# Patient Record
Sex: FEMALE | Race: WHITE | NOT HISPANIC OR LATINO | Employment: UNEMPLOYED | URBAN - NONMETROPOLITAN AREA
[De-identification: names, ages, dates, MRNs, and addresses within clinical notes are randomized per-mention and may not be internally consistent; named-entity substitution may affect disease eponyms.]

---

## 2017-03-30 ENCOUNTER — TRANSFERRED RECORDS (OUTPATIENT)
Dept: HEALTH INFORMATION MANAGEMENT | Facility: HOSPITAL | Age: 22
End: 2017-03-30

## 2017-03-30 ENCOUNTER — AMBULATORY - HEALTHEAST (OUTPATIENT)
Dept: MULTI SPECIALTY CLINIC | Facility: CLINIC | Age: 22
End: 2017-03-30

## 2017-03-30 LAB
C TRACH DNA SPEC QL PROBE+SIG AMP: NORMAL
N GONORRHOEA DNA SPEC QL PROBE+SIG AMP: NORMAL
PAP SMEAR - HIM PATIENT REPORTED: NORMAL
PAP: NORMAL

## 2017-03-31 LAB
C TRACH DNA SPEC QL PROBE+SIG AMP: NEGATIVE
N GONORRHOEA DNA SPEC QL PROBE+SIG AMP: NEGATIVE
SPECIMEN DESCRIP: NORMAL
SPECIMEN DESCRIPTION: NORMAL

## 2017-05-23 LAB
ABO + RH BLD: NORMAL
ABO + RH BLD: NORMAL
BLD GP AB SCN SERPL QL: NORMAL
HBV SURFACE AG SERPL QL IA: NORMAL
HCT VFR BLD AUTO: 40.5 %
HEMOGLOBIN: 13.5 G/DL (ref 11.7–15.7)
HIV 1+2 AB+HIV1 P24 AG SERPL QL IA: NORMAL
RUBELLA ABY IGG: NORMAL
T PALLIDUM IGG SER QL: NON REACTIVE

## 2017-07-21 ENCOUNTER — TRANSFERRED RECORDS (OUTPATIENT)
Dept: HEALTH INFORMATION MANAGEMENT | Facility: HOSPITAL | Age: 22
End: 2017-07-21

## 2017-07-21 LAB
GLU GEST SCREEN 1HR 50G: 190
GLU GEST SCREEN 1HR 50G: 190
HCT VFR BLD AUTO: 38.3 %
HEMOGLOBIN: 13.2 G/DL (ref 11.7–15.7)

## 2017-09-01 ENCOUNTER — TRANSFERRED RECORDS (OUTPATIENT)
Dept: HEALTH INFORMATION MANAGEMENT | Facility: HOSPITAL | Age: 22
End: 2017-09-01

## 2017-09-11 ENCOUNTER — TELEPHONE (OUTPATIENT)
Dept: OBGYN | Facility: OTHER | Age: 22
End: 2017-09-11

## 2017-09-11 NOTE — TELEPHONE ENCOUNTER
Pt states she has moved into town & would like to schedule with Dr Severino/Mony Fernández    She states she is 35 wks pregnant    Please call   [phone]

## 2017-09-15 ENCOUNTER — HOSPITAL ENCOUNTER (OUTPATIENT)
Dept: EDUCATION SERVICES | Facility: HOSPITAL | Age: 22
Discharge: HOME | End: 2017-09-15
Attending: NURSE PRACTITIONER | Admitting: NURSE PRACTITIONER
Payer: MEDICAID

## 2017-09-15 ENCOUNTER — TELEPHONE (OUTPATIENT)
Dept: EDUCATION SERVICES | Facility: HOSPITAL | Age: 22
End: 2017-09-15

## 2017-09-15 ENCOUNTER — PRENATAL OFFICE VISIT (OUTPATIENT)
Dept: OBGYN | Facility: OTHER | Age: 22
End: 2017-09-15
Attending: NURSE PRACTITIONER
Payer: MEDICAID

## 2017-09-15 VITALS
SYSTOLIC BLOOD PRESSURE: 120 MMHG | DIASTOLIC BLOOD PRESSURE: 72 MMHG | BODY MASS INDEX: 39.7 KG/M2 | HEIGHT: 66 IN | WEIGHT: 247 LBS

## 2017-09-15 VITALS
WEIGHT: 251 LBS | DIASTOLIC BLOOD PRESSURE: 78 MMHG | BODY MASS INDEX: 40.34 KG/M2 | OXYGEN SATURATION: 98 % | HEART RATE: 106 BPM | SYSTOLIC BLOOD PRESSURE: 123 MMHG | HEIGHT: 66 IN

## 2017-09-15 DIAGNOSIS — O24.415 GESTATIONAL DIABETES MELLITUS (GDM) IN THIRD TRIMESTER CONTROLLED ON ORAL HYPOGLYCEMIC DRUG: ICD-10-CM

## 2017-09-15 DIAGNOSIS — A60.04 HERPES SIMPLEX VIRUS (HSV) INFECTION OF VAGINA: Primary | ICD-10-CM

## 2017-09-15 DIAGNOSIS — O24.419 GESTATIONAL DIABETES MELLITUS (GDM) AFFECTING PREGNANCY: ICD-10-CM

## 2017-09-15 DIAGNOSIS — B00.9 RECURRENT HSV (HERPES SIMPLEX VIRUS): ICD-10-CM

## 2017-09-15 DIAGNOSIS — Z23 NEED FOR PROPHYLACTIC VACCINATION AND INOCULATION AGAINST INFLUENZA: ICD-10-CM

## 2017-09-15 DIAGNOSIS — O09.93 SUPERVISION OF HIGH RISK PREGNANCY IN THIRD TRIMESTER: ICD-10-CM

## 2017-09-15 DIAGNOSIS — O24.414 INSULIN CONTROLLED GESTATIONAL DIABETES MELLITUS (GDM) IN THIRD TRIMESTER: Primary | ICD-10-CM

## 2017-09-15 DIAGNOSIS — O09.613 HIGH-RISK FIRST PREGNANCY OF YOUNG WOMAN, THIRD TRIMESTER: ICD-10-CM

## 2017-09-15 PROCEDURE — 90686 IIV4 VACC NO PRSV 0.5 ML IM: CPT | Performed by: NURSE PRACTITIONER

## 2017-09-15 PROCEDURE — G0108 DIAB MANAGE TRN  PER INDIV: HCPCS

## 2017-09-15 PROCEDURE — 90715 TDAP VACCINE 7 YRS/> IM: CPT | Performed by: NURSE PRACTITIONER

## 2017-09-15 PROCEDURE — 87653 STREP B DNA AMP PROBE: CPT | Mod: ZL | Performed by: NURSE PRACTITIONER

## 2017-09-15 PROCEDURE — 90471 IMMUNIZATION ADMIN: CPT | Performed by: NURSE PRACTITIONER

## 2017-09-15 PROCEDURE — 90472 IMMUNIZATION ADMIN EACH ADD: CPT | Performed by: NURSE PRACTITIONER

## 2017-09-15 PROCEDURE — 99212 OFFICE O/P EST SF 10 MIN: CPT | Mod: 25 | Performed by: NURSE PRACTITIONER

## 2017-09-15 PROCEDURE — 99207 ZZC PRENATAL VISIT: CPT | Performed by: NURSE PRACTITIONER

## 2017-09-15 RX ORDER — VALACYCLOVIR HYDROCHLORIDE 500 MG/1
500 TABLET, FILM COATED ORAL 2 TIMES DAILY
Qty: 20 TABLET | Refills: 0 | Status: SHIPPED | OUTPATIENT
Start: 2017-09-15 | End: 2017-09-22

## 2017-09-15 ASSESSMENT — PAIN SCALES - GENERAL: PAINLEVEL: NO PAIN (0)

## 2017-09-15 NOTE — PROGRESS NOTES
Injectable Influenza Immunization Documentation    1.  Is the person to be vaccinated sick today? NO    2. Does the person to be vaccinated have an allergy to a component   of the vaccine? NO    3. Has the person to be vaccinated ever had a serious reaction   to influenza vaccine in the past? NO    4. Has the person to be vaccinated ever had Guillain-Barré syndrome? NO    Form completed by Annamarie Aguayo

## 2017-09-15 NOTE — TELEPHONE ENCOUNTER
Started Megyn on HumuLIN N U-100 - 10 units daily at bedtime. Do you want her to stop Glyguride? Thanks!

## 2017-09-15 NOTE — NURSING NOTE
"Chief Complaint   Patient presents with     Diabetes     consult       Initial /78 (BP Location: Left arm, Patient Position: Chair, Cuff Size: Adult Large)  Pulse 106  Ht 1.676 m (5' 6\")  Wt 113.9 kg (251 lb)  SpO2 98%  BMI 40.51 kg/m2 Estimated body mass index is 40.51 kg/(m^2) as calculated from the following:    Height as of this encounter: 1.676 m (5' 6\").    Weight as of this encounter: 113.9 kg (251 lb).  Medication Reconciliation: complete   Yue Blake      "

## 2017-09-15 NOTE — MR AVS SNAPSHOT
After Visit Summary   9/15/2017    Lorena Echevarria    MRN: [id number]           Patient Information     Date Of Birth          1995        Visit Information        Provider Department      9/15/2017 3:30 PM Mony Fernández NP [facility]        Today's Diagnoses     Herpes simplex virus (HSV) infection of vagina    -  1    Gestational diabetes mellitus (GDM) affecting pregnancy        Supervision of high risk pregnancy in third trimester        Need for prophylactic vaccination and inoculation against influenza        High-risk first pregnancy of young woman, third trimester        Recurrent HSV (herpes simplex virus)        Gestational diabetes mellitus (GDM) in third trimester controlled on oral hypoglycemic drug          Care Instructions    RTC 1 week          Follow-ups after your visit        Additional Services     DIABETES EDUCATION REFERRAL ([place])       DIABETES SELF-MANAGEMENT TRAINING (DSMT)  Type of training and number of hours requested:  Initial Group DMST; 10    (Medicare will cover:10 hours initial DSMT in 12-month period, plus 2 hours follow-up DSMT annually)    Please add if the patient has special educational need: Additional Insulin Training  (Patients with special needs requiring individual DSMT)    Please include the following DMST content: All ten content areas, as appropriate and Insulin start/management    Patient has the following:Pregnancy    Please begin Medical Nutritional Therapy.  Initial Medical Nutritional Therapy (MNT)  ([place] allows 3 hours initial MNT in the first calendar year, plus two hours follow up MNT annually.  Additional MNT hours are available for change in medical condition treatment and/or diagnosis)    Additional Services Provided:  >>A1c will be completed upon referral and completion of program unless completed in clinic.  >>Influenza vaccination assessment (form #N228) as applicable.  >>Order for diabetes supplies will be faxed to  "patient's pharmacy.  >>If on insulin: Insulin dose adjustment per staged Diabetes Mgmt. Protocols    DIABETES RESOURCE CENTER  [facility]  Telephone:  [phone]   Fax:  [phone]            DIABETES EDUCATOR REFERRAL       Pt is currently pregnant. Failed 3  hr GTT.                  Your next 10 appointments already scheduled     Sep 22, 2017 11:30 AM CDT   (Arrive by 11:15 AM)   ESTABLISHED PRENATAL with Fidel Severino MD   [facility] ([facility] )    [address]  [facility] [address]   [phone]              Future tests that were ordered for you today     Open Future Orders        Priority Expected Expires Ordered    DIABETES EDUCATOR REFERRAL Routine 9/15/2017 11/15/2017 9/15/2017            Who to contact     If you have questions or need follow up information about today's clinic visit or your schedule please contact [facility] directly at [phone].  Normal or non-critical lab and imaging results will be communicated to you by MyChart, letter or phone within 4 business days after the clinic has received the results. If you do not hear from us within 7 days, please contact the clinic through PluggedInhart or phone. If you have a critical or abnormal lab result, we will notify you by phone as soon as possible.  Submit refill requests through Purchext or call your pharmacy and they will forward the refill request to us. Please allow 3 business days for your refill to be completed.          Additional Information About Your Visit        Purchext Information     Purchext lets you send messages to your doctor, view your test results, renew your prescriptions, schedule appointments and more. To sign up, go to [email or web address] . Click on \"Log in\" on the left side of the screen, which will take you to the Welcome page. Then click on \"Sign up Now\" on the right side of the page.     You will be asked to enter the access code listed " "below, as well as some personal information. Please follow the directions to create your username and password.     Your access code is: [email or web address]  Expires: 2017 11:23 AM     Your access code will  in 90 days. If you need help or a new code, please call your [place] clinic or [phone].        Care EveryWhere ID     This is your Care EveryWhere ID. This could be used by other organizations to access your [place] medical records  [id number]        Your Vitals Were     Height BMI (Body Mass Index)                5' 6\" (1.676 m) 39.87 kg/m2           Blood Pressure from Last 3 Encounters:   09/15/17 120/72   14 128/87   14 112/52    Weight from Last 3 Encounters:   09/15/17 247 lb (112 kg)   14 238 lb 12 oz (108.3 kg) (>99 %)*   03/15/14 203 lb (92.1 kg) (98 %)*     * Growth percentiles are based on [facility] 2-20 Years data.              We Performed the Following     DIABETES EDUCATION REFERRAL ([place])     FLU VAC, SPLIT VIRUS IM > 3 YO (QUADRIVALENT) [22807]     Strep, Group B by PCR     TDAP VACCINE ([facility])     Vaccine Administration, Each Additional [78209]     VACCINE ADMINISTRATION, INITIAL          Today's Medication Changes          These changes are accurate as of: 9/15/17  4:12 PM.  If you have any questions, ask your nurse or doctor.               These medicines have changed or have updated prescriptions.        Dose/Directions    * VALACYCLOVIR HCL PO   This may have changed:  Another medication with the same name was added. Make sure you understand how and when to take each.   Changed by:  Mony Fernnádez, NP        Dose:  500 mg   Take 500 mg by mouth daily   Refills:  0       * valACYclovir 500 MG tablet   Commonly known as:  VALTREX   This may have changed:  You were already taking a medication with the same name, and this prescription was added. Make sure you understand how and when to take each.   Used for:  Herpes simplex virus (HSV) infection of vagina   Changed " by:  Mony Fernández, NP        Dose:  500 mg   Take 1 tablet (500 mg) by mouth 2 times daily   Quantity:  20 tablet   Refills:  0       * Notice:  This list has 2 medication(s) that are the same as other medications prescribed for you. Read the directions carefully, and ask your doctor or other care provider to review them with you.         Where to get your medicines      These medications were sent to [id number] - [place], [place] - [address] AT [facility] of [address], [facility] [address]     Phone:  [phone]     valACYclovir 500 MG tablet                Primary Care Provider    None       No address on file        Equal Access to Services     EDMOND QUIÑONES : [place], waen alcala, qadebra kaalmada sury, mayito dove . [facility] [phone].    ATENCIÓN: Si habla español, tiene a hein disposición servicios gratuitos de asistencia lingüística. Llame al [phone].    We comply with applicable federal civil rights laws and [place] laws. We do not discriminate on the basis of race, color, national origin, age, disability sex, sexual orientation or gender identity.            Thank you!     Thank you for choosing [facility]  for your care. Our goal is always to provide you with excellent care. Hearing back from our patients is one way we can continue to improve our services. Please take a few minutes to complete the written survey that you may receive in the mail after your visit with us. Thank you!             Your Updated Medication List - Protect others around you: Learn how to safely use, store and throw away your medicines at www.disposemymeds.org.          This list is accurate as of: 9/15/17  4:12 PM.  Always use your most recent med list.                   Brand Name Dispense Instructions for use Diagnosis    BUPROPION HCL PO      Take 150 mg by mouth daily        GLYBURIDE PO      Take 2.5 mg by mouth 2  times daily (with meals)        PRENATAL VITAMIN PO      Take 1 tablet by mouth daily        * VALACYCLOVIR HCL PO      Take 500 mg by mouth daily        * valACYclovir 500 MG tablet    VALTREX    20 tablet    Take 1 tablet (500 mg) by mouth 2 times daily    Herpes simplex virus (HSV) infection of vagina       * Notice:  This list has 2 medication(s) that are the same as other medications prescribed for you. Read the directions carefully, and ask your doctor or other care provider to review them with you.

## 2017-09-15 NOTE — DISCHARGE INSTRUCTIONS
Inject HumuLIN N 10 units daily at 9 pm    Continue checking blood glucose 4 times daily    I will call you on Monday, 9/18/17, for BG readings    Concerns, please call Laura at [phone]

## 2017-09-15 NOTE — NURSING NOTE
"Chief Complaint   Patient presents with     Prenatal Care     Transfer of care from [place], [place]       Initial /72  Ht 5' 6\" (1.676 m)  Wt 247 lb (112 kg)  BMI 39.87 kg/m2 Estimated body mass index is 39.87 kg/(m^2) as calculated from the following:    Height as of this encounter: 5' 6\" (1.676 m).    Weight as of this encounter: 247 lb (112 kg).  Medication Reconciliation: complete     GM LEDBETTER      "

## 2017-09-15 NOTE — PROGRESS NOTES
New ob WILFREDO.  Pt does not have records.  Release completed.  .  HSV with report of weekly outbreaks and not on suppression.  Begin treatment dose with switch to suppression once resolved.  GDM on glyburide.  Running out of test strips so only testing in the morning.  Fasting -200+.  Plan to see DC today and start insulin and monitoring.  BPP and NST set up for Monday.  Tdap and flu shot today.  States 20 week US was normal and girl.  Denies hx of other STI's.  Denies cramping or bleeding.  Baby active.  Seeing Dr Severino in 1 week.

## 2017-09-15 NOTE — IP AVS SNAPSHOT
MRN:[id number]                      After Visit Summary   9/15/2017    Lorena Echevarria    MRN: [id number]           Thank you!     Thank you for choosing [facility] for your care. Our goal is always to provide you with excellent care. Hearing back from our patients is one way we can continue to improve our services. Please take a few minutes to complete the written survey that you may receive in the mail after you visit with us. Thank you!        Patient Information     Date Of Birth          1995        About your hospital stay     You were admitted on:  September 15, 2017 You last received care in the:  [facility]    You were discharged on:  September 15, 2017       Who to Call     For medical emergencies, please call 911.  For non-urgent questions about your medical care, please call your primary care provider or clinic, None          Attending Provider     Provider Specialty    Mony Fernández, AJ OB/Gyn    [facility]       Primary Care Provider    None      Your next 10 appointments already scheduled     Sep 22, 2017 11:30 AM CDT   (Arrive by 11:15 AM)   ESTABLISHED PRENATAL with Fidel Severino MD   [facility] ([facility] )    [address] [facility] [address]   [phone]              Further instructions from your care team       Inject HumuLIN N 10 units daily at 9 pm    Continue checking blood glucose 4 times daily    I will call you on Monday, 9/18/17, for BG readings    Concerns, please call Laura at [phone]    Pending Results     Date and Time Order Name Status Description    9/15/2017 1600 GROUP B STREP PCR In process             Admission Information     Date & Time Provider Department Dept. Phone    9/15/2017 Nataliya Barrera [facility] [phone]      Your Vitals Were     Blood Pressure Pulse Height Weight Pulse Oximetry BMI (Body Mass Index)    123/78 (BP Location: Left arm, Patient Position: Chair, Cuff  "Size: Adult Large) 106 1.676 m (5' 6\") 113.9 kg (251 lb) 98% 40.51 kg/m2      [employer] Information     Editlite lets you send messages to your doctor, view your test results, renew your prescriptions, schedule appointments and more. To sign up, go to [email or web address] . Click on \"Log in\" on the left side of the screen, which will take you to the Welcome page. Then click on \"Sign up Now\" on the right side of the page.     You will be asked to enter the access code listed below, as well as some personal information. Please follow the directions to create your username and password.     Your access code is: [email or web address]  Expires: 2017 11:23 AM     Your access code will  in 90 days. If you need help or a new code, please call your [place] clinic or [phone].        Care EveryWhere ID     This is your Care EveryWhere ID. This could be used by other organizations to access your [place] medical records  [id number]        Equal Access to Services     [facility] Center[facility] : [place], waen alcala, stephanie ga, mayito nunez. So [facility] [phone].    ATENCIÓN: Si habla español, tiene a hein disposición servicios gratuitos de asistencia lingüística. Huyen al [phone].    We comply with applicable federal civil rights laws and [place] laws. We do not discriminate on the basis of race, color, national origin, age, disability sex, sexual orientation or gender identity.               Review of your medicines      UNREVIEWED medicines. Ask your doctor about these medicines        Dose / Directions    BUPROPION HCL PO        Dose:  150 mg   Take 150 mg by mouth daily   Refills:  0       GLYBURIDE PO        Dose:  2.5 mg   Take 2.5 mg by mouth 2 times daily (with meals)   Refills:  0       PRENATAL VITAMIN PO        Dose:  1 tablet   Take 1 tablet by mouth daily   Refills:  0       * VALACYCLOVIR HCL PO   This may have changed:  Another medication " with the same name was added. Make sure you understand how and when to take each.        Dose:  500 mg   Take 500 mg by mouth daily   Refills:  0       * valACYclovir 500 MG tablet   Commonly known as:  VALTREX   This may have changed:  You were already taking a medication with the same name, and this prescription was added. Make sure you understand how and when to take each.   Used for:  Herpes simplex virus (HSV) infection of vagina        Dose:  500 mg   Take 1 tablet (500 mg) by mouth 2 times daily   Quantity:  20 tablet   Refills:  0       * Notice:  This list has 2 medication(s) that are the same as other medications prescribed for you. Read the directions carefully, and ask your doctor or other care provider to review them with you.         Where to get your medicines      These medications were sent to [facility], [place] - [address] AT [facility] of [facility] [address] [place] [address]     Phone:  [phone]     valACYclovir 500 MG tablet                Protect others around you: Learn how to safely use, store and throw away your medicines at www.disposemymeds.org.             Medication List: This is a list of all your medications and when to take them. Check marks below indicate your daily home schedule. Keep this list as a reference.      Medications           Morning Afternoon Evening Bedtime As Needed    BUPROPION HCL PO   Take 150 mg by mouth daily                                GLYBURIDE PO   Take 2.5 mg by mouth 2 times daily (with meals)                                PRENATAL VITAMIN PO   Take 1 tablet by mouth daily                                * VALACYCLOVIR HCL PO   Take 500 mg by mouth daily                                * valACYclovir 500 MG tablet   Commonly known as:  VALTREX   Take 1 tablet (500 mg) by mouth 2 times daily                                * Notice:  This list has 2 medication(s) that are the same as other medications prescribed for  you. Read the directions carefully, and ask your doctor or other care provider to review them with you.

## 2017-09-15 NOTE — PROGRESS NOTES
Lorena is here to start HumuLIN N insulin. Pt instructed on insulin action, dosage, injection technique, site rotation, sharps disposal, insulin storage.. Pt appropriately demonstrated proper injection technique with minmal cueing. She is also taking Glyburide 5 mg bid. Lorena needs BG test strips. Prescription sent to [facility].    Plan:  Inject HumuLIN N 10 units daily at 9 pm    Continue checking blood glucose 4 times daily    I will call you on Monday, 9/18/17, for BG readings    Concerns, please call Laura at [phone]    Time spent with patient 30 minutes.

## 2017-09-16 LAB
GP B STREP DNA SPEC QL NAA+PROBE: NEGATIVE
SPECIMEN SOURCE: NORMAL

## 2017-09-18 ENCOUNTER — HOSPITAL ENCOUNTER (OUTPATIENT)
Dept: ULTRASOUND IMAGING | Facility: HOSPITAL | Age: 22
Discharge: HOME | End: 2017-09-18
Attending: NURSE PRACTITIONER | Admitting: NURSE PRACTITIONER
Payer: MEDICAID

## 2017-09-18 PROCEDURE — 76819 FETAL BIOPHYS PROFIL W/O NST: CPT | Mod: TC

## 2017-09-18 PROCEDURE — 76819 FETAL BIOPHYS PROFIL W/O NST: CPT | Mod: TC | Performed by: RADIOLOGY

## 2017-09-22 ENCOUNTER — PRENATAL OFFICE VISIT (OUTPATIENT)
Dept: OBGYN | Facility: OTHER | Age: 22
End: 2017-09-22
Attending: OBSTETRICS & GYNECOLOGY
Payer: MEDICAID

## 2017-09-22 ENCOUNTER — OFFICE VISIT (OUTPATIENT)
Dept: BEHAVIORAL HEALTH | Facility: OTHER | Age: 22
End: 2017-09-22
Attending: SOCIAL WORKER
Payer: MEDICAID

## 2017-09-22 ENCOUNTER — TELEPHONE (OUTPATIENT)
Dept: EDUCATION SERVICES | Facility: HOSPITAL | Age: 22
End: 2017-09-22

## 2017-09-22 VITALS
HEART RATE: 106 BPM | WEIGHT: 255 LBS | OXYGEN SATURATION: 97 % | SYSTOLIC BLOOD PRESSURE: 118 MMHG | BODY MASS INDEX: 40.98 KG/M2 | DIASTOLIC BLOOD PRESSURE: 81 MMHG | HEIGHT: 66 IN

## 2017-09-22 DIAGNOSIS — R87.612 PAPANICOLAOU SMEAR OF CERVIX WITH LOW GRADE SQUAMOUS INTRAEPITHELIAL LESION (LGSIL): ICD-10-CM

## 2017-09-22 DIAGNOSIS — O24.415 GESTATIONAL DIABETES MELLITUS (GDM) IN THIRD TRIMESTER CONTROLLED ON ORAL HYPOGLYCEMIC DRUG: Primary | ICD-10-CM

## 2017-09-22 DIAGNOSIS — R69 DIAGNOSIS DEFERRED: Primary | ICD-10-CM

## 2017-09-22 DIAGNOSIS — A60.04 HERPES SIMPLEX VIRUS (HSV) INFECTION OF VAGINA: ICD-10-CM

## 2017-09-22 DIAGNOSIS — O09.613 HIGH-RISK FIRST PREGNANCY OF YOUNG WOMAN, THIRD TRIMESTER: ICD-10-CM

## 2017-09-22 DIAGNOSIS — O24.415 GESTATIONAL DIABETES MELLITUS (GDM) IN THIRD TRIMESTER CONTROLLED ON ORAL HYPOGLYCEMIC DRUG: ICD-10-CM

## 2017-09-22 PROCEDURE — 99207 ZZC FIRST OB VISIT: CPT | Performed by: OBSTETRICS & GYNECOLOGY

## 2017-09-22 PROCEDURE — 99212 OFFICE O/P EST SF 10 MIN: CPT

## 2017-09-22 RX ORDER — VALACYCLOVIR HYDROCHLORIDE 500 MG/1
500 TABLET, FILM COATED ORAL DAILY
Qty: 30 TABLET | Refills: 1 | Status: SHIPPED | OUTPATIENT
Start: 2017-09-22 | End: 2017-11-06

## 2017-09-22 ASSESSMENT — PAIN SCALES - GENERAL: PAINLEVEL: MODERATE PAIN (4)

## 2017-09-22 NOTE — MR AVS SNAPSHOT
After Visit Summary   9/22/2017    Lorena Echevarria    MRN: [id number]           Patient Information     Date Of Birth          1995        Visit Information        Provider Department      9/22/2017 1:00 PM Barbara Flores LSW [facility]        Today's Diagnoses     Diagnosis deferred    -  1       Follow-ups after your visit        Your next 10 appointments already scheduled     Sep 27, 2017  3:00 PM CDT   (Arrive by 2:45 PM)   Return Visit with Laura Barrera RN   [facility] ([facility] )    [address] [place] [id number] [phone]            Sep 29, 2017 11:30 AM CDT   (Arrive by 11:15 AM)   ESTABLISHED PRENATAL with Fidel Severino MD   [facility] ([facility] )    [address] [place] [id number] [phone]              Future tests that were ordered for you today     Open Standing Orders        Priority Remaining Interval Expires Ordered    US Biophys Single Gestation w Measure (Clinic Performed) Routine 5/6 weekly 11/3/2017 9/22/2017            Who to contact     If you have questions or need follow up information about today's clinic visit or your schedule please contact [facility] directly at [phone].  Normal or non-critical lab and imaging results will be communicated to you by GIVTEDhart, letter or phone within 4 business days after the clinic has received the results. If you do not hear from us within 7 days, please contact the clinic through GIVTEDhart or phone. If you have a critical or abnormal lab result, we will notify you by phone as soon as possible.  Submit refill requests through Artist Growth or call your pharmacy and they will forward the refill request to us. Please allow 3 business days for your refill to be completed.          Additional Information About Your Visit        GIVTEDhart Information     Artist Growth lets you send messages to your doctor, view your test results, renew your  "prescriptions, schedule appointments and more. To sign up, go to [email or web address] . Click on \"Log in\" on the left side of the screen, which will take you to the Welcome page. Then click on \"Sign up Now\" on the right side of the page.     You will be asked to enter the access code listed below, as well as some personal information. Please follow the directions to create your username and password.     Your access code is: [email or web address]  Expires: 2017 11:23 AM     Your access code will  in 90 days. If you need help or a new code, please call your [place] clinic or [phone].        Care EveryWhere ID     This is your Care EveryWhere ID. This could be used by other organizations to access your [place] medical records  [id number]         Blood Pressure from Last 3 Encounters:   17 118/81   09/15/17 120/72   09/15/17 123/78    Weight from Last 3 Encounters:   17 255 lb (115.7 kg)   09/15/17 247 lb (112 kg)   09/15/17 251 lb (113.9 kg)              Today, you had the following     No orders found for display         Where to get your medicines      These medications were sent to [facility] [address] [place] [address] [address] AT [facility] of [facility] [address]  [address], [facility] [address]     Phone:  [phone]     valACYclovir 500 MG tablet          Primary Care Provider    None       No address on file        Equal Access to Services     [facility] AH: Hadii emelia damon hadasho Soomaali, waaxda luqadaha, qaybta kaalmada adeegyada, mayito nunez. So [facility] [phone].    ATENCIÓN: Si habla español, tiene a hein disposición servicios gratuitos de asistencia lingüística. Llame al [phone].    We comply with applicable federal civil rights laws and [place] laws. We do not discriminate on the basis of race, color, national origin, age, disability sex, sexual orientation or gender identity.            Thank you!     [facility] you for [facility]  [facility] your [facility]. Our goal is always to provide you with excellent care. Hearing back from our patients is one way we can continue to improve our services. Please take a few minutes to complete the written survey that you may receive in the mail after your visit with us. Thank you!             Your Updated Medication List - Protect others around you: Learn how to safely use, store and throw away your medicines at www.disposemymeds.org.          This list is accurate as of: 9/22/17  4:13 PM.  Always use your most recent med list.                   Brand Name Dispense Instructions for use Diagnosis    blood glucose monitoring test strip    ONE TOUCH ULTRA    100 each    Use to test blood sugar 4 times daily.    Gestational diabetes mellitus (GDM) in third trimester controlled on oral hypoglycemic drug       BUPROPION HCL PO      Take 150 mg by mouth daily        GLYBURIDE PO      Take 2.5 mg by mouth 2 times daily (with meals)        insulin isophane human 100 UNIT/ML injection    HumuLIN N PEN     Inject 10 Units Subcutaneous At Bedtime    Gestational diabetes mellitus (GDM) in third trimester controlled on oral hypoglycemic drug       PRENATAL VITAMIN PO      Take 1 tablet by mouth daily        valACYclovir 500 MG tablet    VALTREX    30 tablet    Take 1 tablet (500 mg) by mouth daily    Herpes simplex virus (HSV) infection of vagina

## 2017-09-22 NOTE — PROGRESS NOTES
ELVIA CC introduced and explained integrated health model, brief therapy interventions and referral/ support services for ongoing therapy interventions.  Discussed [facility] services, patient  Was not interested at this time.   Doctor, Fidel Severino is concerned about this patient as she is not managing her diabetes and doesn't have medical insurance. Gave patient CAF to fill out for [facility] services. Patient stated that she did not need help with anything else, but business cards were left with her. Will check back with her next week when she see OB to see if she needed any other assistance. Will qualify for [facility] when she is connected to [place].     September 22, 2017 LINDA ElderW

## 2017-09-22 NOTE — PROGRESS NOTES
Doing well.  No concerns today.  Recent URI.  Not checking blood sugars as cannot afford testing supplies.  Not taking Valtrex.   Erx done.  H/o HSV, GDM.    consulted.  Diabetes center today for supplies  BPP 8/8 today.   Wkly BPP and US growth ordered.  2x weekly NST.  Discussed kick counts and fetal movement.g  She will report to OB if contractions every 5-10 minutes or if rupture of membranes.  RTC in 1/2 week  Denies regular contractions, vaginal bleeding, LOF  25 minutes were spent with the patient with greater than 50% of the visit spent in face-to-face counseling and coordination of care.    Fidel Severino MD  9/22/2017

## 2017-09-22 NOTE — NURSING NOTE
"Chief Complaint   Patient presents with     Prenatal Care     36 weeks and 6 days     Transferred OB Care       Initial /81 (BP Location: Left arm, Cuff Size: Adult Large)  Pulse 106  Ht 5' 6\" (1.676 m)  Wt 255 lb (115.7 kg)  SpO2 97%  BMI 41.16 kg/m2 Estimated body mass index is 41.16 kg/(m^2) as calculated from the following:    Height as of this encounter: 5' 6\" (1.676 m).    Weight as of this encounter: 255 lb (115.7 kg).  Medication Reconciliation: etelvina [email or web address]      "

## 2017-09-22 NOTE — MR AVS SNAPSHOT
After Visit Summary   9/22/2017    Lorena Echevarria    MRN: [id number]           Patient Information     Date Of Birth          1995        Visit Information        Provider Department      9/22/2017 11:30 AM Fidel Severino MD [facility]        Today's Diagnoses     High-risk first pregnancy of young woman, third trimester        Gestational diabetes mellitus (GDM) in third trimester controlled on oral hypoglycemic drug        Papanicolaou smear of cervix with low grade squamous intraepithelial lesion (LGSIL)        Herpes simplex virus (HSV) infection of vagina          Care Instructions    F/u 1 wk          Follow-ups after your visit        Your next 10 appointments already scheduled     Sep 27, 2017  3:00 PM CDT   (Arrive by 2:45 PM)   Return Visit with Laura Barrera RN   [facility] ([facility] )    [address] [place] [id number] [phone]            Sep 29, 2017 11:30 AM CDT   (Arrive by 11:15 AM)   ESTABLISHED PRENATAL with Fidel Severino MD   [facility] ([facility] )    [address] [place] [address] [id number] [phone]              Future tests that were ordered for you today     Open Standing Orders        Priority Remaining Interval Expires Ordered    US Biophys Single Gestation w Measure (Clinic Performed) Routine 5/6 weekly 11/3/2017 9/22/2017            Who to contact     If you have questions or need follow up information about today's clinic visit or your schedule please contact [facility] directly at [phone].  Normal or non-critical lab and imaging results will be communicated to you by MyChart, letter or phone within 4 business days after the clinic has received the results. If you do not hear from us within 7 days, please contact the clinic through MyChart or phone. If you have a critical or abnormal lab result, we will notify you by phone as soon as possible.  Submit refill  "requests through [employer] or call your pharmacy and they will forward the refill request to us. Please allow 3 business days for your refill to be completed.          Additional Information About Your Visit        [employer] Information     [employer] lets you send messages to your doctor, view your test results, renew your prescriptions, schedule appointments and more. To sign up, go to [email or web address] . Click on \"Log in\" on the left side of the screen, which will take you to the Welcome page. Then click on \"Sign up Now\" on the right side of the page.     You will be asked to enter the access code listed below, as well as some personal information. Please follow the directions to create your username and password.     Your access code is: [email or web address]  Expires: 2017 11:23 AM     Your access code will  in 90 days. If you need help or a new code, please call your [place] clinic or [phone].        Care EveryWhere ID     This is your Care EveryWhere ID. This could be used by other organizations to access your [place] medical records  [id number]        Your Vitals Were     Pulse Height Pulse Oximetry BMI (Body Mass Index)          106 1.676 m (5' 6\") 97% 41.16 kg/m2         Blood Pressure from Last 3 Encounters:   17 118/81   09/15/17 120/72   09/15/17 123/78    Weight from Last 3 Encounters:   17 115.7 kg (255 lb)   09/15/17 112 kg (247 lb)   09/15/17 113.9 kg (251 lb)                 Where to get your medicines      These medications were sent to [id number]  [place], [place] - [address] AT [facility] [address]  [address], [place] [address]     Phone:  [phone]     valACYclovir 500 MG tablet          Primary Care Provider    None       No address on file        Equal Access to Services     [facility] ISHMAEL AH: [place], richelle alcala, mayito zavala. [facility] [phone].    ATENCIÓN: " Si habla valerio, tiene a hein disposición servicios gratuitos de asistencia lingüística. Huyen rosa [phone].    We comply with applicable federal civil rights laws and [place] laws. We do not discriminate on the basis of race, color, national origin, age, disability sex, sexual orientation or gender identity.            Thank you!     Thank you for choosing [facility]  for your care. Our goal is always to provide you with excellent care. Hearing back from our patients is one way we can continue to improve our services. Please take a few minutes to complete the written survey that you may receive in the mail after your visit with us. Thank you!             Your Updated Medication List - Protect others around you: Learn how to safely use, store and throw away your medicines at www.disposemymeds.org.          This list is accurate as of: 9/22/17 11:50 AM.  Always use your most recent med list.                   Brand Name Dispense Instructions for use Diagnosis    blood glucose monitoring test strip    ONE TOUCH ULTRA    100 each    Use to test blood sugar 4 times daily.    Gestational diabetes mellitus (GDM) in third trimester controlled on oral hypoglycemic drug       BUPROPION HCL PO      Take 150 mg by mouth daily        GLYBURIDE PO      Take 2.5 mg by mouth 2 times daily (with meals)        insulin isophane human 100 UNIT/ML injection    HumuLIN N PEN     Inject 10 Units Subcutaneous At Bedtime    Gestational diabetes mellitus (GDM) in third trimester controlled on oral hypoglycemic drug       PRENATAL VITAMIN PO      Take 1 tablet by mouth daily        valACYclovir 500 MG tablet    VALTREX    30 tablet    Take 1 tablet (500 mg) by mouth daily    Herpes simplex virus (HSV) infection of vagina

## 2017-09-23 NOTE — TELEPHONE ENCOUNTER
Requested to meet with patient during today's prenatal visit. HumuLIN N started last Friday, 9/15/17 and increased via phone call to 15 units daily on 9/18/17 with patient report of FBG between 90 to 95.    Patient has no BG testing supplies as she has no insurance coverage. Lorena tells me that she has only tested twice since our conversation on 9/18/17. Unable to adjust insulin today. Gave her a Jesse Contour Next meter today and 20 test strips. Lorena reports taking her insulin every day.    Spoke with Dr. Severino about continuing her Glyburide 25 mg bid and he states to continue it. I told Lorena that she should continue the Glyburide.    Will follow with Lorena by phone on 9/25/17 and at appointment on 9/27/17.

## 2017-09-25 ENCOUNTER — TELEPHONE (OUTPATIENT)
Dept: EDUCATION SERVICES | Facility: HOSPITAL | Age: 22
End: 2017-09-25

## 2017-09-25 NOTE — TELEPHONE ENCOUNTER
Called Lorena. She is taking HumuLIN N 15 units daily and Glyburide 2.5 mg bid. Reports readings as follows: fasting 80-90 and post-meals 120 and below. No change in insulin. She has not heard about if she has insurance yet. I will follow with her on Wednesday, 9/27/17.

## 2017-09-26 NOTE — TELEPHONE ENCOUNTER
Sorry for the delayed response; I have been out of the office.   How is she doing currently with the Humulin?  OK to stop glyburide if adequately controled with the humulin.

## 2017-09-27 ENCOUNTER — TELEPHONE (OUTPATIENT)
Dept: EDUCATION SERVICES | Facility: HOSPITAL | Age: 22
End: 2017-09-27

## 2017-09-27 ENCOUNTER — HOSPITAL ENCOUNTER (EMERGENCY)
Facility: HOSPITAL | Age: 22
Discharge: STILL ADMITTED OR ADMINISTRATIVE | End: 2017-09-27
Admitting: EMERGENCY MEDICINE
Payer: COMMERCIAL

## 2017-09-27 ENCOUNTER — HOSPITAL ENCOUNTER (OUTPATIENT)
Facility: HOSPITAL | Age: 22
Discharge: HOME | End: 2017-09-27
Attending: OBSTETRICS & GYNECOLOGY | Admitting: OBSTETRICS & GYNECOLOGY
Payer: MEDICAID

## 2017-09-27 VITALS
TEMPERATURE: 98.2 F | HEIGHT: 66 IN | WEIGHT: 245 LBS | SYSTOLIC BLOOD PRESSURE: 106 MMHG | RESPIRATION RATE: 22 BRPM | DIASTOLIC BLOOD PRESSURE: 50 MMHG | BODY MASS INDEX: 39.37 KG/M2

## 2017-09-27 DIAGNOSIS — M54.50 ACUTE RIGHT-SIDED LOW BACK PAIN WITHOUT SCIATICA: Primary | ICD-10-CM

## 2017-09-27 PROBLEM — Z36.89 ENCOUNTER FOR TRIAGE IN PREGNANT PATIENT: Status: ACTIVE | Noted: 2017-09-27

## 2017-09-27 LAB
ALBUMIN UR-MCNC: NEGATIVE MG/DL
ALBUMIN UR-MCNC: NEGATIVE MG/DL
AMPHETAMINES UR QL SCN: NEGATIVE
ANION GAP SERPL CALCULATED.3IONS-SCNC: 9 MMOL/L (ref 3–14)
APPEARANCE UR: ABNORMAL
APPEARANCE UR: CLEAR
BACTERIA #/AREA URNS HPF: ABNORMAL /HPF
BACTERIA #/AREA URNS HPF: ABNORMAL /HPF
BARBITURATES UR QL: NEGATIVE
BENZODIAZ UR QL: NEGATIVE
BILIRUB UR QL STRIP: NEGATIVE
BILIRUB UR QL STRIP: NEGATIVE
BUN SERPL-MCNC: 6 MG/DL (ref 7–30)
CALCIUM SERPL-MCNC: 8.7 MG/DL (ref 8.5–10.1)
CANNABINOIDS UR QL SCN: NEGATIVE
CHLORIDE SERPL-SCNC: 105 MMOL/L (ref 94–109)
CO2 SERPL-SCNC: 23 MMOL/L (ref 20–32)
COCAINE UR QL: NEGATIVE
COLOR UR AUTO: ABNORMAL
COLOR UR AUTO: YELLOW
CREAT SERPL-MCNC: 0.57 MG/DL (ref 0.52–1.04)
ERYTHROCYTE [DISTWIDTH] IN BLOOD BY AUTOMATED COUNT: 13 % (ref 10–15)
EST. AVERAGE GLUCOSE BLD GHB EST-MCNC: 103 MG/DL
GFR SERPL CREATININE-BSD FRML MDRD: >90 ML/MIN/1.7M2
GLUCOSE SERPL-MCNC: 80 MG/DL (ref 70–99)
GLUCOSE UR STRIP-MCNC: NEGATIVE MG/DL
GLUCOSE UR STRIP-MCNC: NEGATIVE MG/DL
HBA1C MFR BLD: 5.2 % (ref 4.3–6)
HCT VFR BLD AUTO: 36.4 % (ref 35–47)
HGB BLD-MCNC: 12.4 G/DL (ref 11.7–15.7)
HGB UR QL STRIP: NEGATIVE
HGB UR QL STRIP: NEGATIVE
KETONES UR STRIP-MCNC: NEGATIVE MG/DL
KETONES UR STRIP-MCNC: NEGATIVE MG/DL
LEUKOCYTE ESTERASE UR QL STRIP: ABNORMAL
LEUKOCYTE ESTERASE UR QL STRIP: ABNORMAL
MCH RBC QN AUTO: 30.5 PG (ref 26.5–33)
MCHC RBC AUTO-ENTMCNC: 34.1 G/DL (ref 31.5–36.5)
MCV RBC AUTO: 90 FL (ref 78–100)
METHADONE UR QL SCN: NEGATIVE
MUCOUS THREADS #/AREA URNS LPF: PRESENT /LPF
NITRATE UR QL: NEGATIVE
NITRATE UR QL: NEGATIVE
OPIATES UR QL SCN: NEGATIVE
PCP UR QL SCN: NEGATIVE
PH UR STRIP: 6.5 PH (ref 4.7–8)
PH UR STRIP: 7 PH (ref 4.7–8)
PLATELET # BLD AUTO: 200 10E9/L (ref 150–450)
POTASSIUM SERPL-SCNC: 4.3 MMOL/L (ref 3.4–5.3)
RBC # BLD AUTO: 4.06 10E12/L (ref 3.8–5.2)
RBC #/AREA URNS AUTO: 12 /HPF (ref 0–2)
RBC #/AREA URNS AUTO: 7 /HPF (ref 0–2)
SODIUM SERPL-SCNC: 137 MMOL/L (ref 133–144)
SOURCE: ABNORMAL
SOURCE: ABNORMAL
SP GR UR STRIP: 1 (ref 1–1.03)
SP GR UR STRIP: 1.01 (ref 1–1.03)
SQUAMOUS #/AREA URNS AUTO: 11 /HPF (ref 0–1)
SQUAMOUS #/AREA URNS AUTO: 4 /HPF (ref 0–1)
UROBILINOGEN UR STRIP-MCNC: NORMAL MG/DL (ref 0–2)
UROBILINOGEN UR STRIP-MCNC: NORMAL MG/DL (ref 0–2)
WBC # BLD AUTO: 10.3 10E9/L (ref 4–11)
WBC #/AREA URNS AUTO: 31 /HPF (ref 0–2)
WBC #/AREA URNS AUTO: 53 /HPF (ref 0–2)

## 2017-09-27 PROCEDURE — 25000125 ZZHC RX 250

## 2017-09-27 PROCEDURE — 76816 OB US FOLLOW-UP PER FETUS: CPT | Mod: TC

## 2017-09-27 PROCEDURE — 96365 THER/PROPH/DIAG IV INF INIT: CPT

## 2017-09-27 PROCEDURE — 25000128 H RX IP 250 OP 636: Performed by: OBSTETRICS & GYNECOLOGY

## 2017-09-27 PROCEDURE — 96360 HYDRATION IV INFUSION INIT: CPT

## 2017-09-27 PROCEDURE — 59025 FETAL NON-STRESS TEST: CPT

## 2017-09-27 PROCEDURE — 40000788 ZZHCL STATISTIC ESTIMATED AVERAGE GLUCOSE: Performed by: OBSTETRICS & GYNECOLOGY

## 2017-09-27 PROCEDURE — 99235 HOSP IP/OBS SAME DATE MOD 70: CPT | Mod: 25 | Performed by: OBSTETRICS & GYNECOLOGY

## 2017-09-27 PROCEDURE — 40000268 ZZH STATISTIC NO CHARGES

## 2017-09-27 PROCEDURE — 25000132 ZZH RX MED GY IP 250 OP 250 PS 637: Performed by: OBSTETRICS & GYNECOLOGY

## 2017-09-27 PROCEDURE — 83036 HEMOGLOBIN GLYCOSYLATED A1C: CPT | Performed by: OBSTETRICS & GYNECOLOGY

## 2017-09-27 PROCEDURE — 87086 URINE CULTURE/COLONY COUNT: CPT | Performed by: OBSTETRICS & GYNECOLOGY

## 2017-09-27 PROCEDURE — 76819 FETAL BIOPHYS PROFIL W/O NST: CPT | Mod: TC

## 2017-09-27 PROCEDURE — 76770 US EXAM ABDO BACK WALL COMP: CPT | Mod: TC

## 2017-09-27 PROCEDURE — 99214 OFFICE O/P EST MOD 30 MIN: CPT | Mod: 25

## 2017-09-27 PROCEDURE — 96372 THER/PROPH/DIAG INJ SC/IM: CPT | Mod: 59

## 2017-09-27 PROCEDURE — 85027 COMPLETE CBC AUTOMATED: CPT | Performed by: OBSTETRICS & GYNECOLOGY

## 2017-09-27 PROCEDURE — 80307 DRUG TEST PRSMV CHEM ANLYZR: CPT | Performed by: OBSTETRICS & GYNECOLOGY

## 2017-09-27 PROCEDURE — 80048 BASIC METABOLIC PNL TOTAL CA: CPT | Performed by: OBSTETRICS & GYNECOLOGY

## 2017-09-27 PROCEDURE — 81001 URINALYSIS AUTO W/SCOPE: CPT | Performed by: OBSTETRICS & GYNECOLOGY

## 2017-09-27 PROCEDURE — 59025 FETAL NON-STRESS TEST: CPT | Mod: 26 | Performed by: OBSTETRICS & GYNECOLOGY

## 2017-09-27 RX ORDER — LIDOCAINE HYDROCHLORIDE 10 MG/ML
INJECTION, SOLUTION EPIDURAL; INFILTRATION; INTRACAUDAL; PERINEURAL
Status: COMPLETED
Start: 2017-09-27 | End: 2017-09-27

## 2017-09-27 RX ORDER — LIDOCAINE HYDROCHLORIDE 10 MG/ML
0.3 INJECTION, SOLUTION EPIDURAL; INFILTRATION; INTRACAUDAL; PERINEURAL ONCE
Status: COMPLETED | OUTPATIENT
Start: 2017-09-27 | End: 2017-09-27

## 2017-09-27 RX ORDER — OXYCODONE AND ACETAMINOPHEN 5; 325 MG/1; MG/1
1-2 TABLET ORAL EVERY 6 HOURS PRN
Qty: 30 TABLET | Refills: 0 | Status: SHIPPED | OUTPATIENT
Start: 2017-09-27 | End: 2017-10-27

## 2017-09-27 RX ORDER — HYDROXYZINE HYDROCHLORIDE 50 MG/ML
100 INJECTION, SOLUTION INTRAMUSCULAR ONCE
Status: COMPLETED | OUTPATIENT
Start: 2017-09-27 | End: 2017-09-27

## 2017-09-27 RX ORDER — CYCLOBENZAPRINE HCL 10 MG
5-10 TABLET ORAL 3 TIMES DAILY PRN
Qty: 30 TABLET | Refills: 1 | Status: SHIPPED | OUTPATIENT
Start: 2017-09-27 | End: 2018-06-01

## 2017-09-27 RX ORDER — OXYCODONE AND ACETAMINOPHEN 5; 325 MG/1; MG/1
1-2 TABLET ORAL EVERY 6 HOURS PRN
Status: DISCONTINUED | OUTPATIENT
Start: 2017-09-27 | End: 2017-09-27 | Stop reason: HOSPADM

## 2017-09-27 RX ORDER — CYCLOBENZAPRINE HCL 10 MG
10 TABLET ORAL ONCE
Status: COMPLETED | OUTPATIENT
Start: 2017-09-27 | End: 2017-09-27

## 2017-09-27 RX ORDER — NALOXONE HYDROCHLORIDE 0.4 MG/ML
.1-.4 INJECTION, SOLUTION INTRAMUSCULAR; INTRAVENOUS; SUBCUTANEOUS
Status: DISCONTINUED | OUTPATIENT
Start: 2017-09-27 | End: 2017-09-27 | Stop reason: HOSPADM

## 2017-09-27 RX ADMIN — OXYCODONE HYDROCHLORIDE AND ACETAMINOPHEN 1 TABLET: 5; 325 TABLET ORAL at 08:19

## 2017-09-27 RX ADMIN — OXYCODONE HYDROCHLORIDE AND ACETAMINOPHEN 1 TABLET: 5; 325 TABLET ORAL at 09:07

## 2017-09-27 RX ADMIN — LIDOCAINE HYDROCHLORIDE 0.3 MG: 10 INJECTION, SOLUTION EPIDURAL; INFILTRATION; INTRACAUDAL; PERINEURAL at 10:19

## 2017-09-27 RX ADMIN — HYDROMORPHONE HYDROCHLORIDE 0.3 MG: 1 INJECTION, SOLUTION INTRAMUSCULAR; INTRAVENOUS; SUBCUTANEOUS at 10:48

## 2017-09-27 RX ADMIN — HYDROXYZINE HYDROCHLORIDE 100 MG: 50 INJECTION, SOLUTION INTRAMUSCULAR at 08:20

## 2017-09-27 RX ADMIN — SODIUM CHLORIDE, POTASSIUM CHLORIDE, SODIUM LACTATE AND CALCIUM CHLORIDE 500 ML: 600; 310; 30; 20 INJECTION, SOLUTION INTRAVENOUS at 10:21

## 2017-09-27 RX ADMIN — CYCLOBENZAPRINE HYDROCHLORIDE 10 MG: 10 TABLET, FILM COATED ORAL at 12:20

## 2017-09-27 NOTE — PLAN OF CARE
100 mg of vistaril given IM and 1 percocet given per order. Patient education provided. Patient uncomfortable and moving around a lot. Will obtain an NST. Will collect UA and strain urine. Called US to come up for Renal US and BPP. Will continue to assess.

## 2017-09-27 NOTE — IP AVS SNAPSHOT
[facility]    [address] [address]    Phone:  [phone]    Fax:  [phone]                                       After Visit Summary   9/27/2017    Lorena Echevarria    MRN: [id number]           After Visit Summary Signature Page     I have received my discharge instructions, and my questions have been answered. I have discussed any challenges I see with this plan with the nurse or doctor.    ..........................................................................................................................................  Patient/Patient Representative Signature      ..........................................................................................................................................  Patient Representative Print Name and Relationship to Patient    ..................................................               ................................................  Date                                            Time    ..........................................................................................................................................  Reviewed by Signature/Title    ...................................................              ..............................................  Date                                                            Time

## 2017-09-27 NOTE — TELEPHONE ENCOUNTER
Met with Lorena when she was on the OB unit. She is having back pain. Lorena is in pain, but is answering my questions. She is taking her insulin HumuLIN N 15 units at bedtime, but did not take recent dose of Glyburide 2.5 mg bid. Lorena does not have her meter with her today, Can't remember BG of this am, states last night BG was 177 after eating. BG this am in the ED was 80. No change in NPH today.    Lorena does not have current insurance so is still unable to  BG monitoring supplies nor insulin/pen needles from the pharmacy. Still today she tells me that she doesn't think she has insurance yet.    Gave Lorena 20 test strips today. She still has HumuLIN N pen samples that I gave her last week so is still taking her insulin.    Requested that she bring her BG meter in to her next appointment on Friday with Dr. Severino. We can download it then.

## 2017-09-27 NOTE — PLAN OF CARE
Dr. Severino in to see patient. Back pain improved to 4/10 at rest and 8/10 with activity. Patient transfers out of bed and to the bathroom better after the pain medication. Flexeril 10 mg given prior to discharge as ordered. Discharge instructions reviewed with patient regarding new medications and pain control. Instructed regarding alternating heat and ice and trying the tub for pain. Instructed to drink plenty of fluids. Instructed to continue to check blood sugars four times daily and take insulin as ordered. Diabetes educator in to see patient prior to discharge and plans to see patient on Friday. Instructed to keep appointment with Dr. Severino on Friday. Pt. Verbalized understanding of all discharge instructions. No further questions at this time. Patient brought down to vehicle via wheelchair.

## 2017-09-27 NOTE — PLAN OF CARE
Pt. Appears to be more comfortable after 0.3 mg of IV dilaudid. States back pain is 8/10 with activity but now only 4/10 at rest. 500 ml bolus infused. FHT's 140's. Pradip from Diabetes education was here to see patient. Glucose strips given to patient. She will be following up with her this evening.

## 2017-09-27 NOTE — PLAN OF CARE
OB Triage Note  Lorena Echevarria  MRN: [id number]  Gestational Age: 37w4d      Lorena Echevarria presents with Back pain for 3 days. Rates 9/10. Has difficulty moving. Describes lower Right flank area pain.    Denies contractions.  Rates pain at 9/10.  Bleeding: none.  Membranes intact  Dr. Severino notified of arrival and condition.  Oriented patient to surroundings. [place] light within reach.     FHT: 130's-140's moderate variability. Patient is in a lot of pain and is moving around a lot.   Uterine Assessment:Contractions: none    Plan:  -Initial NST, then fetal/uterine monitoring per MD/patient plan.  -Urinalysis.

## 2017-09-27 NOTE — H&P
[facility] Labor and Delivery History and Physical    Lorena Echevarria MRN# [id number]   Age: 22 year old YOB: 1995     Date of Admission:  2017    Primary care provider: None           Chief Complaint:   Lorena Echevarria is a 22 year old female who is 37w4d pregnant and being admitted for LBP on right x 3 days 8/10. Prenatal record/H and P reviewed with patient and unchanged.  Denies dysuria, vaginal bleeding, contractions, fever, N/V.  GDM with poor compliance and late transfer of care.  Has had cough/URI sx.             Pregnancy history:     OBSTETRIC HISTORY:    Obstetric History       T0      L0     SAB0   TAB0   Ectopic0   Multiple0   Live Births0       # Outcome Date GA Lbr López/2nd Weight Sex Delivery Anes PTL Lv   1 Current                   EDC: Estimated Date of Delivery: Oct 14, 2017    Prenatal Labs:   Lab Results   Component Value Date    ABO A 2017    RH Pos 2017    AS Neg 2017    HEPBANG Neg 2017    CHPCRT Neg 2017    CHPCRT negative 2017    GCPCRT Neg 2017    GCPCRT negative 2017    TREPAB non reactive 2017    RUBELLAABIGG non immune 2017    HGB 13.2 2017       GBS Status:   Lab Results   Component Value Date    GBS Negative 09/15/2017       Active Problem List  Patient Active Problem List   Diagnosis     Suicidal ideation     Mild intellectual disability     Autism spectrum     Anxiety     Mood disorder (H)     Suicidal thoughts     Left Ankle Pain     Irregular menstrual cycle (PERIODS)     Depression     Elevated serum cholesterol     Obesity     Sinus tachycardia     Galactorrhea of both breasts     High-risk first pregnancy of young woman, third trimester     Recurrent HSV (herpes simplex virus)     Gestational diabetes mellitus (GDM) in third trimester controlled on oral hypoglycemic drug     Papanicolaou smear of cervix with low grade squamous intraepithelial lesion (LGSIL)     Encounter  "for triage in pregnant patient       Medication Prior to Admission  Prescriptions Prior to Admission   Medication Sig Dispense Refill Last Dose     valACYclovir (VALTREX) 500 MG tablet Take 1 tablet (500 mg) by mouth daily 30 tablet 1 9/26/2017 at 1000     BUPROPION HCL PO Take 150 mg by mouth daily   9/26/2017 at 1000     GLYBURIDE PO Take 2.5 mg by mouth 2 times daily (with meals)   9/26/2017 at 1000     Prenatal Vit-Fe Fumarate-FA (PRENATAL VITAMIN PO) Take 1 tablet by mouth daily   9/26/2017 at 1000     insulin isophane human (HUMULIN N PEN) 100 UNIT/ML injection Inject 10 Units Subcutaneous At Bedtime   9/26/2017 at 2100     blood glucose monitoring (ONE TOUCH ULTRA) test strip Use to test blood sugar 4 times daily. (Patient not taking: Reported on 9/22/2017) 100 each 5 Not Taking   .        Maternal Past Medical History:     Past Medical History:   Diagnosis Date     Anxiety      Asthma      Autism spectrum disorder      Depressed      LGSIL on Pap smear of cervix 03/30/2017     Mild intellectual disabilities      Schizoaffective disorder (H)                        Family History:   Unchanged from prenatal record            Social History:   Unchanged from prenatal record         Review of Systems:   Per HPI.  Other systems reviewed and negative         Physical Exam:     Vitals:    09/27/17 0740   BP: 106/50   Resp: 22   Temp: 98.2  F (36.8  C)   Weight: 111.1 kg (245 lb)   Height: 1.676 m (5' 6\")     Chest: CTA  CV:  RRR without murmers  General:  Alert and oriented  Abdomen soft, non-tender, gravid  Back + pain paraspinals low back on right.  No midline pain or leg radiation or neuro sx.    Ext: neg  Cervix:  Per RN closed     Presentation:Cephalic  Fetal Heart Rate Tracing: reactive and reassuring  Tocometer: external monitor and no contractions    US BPP 8/8.  No visible stones or significant hydro.  EFW 3121g.                       Assessment:   Lorena Echevarria is a 37w4d pregnant female admitted with LBP. "  No evidence labor/stones.  GDM with poor compliance          Plan:   Pain control.    US done  Diabetes center consultation  DC home on PO meds if able to acheicve adequate pain control      Fidel Severino MD

## 2017-09-27 NOTE — IP AVS SNAPSHOT
MRN:[id number]                      After Visit Summary   9/27/2017    Lorena Echevarria    MRN: [id number]           Thank you!     Thank you for choosing [facility] for your care. Our goal is always to provide you with excellent care. Hearing back from our patients is one way we can continue to improve our services. Please take a few minutes to complete the written survey that you may receive in the mail after you visit with us. Thank you!        Patient Information     Date Of Birth          1995        About your hospital stay     You were admitted on:  September 27, 2017 You last received care in the:  [facility]    You were discharged on:  September 27, 2017       Who to Call     For medical emergencies, please call 911.  For non-urgent questions about your medical care, please call your primary care provider or clinic, None          Attending Provider     Provider Specialty    Fidel Severino MD OB/Gyn       Primary Care Provider    None      Your next 10 appointments already scheduled     Sep 29, 2017 11:30 AM CDT   (Arrive by 11:15 AM)   ESTABLISHED PRENATAL with Fidel Severino MD   [facility]    [address] [place] [address]   [phone]              Further instructions from your care team       Discharge Instruction for Undelivered Patients      You were seen for: Right Lower back pain  We Consulted: Dr. Severino  You had (Test or Medicine): Fetal and uterine monitoring, urinalysis, 500 ml IV bolus, 100 mg Vistaril IM, 2 percocets, 0.3 mg of dilaudid, Renal US, Biophysical Profile    Diet:   Drink 8 to 12 glasses of liquids (milk, juice, water) every day.     Activity:  Count fetal kicks everyday (see handout)  Call your doctor or nurse midwife if your baby is moving less than usual.     Call your provider if you notice:  Swelling in your face or increased swelling in your hands or legs.  Headaches that are not relieved by  "Tylenol (acetaminophen).  Changes in your vision (blurring: seeing spots or stars.)  Nausea (sick to your stomach) and vomiting (throwing up).   Weight gain of 5 pounds or more per week.  Heartburn that doesn't go away.  Signs of bladder infection: pain when you urinate (use the toilet), need to go more often and more urgently.  The bag of lockett (rupture of membranes) breaks, or you notice leaking in your underwear.  Bright red blood in your underwear.  Abdominal (lower belly) or stomach pain.  For first baby: Contractions (tightening) less than 5 minutes apart for one hour or more.  *If less than 34 weeks: Contractions (tightenings) more than 6 times in one hour.  Increase or change in vaginal discharge (note the color and amount)      Follow-up:  As scheduled in the clinic          Pending Results     Date and Time Order Name Status Description    9/27/2017 1024 Urine Culture Aerobic Bacterial In process     9/27/2017 0944 US OB Ltd One Or More Fetus FU/Repeat Preliminary     9/27/2017 0814 US Biophys Single Gestation w Measure Preliminary     9/27/2017 0812 US Renal Complete In process             Statement of Approval     Ordered          09/27/17 1208  I have reviewed and agree with all the recommendations and orders detailed in this document.  EFFECTIVE NOW     Approved and electronically signed by:  Fidel Severino MD             Admission Information     Date & Time Provider Department Dept. Phone    9/27/2017 Fidel Severino MD [facility] [phone]      Your Vitals Were     Blood Pressure Temperature Respirations Height Weight BMI (Body Mass Index)    106/50 98.2  F (36.8  C) 22 1.676 m (5' 6\") 111.1 kg (245 lb) 39.54 kg/m2      MyChart Information     [employer] lets you send messages to your doctor, view your test results, renew your prescriptions, schedule appointments and more. To sign up, go to [email or web address] . Click on \"Log in\" on the left side of the screen, which will take you to " "the Welcome page. Then click on \"Sign up Now\" on the right side of the page.     You will be asked to enter the access code listed below, as well as some personal information. Please follow the directions to create your username and password.     Your access code is: [email or web address]  Expires: 2017 11:23 AM     Your access code will  in 90 days. If you need help or a new code, please call your [place] clinic or [phone].        Care EveryWhere ID     This is your Care EveryWhere ID. This could be used by other organizations to access your [facility] medical records  [id number]        Equal Access to Services     MARILEE QUIÑONES : [place], richelle alcala, stephanie ga, mayito dove . So [facility] [phone].    ATENCIÓN: Si habla español, tiene a hein disposición servicios gratuitos de asistencia lingüística. Llame al [phone].    We comply with applicable federal civil rights laws and [place] laws. We do not discriminate on the basis of race, color, national origin, age, disability sex, sexual orientation or gender identity.               Review of your medicines      START taking        Dose / Directions    cyclobenzaprine 10 MG tablet   Commonly known as:  FLEXERIL   Used for:  Acute right-sided low back pain without sciatica        Dose:  5-10 mg   Take 0.5-1 tablets (5-10 mg) by mouth 3 times daily as needed for muscle spasms   Quantity:  30 tablet   Refills:  1       oxyCODONE-acetaminophen 5-325 MG per tablet   Commonly known as:  PERCOCET   Used for:  Acute right-sided low back pain without sciatica        Dose:  1-2 tablet   Take 1-2 tablets by mouth every 6 hours as needed for moderate to severe pain (every 4-6 hrs prn)   Quantity:  30 tablet   Refills:  0         CONTINUE these medicines which have NOT CHANGED        Dose / Directions    blood glucose monitoring test strip   Commonly known as:  ONE TOUCH ULTRA   Used for:  " Gestational diabetes mellitus (GDM) in third trimester controlled on oral hypoglycemic drug        Use to test blood sugar 4 times daily.   Quantity:  100 each   Refills:  5       BUPROPION HCL PO        Dose:  150 mg   Take 150 mg by mouth daily   Refills:  0       GLYBURIDE PO        Dose:  2.5 mg   Take 2.5 mg by mouth 2 times daily (with meals)   Refills:  0       insulin isophane human 100 UNIT/ML injection   Commonly known as:  HumuLIN N PEN   Used for:  Gestational diabetes mellitus (GDM) in third trimester controlled on oral hypoglycemic drug        Dose:  10 Units   Inject 10 Units Subcutaneous At Bedtime   Refills:  0       PRENATAL VITAMIN PO        Dose:  1 tablet   Take 1 tablet by mouth daily   Refills:  0       valACYclovir 500 MG tablet   Commonly known as:  VALTREX   Used for:  Herpes simplex virus (HSV) infection of vagina        Dose:  500 mg   Take 1 tablet (500 mg) by mouth daily   Quantity:  30 tablet   Refills:  1            Where to get your medicines      These medications were sent to [id number] - [place] - [address]  [address] [place] [address]     Phone:  [phone]     cyclobenzaprine 10 MG tablet         Some of these will need a paper prescription and others can be bought over the counter. Ask your nurse if you have questions.     Bring a paper prescription for each of these medications     oxyCODONE-acetaminophen 5-325 MG per tablet                Protect others around you: Learn how to safely use, store and throw away your medicines at www.disposemymeds.org.             Medication List: This is a list of all your medications and when to take them. Check marks below indicate your daily home schedule. Keep this list as a reference.      Medications           Morning Afternoon Evening Bedtime As Needed    blood glucose monitoring test strip   Commonly known as:  ONE TOUCH ULTRA   Use to test blood sugar 4 times daily.                                 BUPROPION HCL PO   Take 150 mg by mouth daily                                cyclobenzaprine 10 MG tablet   Commonly known as:  FLEXERIL   Take 0.5-1 tablets (5-10 mg) by mouth 3 times daily as needed for muscle spasms                                GLYBURIDE PO   Take 2.5 mg by mouth 2 times daily (with meals)                                insulin isophane human 100 UNIT/ML injection   Commonly known as:  HumuLIN N PEN   Inject 10 Units Subcutaneous At Bedtime                                oxyCODONE-acetaminophen 5-325 MG per tablet   Commonly known as:  PERCOCET   Take 1-2 tablets by mouth every 6 hours as needed for moderate to severe pain (every 4-6 hrs prn)   Last time this was given:  1 tablet on 9/27/2017  9:07 AM                                PRENATAL VITAMIN PO   Take 1 tablet by mouth daily                                valACYclovir 500 MG tablet   Commonly known as:  VALTREX   Take 1 tablet (500 mg) by mouth daily

## 2017-09-27 NOTE — PLAN OF CARE
Ultrasound in with patient. Assistance provided with positioning. Patient reports pain is unchanged. 1 more ordered percocet given per order from Dr. Severino. Will continue to assess.

## 2017-09-27 NOTE — DISCHARGE INSTRUCTIONS
Discharge Instruction for Undelivered Patients      You were seen for: Right Lower back pain  We Consulted: Dr. Severino  You had (Test or Medicine): Fetal and uterine monitoring, urinalysis, 500 ml IV bolus, 100 mg Vistaril IM, 2 percocets, 0.3 mg of dilaudid, Renal US, Biophysical Profile    Diet:   Drink 8 to 12 glasses of liquids (milk, juice, water) every day.     Activity:  Count fetal kicks everyday (see handout)  Call your doctor or nurse midwife if your baby is moving less than usual.     Call your provider if you notice:  Swelling in your face or increased swelling in your hands or legs.  Headaches that are not relieved by Tylenol (acetaminophen).  Changes in your vision (blurring: seeing spots or stars.)  Nausea (sick to your stomach) and vomiting (throwing up).   Weight gain of 5 pounds or more per week.  Heartburn that doesn't go away.  Signs of bladder infection: pain when you urinate (use the toilet), need to go more often and more urgently.  The bag of lockett (rupture of membranes) breaks, or you notice leaking in your underwear.  Bright red blood in your underwear.  Abdominal (lower belly) or stomach pain.  For first baby: Contractions (tightening) less than 5 minutes apart for one hour or more.  *If less than 34 weeks: Contractions (tightenings) more than 6 times in one hour.  Increase or change in vaginal discharge (note the color and amount)      Follow-up:  As scheduled in the clinic

## 2017-09-28 LAB
BACTERIA SPEC CULT: ABNORMAL
SPECIMEN SOURCE: ABNORMAL

## 2017-09-29 ENCOUNTER — PRENATAL OFFICE VISIT (OUTPATIENT)
Dept: OBGYN | Facility: OTHER | Age: 22
End: 2017-09-29
Attending: OBSTETRICS & GYNECOLOGY
Payer: MEDICAID

## 2017-09-29 ENCOUNTER — OFFICE VISIT (OUTPATIENT)
Dept: BEHAVIORAL HEALTH | Facility: OTHER | Age: 22
End: 2017-09-29
Attending: SOCIAL WORKER
Payer: MEDICAID

## 2017-09-29 ENCOUNTER — HOSPITAL ENCOUNTER (EMERGENCY)
Facility: HOSPITAL | Age: 22
Discharge: HOME | End: 2017-09-29
Attending: NURSE PRACTITIONER | Admitting: NURSE PRACTITIONER
Payer: MEDICAID

## 2017-09-29 VITALS
OXYGEN SATURATION: 99 % | RESPIRATION RATE: 16 BRPM | SYSTOLIC BLOOD PRESSURE: 121 MMHG | DIASTOLIC BLOOD PRESSURE: 70 MMHG | TEMPERATURE: 98.2 F

## 2017-09-29 VITALS
BODY MASS INDEX: 41.14 KG/M2 | DIASTOLIC BLOOD PRESSURE: 79 MMHG | SYSTOLIC BLOOD PRESSURE: 112 MMHG | OXYGEN SATURATION: 98 % | HEIGHT: 66 IN | HEART RATE: 116 BPM | WEIGHT: 256 LBS

## 2017-09-29 DIAGNOSIS — O09.93 SUPERVISION OF HIGH RISK PREGNANCY IN THIRD TRIMESTER: ICD-10-CM

## 2017-09-29 DIAGNOSIS — O99.810 ABNORMAL MATERNAL GLUCOSE TOLERANCE, ANTEPARTUM: ICD-10-CM

## 2017-09-29 DIAGNOSIS — M54.50 ACUTE RIGHT-SIDED LOW BACK PAIN WITHOUT SCIATICA: ICD-10-CM

## 2017-09-29 DIAGNOSIS — R30.0 DYSURIA: Primary | ICD-10-CM

## 2017-09-29 DIAGNOSIS — R69 DIAGNOSIS DEFERRED: Primary | ICD-10-CM

## 2017-09-29 DIAGNOSIS — O24.415 GESTATIONAL DIABETES MELLITUS (GDM) IN THIRD TRIMESTER CONTROLLED ON ORAL HYPOGLYCEMIC DRUG: ICD-10-CM

## 2017-09-29 DIAGNOSIS — J20.9 ACUTE BRONCHITIS, UNSPECIFIED ORGANISM: ICD-10-CM

## 2017-09-29 LAB
ALBUMIN UR-MCNC: 30 MG/DL
APPEARANCE UR: ABNORMAL
BACTERIA #/AREA URNS HPF: ABNORMAL /HPF
BILIRUB UR QL STRIP: NEGATIVE
COLOR UR AUTO: YELLOW
GLUCOSE UR STRIP-MCNC: NEGATIVE MG/DL
HGB UR QL STRIP: NEGATIVE
KETONES UR STRIP-MCNC: 5 MG/DL
LEUKOCYTE ESTERASE UR QL STRIP: ABNORMAL
MUCOUS THREADS #/AREA URNS LPF: PRESENT /LPF
NITRATE UR QL: NEGATIVE
PH UR STRIP: 6 PH (ref 4.7–8)
RBC #/AREA URNS AUTO: 3 /HPF (ref 0–2)
SOURCE: ABNORMAL
SP GR UR STRIP: 1.03 (ref 1–1.03)
SQUAMOUS #/AREA URNS AUTO: 2 /HPF (ref 0–1)
UROBILINOGEN UR STRIP-MCNC: NORMAL MG/DL (ref 0–2)
WBC #/AREA URNS AUTO: 10 /HPF (ref 0–2)

## 2017-09-29 PROCEDURE — 99207 ZZC COMPLICATED OB VISIT: CPT | Performed by: OBSTETRICS & GYNECOLOGY

## 2017-09-29 PROCEDURE — 81001 URINALYSIS AUTO W/SCOPE: CPT | Mod: ZL | Performed by: OBSTETRICS & GYNECOLOGY

## 2017-09-29 PROCEDURE — 87086 URINE CULTURE/COLONY COUNT: CPT | Mod: ZL | Performed by: OBSTETRICS & GYNECOLOGY

## 2017-09-29 PROCEDURE — 99202 OFFICE O/P NEW SF 15 MIN: CPT | Performed by: NURSE PRACTITIONER

## 2017-09-29 PROCEDURE — 99213 OFFICE O/P EST LOW 20 MIN: CPT

## 2017-09-29 RX ORDER — AZITHROMYCIN 250 MG/1
TABLET, FILM COATED ORAL
Qty: 6 TABLET | Refills: 0 | Status: SHIPPED | OUTPATIENT
Start: 2017-09-29 | End: 2017-10-04

## 2017-09-29 ASSESSMENT — ENCOUNTER SYMPTOMS
WHEEZING: 0
HEADACHES: 0
FEVER: 0
ACTIVITY CHANGE: 0
DIARRHEA: 0
CHILLS: 0
SHORTNESS OF BREATH: 1
DYSURIA: 0
COUGH: 1
APPETITE CHANGE: 0
PSYCHIATRIC NEGATIVE: 1
SINUS PAIN: 1
NAUSEA: 0
VOMITING: 1
STRIDOR: 0
TROUBLE SWALLOWING: 0
RHINORRHEA: 1
SORE THROAT: 0
SINUS PRESSURE: 1

## 2017-09-29 ASSESSMENT — PAIN SCALES - GENERAL: PAINLEVEL: MODERATE PAIN (5)

## 2017-09-29 NOTE — ED AVS SNAPSHOT
[facility]    [address] [address]    Phone:  [phone]                                       Lorena Echevarria   MRN: [id number]    Department:  [facility]   Date of Visit:  9/29/2017           After Visit Summary Signature Page     I have received my discharge instructions, and my questions have been answered. I have discussed any challenges I see with this plan with the nurse or doctor.    ..........................................................................................................................................  Patient/Patient Representative Signature      ..........................................................................................................................................  Patient Representative Print Name and Relationship to Patient    ..................................................               ................................................  Date                                            Time    ..........................................................................................................................................  Reviewed by Signature/Title    ...................................................              ..............................................  Date                                                            Time

## 2017-09-29 NOTE — MR AVS SNAPSHOT
"              After Visit Summary   9/29/2017    Lorena Echevarria    MRN: [id number]           Patient Information     Date Of Birth          1995        Visit Information        Provider Department      9/29/2017 12:30 PM Barbara Flores LSW [facility] [place]        Today's Diagnoses     Diagnosis deferred    -  1       Follow-ups after your visit        Your next 10 appointments already scheduled     Oct 06, 2017 11:30 AM CDT   (Arrive by 11:15 AM)   ESTABLISHED PRENATAL with Fidel Severino MD   [facility] [place] ([facility] [place] )    [address]  [place] [address]   [phone]              Who to contact     If you have questions or need follow up information about today's clinic visit or your schedule please contact [facility] directly at [phone].  Normal or non-critical lab and imaging results will be communicated to you by MyChart, letter or phone within 4 business days after the clinic has received the results. If you do not hear from us within 7 days, please contact the clinic through MyChart or phone. If you have a critical or abnormal lab result, we will notify you by phone as soon as possible.  Submit refill requests through [employer] or call your pharmacy and they will forward the refill request to us. Please allow 3 business days for your refill to be completed.          Additional Information About Your Visit        MyChart Information     [employer] lets you send messages to your doctor, view your test results, renew your prescriptions, schedule appointments and more. To sign up, go to [email or web address] . Click on \"Log in\" on the left side of the screen, which will take you to the Welcome page. Then click on \"Sign up Now\" on the right side of the page.     You will be asked to enter the access code listed below, as well as some personal information. Please follow the directions to create your username and password.     Your access code is: " [email or web address]  Expires: 2017 11:23 AM     Your access code will  in 90 days. If you need help or a new code, please call your [place] clinic or [phone].        Care EveryWhere ID     This is your Care EveryWhere ID. This could be used by other organizations to access your [place] medical records  [id number]         Blood Pressure from Last 3 Encounters:   17 112/79   17 106/50   17 118/81    Weight from Last 3 Encounters:   17 256 lb (116.1 kg)   17 245 lb (111.1 kg)   17 255 lb (115.7 kg)              Today, you had the following     No orders found for display       Primary Care Provider    None       No address on file        Equal Access to Services     EDMOND QUIÑONES : Hadii emelia [place], wabennieda luqadaha, qaybta kaalmada sury, mayito dove . So [facility] [phone].    ATENCIÓN: Si habla español, tiene a hein disposición servicios gratuitos de asistencia lingüística. Llame al [phone].    We comply with applicable federal civil rights laws and [place] laws. We do not discriminate on the basis of race, color, national origin, age, disability, sex, sexual orientation, or gender identity.            Thank you!     Thank you for choosing [facility]  for your care. Our goal is always to provide you with excellent care. Hearing back from our patients is one way we can continue to improve our services. Please take a few minutes to complete the written survey that you may receive in the mail after your visit with us. Thank you!             Your Updated Medication List - Protect others around you: Learn how to safely use, store and throw away your medicines at www.disposemymeds.org.          This list is accurate as of: 17  3:13 PM.  Always use your most recent med list.                   Brand Name Dispense Instructions for use Diagnosis    blood glucose monitoring test strip    ONE TOUCH ULTRA    100  each    Use to test blood sugar 4 times daily.    Gestational diabetes mellitus (GDM) in third trimester controlled on oral hypoglycemic drug       BUPROPION HCL PO      Take 150 mg by mouth daily        cyclobenzaprine 10 MG tablet    FLEXERIL    30 tablet    Take 0.5-1 tablets (5-10 mg) by mouth 3 times daily as needed for muscle spasms    Acute right-sided low back pain without sciatica       GLYBURIDE PO      Take 2.5 mg by mouth 2 times daily (with meals)        insulin isophane human 100 UNIT/ML injection    HumuLIN N PEN     Inject 10 Units Subcutaneous At Bedtime    Gestational diabetes mellitus (GDM) in third trimester controlled on oral hypoglycemic drug       oxyCODONE-acetaminophen 5-325 MG per tablet    PERCOCET    30 tablet    Take 1-2 tablets by mouth every 6 hours as needed for moderate to severe pain (every 4-6 hrs prn)    Acute right-sided low back pain without sciatica       PRENATAL VITAMIN PO      Take 1 tablet by mouth daily        valACYclovir 500 MG tablet    VALTREX    30 tablet    Take 1 tablet (500 mg) by mouth daily    Herpes simplex virus (HSV) infection of vagina

## 2017-09-29 NOTE — NURSING NOTE
"Chief Complaint   Patient presents with     Prenatal Care     37 weeks and 6 days       Initial /79 (BP Location: Left arm, Cuff Size: Adult Large)  Pulse 116  Ht 5' 6\" (1.676 m)  Wt 256 lb (116.1 kg)  SpO2 98%  BMI 41.32 kg/m2 Estimated body mass index is 41.32 kg/(m^2) as calculated from the following:    Height as of this encounter: 5' 6\" (1.676 m).    Weight as of this encounter: 256 lb (116.1 kg).  Medication Reconciliation: complete     Danii Florez      "

## 2017-09-29 NOTE — ED AVS SNAPSHOT
[facility]    [address] [address]    Phone:  [phone]                                       Lorena Echevarria   MRN: [id number]    Department:  [facility]   Date of Visit:  9/29/2017           Patient Information     Date Of Birth          1995        Your diagnoses for this visit were:     Acute bronchitis, unspecified organism        You were seen by Cecilia Ham NP.      Follow-up Information     Follow up with [facility].    Specialty:  EMERGENCY MEDICINE    Why:  As needed, If symptoms worsen    Contact information:    [address] [place] [address] [phone]    Additional information:    From [place]: Take [address]. Turn left at [address]. Turn left at the first stoplight on [address]. At the first stop sign, take a right onto [address]. Take a left into the parking lot and continue through until you reach the North enterance of the building.       From [place]: Take [address]. Take the [address] ramp towards [place]. Turn left onto [address]. Take a slight right onto [address]. Turn left at the first stoplight on [address]. At the first stop sign, take a right onto [address]. Take a left into the parking lot and continue through until you reach the North enterance of the building.       From [place]: Take [address]. Take a right at [address]. At the first stop sign, take a right onto [address]. Take a left into the parking lot and continue through until you reach the North enterance of the building.         Discharge Instructions       Take antibiotics as ordered.   Eat a yogurt daily while taking antibiotics.   Handout given on safe medications in pregnancy for cough.   You can take tylenol for pain. Follow dosing on package.   Apply heating pad to left chest wall. Protect skin.   Follow up with PCP with  any increase in symptoms or concerns.   Return to urgent care or emergency department with any increase in symptoms or concerns.     Discharge References/Attachments     BRONCHITIS, ANTIOBIOTIC TREATMENT (ADULT) (ENGLISH)      Future Appointments        Provider Department Dept Phone Center    10/6/2017 11:30 AM Fidel Severino MD [facility] [phone] [place]         Review of your medicines      START taking        Dose / Directions Last dose taken    azithromycin 250 MG tablet   Commonly known as:  ZITHROMAX Z-SYLVAIN   Quantity:  6 tablet        Two tablets on the first day, then one tablet daily for the next 4 days   Refills:  0          Our records show that you are taking the medicines listed below. If these are incorrect, please call your family doctor or clinic.        Dose / Directions Last dose taken    blood glucose monitoring test strip   Commonly known as:  ONE TOUCH ULTRA   Quantity:  100 each        Use to test blood sugar 4 times daily.   Refills:  5        BUPROPION HCL PO   Dose:  150 mg        Take 150 mg by mouth daily   Refills:  0        cyclobenzaprine 10 MG tablet   Commonly known as:  FLEXERIL   Dose:  5-10 mg   Quantity:  30 tablet        Take 0.5-1 tablets (5-10 mg) by mouth 3 times daily as needed for muscle spasms   Refills:  1        GLYBURIDE PO   Dose:  2.5 mg        Take 2.5 mg by mouth 2 times daily (with meals)   Refills:  0        insulin isophane human 100 UNIT/ML injection   Commonly known as:  HumuLIN N PEN   Dose:  10 Units        Inject 10 Units Subcutaneous At Bedtime   Refills:  0        oxyCODONE-acetaminophen 5-325 MG per tablet   Commonly known as:  PERCOCET   Dose:  1-2 tablet   Quantity:  30 tablet        Take 1-2 tablets by mouth every 6 hours as needed for moderate to severe pain (every 4-6 hrs prn)   Refills:  0        PRENATAL VITAMIN PO   Dose:  1 tablet        Take 1 tablet by mouth daily   Refills:  0        valACYclovir 500 MG tablet   Commonly  "known as:  VALTREX   Dose:  500 mg   Quantity:  30 tablet        Take 1 tablet (500 mg) by mouth daily   Refills:  1                Prescriptions were sent or printed at these locations (1 Prescription)                   [id number] - [place] - [address]   [address] [place] [address]    Telephone:  [phone]   Fax:  [phone]   Hours:                  E-Prescribed (1 of 1)         azithromycin (ZITHROMAX Z-SYLVAIN) 250 MG tablet                Orders Needing Specimen Collection     None      Pending Results     Date and Time Order Name Status Description    2017 1154 URINE CULTURE AEROBIC BACTERIAL In process             Pending Culture Results     Date and Time Order Name Status Description    2017 1154 URINE CULTURE AEROBIC BACTERIAL In process             Thank you for choosing [facility]       Thank you for choosing [facility] for your care. Our goal is always to provide you with excellent care. Hearing back from our patients is one way we can continue to improve our services. Please take a few minutes to complete the written survey that you may receive in the mail after you visit with us. Thank you!        [employer] Information     [employer] lets you send messages to your doctor, view your test results, renew your prescriptions, schedule appointments and more. To sign up, go to [email or web address] . Click on \"Log in\" on the left side of the screen, which will take you to the Welcome page. Then click on \"Sign up Now\" on the right side of the page.     You will be asked to enter the access code listed below, as well as some personal information. Please follow the directions to create your username and password.     Your access code is: [email or web address]  Expires: 2017 11:23 AM     Your access code will  in 90 days. If you need help or a new code, please call your [facility] clinic or [phone].        Care EveryWhere ID     This is your Care EveryWhere ID. This " could be used by other organizations to access your [facility] medical records  [id number]        Equal Access to Services     EDMOND QUIÑONES : Hadii emelia [place], richelle alcala, mayito zavala. So [facility] [phone].    ATENCIÓN: Si habla español, tiene a hein disposición servicios gratuitos de asistencia lingüística. Llame al [phone].    We comply with applicable federal civil rights laws and [place] laws. We do not discriminate on the basis of race, color, national origin, age, disability, sex, sexual orientation, or gender identity.            After Visit Summary       This is your record. Keep this with you and show to your community pharmacist(s) and doctor(s) at your next visit.

## 2017-09-29 NOTE — PROGRESS NOTES
Still with  Bilateral LBP.   See prior eval.  UA contaminated so repeat ordered.  NST reactive today.  BPP ordered next wk.   and diabetes center today.  BS good.  Discussed signs of labor and when to call or come in.  Discussed kick counts and fetal movement.  RTC in 1 week  Denies regular contractions, vaginal bleeding, STEFANIA Severino MD  9/29/2017

## 2017-09-29 NOTE — ED PROVIDER NOTES
History     Chief Complaint   Patient presents with     Cough     cough and lt lower rib pain     The history is provided by the patient. No  was used.     Lorena Echevarria is a 22 year old female who presents with a productive cough and left lower rib pain. Cough started 1 week ago and rib pain started 3-4 days ago after coughing. She's used a heating pad to her left lower rib area and warm baths with mild effectiveness. She is 37 weeks 6 days pregnant with a TAMIKA 10-14-17. She was seen by Dr. Severino (OB/GYN) today. Denies abdominal pain, contractions, changes in vision, or swelling in her legs. She has good fetal movement. Denies fever or chills. Positive for night sweats. Eating and drinking well. Bowel and bladder are working well. No antibiotic use in the past 30 days. She has gestations diabetes. Her blood glucose has been 80-90.     I have reviewed the Medications, Allergies, Past Medical and Surgical History, and Social History in the Epic system.        Review of Systems   Constitutional: Negative for activity change, appetite change, chills and fever.   HENT: Positive for congestion, postnasal drip, rhinorrhea, sinus pain and sinus pressure. Negative for ear discharge, ear pain, sneezing, sore throat and trouble swallowing.    Eyes: Negative for visual disturbance.   Respiratory: Positive for cough and shortness of breath. Negative for wheezing and stridor.         SOB with cough.    Gastrointestinal: Positive for vomiting. Negative for diarrhea and nausea.        Vomited from coughing.    Genitourinary: Negative for dysuria.   Skin: Negative for rash.   Neurological: Negative for headaches.   Psychiatric/Behavioral: Negative.        Physical Exam   BP: 121/70  Heart Rate: 97  Temp: 98.2  F (36.8  C)  Resp: 16  SpO2: 99 %  Physical Exam   Constitutional: She is oriented to person, place, and time. She appears well-developed and well-nourished. No distress.   HENT:   Head: Normocephalic.    Right Ear: External ear normal.   Left Ear: External ear normal.   Mouth/Throat: Oropharynx is clear and moist. No oropharyngeal exudate.   No TTP to sinuses.    Neck: Normal range of motion. Neck supple.   Cardiovascular: Normal rate, regular rhythm, normal heart sounds and intact distal pulses.    No murmur heard.  Pulmonary/Chest: Effort normal. No respiratory distress. She has no wheezes. She has rales.   Abdominal: Soft. She exhibits no distension.   Musculoskeletal: Normal range of motion. She exhibits no edema.   Pain is reproducible to left posterior lower rib cage area.    Lymphadenopathy:     She has no cervical adenopathy.   Neurological: She is alert and oriented to person, place, and time. She has normal reflexes. She displays normal reflexes. She exhibits normal muscle tone.   Skin: Skin is warm and dry. No rash noted. She is not diaphoretic.   Psychiatric: She has a normal mood and affect. Her behavior is normal.   Nursing note and vitals reviewed.      ED Course     ED Course     Procedures      Assessments & Plan (with Medical Decision Making)     FHT's 155 per OB RN.     Discussed antibiotic selection with Jay Jay, Pharm-D who agrees that Azithromycin is safe. A hand out was provided on safe medication for colds in pregnancy.     Discussed plan of care. She verbalized understanding. All questions answered.     I have reviewed the nursing notes.    I have reviewed the findings, diagnosis, plan and need for follow up with the patient.  Discharged in stable condition.     New Prescriptions    AZITHROMYCIN (ZITHROMAX Z-SYLVAIN) 250 MG TABLET    Two tablets on the first day, then one tablet daily for the next 4 days       Final diagnoses:   Acute bronchitis, unspecified organism     Take antibiotics as ordered.   Eat a yogurt daily while taking antibiotics.   Handout given on safe medications in pregnancy for cough.   You can take tylenol for pain. Follow dosing on package.   Apply heating pad to left chest  wall. Protect skin.   Follow up with PCP with any increase in symptoms or concerns.   Return to urgent care or emergency department with any increase in symptoms or concerns.     MONTEZ Strickland  9/29/2017  6:23 PM  [facility]       Cecilia Ham NP  09/29/17 2020

## 2017-09-29 NOTE — MR AVS SNAPSHOT
"              After Visit Summary   9/29/2017    Lorena Echevarria    MRN: [id number]           Patient Information     Date Of Birth          1995        Visit Information        Provider Department      9/29/2017 11:30 AM Fidel Severino MD [facility]        Today's Diagnoses     Dysuria    -  1    Abnormal maternal glucose tolerance, antepartum        Gestational diabetes mellitus (GDM) in third trimester controlled on oral hypoglycemic drug        Supervision of high risk pregnancy in third trimester        Acute right-sided low back pain without sciatica          Care Instructions    F/u 1 wk          Follow-ups after your visit        Your next 10 appointments already scheduled     Oct 06, 2017 11:30 AM CDT   (Arrive by 11:15 AM)   ESTABLISHED PRENATAL with Fidel Severino MD   [facility]    [address]  [facility] [address]   [phone]              Who to contact     If you have questions or need follow up information about today's clinic visit or your schedule please contact [facility] directly at [phone].  Normal or non-critical lab and imaging results will be communicated to you by [employer], letter or phone within 4 business days after the clinic has received the results. If you do not hear from us within 7 days, please contact the clinic through [employer] or phone. If you have a critical or abnormal lab result, we will notify you by phone as soon as possible.  Submit refill requests through [employer] or call your pharmacy and they will forward the refill request to us. Please allow 3 business days for your refill to be completed.          Additional Information About Your Visit        [employer] Information     [employer] lets you send messages to your doctor, view your test results, renew your prescriptions, schedule appointments and more. To sign up, go to [email or web address] . Click on \"Log in\" on the left side of the " "screen, which will take you to the Welcome page. Then click on \"Sign up Now\" on the right side of the page.     You will be asked to enter the access code listed below, as well as some personal information. Please follow the directions to create your username and password.     Your access code is: [email or web address]  Expires: 2017 11:23 AM     Your access code will  in 90 days. If you need help or a new code, please call your [facility] clinic or [phone].        Care EveryWhere ID     This is your Care EveryWhere ID. This could be used by other organizations to access your [facility] medical records  [id number]        Your Vitals Were     Pulse Height Pulse Oximetry BMI (Body Mass Index)          116 5' 6\" (1.676 m) 98% 41.32 kg/m2         Blood Pressure from Last 3 Encounters:   17 112/79   17 106/50   17 118/81    Weight from Last 3 Encounters:   17 256 lb (116.1 kg)   17 245 lb (111.1 kg)   17 255 lb (115.7 kg)              We Performed the Following     UA with Microscopic     Urine Culture Aerobic Bacterial        Primary Care Provider    None       No address on file        Equal Access to Services     EDMOND QUIÑONES : Hadii emelia ku abbyo Soucheali, waaxda luqadaha, qaybta kaalmada adeegyada, waxay perez dove . So [facility] [phone].    ATENCIÓN: Si habla español, tiene a hein disposición servicios gratuitos de asistencia lingüística. Llame al [phone].    We comply with applicable federal civil rights laws and [place] laws. We do not discriminate on the basis of race, color, national origin, age, disability, sex, sexual orientation, or gender identity.            Thank you!     Thank you for choosing [facility]  for your care. Our goal is always to provide you with excellent care. Hearing back from our patients is one way we can continue to improve our services. Please take a few minutes to complete the written survey that you " may receive in the mail after your visit with us. Thank you!             Your Updated Medication List - Protect others around you: Learn how to safely use, store and throw away your medicines at www.disposemymeds.org.          This list is accurate as of: 9/29/17  1:42 PM.  Always use your most recent med list.                   Brand Name Dispense Instructions for use Diagnosis    blood glucose monitoring test strip    ONE TOUCH ULTRA    100 each    Use to test blood sugar 4 times daily.    Gestational diabetes mellitus (GDM) in third trimester controlled on oral hypoglycemic drug       BUPROPION HCL PO      Take 150 mg by mouth daily        cyclobenzaprine 10 MG tablet    FLEXERIL    30 tablet    Take 0.5-1 tablets (5-10 mg) by mouth 3 times daily as needed for muscle spasms    Acute right-sided low back pain without sciatica       GLYBURIDE PO      Take 2.5 mg by mouth 2 times daily (with meals)        insulin isophane human 100 UNIT/ML injection    HumuLIN N PEN     Inject 10 Units Subcutaneous At Bedtime    Gestational diabetes mellitus (GDM) in third trimester controlled on oral hypoglycemic drug       oxyCODONE-acetaminophen 5-325 MG per tablet    PERCOCET    30 tablet    Take 1-2 tablets by mouth every 6 hours as needed for moderate to severe pain (every 4-6 hrs prn)    Acute right-sided low back pain without sciatica       PRENATAL VITAMIN PO      Take 1 tablet by mouth daily        valACYclovir 500 MG tablet    VALTREX    30 tablet    Take 1 tablet (500 mg) by mouth daily    Herpes simplex virus (HSV) infection of vagina

## 2017-09-30 NOTE — DISCHARGE INSTRUCTIONS
Take antibiotics as ordered.   Eat a yogurt daily while taking antibiotics.   Handout given on safe medications in pregnancy for cough.   You can take tylenol for pain. Follow dosing on package.   Apply heating pad to left chest wall. Protect skin.   Follow up with PCP with any increase in symptoms or concerns.   Return to urgent care or emergency department with any increase in symptoms or concerns.

## 2017-10-01 LAB
BACTERIA SPEC CULT: ABNORMAL
SPECIMEN SOURCE: ABNORMAL

## 2017-10-02 ENCOUNTER — HOSPITAL ENCOUNTER (EMERGENCY)
Facility: HOSPITAL | Age: 22
Discharge: HOME | End: 2017-10-03
Attending: INTERNAL MEDICINE | Admitting: INTERNAL MEDICINE
Payer: MEDICAID

## 2017-10-02 DIAGNOSIS — R05.9 COUGH: ICD-10-CM

## 2017-10-02 DIAGNOSIS — H11.33 SUBCONJUNCTIVAL BLEED, BILATERAL: ICD-10-CM

## 2017-10-02 DIAGNOSIS — J06.9 URI, ACUTE: ICD-10-CM

## 2017-10-02 PROCEDURE — 99284 EMERGENCY DEPT VISIT MOD MDM: CPT | Performed by: INTERNAL MEDICINE

## 2017-10-02 PROCEDURE — 85730 THROMBOPLASTIN TIME PARTIAL: CPT | Performed by: INTERNAL MEDICINE

## 2017-10-02 PROCEDURE — 82550 ASSAY OF CK (CPK): CPT | Performed by: INTERNAL MEDICINE

## 2017-10-02 PROCEDURE — 80053 COMPREHEN METABOLIC PANEL: CPT | Performed by: INTERNAL MEDICINE

## 2017-10-02 PROCEDURE — 85025 COMPLETE CBC W/AUTO DIFF WBC: CPT | Performed by: INTERNAL MEDICINE

## 2017-10-02 PROCEDURE — 36415 COLL VENOUS BLD VENIPUNCTURE: CPT | Performed by: INTERNAL MEDICINE

## 2017-10-02 PROCEDURE — 99283 EMERGENCY DEPT VISIT LOW MDM: CPT

## 2017-10-02 PROCEDURE — 85610 PROTHROMBIN TIME: CPT | Performed by: INTERNAL MEDICINE

## 2017-10-02 NOTE — ED AVS SNAPSHOT
[facility]    [address]    [place] [address]    Phone:  [phone]                                       Lorena Echevarria   MRN: [id number]    Department:  [facility]   Date of Visit:  10/2/2017           Patient Information     Date Of Birth          1995        Your diagnoses for this visit were:     Subconjunctival bleed, bilateral     Cough     URI, acute        You were seen by Eagle Bella MD.      Follow-up Information     Schedule an appointment as soon as possible for a visit with [facility] MD Joshua.        Discharge Instructions         Viral Upper Respiratory Illness (Adult)  You have a viral upper respiratory illness (URI), which is another term for the common cold. This illness is contagious during the first few days. It is spread through the air by coughing and sneezing. It may also be spread by direct contact (touching the sick person and then touching your own eyes, nose, or mouth). Frequent handwashing will decrease risk of spread. Most viral illnesses go away within 7 to 10 days with rest and simple home remedies. Sometimes the illness may last for several weeks. Antibiotics will not kill a virus, and they are generally not prescribed for this condition.    Home care    If symptoms are severe, rest at home for the first 2 to 3 days. When you resume activity, don't let yourself get too tired.    Avoid being exposed to cigarette smoke (yours or others ).    You may use acetaminophen or ibuprofen to control pain and fever, unless another medicine was prescribed. (Note: If you have chronic liver or kidney disease, have ever had a stomach ulcer or gastrointestinal bleeding, or are taking blood-thinning medicines, talk with your healthcare provider before using these medicines.) Aspirin should never be given to anyone under 18 years of age who is ill with a viral infection or fever. It may cause severe liver or brain damage.    Your appetite  may be poor, so a light diet is fine. Avoid dehydration by drinking 6 to 8 glasses of fluids per day (water, soft drinks, juices, tea, or soup). Extra fluids will help loosen secretions in the nose and lungs.    Over-the-counter cold medicines will not shorten the length of time you re sick, but they may be helpful for the following symptoms: cough, sore throat, and nasal and sinus congestion. (Note: Do not use decongestants if you have high blood pressure.)  Follow-up care  Follow up with your healthcare provider, or as advised.  When to seek medical advice  Call your healthcare provider right away if any of these occur:    Cough with lots of colored sputum (mucus)    Severe headache; face, neck, or ear pain    Difficulty swallowing due to throat pain    Fever of 100.4 F (38 C)  Call 911, or get immediate medical care  Call emergency services right away if any of these occur:    Chest pain, shortness of breath, wheezing, or difficulty breathing    Coughing up blood    Inability to swallow due to throat pain  Date Last Reviewed: 9/13/2015 2000-2017 [employer]. [address] [place] [address]. All rights reserved. This information is not intended as a substitute for professional medical care. Always follow your healthcare professional's instructions.        Subconjunctival Hemorrhage    A subconjunctival hemorrhage is when a blood vessel breaks open in the white of the eye. It causes a bright red patch in the white of the eye. It is similar to a bruise on the skin. This type of hemorrhage is common. It can look quite alarming, but it is usually harmless.  Understanding the conjunctiva  The conjunctiva is the thin layer that covers the inside of the eyelids and the surface of the eye. It has many tiny blood vessels that bring oxygen and nutrients to the eye. The sclera is the white part of the eye that lies beneath the conjunctiva. Sometimes a blood vessel in the conjunctiva breaks open and  bleeds. The blood then collects under the conjunctiva and turns part of the eye red. Over several weeks, your body then absorbs the blood.  What causes subconjunctival hemorrhage?  In many cases the cause isn t known. But some health conditions may make it more likely. These include:    Eye injury    Eye surgery    High blood pressure    Inflammation of the conjunctiva    Contact lens use    Diabetes    Arteriosclerosis    Tumor of the conjunctiva    Diseases that affect blood clotting    Violent sneezing, coughing, or vomiting    Certain medicines that can increase bleeding, such as aspirin    Pushing hard during childbirth    Straining during constipation  Symptoms of subconjunctival hemorrhage  The main symptom is a red patch on the eye. You may notice it after waking up in the morning. In most cases just one eye will have a hemorrhage. It usually happens once and then goes away. But some health conditions may cause repeat hemorrhages. You may feel like you have something in your eye, but this is not common. The hemorrhage shouldn t affect your eyesight or cause any pain. If you do have pain, you may have another type of problem with your eye.  Diagnosing subconjunctival hemorrhage  Your healthcare provider will ask about your health history. You may have a physical exam. This includes a basic eye exam. Your provider will make sure you don t have other causes of red eye that may need other treatment.  You will need to see an eye doctor (ophthalmologist) if you have had an eye injury. This doctor might use a special lighted microscope to look closely at your eye. This helps show the doctor if the injury hurt the eye itself and not just its outer layer.  If this is not your first subconjunctival hemorrhage, your doctor may need to find the cause. For example, you may need blood tests to check for a blood clotting disorder.  Treatment for subconjunctival hemorrhage  In most cases you will not need treatment. The red  patch will usually go away on its own in a few weeks. It will turn from red to brown and then yellow. There are no treatments to speed up this process. Your doctor may suggest you use a warm compress and artificial tears eye drops to help relieve some of the redness.  If your subconjunctival hemorrhage was caused by a health condition, that condition will be treated. For example, you may need a blood pressure medicine to treat high blood pressure.  When to call your healthcare provider  Call your healthcare provider right away if you have any of these:    Hemorrhage that doesn t go away in 2 to 3 weeks    Eye pain    Loss of eyesight    Another subconjunctival hemorrhage    Date Last Reviewed: 2/1/2017 2000-2017 [employer]. [address] [place] [address], [place] [address]. All rights reserved. This information is not intended as a substitute for professional medical care. Always follow your healthcare professional's instructions.          Future Appointments        Provider Department Dept Phone Center    10/6/2017 11:30 AM Fidel Severino MD [facility] [phone] [facility]         Review of your medicines      START taking        Dose / Directions Last dose taken    guaiFENesin-dextromethorphan 100-10 MG/5ML syrup   Commonly known as:  ROBITUSSIN DM   Dose:  5 mL   Quantity:  118 mL        Take 5 mLs by mouth 3 times daily as needed for cough   Refills:  0          Our records show that you are taking the medicines listed below. If these are incorrect, please call your family doctor or clinic.        Dose / Directions Last dose taken    azithromycin 250 MG tablet   Commonly known as:  ZITHROMAX Z-SYLVAIN   Quantity:  6 tablet        Two tablets on the first day, then one tablet daily for the next 4 days   Refills:  0        blood glucose monitoring test strip   Commonly known as:  ONE TOUCH ULTRA   Quantity:  100 each        Use to test blood sugar 4 times daily.   Refills:  5         BUPROPION HCL PO   Dose:  150 mg        Take 150 mg by mouth daily   Refills:  0        cyclobenzaprine 10 MG tablet   Commonly known as:  FLEXERIL   Dose:  5-10 mg   Quantity:  30 tablet        Take 0.5-1 tablets (5-10 mg) by mouth 3 times daily as needed for muscle spasms   Refills:  1        GLYBURIDE PO   Dose:  2.5 mg        Take 2.5 mg by mouth 2 times daily (with meals)   Refills:  0        insulin isophane human 100 UNIT/ML injection   Commonly known as:  HumuLIN N PEN   Dose:  10 Units        Inject 10 Units Subcutaneous At Bedtime   Refills:  0        oxyCODONE-acetaminophen 5-325 MG per tablet   Commonly known as:  PERCOCET   Dose:  1-2 tablet   Quantity:  30 tablet        Take 1-2 tablets by mouth every 6 hours as needed for moderate to severe pain (every 4-6 hrs prn)   Refills:  0        PRENATAL VITAMIN PO   Dose:  1 tablet        Take 1 tablet by mouth daily   Refills:  0        valACYclovir 500 MG tablet   Commonly known as:  VALTREX   Dose:  500 mg   Quantity:  30 tablet        Take 1 tablet (500 mg) by mouth daily   Refills:  1                Prescriptions were sent or printed at these locations (1 Prescription)                   [id number] - [place], [place] - [address]   [address] [place] [address]    Telephone:  [phone]   Fax:  [phone]   Hours:                  Printed at Department/Unit printer (1 of 1)         guaiFENesin-dextromethorphan (ROBITUSSIN DM) 100-10 MG/5ML syrup                Procedures and tests performed during your visit     CBC with platelets differential    CK total    Comprehensive metabolic panel    INR    Partial thromboplastin time      Orders Needing Specimen Collection     None      Pending Results     No orders found for last 3 day(s).            Pending Culture Results     No orders found for last 3 day(s).            Thank you for choosing [facility]       Thank you for choosing [facility] for your care. Our goal is always to provide you  "with excellent care. Hearing back from our patients is one way we can continue to improve our services. Please take a few minutes to complete the written survey that you may receive in the mail after you visit with us. Thank you!        [employer] Information     [employer] lets you send messages to your doctor, view your test results, renew your prescriptions, schedule appointments and more. To sign up, go to [email or web address] . Click on \"Log in\" on the left side of the screen, which will take you to the Welcome page. Then click on \"Sign up Now\" on the right side of the page.     You will be asked to enter the access code listed below, as well as some personal information. Please follow the directions to create your username and password.     Your access code is: [email or web address]  Expires: 2017 11:23 AM     Your access code will  in 90 days. If you need help or a new code, please call your [facility] clinic or [phone].        Care EveryWhere ID     This is your Care EveryWhere ID. This could be used by other organizations to access your [facility] medical records  [id number]        Equal Access to Services     EDMOND QUIÑONES : Hadii emelia [place], richelle alcala, qadebra kaalmayoselin ga, mayito dove . So [facility] [phone].    ATENCIÓN: Si habla español, tiene a hein disposición servicios gratuitos de asistencia lingüística. Llame al [phone].    We comply with applicable federal civil rights laws and [place] laws. We do not discriminate on the basis of race, color, national origin, age, disability, sex, sexual orientation, or gender identity.            After Visit Summary       This is your record. Keep this with you and show to your community pharmacist(s) and doctor(s) at your next visit.                  "

## 2017-10-02 NOTE — ED AVS SNAPSHOT
[facility]    [address] [address]    Phone:  [phone]                                       Lorena Echevarria   MRN: [id number]    Department[facility]  [facility]   Date of Visit:  10/2/2017           After Visit Summary Signature Page     I have received my discharge instructions, and my questions have been answered. I have discussed any challenges I see with this plan with the nurse or doctor.    ..........................................................................................................................................  Patient/Patient Representative Signature      ..........................................................................................................................................  Patient Representative Print Name and Relationship to Patient    ..................................................               ................................................  Date                                            Time    ..........................................................................................................................................  Reviewed by Signature/Title    ...................................................              ..............................................  Date                                                            Time

## 2017-10-03 VITALS
HEART RATE: 85 BPM | DIASTOLIC BLOOD PRESSURE: 74 MMHG | SYSTOLIC BLOOD PRESSURE: 116 MMHG | TEMPERATURE: 98 F | RESPIRATION RATE: 16 BRPM | OXYGEN SATURATION: 96 %

## 2017-10-03 LAB
ALBUMIN SERPL-MCNC: 2.5 G/DL (ref 3.4–5)
ALP SERPL-CCNC: 147 U/L (ref 40–150)
ALT SERPL W P-5'-P-CCNC: 26 U/L (ref 0–50)
ANION GAP SERPL CALCULATED.3IONS-SCNC: 10 MMOL/L (ref 3–14)
APTT PPP: 23 SEC (ref 24–37)
AST SERPL W P-5'-P-CCNC: 18 U/L (ref 0–45)
BASOPHILS # BLD AUTO: 0 10E9/L (ref 0–0.2)
BASOPHILS NFR BLD AUTO: 0.2 %
BILIRUB SERPL-MCNC: 0.2 MG/DL (ref 0.2–1.3)
BUN SERPL-MCNC: 5 MG/DL (ref 7–30)
CALCIUM SERPL-MCNC: 8.5 MG/DL (ref 8.5–10.1)
CHLORIDE SERPL-SCNC: 106 MMOL/L (ref 94–109)
CK SERPL-CCNC: 79 U/L (ref 30–225)
CO2 SERPL-SCNC: 22 MMOL/L (ref 20–32)
CREAT SERPL-MCNC: 0.61 MG/DL (ref 0.52–1.04)
DIFFERENTIAL METHOD BLD: ABNORMAL
EOSINOPHIL # BLD AUTO: 0.1 10E9/L (ref 0–0.7)
EOSINOPHIL NFR BLD AUTO: 0.8 %
ERYTHROCYTE [DISTWIDTH] IN BLOOD BY AUTOMATED COUNT: 13.2 % (ref 10–15)
GFR SERPL CREATININE-BSD FRML MDRD: >90 ML/MIN/1.7M2
GLUCOSE SERPL-MCNC: 77 MG/DL (ref 70–99)
HCT VFR BLD AUTO: 36.2 % (ref 35–47)
HGB BLD-MCNC: 12 G/DL (ref 11.7–15.7)
IMM GRANULOCYTES # BLD: 0.1 10E9/L (ref 0–0.4)
IMM GRANULOCYTES NFR BLD: 0.4 %
INR PPP: 0.95 (ref 0.8–1.2)
LYMPHOCYTES # BLD AUTO: 2.8 10E9/L (ref 0.8–5.3)
LYMPHOCYTES NFR BLD AUTO: 23.2 %
MCH RBC QN AUTO: 30.5 PG (ref 26.5–33)
MCHC RBC AUTO-ENTMCNC: 33.1 G/DL (ref 31.5–36.5)
MCV RBC AUTO: 92 FL (ref 78–100)
MONOCYTES # BLD AUTO: 0.7 10E9/L (ref 0–1.3)
MONOCYTES NFR BLD AUTO: 6 %
NEUTROPHILS # BLD AUTO: 8.3 10E9/L (ref 1.6–8.3)
NEUTROPHILS NFR BLD AUTO: 69.4 %
NRBC # BLD AUTO: 0 10*3/UL
NRBC BLD AUTO-RTO: 0 /100
PLATELET # BLD AUTO: 218 10E9/L (ref 150–450)
POTASSIUM SERPL-SCNC: 3.8 MMOL/L (ref 3.4–5.3)
PROT SERPL-MCNC: 6.7 G/DL (ref 6.8–8.8)
RBC # BLD AUTO: 3.94 10E12/L (ref 3.8–5.2)
SODIUM SERPL-SCNC: 138 MMOL/L (ref 133–144)
WBC # BLD AUTO: 12 10E9/L (ref 4–11)

## 2017-10-03 PROCEDURE — 25000132 ZZH RX MED GY IP 250 OP 250 PS 637: Performed by: INTERNAL MEDICINE

## 2017-10-03 PROCEDURE — 25000125 ZZHC RX 250: Performed by: INTERNAL MEDICINE

## 2017-10-03 RX ORDER — ALBUTEROL SULFATE 0.83 MG/ML
5 SOLUTION RESPIRATORY (INHALATION) ONCE
Status: COMPLETED | OUTPATIENT
Start: 2017-10-03 | End: 2017-10-03

## 2017-10-03 RX ORDER — GUAIFENESIN/DEXTROMETHORPHAN 100-10MG/5
5 SYRUP ORAL 3 TIMES DAILY PRN
Qty: 118 ML | Refills: 0 | Status: SHIPPED | OUTPATIENT
Start: 2017-10-03 | End: 2017-10-05

## 2017-10-03 RX ADMIN — ALBUTEROL SULFATE 2.5 MG: 2.5 SOLUTION RESPIRATORY (INHALATION) at 00:55

## 2017-10-03 RX ADMIN — GUAIFENESIN 10 ML: 100 SOLUTION ORAL at 01:09

## 2017-10-03 ASSESSMENT — ENCOUNTER SYMPTOMS
WHEEZING: 0
ABDOMINAL PAIN: 0
CHEST TIGHTNESS: 0
ANAL BLEEDING: 0
CONFUSION: 0
BLOOD IN STOOL: 0
MYALGIAS: 0
NECK STIFFNESS: 0
NECK PAIN: 0
FLANK PAIN: 0
WOUND: 0
SHORTNESS OF BREATH: 0
COLOR CHANGE: 0
NAUSEA: 0
PALPITATIONS: 0
HEMATURIA: 0
NUMBNESS: 0
DIAPHORESIS: 0
ARTHRALGIAS: 0
LIGHT-HEADEDNESS: 0
VOMITING: 0
DIZZINESS: 0
CHILLS: 0
COUGH: 1
VOICE CHANGE: 0
BACK PAIN: 0
FEVER: 0
ABDOMINAL DISTENTION: 0
DYSURIA: 0
HEADACHES: 0

## 2017-10-03 NOTE — DISCHARGE INSTRUCTIONS
Viral Upper Respiratory Illness (Adult)  You have a viral upper respiratory illness (URI), which is another term for the common cold. This illness is contagious during the first few days. It is spread through the air by coughing and sneezing. It may also be spread by direct contact (touching the sick person and then touching your own eyes, nose, or mouth). Frequent handwashing will decrease risk of spread. Most viral illnesses go away within 7 to 10 days with rest and simple home remedies. Sometimes the illness may last for several weeks. Antibiotics will not kill a virus, and they are generally not prescribed for this condition.    Home care    If symptoms are severe, rest at home for the first 2 to 3 days. When you resume activity, don't let yourself get too tired.    Avoid being exposed to cigarette smoke (yours or others ).    You may use acetaminophen or ibuprofen to control pain and fever, unless another medicine was prescribed. (Note: If you have chronic liver or kidney disease, have ever had a stomach ulcer or gastrointestinal bleeding, or are taking blood-thinning medicines, talk with your healthcare provider before using these medicines.) Aspirin should never be given to anyone under 18 years of age who is ill with a viral infection or fever. It may cause severe liver or brain damage.    Your appetite may be poor, so a light diet is fine. Avoid dehydration by drinking 6 to 8 glasses of fluids per day (water, soft drinks, juices, tea, or soup). Extra fluids will help loosen secretions in the nose and lungs.    Over-the-counter cold medicines will not shorten the length of time you re sick, but they may be helpful for the following symptoms: cough, sore throat, and nasal and sinus congestion. (Note: Do not use decongestants if you have high blood pressure.)  Follow-up care  Follow up with your healthcare provider, or as advised.  When to seek medical advice  Call your healthcare provider right away if any  of these occur:    Cough with lots of colored sputum (mucus)    Severe headache; face, neck, or ear pain    Difficulty swallowing due to throat pain    Fever of 100.4 F (38 C)  Call 911, or get immediate medical care  Call emergency services right away if any of these occur:    Chest pain, shortness of breath, wheezing, or difficulty breathing    Coughing up blood    Inability to swallow due to throat pain  Date Last Reviewed: 9/13/2015 2000-2017 [employer]. [address] [place] [address]. All rights reserved. This information is not intended as a substitute for professional medical care. Always follow your healthcare professional's instructions.        Subconjunctival Hemorrhage    A subconjunctival hemorrhage is when a blood vessel breaks open in the white of the eye. It causes a bright red patch in the white of the eye. It is similar to a bruise on the skin. This type of hemorrhage is common. It can look quite alarming, but it is usually harmless.  Understanding the conjunctiva  The conjunctiva is the thin layer that covers the inside of the eyelids and the surface of the eye. It has many tiny blood vessels that bring oxygen and nutrients to the eye. The sclera is the white part of the eye that lies beneath the conjunctiva. Sometimes a blood vessel in the conjunctiva breaks open and bleeds. The blood then collects under the conjunctiva and turns part of the eye red. Over several weeks, your body then absorbs the blood.  What causes subconjunctival hemorrhage?  In many cases the cause isn t known. But some health conditions may make it more likely. These include:    Eye injury    Eye surgery    High blood pressure    Inflammation of the conjunctiva    Contact lens use    Diabetes    Arteriosclerosis    Tumor of the conjunctiva    Diseases that affect blood clotting    Violent sneezing, coughing, or vomiting    Certain medicines that can increase bleeding, such as aspirin    Pushing  hard during childbirth    Straining during constipation  Symptoms of subconjunctival hemorrhage  The main symptom is a red patch on the eye. You may notice it after waking up in the morning. In most cases just one eye will have a hemorrhage. It usually happens once and then goes away. But some health conditions may cause repeat hemorrhages. You may feel like you have something in your eye, but this is not common. The hemorrhage shouldn t affect your eyesight or cause any pain. If you do have pain, you may have another type of problem with your eye.  Diagnosing subconjunctival hemorrhage  Your healthcare provider will ask about your health history. You may have a physical exam. This includes a basic eye exam. Your provider will make sure you don t have other causes of red eye that may need other treatment.  You will need to see an eye doctor (ophthalmologist) if you have had an eye injury. This doctor might use a special lighted microscope to look closely at your eye. This helps show the doctor if the injury hurt the eye itself and not just its outer layer.  If this is not your first subconjunctival hemorrhage, your doctor may need to find the cause. For example, you may need blood tests to check for a blood clotting disorder.  Treatment for subconjunctival hemorrhage  In most cases you will not need treatment. The red patch will usually go away on its own in a few weeks. It will turn from red to brown and then yellow. There are no treatments to speed up this process. Your doctor may suggest you use a warm compress and artificial tears eye drops to help relieve some of the redness.  If your subconjunctival hemorrhage was caused by a health condition, that condition will be treated. For example, you may need a blood pressure medicine to treat high blood pressure.  When to call your healthcare provider  Call your healthcare provider right away if you have any of these:    Hemorrhage that doesn t go away in 2 to 3  weeks    Eye pain    Loss of eyesight    Another subconjunctival hemorrhage    Date Last Reviewed: 2/1/2017 2000-2017 [employer]. [address] [place] [address], [place] [address]. All rights reserved. This information is not intended as a substitute for professional medical care. Always follow your healthcare professional's instructions.

## 2017-10-03 NOTE — ED NOTES
Pt verbalized understanding of all discharge instructions.  Pt given paper script for cough syrup.

## 2017-10-03 NOTE — ED PROVIDER NOTES
History     Chief Complaint   Patient presents with     Cough     cough for past week, redness in eyes     Patient is a 22 year old female presenting with cough. The history is provided by the patient.   Cough   Cough characteristics:  Dry and harsh  Sputum characteristics:  Nondescript  Severity:  Moderate  Onset quality:  Gradual  Duration:  1 week  Timing:  Constant  Chronicity:  New  Associated symptoms: no chest pain, no chills, no diaphoresis, no fever, no headaches, no myalgias, no rash, no shortness of breath and no wheezing          I have reviewed the Medications, Allergies, Past Medical and Surgical History, and Social History in the Epic system.    Allergies:   Allergies   Allergen Reactions     Lamictal [Lamotrigine] Other (See Comments)     Increases aggression.     Amoxicillin Rash     Zoloft [Sertraline] Other (See Comments)     Depression worsens         No current facility-administered medications on file prior to encounter.   Current Outpatient Prescriptions on File Prior to Encounter:  cyclobenzaprine (FLEXERIL) 10 MG tablet Take 0.5-1 tablets (5-10 mg) by mouth 3 times daily as needed for muscle spasms   valACYclovir (VALTREX) 500 MG tablet Take 1 tablet (500 mg) by mouth daily   BUPROPION HCL PO Take 150 mg by mouth daily   GLYBURIDE PO Take 2.5 mg by mouth 2 times daily (with meals)   Prenatal Vit-Fe Fumarate-FA (PRENATAL VITAMIN PO) Take 1 tablet by mouth daily   insulin isophane human (HUMULIN N PEN) 100 UNIT/ML injection Inject 10 Units Subcutaneous At Bedtime   azithromycin (ZITHROMAX Z-SYLVAIN) 250 MG tablet Two tablets on the first day, then one tablet daily for the next 4 days (Patient taking differently: Two tablets on the first day, then one tablet daily for the next 4 days)   oxyCODONE-acetaminophen (PERCOCET) 5-325 MG per tablet Take 1-2 tablets by mouth every 6 hours as needed for moderate to severe pain (every 4-6 hrs prn)   blood glucose monitoring (ONE TOUCH ULTRA) test strip Use  "to test blood sugar 4 times daily.       Patient Active Problem List   Diagnosis     Suicidal ideation     Mild intellectual disability     Autism spectrum     Anxiety     Mood disorder (H)     Suicidal thoughts     Left Ankle Pain     Irregular menstrual cycle (PERIODS)     Depression     Elevated serum cholesterol     Obesity     Sinus tachycardia     Galactorrhea of both breasts     High-risk first pregnancy of young woman, third trimester     Recurrent HSV (herpes simplex virus)     Gestational diabetes mellitus (GDM) in third trimester controlled on oral hypoglycemic drug     Papanicolaou smear of cervix with low grade squamous intraepithelial lesion (LGSIL)     Encounter for triage in pregnant patient       History reviewed. No pertinent surgical history.    Social History   Substance Use Topics     Smoking status: Former Smoker     Smokeless tobacco: Never Used     Alcohol use No      Comment: Past use       Most Recent Immunizations   Administered Date(s) Administered     Influenza Vaccine IM 3yrs+ 4 Valent IIV4 09/15/2017     TDAP Vaccine (Adacel) 09/15/2017       BMI: Estimated body mass index is 41.32 kg/(m^2) as calculated from the following:    Height as of 9/29/17: 1.676 m (5' 6\").    Weight as of 9/29/17: 116.1 kg (256 lb).      Review of Systems   Constitutional: Negative for chills, diaphoresis and fever.   HENT: Negative for voice change.    Eyes: Negative for visual disturbance.   Respiratory: Positive for cough. Negative for chest tightness, shortness of breath and wheezing.    Cardiovascular: Negative for chest pain, palpitations and leg swelling.   Gastrointestinal: Negative for abdominal distention, abdominal pain, anal bleeding, blood in stool, nausea and vomiting.   Genitourinary: Negative for decreased urine volume, dysuria, flank pain and hematuria.   Musculoskeletal: Negative for arthralgias, back pain, gait problem, myalgias, neck pain and neck stiffness.   Skin: Negative for color " change, pallor, rash and wound.   Neurological: Negative for dizziness, syncope, light-headedness, numbness and headaches.   Psychiatric/Behavioral: Negative for confusion and suicidal ideas.       Physical Exam   BP: 127/83  Pulse: 91  Heart Rate: 91  Temp: 98.6  F (37  C)  Resp: 18  SpO2: 96 %  Physical Exam   Constitutional: She is oriented to person, place, and time. She appears well-developed and well-nourished.   HENT:   Head: Normocephalic and atraumatic.   Mouth/Throat: No oropharyngeal exudate.   Eyes: EOM are normal. Pupils are equal, round, and reactive to light. Right eye exhibits no hordeolum. No foreign body present in the right eye. Left eye exhibits no hordeolum. No foreign body present in the left eye. Right conjunctiva has a hemorrhage. Left conjunctiva has a hemorrhage.       Neck: Normal range of motion. Neck supple. No JVD present. No tracheal deviation present. No thyromegaly present.   Cardiovascular: Normal rate, regular rhythm, normal heart sounds and intact distal pulses.  Exam reveals no gallop and no friction rub.    No murmur heard.  Pulmonary/Chest: Effort normal and breath sounds normal. No stridor. No respiratory distress. She has no wheezes. She has no rales. She exhibits no tenderness.   Abdominal: Soft. Bowel sounds are normal. She exhibits no distension and no mass. There is no tenderness. There is no rebound and no guarding.   Musculoskeletal: Normal range of motion. She exhibits no edema or tenderness.   Lymphadenopathy:     She has no cervical adenopathy.   Neurological: She is alert and oriented to person, place, and time.   Skin: Skin is warm and dry. No rash noted. No erythema. No pallor.   Psychiatric: Her behavior is normal.   Nursing note and vitals reviewed.      ED Course     ED Course     Procedures                 Labs Ordered and Resulted from Time of ED Arrival Up to the Time of Departure from the ED   CBC WITH PLATELETS DIFFERENTIAL - Abnormal; Notable for the  following:        Result Value    WBC 12.0 (*)     All other components within normal limits   COMPREHENSIVE METABOLIC PANEL - Abnormal; Notable for the following:     Urea Nitrogen 5 (*)     Albumin 2.5 (*)     Protein Total 6.7 (*)     All other components within normal limits   PARTIAL THROMBOPLASTIN TIME - Abnormal; Notable for the following:     PTT 23 (*)     All other components within normal limits   CK TOTAL   INR       Assessments & Plan (with Medical Decision Making)   Cough for 1 wk, after a vigorous episode of coughing developed conjuntival bleeding  In exam : b/l subconjunctival bleed  38 wk pregnant,  done by ob team  Denies any headache, vision change  Labs: slight elevation of WBC, otherwise normal  Did not respond to nebulizer treatment, should not be reactive airway disease, no respiratory distress or wheezing  subconjuntival hemorrhage should related to coughing  Guaifenesin given and prescribed  I have reviewed the nursing notes.    I have reviewed the findings, diagnosis, plan and need for follow up with the patient.      Discharge Medication List as of 10/3/2017  1:05 AM      START taking these medications    Details   guaiFENesin-dextromethorphan (ROBITUSSIN DM) 100-10 MG/5ML syrup Take 5 mLs by mouth 3 times daily as needed for cough, Disp-118 mL, R-0, Local Print             Final diagnoses:   Subconjunctival bleed, bilateral   Cough   URI, acute       10/2/2017   [facility]     Eagle Bella MD  10/03/17 0350       Eagle Bella MD  10/03/17 035

## 2017-10-03 NOTE — ED NOTES
Pt comes in today with report of shortness of breath, productive cough, rib pain, dizziness, and redness to eyes.  Pt has bruising noted to bilat eyes.  Blood shot to bilat eyes.  Pt reports 38 weeks pregnant, fetal heart tones 135, completed by OB RN.  Pt reports poor sleep due to cough.  Pt reports fatigue.

## 2017-10-04 ENCOUNTER — HOSPITAL ENCOUNTER (EMERGENCY)
Facility: HOSPITAL | Age: 22
Discharge: HOME | End: 2017-10-04
Attending: PHYSICIAN ASSISTANT | Admitting: PHYSICIAN ASSISTANT
Payer: MEDICAID

## 2017-10-04 ENCOUNTER — HOSPITAL ENCOUNTER (OUTPATIENT)
Facility: HOSPITAL | Age: 22
Discharge: STILL ADMITTED OR ADMINISTRATIVE | End: 2017-10-04
Attending: OBSTETRICS & GYNECOLOGY | Admitting: OBSTETRICS & GYNECOLOGY
Payer: MEDICAID

## 2017-10-04 VITALS
DIASTOLIC BLOOD PRESSURE: 74 MMHG | RESPIRATION RATE: 20 BRPM | SYSTOLIC BLOOD PRESSURE: 127 MMHG | OXYGEN SATURATION: 98 % | TEMPERATURE: 98.1 F | HEART RATE: 99 BPM

## 2017-10-04 VITALS
OXYGEN SATURATION: 97 % | DIASTOLIC BLOOD PRESSURE: 82 MMHG | RESPIRATION RATE: 16 BRPM | SYSTOLIC BLOOD PRESSURE: 124 MMHG | HEART RATE: 90 BPM | TEMPERATURE: 98 F

## 2017-10-04 DIAGNOSIS — H11.33 SUBCONJUNCTIVAL HEMORRHAGE, BILATERAL: ICD-10-CM

## 2017-10-04 DIAGNOSIS — J06.9 URI WITH COUGH AND CONGESTION: ICD-10-CM

## 2017-10-04 PROCEDURE — 96360 HYDRATION IV INFUSION INIT: CPT

## 2017-10-04 PROCEDURE — 99213 OFFICE O/P EST LOW 20 MIN: CPT

## 2017-10-04 PROCEDURE — 25000128 H RX IP 250 OP 636: Performed by: PHYSICIAN ASSISTANT

## 2017-10-04 PROCEDURE — 25000132 ZZH RX MED GY IP 250 OP 250 PS 637: Performed by: PHYSICIAN ASSISTANT

## 2017-10-04 PROCEDURE — 96361 HYDRATE IV INFUSION ADD-ON: CPT

## 2017-10-04 PROCEDURE — 99284 EMERGENCY DEPT VISIT MOD MDM: CPT | Performed by: PHYSICIAN ASSISTANT

## 2017-10-04 PROCEDURE — 99284 EMERGENCY DEPT VISIT MOD MDM: CPT | Mod: 25

## 2017-10-04 RX ORDER — AZITHROMYCIN 250 MG/1
TABLET, FILM COATED ORAL
Qty: 6 TABLET | Refills: 0 | Status: SHIPPED | OUTPATIENT
Start: 2017-10-04 | End: 2017-10-09

## 2017-10-04 RX ORDER — ACETAMINOPHEN 325 MG/1
975 TABLET ORAL ONCE
Status: COMPLETED | OUTPATIENT
Start: 2017-10-04 | End: 2017-10-04

## 2017-10-04 RX ORDER — SODIUM CHLORIDE 9 MG/ML
INJECTION, SOLUTION INTRAVENOUS CONTINUOUS
Status: DISCONTINUED | OUTPATIENT
Start: 2017-10-04 | End: 2017-10-04 | Stop reason: HOSPADM

## 2017-10-04 RX ORDER — GUAIFENESIN/DEXTROMETHORPHAN 100-10MG/5
10 SYRUP ORAL ONCE
Status: COMPLETED | OUTPATIENT
Start: 2017-10-04 | End: 2017-10-04

## 2017-10-04 RX ADMIN — ACETAMINOPHEN 975 MG: 325 TABLET, FILM COATED ORAL at 12:45

## 2017-10-04 RX ADMIN — SODIUM CHLORIDE 1000 ML: 9 INJECTION, SOLUTION INTRAVENOUS at 11:09

## 2017-10-04 RX ADMIN — GUAIFENESIN AND DEXTROMETHORPHAN 10 ML: 100; 10 SYRUP ORAL at 12:46

## 2017-10-04 RX ADMIN — SODIUM CHLORIDE 1000 ML: 9 INJECTION, SOLUTION INTRAVENOUS at 11:54

## 2017-10-04 ASSESSMENT — ENCOUNTER SYMPTOMS
NECK PAIN: 0
COUGH: 1
SINUS PAIN: 0
VOMITING: 1
ABDOMINAL PAIN: 0
NAUSEA: 0
BRUISES/BLEEDS EASILY: 0
BACK PAIN: 0
CHILLS: 0
FEVER: 0
LIGHT-HEADEDNESS: 1
PALPITATIONS: 0
SORE THROAT: 0
ACTIVITY CHANGE: 1
APPETITE CHANGE: 1
HEADACHES: 1
FACIAL SWELLING: 0
FATIGUE: 1
EYE REDNESS: 1
WEAKNESS: 1
RHINORRHEA: 1
VOICE CHANGE: 1
CHEST TIGHTNESS: 1
SINUS PRESSURE: 0

## 2017-10-04 NOTE — PROGRESS NOTES
This patient is new to the area.  She is wanting to have an appointment to establish care with Kaya Servin.  Appointment was made for a month from now.

## 2017-10-04 NOTE — ED NOTES
"Pt comes in from OB after having NST due to decreased fetal movement and vomiting. Pt was recently seen here in er and as well as urgent care for treatment of URI.  Pt states today that she never did take the z pac as prescribed as insurance would not cover cost of medication.  Pt was also prescribed cough syrup in er Sunday night, did not fill this either.  Pt family at side reports \"pt will get something done for her today, cause they did not do it Sunday\".  Did assure family that heart tones were listened to on Sunday, and that some treatments are limited due to pregnancy.  Family would like influenza swab completed, also informed them that this would be a send out, and immediate result would not be available.  Pt has noted bruising to bridge of nose between eyes.  Eye blood shot from coughing.   "

## 2017-10-04 NOTE — ED PROVIDER NOTES
History     Chief Complaint   Patient presents with     Nausea & Vomiting     vomiting throughout day     Cough     clear sputum     The history is provided by the patient.     Lorena Echevarria is a 22 year old female who presented to the ED along with family for evaluation of continued cough.  Lorena has been seen twice recently for this cough.  She was provided Azithromycin in the  but admits not filling the medication due to lack of insurance.  Family states that they all have had the symptoms of a URI recently.  Lorena's history is most appreciable for 38 week gestation.  She was seen in the ED 2 days ago and treated with cough medications.  Large bilateral subconjunctival hemorrhages were noted at that time.  These developed after a long paroxysm of cough.  Does endorse clear sputum production.  No fevers.  No sore throat.  Does have nasal congestion and postnasal drainage.  Has now developed posttussive emesis as well.  She is up to date on her immunizations.  Mild headache.  Feels weak and tired.      I have reviewed the Medications, Allergies, Past Medical and Surgical History, and Social History in the Epic system.    Allergies:   Allergies   Allergen Reactions     Lamictal [Lamotrigine] Other (See Comments)     Increases aggression.     Amoxicillin Rash     Zoloft [Sertraline] Other (See Comments)     Depression worsens         No current facility-administered medications on file prior to encounter.   Current Outpatient Prescriptions on File Prior to Encounter:  guaiFENesin-dextromethorphan (ROBITUSSIN DM) 100-10 MG/5ML syrup Take 5 mLs by mouth 3 times daily as needed for cough   valACYclovir (VALTREX) 500 MG tablet Take 1 tablet (500 mg) by mouth daily   BUPROPION HCL PO Take 150 mg by mouth daily   GLYBURIDE PO Take 2.5 mg by mouth 2 times daily (with meals)   Prenatal Vit-Fe Fumarate-FA (PRENATAL VITAMIN PO) Take 1 tablet by mouth daily   insulin isophane human (HUMULIN N PEN) 100 UNIT/ML injection  "Inject 10 Units Subcutaneous At Bedtime   oxyCODONE-acetaminophen (PERCOCET) 5-325 MG per tablet Take 1-2 tablets by mouth every 6 hours as needed for moderate to severe pain (every 4-6 hrs prn)   cyclobenzaprine (FLEXERIL) 10 MG tablet Take 0.5-1 tablets (5-10 mg) by mouth 3 times daily as needed for muscle spasms   blood glucose monitoring (ONE TOUCH ULTRA) test strip Use to test blood sugar 4 times daily.       Patient Active Problem List   Diagnosis     Suicidal ideation     Mild intellectual disability     Autism spectrum     Anxiety     Mood disorder (H)     Suicidal thoughts     Left Ankle Pain     Irregular menstrual cycle (PERIODS)     Depression     Elevated serum cholesterol     Obesity     Sinus tachycardia     Galactorrhea of both breasts     High-risk first pregnancy of young woman, third trimester     Recurrent HSV (herpes simplex virus)     Gestational diabetes mellitus (GDM) in third trimester controlled on oral hypoglycemic drug     Papanicolaou smear of cervix with low grade squamous intraepithelial lesion (LGSIL)     Encounter for triage in pregnant patient       History reviewed. No pertinent surgical history.    Social History   Substance Use Topics     Smoking status: Former Smoker     Smokeless tobacco: Never Used     Alcohol use No      Comment: Past use       Most Recent Immunizations   Administered Date(s) Administered     Influenza Vaccine IM 3yrs+ 4 Valent IIV4 09/15/2017     TDAP Vaccine (Adacel) 09/15/2017       BMI: Estimated body mass index is 41.32 kg/(m^2) as calculated from the following:    Height as of 9/29/17: 1.676 m (5' 6\").    Weight as of 9/29/17: 116.1 kg (256 lb).      Review of Systems   Constitutional: Positive for activity change, appetite change and fatigue. Negative for chills and fever.   HENT: Positive for congestion, postnasal drip, rhinorrhea and voice change. Negative for ear pain, facial swelling, nosebleeds, sinus pain, sinus pressure and sore throat.    Eyes: " Positive for redness.   Respiratory: Positive for cough and chest tightness.         Some dyspnea on exertion    Cardiovascular: Negative for chest pain, palpitations and leg swelling.   Gastrointestinal: Positive for vomiting. Negative for abdominal pain and nausea.   Genitourinary: Negative.    Musculoskeletal: Negative for back pain and neck pain.   Skin: Negative.    Neurological: Positive for weakness, light-headedness and headaches.   Hematological: Does not bruise/bleed easily.       Physical Exam   BP: 131/87  Pulse: 86  Heart Rate: 108  Temp: 98.5  F (36.9  C)  Resp: 20  SpO2: 96 %  Physical Exam   Constitutional: She is oriented to person, place, and time. She appears well-developed and well-nourished. No distress.   Eyes: EOM are normal. Pupils are equal, round, and reactive to light.   Rather appreciable bilateral subconjunctival hemorrhages with some lower orbital ecchymosis    Cardiovascular: Normal rate and regular rhythm.    No tachycardia on my exam    Pulmonary/Chest: Effort normal and breath sounds normal. No respiratory distress. She has no wheezes.   Musculoskeletal: She exhibits no edema.   Neurological: She is alert and oriented to person, place, and time.   Skin: Skin is warm and dry.   Psychiatric: She has a normal mood and affect.   Nursing note and vitals reviewed.      ED Course     ED Course     Procedures        Medications   0.9% sodium chloride BOLUS (0 mLs Intravenous Stopped 10/4/17 1247)     Followed by   0.9% sodium chloride BOLUS (0 mLs Intravenous Stopped 10/4/17 1154)     Followed by   0.9% sodium chloride infusion (not administered)   acetaminophen (TYLENOL) tablet 975 mg (975 mg Oral Given 10/4/17 1245)   guaiFENesin-dextromethorphan (ROBITUSSIN DM) 100-10 MG/5ML syrup 10 mL (10 mLs Oral Given 10/4/17 1246)          Critical Care time:  none               Labs Ordered and Resulted from Time of ED Arrival Up to the Time of Departure from the ED - No data to  display    Assessments & Plan (with Medical Decision Making)   She has had a recent CBC that shows normal platelets.  No indication for imaging or laboratory work-up.  Everybody in the family has had similar symptoms and she has associated nasal congestion. IV fluids and  to help with the Z-Pac.  Follow-up in the clinic.  Not consistent with CAP or PE.  Return HERE for ANY worsening symptoms, chest pain, or other concerns.  Ms. Echevarria and her parents voiced complete understanding and were agreeable.     I have reviewed the nursing notes.    I have reviewed the findings, diagnosis, plan and need for follow up with the patient.       New Prescriptions    AZITHROMYCIN (ZITHROMAX Z-SYLVAIN) 250 MG TABLET    Two tablets on the first day, then one tablet daily for the next 4 days       Final diagnoses:   URI with cough and congestion   Subconjunctival hemorrhage, bilateral       10/4/2017   [facility]     Vanita Arce PA-C  10/04/17 5057

## 2017-10-04 NOTE — PLAN OF CARE
OB Triage Note  Lorena Echevarria  MRN: [id number]  Gestational Age: 38w4d      Lorena Echevarria presents for cough with vomiting for at least 4 days. Stated she has not been able to keep fluids or food down for at least 4 days. Denies contractions, rupture of membranes, or vaginal bleeding.  External monitors on. Fetal heart tones 140's with variability and accelerations noted.  Patient began coughing and gagging, difficult to maintain heart tone monitoring with patient changing positions frequently. Able to monitor heart tones for about 6 minutes.    .    Dr. Severino notified of arrival and condition. Order received to transfer patient to E.R.   Oriented patient to surroundings. Call light within reach.     FHT: 140  NST Start Time:1004  NST Stop Time:1016  NST: Reactive   Uterine Assessment:Contractions patient denies contractions    Plan:   Patient transferred to E.R. Per Dr. Severino order

## 2017-10-04 NOTE — ED AVS SNAPSHOT
[facility]    [address] [address]    Phone:  [phone]                                       Lorena Echevarria   MRN: [id number]    Department:  [facility]   Date of Visit:  10/4/2017           After Visit Summary Signature Page     I have received my discharge instructions, and my questions have been answered. I have discussed any challenges I see with this plan with the nurse or doctor.    ..........................................................................................................................................  Patient/Patient Representative Signature      ..........................................................................................................................................  Patient Representative Print Name and Relationship to Patient    ..................................................               ................................................  Date                                            Time    ..........................................................................................................................................  Reviewed by Signature/Title    ...................................................              ..............................................  Date                                                            Time

## 2017-10-04 NOTE — ED AVS SNAPSHOT
[facility]    [address] [address]    Phone:  [phone]                                       Lorena Echevarria   MRN: [id number]    Department:  [facility]   Date of Visit:  10/4/2017           Patient Information     Date Of Birth          1995        Your diagnoses for this visit were:     URI with cough and congestion     Subconjunctival hemorrhage, bilateral        You were seen by Vanita Arce PA-C.      Follow-up Information     Follow up with [facility].    Specialty:  EMERGENCY MEDICINE    Why:  If symptoms worsen    Contact information:    [address] [place] [address] [phone]    Additional information:    From [place]: Take [address]. Turn left at [address]. Turn left at the first stoplight on [address]. At the first stop sign, take a right onto [address]. Take a left into the parking lot and continue through until you reach the North enterance of the building.       From [place]: Take [address]. Take the [address] ramp towards [place]. Turn left onto [address]. Take a slight right onto [address]. Turn left at the first stoplight on [address]. At the first stop sign, take a right onto [address]. Take a left into the parking lot and continue through until you reach the North enterance of the building.       From [place]: Take [address]. Take a right at [address]. At the first stop sign, take a right onto [address]. Take a left into the parking lot and continue through until you reach the North enterance of the building.         Discharge Instructions         Subconjunctival Hemorrhage    A subconjunctival hemorrhage is a result of a broken blood vessel in the white portion of the eye. It is usually painless and may be caused by coughing, sneezing, or vomiting. An injury to the eye can cause this. It can  also be a sign of hypertension (high blood pressure) or a bleeding disorder.  Although it can look frightening, the presence of the blood is not serious. The blood will be reabsorbed without treatment within 2 to 3 weeks.  Home care  You may continue your usual activities.  Follow-up care  Follow up with your healthcare provider, or as advised.  When to seek medical advice  Contact your healthcare provider right away if any of these occur:    Pain in the eye    Change in vision    The blood does not disappear within three weeks    Increasing redness or swelling of the eye    Severe headache or dizziness    Signs of bruising or bleeding from other parts of your body  Date Last Reviewed: 6/14/2015 2000-2017 [employer]. [address] [place] [address], [place] [address]. All rights reserved. This information is not intended as a substitute for professional medical care. Always follow your healthcare professional's instructions.      Rest and stay hydrated.     Follow-up with OB for scheduled visits.      Please return HERE for ANY worsening symptoms, shortness of breath, fevers, or other concerns.     Azithromycin as prescribed.       Discharge References/Attachments     BRONCHITIS, ANTIOBIOTIC TREATMENT (ADULT) (ENGLISH)      Future Appointments        Provider Department Dept Phone Center    10/6/2017 11:30 AM Fidel Severino MD [facility] [phone] [place]    11/6/2017 10:30 AM SAGAR Victor [facility] [phone] [place]         Review of your medicines      CONTINUE these medicines which may have CHANGED, or have new prescriptions. If we are uncertain of the size of tablets/capsules you have at home, strength may be listed as something that might have changed.        Dose / Directions Last dose taken    azithromycin 250 MG tablet   Commonly known as:  ZITHROMAX Z-SYLVAIN   What changed:  additional instructions   Quantity:  6 tablet        Two tablets on the  first day, then one tablet daily for the next 4 days   Refills:  0          Our records show that you are taking the medicines listed below. If these are incorrect, please call your family doctor or clinic.        Dose / Directions Last dose taken    blood glucose monitoring test strip   Commonly known as:  ONE TOUCH ULTRA   Quantity:  100 each        Use to test blood sugar 4 times daily.   Refills:  5        BUPROPION HCL PO   Dose:  150 mg        Take 150 mg by mouth daily   Refills:  0        cyclobenzaprine 10 MG tablet   Commonly known as:  FLEXERIL   Dose:  5-10 mg   Quantity:  30 tablet        Take 0.5-1 tablets (5-10 mg) by mouth 3 times daily as needed for muscle spasms   Refills:  1        GLYBURIDE PO   Dose:  2.5 mg        Take 2.5 mg by mouth 2 times daily (with meals)   Refills:  0        guaiFENesin-dextromethorphan 100-10 MG/5ML syrup   Commonly known as:  ROBITUSSIN DM   Dose:  5 mL   Quantity:  118 mL        Take 5 mLs by mouth 3 times daily as needed for cough   Refills:  0        insulin isophane human 100 UNIT/ML injection   Commonly known as:  HumuLIN N PEN   Dose:  10 Units        Inject 10 Units Subcutaneous At Bedtime   Refills:  0        oxyCODONE-acetaminophen 5-325 MG per tablet   Commonly known as:  PERCOCET   Dose:  1-2 tablet   Quantity:  30 tablet        Take 1-2 tablets by mouth every 6 hours as needed for moderate to severe pain (every 4-6 hrs prn)   Refills:  0        PRENATAL VITAMIN PO   Dose:  1 tablet        Take 1 tablet by mouth daily   Refills:  0        valACYclovir 500 MG tablet   Commonly known as:  VALTREX   Dose:  500 mg   Quantity:  30 tablet        Take 1 tablet (500 mg) by mouth daily   Refills:  1                Prescriptions were sent or printed at these locations (1 Prescription)                   [facility] [place] - [address]   [address] [address]    Telephone:  [phone]   Fax:  [phone]   Hours:                   "E-Prescribed (1 of 1)         azithromycin (ZITHROMAX Z-SYLVAIN) 250 MG tablet                Procedures and tests performed during your visit     Peripheral IV: Standard      Orders Needing Specimen Collection     Ordered          10/04/17 1102  UA reflex to Microscopic - STAT, [place]: STAT, Needs to be Collected     Scheduled Task Status   10/04/17 1103 Collect UA reflex to Microscopic Open   Order Class:  PCU Collect                  Pending Results     No orders found from 10/2/2017 to 10/5/2017.            Pending Culture Results     No orders found from 10/2/2017 to 10/5/2017.            Thank you for choosing [facility]       Thank you for choosing [facility] for your care. Our goal is always to provide you with excellent care. Hearing back from our patients is one way we can continue to improve our services. Please take a few minutes to complete the written survey that you may receive in the mail after you visit with us. Thank you!        [employer] Information     [employer] lets you send messages to your doctor, view your test results, renew your prescriptions, schedule appointments and more. To sign up, go to [email or web address] . Click on \"Log in\" on the left side of the screen, which will take you to the Welcome page. Then click on \"Sign up Now\" on the right side of the page.     You will be asked to enter the access code listed below, as well as some personal information. Please follow the directions to create your username and password.     Your access code is: [email or web address]  Expires: 2017 11:23 AM     Your access code will  in 90 days. If you need help or a new code, please call your [facility] clinic or [phone].        Care EveryWhere ID     This is your Care EveryWhere ID. This could be used by other organizations to access your [facility] medical records  [id number]        Equal Access to Services     EDMOND QUIÑONES AH: richelle Rodgers qaybta kaalmada adeegyada, waxay " perez dove ah. So [facility] [phone].    ATENCIÓN: Si habla español, tiene a hein disposición servicios gratuitos de asistencia lingüística. Llame al [phone].    We comply with applicable federal civil rights laws and [place] laws. We do not discriminate on the basis of race, color, national origin, age, disability, sex, sexual orientation, or gender identity.            After Visit Summary       This is your record. Keep this with you and show to your community pharmacist(s) and doctor(s) at your next visit.

## 2017-10-04 NOTE — DISCHARGE INSTRUCTIONS
Subconjunctival Hemorrhage    A subconjunctival hemorrhage is a result of a broken blood vessel in the white portion of the eye. It is usually painless and may be caused by coughing, sneezing, or vomiting. An injury to the eye can cause this. It can also be a sign of hypertension (high blood pressure) or a bleeding disorder.  Although it can look frightening, the presence of the blood is not serious. The blood will be reabsorbed without treatment within 2 to 3 weeks.  Home care  You may continue your usual activities.  Follow-up care  Follow up with your healthcare provider, or as advised.  When to seek medical advice  Contact your healthcare provider right away if any of these occur:    Pain in the eye    Change in vision    The blood does not disappear within three weeks    Increasing redness or swelling of the eye    Severe headache or dizziness    Signs of bruising or bleeding from other parts of your body  Date Last Reviewed: 6/14/2015 2000-2017 [employer]. [address] [place] [address]. All rights reserved. This information is not intended as a substitute for professional medical care. Always follow your healthcare professional's instructions.      Rest and stay hydrated.     Follow-up with OB for scheduled visits.      Please return HERE for ANY worsening symptoms, shortness of breath, fevers, or other concerns.     Azithromycin as prescribed.

## 2017-10-05 DIAGNOSIS — O24.414 INSULIN CONTROLLED GESTATIONAL DIABETES MELLITUS (GDM) IN THIRD TRIMESTER: Primary | ICD-10-CM

## 2017-10-06 ENCOUNTER — HOSPITAL ENCOUNTER (OUTPATIENT)
Facility: HOSPITAL | Age: 22
Discharge: HOME | End: 2017-10-06
Attending: OBSTETRICS & GYNECOLOGY | Admitting: OBSTETRICS & GYNECOLOGY
Payer: MEDICAID

## 2017-10-06 ENCOUNTER — APPOINTMENT (OUTPATIENT)
Dept: ULTRASOUND IMAGING | Facility: HOSPITAL | Age: 22
End: 2017-10-06
Attending: OBSTETRICS & GYNECOLOGY
Payer: MEDICAID

## 2017-10-06 ENCOUNTER — PRENATAL OFFICE VISIT (OUTPATIENT)
Dept: OBGYN | Facility: OTHER | Age: 22
End: 2017-10-06
Attending: OBSTETRICS & GYNECOLOGY
Payer: MEDICAID

## 2017-10-06 ENCOUNTER — TELEPHONE (OUTPATIENT)
Dept: EDUCATION SERVICES | Facility: HOSPITAL | Age: 22
End: 2017-10-06

## 2017-10-06 VITALS
HEIGHT: 66 IN | SYSTOLIC BLOOD PRESSURE: 118 MMHG | DIASTOLIC BLOOD PRESSURE: 74 MMHG | HEART RATE: 111 BPM | OXYGEN SATURATION: 97 % | WEIGHT: 257 LBS | BODY MASS INDEX: 41.3 KG/M2

## 2017-10-06 VITALS
DIASTOLIC BLOOD PRESSURE: 56 MMHG | SYSTOLIC BLOOD PRESSURE: 122 MMHG | HEART RATE: 107 BPM | RESPIRATION RATE: 18 BRPM | TEMPERATURE: 98.4 F

## 2017-10-06 DIAGNOSIS — O09.613 HIGH-RISK FIRST PREGNANCY OF YOUNG WOMAN, THIRD TRIMESTER: Primary | ICD-10-CM

## 2017-10-06 DIAGNOSIS — F43.21 ADJUSTMENT DISORDER WITH DEPRESSED MOOD: ICD-10-CM

## 2017-10-06 DIAGNOSIS — J06.9 UPPER RESPIRATORY TRACT INFECTION, UNSPECIFIED TYPE: ICD-10-CM

## 2017-10-06 DIAGNOSIS — O36.8130 DECREASED FETAL MOVEMENTS IN THIRD TRIMESTER, SINGLE OR UNSPECIFIED FETUS: ICD-10-CM

## 2017-10-06 LAB
ALBUMIN UR-MCNC: 100 MG/DL
ALBUMIN UR-MCNC: 30 MG/DL
APPEARANCE UR: ABNORMAL
APPEARANCE UR: CLEAR
BACTERIA #/AREA URNS HPF: ABNORMAL /HPF
BACTERIA #/AREA URNS HPF: ABNORMAL /HPF
BILIRUB UR QL STRIP: NEGATIVE
BILIRUB UR QL STRIP: NEGATIVE
COLOR UR AUTO: ABNORMAL
COLOR UR AUTO: ABNORMAL
GLUCOSE UR STRIP-MCNC: NEGATIVE MG/DL
GLUCOSE UR STRIP-MCNC: NEGATIVE MG/DL
HGB UR QL STRIP: NEGATIVE
HGB UR QL STRIP: NEGATIVE
KETONES UR STRIP-MCNC: 10 MG/DL
KETONES UR STRIP-MCNC: 5 MG/DL
LEUKOCYTE ESTERASE UR QL STRIP: ABNORMAL
LEUKOCYTE ESTERASE UR QL STRIP: ABNORMAL
MUCOUS THREADS #/AREA URNS LPF: PRESENT /LPF
MUCOUS THREADS #/AREA URNS LPF: PRESENT /LPF
NITRATE UR QL: NEGATIVE
NITRATE UR QL: NEGATIVE
PH UR STRIP: 6 PH (ref 4.7–8)
PH UR STRIP: 6 PH (ref 4.7–8)
RBC #/AREA URNS AUTO: 2 /HPF (ref 0–2)
RBC #/AREA URNS AUTO: 28 /HPF (ref 0–2)
SOURCE: ABNORMAL
SOURCE: ABNORMAL
SP GR UR STRIP: 1.03 (ref 1–1.03)
SP GR UR STRIP: 1.03 (ref 1–1.03)
SQUAMOUS #/AREA URNS AUTO: 15 /HPF (ref 0–1)
UROBILINOGEN UR STRIP-MCNC: 2 MG/DL (ref 0–2)
UROBILINOGEN UR STRIP-MCNC: NORMAL MG/DL (ref 0–2)
WBC #/AREA URNS AUTO: 77 /HPF (ref 0–2)
WBC #/AREA URNS AUTO: 8 /HPF (ref 0–2)

## 2017-10-06 PROCEDURE — 81001 URINALYSIS AUTO W/SCOPE: CPT | Performed by: OBSTETRICS & GYNECOLOGY

## 2017-10-06 PROCEDURE — 99207 ZZC COMPLICATED OB VISIT: CPT | Performed by: OBSTETRICS & GYNECOLOGY

## 2017-10-06 PROCEDURE — 99212 OFFICE O/P EST SF 10 MIN: CPT | Mod: 25 | Performed by: OBSTETRICS & GYNECOLOGY

## 2017-10-06 PROCEDURE — 76819 FETAL BIOPHYS PROFIL W/O NST: CPT | Mod: TC

## 2017-10-06 RX ORDER — BUPROPION HYDROCHLORIDE 75 MG/1
150 TABLET ORAL DAILY
Qty: 90 TABLET | Refills: 0 | Status: SHIPPED | OUTPATIENT
Start: 2017-10-06 | End: 2017-11-03

## 2017-10-06 RX ORDER — CALCIUM CARBONATE 300MG(750)
1 TABLET,CHEWABLE ORAL DAILY
Qty: 90 TABLET | Refills: 3 | Status: SHIPPED | OUTPATIENT
Start: 2017-10-06 | End: 2017-10-27

## 2017-10-06 RX ORDER — CALCIUM CARBONATE 300MG(750)
1 TABLET,CHEWABLE ORAL DAILY
Status: ON HOLD | COMMUNITY
End: 2017-10-06

## 2017-10-06 ASSESSMENT — PAIN SCALES - GENERAL: PAINLEVEL: SEVERE PAIN (6)

## 2017-10-06 NOTE — DISCHARGE INSTRUCTIONS
Discharge Instructions for Undelivered Patients  PLEASE RETURN Sunday October 8TH, 2017    Diet:  * Drink 8 to 12 glasses of liquids (milk, juice, water) every day  * You may eat meals and snacks.    Activity:  * Count fetal kicks every day.  * Call your doctor if your baby is moving less than usual.    Call your provider if you notice:  * Swelling in your face or increased swelling in your hands or legs.  * Headaches that are not relieved by Tylenol (acetaminophen).  * Changes in your vision (blurring; seeing spots or stars).  * Nausea (sick to your stomach) and vomiting (throwing up).  * Weight gain of 5 pounds per week.  * Heartburn that doesn't go away.  * Signs of bladder infection: Pain when you urinate (use the toilet), needing to go more often or more urgently.  * The bag of lockett (membrane) breaks, or you notice leaking in your underwear.  * Bright red blood in your underwear.  * Abdominal (lower belly) or stomach pain.  * For first baby: Contractions (tightenings) less than 5 minutes apart for one hour or more.  * Second (plus) baby: Contractions (tightenings) less than 10 minutes apart and getting stronger.  * Increase or change in vaginal discharge (note the color and amount).      [facility]: [phone]

## 2017-10-06 NOTE — PROGRESS NOTES
Pt with improving cough and URI sx.  See ED evals.  She finally started on Z-pack yesterday after insurance put in place.  She never did  percocet which may help with pain and act as cough suppressant.  B conjunctival hemorrhages noted from coughing.  See ED evaluations.  Fetus moving but decreased the last few days with everything going on.  NST was reactive WED.  She did not schedule her BPP for this week.    Denies regular contractions, vaginal bleeding, LOF.   She did not bring her glucometer readings with her but does have supplies now.   To [facility] and D for evaluation with NST and BPP.    Consider IOL prior to 40 wks   Fidel Severino MD  10/6/2017

## 2017-10-06 NOTE — NURSING NOTE
"Chief Complaint   Patient presents with     Prenatal Care     38 weeks and 6 days       Initial /74 (BP Location: Left arm, Cuff Size: Adult Large)  Pulse 111  Ht 5' 6\" (1.676 m)  Wt 257 lb (116.6 kg)  SpO2 97%  BMI 41.48 kg/m2 Estimated body mass index is 41.48 kg/(m^2) as calculated from the following:    Height as of this encounter: 5' 6\" (1.676 m).    Weight as of this encounter: 257 lb (116.6 kg).  Medication Reconciliation: complete     Danii Florez      "

## 2017-10-06 NOTE — PLAN OF CARE
Patient gave this writer a copy of her blood sugars with dates and time.  Nothing written down since 9-26-17, states she has take her blood sugars since than, but has not written them down.  She is unsure what they were.  Copy sent to Dr. Severino in clinic, patient kept the original.

## 2017-10-06 NOTE — PLAN OF CARE
Straight cath dark sarita urine, cloudy, small amount.  Patient reports little discomfort during procedure.  Patient states that new procedures make her nervous. Sent to lab.

## 2017-10-06 NOTE — IP AVS SNAPSHOT
[facility]    [address] [address]    Phone:  [phone]    Fax:  [phone]                                       After Visit Summary   10/6/2017    Lorena Echevarria    MRN: [id number]           After Visit Summary Signature Page     I have received my discharge instructions, and my questions have been answered. I have discussed any challenges I see with this plan with the nurse or doctor.    ..........................................................................................................................................  Patient/Patient Representative Signature      ..........................................................................................................................................  Patient Representative Print Name and Relationship to Patient    ..................................................               ................................................  Date                                            Time    ..........................................................................................................................................  Reviewed by Signature/Title    ...................................................              ..............................................  Date                                                            Time

## 2017-10-06 NOTE — IP AVS SNAPSHOT
MRN:[id number]                      After Visit Summary   10/6/2017    Lorena Echevarria    MRN: [id number]           Thank you!     Thank you for choosing [facility] for your care. Our goal is always to provide you with excellent care. Hearing back from our patients is one way we can continue to improve our services. Please take a few minutes to complete the written survey that you may receive in the mail after you visit with us. Thank you!        Patient Information     Date Of Birth          1995        About your hospital stay     You were admitted on:  October 6, 2017 You last received care in the:  [facility]    You were discharged on:  October 6, 2017       Who to Call     For medical emergencies, please call 911.  For non-urgent questions about your medical care, please call your primary care provider or clinic, None          Attending Provider     Provider Specialty    Fidel Severino MD OB/Gyn       Primary Care Provider Fax #    Physician No Ref-Primary [phone]      Your next 10 appointments already scheduled     Oct 09, 2017 11:10 AM CDT   New Visit with Khris White DC   [facility])    [address]  [facility] [address]   [phone]            Oct 10, 2017  1:45 PM CDT   (Arrive by 1:30 PM)   ESTABLISHED PRENATAL with Fidel Severino MD   [facility] ([facility] )    [address]  [facility] [address]   [phone]            Nov 06, 2017 10:30 AM CST   (Arrive by 10:15 AM)   Office Visit with SAGAR Bolton   [facility] ([facility] )    [address]  [facility] [address]   [phone]              Further instructions from your care team       Discharge Instructions for Undelivered Patients  PLEASE RETURN Sunday October 8TH, 2017    Diet:  * Drink 8 to 12 glasses of liquids (milk, juice, water) every day  * You may eat meals and  "snacks.    Activity:  * Count fetal kicks every day.  * Call your doctor if your baby is moving less than usual.    Call your provider if you notice:  * Swelling in your face or increased swelling in your hands or legs.  * Headaches that are not relieved by Tylenol (acetaminophen).  * Changes in your vision (blurring; seeing spots or stars).  * Nausea (sick to your stomach) and vomiting (throwing up).  * Weight gain of 5 pounds per week.  * Heartburn that doesn't go away.  * Signs of bladder infection: Pain when you urinate (use the toilet), needing to go more often or more urgently.  * The bag of lockett (membrane) breaks, or you notice leaking in your underwear.  * Bright red blood in your underwear.  * Abdominal (lower belly) or stomach pain.  * For first baby: Contractions (tightenings) less than 5 minutes apart for one hour or more.  * Second (plus) baby: Contractions (tightenings) less than 10 minutes apart and getting stronger.  * Increase or change in vaginal discharge (note the color and amount).      [facility] [place]: [phone]        Pending Results     No orders found from 10/4/2017 to 10/7/2017.            Admission Information     Date & Time Provider Department Dept. Phone    10/6/2017 Fidel Severino MD [facility] [phone]      Your Vitals Were     Blood Pressure Pulse Temperature Respirations          122/56 107 98.4  F (36.9  C) (Oral) 18        MyChart Information     [employer] lets you send messages to your doctor, view your test results, renew your prescriptions, schedule appointments and more. To sign up, go to [email or web address] . Click on \"Log in\" on the left side of the screen, which will take you to the Welcome page. Then click on \"Sign up Now\" on the right side of the page.     You will be asked to enter the access code listed below, as well as some personal information. Please follow the directions to create your username and password.     Your access " code is: [email or web address]  Expires: 2017 11:23 AM     Your access code will  in 90 days. If you need help or a new code, please call your [facility] clinic or [phone].        Care EveryWhere ID     This is your Care EveryWhere ID. This could be used by other organizations to access your [facility] medical records  [id number]        Equal Access to Services     EDMOND QUIÑONES AH: Hadii aad ku hadasho Soomaali, waaxda luqadaha, qaybta kaalmada adeegyada, waxay idiin hayparveenn adejuan luu lafannyana . So [facility] [phone].    ATENCIÓN: Si kenzie luo, tiene a hein disposición servicios gratuitos de asistencia lingüística. Llame al [phone].    We comply with applicable federal civil rights laws and [place] laws. We do not discriminate on the basis of race, color, national origin, age, disability, sex, sexual orientation, or gender identity.               Review of your medicines      UNREVIEWED medicines. Ask your doctor about these medicines        Dose / Directions    azithromycin 250 MG tablet   Commonly known as:  ZITHROMAX Z-SYLVAIN        Two tablets on the first day, then one tablet daily for the next 4 days   Quantity:  6 tablet   Refills:  0       buPROPion 75 MG tablet   Commonly known as:  WELLBUTRIN   Used for:  Adjustment disorder with depressed mood        Dose:  150 mg   Take 2 tablets (150 mg) by mouth daily   Quantity:  90 tablet   Refills:  0       cyclobenzaprine 10 MG tablet   Commonly known as:  FLEXERIL   Used for:  Acute right-sided low back pain without sciatica        Dose:  5-10 mg   Take 0.5-1 tablets (5-10 mg) by mouth 3 times daily as needed for muscle spasms   Quantity:  30 tablet   Refills:  1       GLYBURIDE PO        Dose:  2.5 mg   Take 2.5 mg by mouth 2 times daily (with meals)   Refills:  0       guaiFENesin-dextromethorphan 100-10 MG/5ML syrup   Commonly known as:  ROBITUSSIN DM   Ask about: Should I take this medication?        Dose:  5 mL   Take 5 mLs by mouth 3 times daily as  needed for cough   Quantity:  118 mL   Refills:  0       insulin isophane human 100 UNIT/ML injection   Commonly known as:  HumuLIN N PEN   Used for:  Gestational diabetes mellitus (GDM) in third trimester controlled on oral hypoglycemic drug        Dose:  10 Units   Inject 10 Units Subcutaneous At Bedtime   Refills:  0       oxyCODONE-acetaminophen 5-325 MG per tablet   Commonly known as:  PERCOCET   Used for:  Acute right-sided low back pain without sciatica        Dose:  1-2 tablet   Take 1-2 tablets by mouth every 6 hours as needed for moderate to severe pain (every 4-6 hrs prn)   Quantity:  30 tablet   Refills:  0       PRENATAL GUMMIES/DHA & FA 0.4-32.5 MG Chew   Used for:  High-risk first pregnancy of young woman, third trimester        Dose:  1 chew tab   Take 1 chew tab by mouth daily   Quantity:  90 tablet   Refills:  3       valACYclovir 500 MG tablet   Commonly known as:  VALTREX   Used for:  Herpes simplex virus (HSV) infection of vagina        Dose:  500 mg   Take 1 tablet (500 mg) by mouth daily   Quantity:  30 tablet   Refills:  1         CONTINUE these medicines which have NOT CHANGED        Dose / Directions    blood glucose monitoring lancets   Used for:  Insulin controlled gestational diabetes mellitus (GDM) in third trimester        Use to test blood sugar 4 times daily or as directed.   Quantity:  100 each   Refills:  1       * blood glucose monitoring test strip   Commonly known as:  ONE TOUCH ULTRA   Used for:  Gestational diabetes mellitus (GDM) in third trimester controlled on oral hypoglycemic drug        Use to test blood sugar 4 times daily.   Quantity:  100 each   Refills:  5       * blood glucose monitoring test strip   Commonly known as:  GABE CONTOUR NEXT   Used for:  Insulin controlled gestational diabetes mellitus (GDM) in third trimester        Use to test blood sugar 4 times daily or as directed.   Quantity:  200 each   Refills:  4       * Notice:  This list has 2 medication(s)  that are the same as other medications prescribed for you. Read the directions carefully, and ask your doctor or other care provider to review them with you.         Where to get your medicines      These medications were sent to [id number] - [place] - [address]  [address] [place] [address]     Phone:  [phone]     buPROPion 75 MG tablet    PRENATAL GUMMIES/DHA & FA 0.4-32.5 MG Chew                Protect others around you: Learn how to safely use, store and throw away your medicines at www.disposemymeds.org.             Medication List: This is a list of all your medications and when to take them. Check marks below indicate your daily home schedule. Keep this list as a reference.      Medications           Morning Afternoon Evening Bedtime As Needed    azithromycin 250 MG tablet   Commonly known as:  ZITHROMAX Z-SYLVAIN   Two tablets on the first day, then one tablet daily for the next 4 days                                blood glucose monitoring lancets   Use to test blood sugar 4 times daily or as directed.                                * blood glucose monitoring test strip   Commonly known as:  ONE TOUCH ULTRA   Use to test blood sugar 4 times daily.                                * blood glucose monitoring test strip   Commonly known as:  GABE CONTOUR NEXT   Use to test blood sugar 4 times daily or as directed.                                buPROPion 75 MG tablet   Commonly known as:  WELLBUTRIN   Take 2 tablets (150 mg) by mouth daily                                cyclobenzaprine 10 MG tablet   Commonly known as:  FLEXERIL   Take 0.5-1 tablets (5-10 mg) by mouth 3 times daily as needed for muscle spasms                                GLYBURIDE PO   Take 2.5 mg by mouth 2 times daily (with meals)                                insulin isophane human 100 UNIT/ML injection   Commonly known as:  HumuLIN N PEN   Inject 10 Units Subcutaneous At Bedtime                                 oxyCODONE-acetaminophen 5-325 MG per tablet   Commonly known as:  PERCOCET   Take 1-2 tablets by mouth every 6 hours as needed for moderate to severe pain (every 4-6 hrs prn)                                PRENATAL GUMMIES/DHA & FA 0.4-32.5 MG Chew   Take 1 chew tab by mouth daily                                valACYclovir 500 MG tablet   Commonly known as:  VALTREX   Take 1 tablet (500 mg) by mouth daily                                * Notice:  This list has 2 medication(s) that are the same as other medications prescribed for you. Read the directions carefully, and ask your doctor or other care provider to review them with you.      ASK your doctor about these medications           Morning Afternoon Evening Bedtime As Needed    guaiFENesin-dextromethorphan 100-10 MG/5ML syrup   Commonly known as:  ROBITUSSIN DM   Take 5 mLs by mouth 3 times daily as needed for cough   Ask about: Should I take this medication?                                          More Information        False Labor  If your pregnancy is at 37 weeks or longer and you are having contractions that are not true labor, you are having false labor. This means that it is not time yet to give birth to your baby.  True labor contractions can start unevenly but soon take on a regular pattern. As time goes on, the contractions will get stronger. Also, the intervals between the contractions will get shorter. Even at the onset, these contractions last at least 30 seconds and may increase to a minute. True labor contractions often start in the back and then move to the front.  False labor contractions can be strong, frequent, and painful, but there is no regular pattern. The intensity can vary from strong to mild to strong again. False labor contractions are most often felt in the front. While true labor contractions don t stop no matter what you are doing, false labor contractions may stop on their own or when you rest or move around.  False labor  contractions might make you feel anxious or lose sleep. However, they don t mean that you are sick or that anything is wrong with your baby. You don t need to take any medicine for false labor.  Sometimes, it may be too hard to tell false labor from true labor. In such cases, you may need to have a vaginal exam. This allows your healthcare provider to check for changes in the cervix that only occur with true labor.  Home care    It may help to drink plenty of water and take warm baths. Do what you can ahead of time to prepare for giving birth so you ll have less to worry about later.    Keep a record of your contractions. Write down what time each one starts and how long it lasts. A stopwatch is helpful. Look for the pattern of regularly spaced out contractions with a gradual increase in the time each one lasts.    Don t be embarrassed about going to the hospital with a  false alarm.  Think of it as good practice for the real thing.    Follow-up care  Follow up with your healthcare provider, or as advised. If you are very worried, confused, can t eat or sleep, or have questions about your health or pregnancy, schedule an appointment with your provider.  When to seek medical advice  Call your healthcare provider right away if any of these occur:    You are fewer than 37 weeks along in your pregnancy and you re having contractions.    You have contractions that are regular, getting longer, stronger, and closer together.    Your water breaks.    You have vaginal bleeding.    You feel a decrease in your baby s movement or any other unusual changes.     You re not sure if you are having false or true labor.  Date Last Reviewed: 8/20/2015 2000-2017 [employer]. [address] [place] [address], [place] [address]. All rights reserved. This information is not intended as a substitute for professional medical care. Always follow your healthcare professional's instructions.                Labor and Childbirth: Active  Labor  During active labor, your contractions will be stronger and more rhythmic than with early labor. They peak and subside like waves. They may happen 3 to 5 minutes apart and last about 45 to 60 seconds. This part of labor can be hard work. But it is often shorter than early labor. When you reach active labor, exams and tests will be done to see how you and your baby are doing.     Your cervix may dilate 4 to 8 centimeters during the first part of active labor.   Evaluating you and your baby  An exam tells how you and your baby are responding to contractions. Your blood pressure, temperature, and pulse will be checked. A blood or urine sample may also be taken. A fetal monitor will be used to check your baby s heart rate. Sometimes an IV (intravenous) line is started to give you medicine and fluids.  Moving ahead with labor  You may now feel contractions in your whole stomach instead of just the lower part (like during early labor). If your amniotic sac has not broken already, it may break now. Or, it may be broken for you. To help your baby descend, change position often. Walking or sitting in a rocking chair or recliner may help. You may find it hard to relax even though you are tired. You may also be less interested in talking than you were earlier. If you re having anesthesia, you will get it now.   Special issues during labor  If labor doesn t progress well or a problem arises, you may need a . But your healthcare providers may take certain steps to help you avoid a :    If your cervix isn t dilating, a medicine (oxytocin) may be used to augment labor.    If fetal monitoring shows your baby isn t getting enough oxygen, shifting your body position may help. You may also be given oxygen through a mask.    If you have preeclampsia (a condition that results in high blood pressure, swelling, and other symptoms), you may be given medicines by IV (intravenous). Your healthcare provider may also  tell you to lie on your left side.  Responding to contractions  During contractions, try to stay relaxed. Tense muscles use more oxygen, eat up your body s energy, and increase pain. Use the breathing and relaxation techniques you may have learned. And let your support person know how he or she can help. If you ve had problems during a previous birth, focus on the present. Keep in mind that no 2 births are the same.     Support person s note  Here's how you can help:    Have the mother walk or change positions at least once an hour. This improves circulation and helps the baby descend.    Keep reminding the mother to breathe and relax through each contraction.    Reassure her. Try to keep her from getting anxious or overstressed.    Take care of yourself. Take a short break to eat or go to the bathroom when you need to.    Rest when the mother does. You ll both benefit.   Date Last Reviewed: 8/16/2015 2000-2017 [employer]. [address] [place] [address]. All rights reserved. This information is not intended as a substitute for professional medical care. Always follow your healthcare professional's instructions.                Recognizing Labor    The beginning of labor is the beginning of birth. You ll start to feel strong contractions. That s when the muscles of your uterus tighten up to help push your baby out during birth.  Yes, labor has probably started   Signs of labor include:    Your contractions are getting stronger and more painful instead of weaker. You ll probably feel them throughout your whole uterus.    Your contractions are regular. This means that you feel them about every 5 to 10 minutes. And they are getting closer together.    You have pink-colored or blood-streaked fluid from your vagina.    Your water breaks. It may be a gush or a slow trickle of clear fluid from your vagina.  No, it s probably not real labor   Signs of false labor include:    Your contractions  aren t regular or strong.    You feel the contractions only in your lower uterus.    Your contractions go away when you walk or change position.    Your contractions go away after drinking fluids.  When to call your healthcare provider  Call your healthcare provider or clinic right away if you notice any of these signs:    Fluid from your vagina, with or without contractions.    Bleeding heavy enough that you need a sanitary pad.    You don t feel your baby moving as much as before.     NOTE: Contractions are timed by both of these measures:    The length of each contraction from its start to its finish.    How far apart the contractions are -- the time between the start of one contraction and the start of the next contraction.   Date Last Reviewed: 8/9/2015 2000-2017 [employer]. [address] [place] [address], [place] [address]. All rights reserved. This information is not intended as a substitute for professional medical care. Always follow your healthcare professional's instructions.

## 2017-10-06 NOTE — PLAN OF CARE
Lorena Echevarria is a 22 year old  and 38w6d patient came in complaining of Decreased Fetal Movement    Patient Active Problem List   Diagnosis     Suicidal ideation     Mild intellectual disability     Autism spectrum     Anxiety     Mood disorder (H)     Suicidal thoughts     Left Ankle Pain     Irregular menstrual cycle (PERIODS)     Depression     Elevated serum cholesterol     Obesity     Sinus tachycardia     Galactorrhea of both breasts     High-risk first pregnancy of young woman, third trimester     Recurrent HSV (herpes simplex virus)     Gestational diabetes mellitus (GDM) in third trimester controlled on oral hypoglycemic drug     Papanicolaou smear of cervix with low grade squamous intraepithelial lesion (LGSIL)     Encounter for triage in pregnant patient       Pt discharged to home at 2:08 PM and encouraged to rest and drink plenty of fluids.  Pt told to call/return if bleeding more than spotting, water breaks, contractions 3-5 minutes apart that she has to breath through.     Nursing education on importance staying hydrated s/s of pre-clampsia provided. Self-management instructions reviewed. Advised to return on [place] 10/8/2017 any time for NST, seriel BP's and UA. AVS given and signed. All questions answered and patient verbalizes understanding.    /56  Pulse 107  Temp 98.4  F (36.9  C) (Oral)  Resp 18    Cervical status: not examined  Fetal Assessment: Reactive    Discharge support:  Family/Friend Support    Obstetric History       T0      L0     SAB0   TAB0   Ectopic0   Multiple0   Live Births0       # Outcome Date GA Lbr López/2nd Weight Sex Delivery Anes PTL Lv   1 Current                   Radha Solis

## 2017-10-06 NOTE — PLAN OF CARE
Lorena Echevarria        NST:  reactive  Start:  1209  Stop:    1220    Physician: Dr Severino  Reason For Test: Diabetes Mellitus , GDM oninsulin  EDC:10/14/17  Gestational Age: 38w6d    Comments:  Will have next RN put back on monitor to complete a 20 min strip , did have 2 accels in the 10 mins obtained before being off efm for BPP , urine sent , results pending , first /71 , pulse 107 , BPP 8/8      Yuliana Horton

## 2017-10-06 NOTE — TELEPHONE ENCOUNTER
Called pt regarding glucose levels as she did not bring her meter to appointment today.  She is taking 15 units Humulin N at bedtime.  Current glucose levels:  Fasting-95, 85, 95, 100, 115, 96, 85, 106  2 hours post supper-102, 115, 120, 125, 126, 116  Pt instructed to increase Humulin N to 18 units and Laura will call her next week.  She has testing supplies now.

## 2017-10-06 NOTE — MR AVS SNAPSHOT
After Visit Summary   10/6/2017    Lorena Echevarria    MRN: [id number]           Patient Information     Date Of Birth          1995        Visit Information        Provider Department      10/6/2017 11:30 AM Fidel Severino MD [facility]         Follow-ups after your visit        Your next 10 appointments already scheduled     Oct 09, 2017 11:10 AM CDT   New Visit with Khris White DC   [facility] [place] [facility] ([facility])    [address]  [place] [address]   [phone]            Oct 10, 2017  1:45 PM CDT   (Arrive by 1:30 PM)   ESTABLISHED PRENATAL with Fidel Severino MD   [facility] [place] ([facility] [place] )    [address]  [place] [address]   [phone]            Nov 06, 2017 10:30 AM CST   (Arrive by 10:15 AM)   Office Visit with SAGAR Bolton   [facility] [place] ([facility] [place] )    [address]  [place] [address]   [phone]              Who to contact     If you have questions or need follow up information about today's clinic visit or your schedule please contact [facility] directly at [phone].  Normal or non-critical lab and imaging results will be communicated to you by MyChart, letter or phone within 4 business days after the clinic has received the results. If you do not hear from us within 7 days, please contact the clinic through MyChart or phone. If you have a critical or abnormal lab result, we will notify you by phone as soon as possible.  Submit refill requests through LoSo or call your pharmacy and they will forward the refill request to us. Please allow 3 business days for your refill to be completed.          Additional Information About Your Visit        MyChart Information     LoSo lets you send messages to your doctor, view your test results, renew your prescriptions, schedule appointments and more. To sign up, go to [email or web address] .  "Click on \"Log in\" on the left side of the screen, which will take you to the Welcome page. Then click on \"Sign up Now\" on the right side of the page.     You will be asked to enter the access code listed below, as well as some personal information. Please follow the directions to create your username and password.     Your access code is: [email or web address]  Expires: 2017 11:23 AM     Your access code will  in 90 days. If you need help or a new code, please call your [place] clinic or [phone].        Care EveryWhere ID     This is your Care EveryWhere ID. This could be used by other organizations to access your [place] medical records  [id number]        Your Vitals Were     Pulse Height Pulse Oximetry BMI (Body Mass Index)          111 5' 6\" (1.676 m) 97% 41.48 kg/m2         Blood Pressure from Last 3 Encounters:   10/06/17 118/74   10/04/17 124/82   10/04/17 127/74    Weight from Last 3 Encounters:   10/06/17 257 lb (116.6 kg)   17 256 lb (116.1 kg)   17 245 lb (111.1 kg)              Today, you had the following     No orders found for display       Primary Care Provider Fax #    Physician No Ref-Primary [phone]       No address on file        Equal Access to Services     EDMOND QUIÑONES : Hadii emelia pyle Solouie, waaxda luqadaha, qaybta kaalmayoselin ga, mayito dove . So [facility] [phone].    ATENCIÓN: Si habla español, tiene a hein disposición servicios gratuitos de asistencia lingüística. Huyen al [phone].    We comply with applicable federal civil rights laws and [place] laws. We do not discriminate on the basis of race, color, national origin, age, disability, sex, sexual orientation, or gender identity.            Thank you!     Thank you for choosing [facility]  for your care. Our goal is always to provide you with excellent care. Hearing back from our patients is one way we can continue to improve our services. Please take " a few minutes to complete the written survey that you may receive in the mail after your visit with us. Thank you!             Your Updated Medication List - Protect others around you: Learn how to safely use, store and throw away your medicines at www.disposemymeds.org.          This list is accurate as of: 10/6/17 11:54 AM.  Always use your most recent med list.                   Brand Name Dispense Instructions for use Diagnosis    azithromycin 250 MG tablet    ZITHROMAX Z-SYLVAIN    6 tablet    Two tablets on the first day, then one tablet daily for the next 4 days        blood glucose monitoring lancets     100 each    Use to test blood sugar 4 times daily or as directed.    Insulin controlled gestational diabetes mellitus (GDM) in third trimester       * blood glucose monitoring test strip    ONE TOUCH ULTRA    100 each    Use to test blood sugar 4 times daily.    Gestational diabetes mellitus (GDM) in third trimester controlled on oral hypoglycemic drug       * blood glucose monitoring test strip    GABE CONTOUR NEXT    200 each    Use to test blood sugar 4 times daily or as directed.    Insulin controlled gestational diabetes mellitus (GDM) in third trimester       BUPROPION HCL PO      Take 150 mg by mouth daily        cyclobenzaprine 10 MG tablet    FLEXERIL    30 tablet    Take 0.5-1 tablets (5-10 mg) by mouth 3 times daily as needed for muscle spasms    Acute right-sided low back pain without sciatica       GLYBURIDE PO      Take 2.5 mg by mouth 2 times daily (with meals)        insulin isophane human 100 UNIT/ML injection    HumuLIN N PEN     Inject 10 Units Subcutaneous At Bedtime    Gestational diabetes mellitus (GDM) in third trimester controlled on oral hypoglycemic drug       oxyCODONE-acetaminophen 5-325 MG per tablet    PERCOCET    30 tablet    Take 1-2 tablets by mouth every 6 hours as needed for moderate to severe pain (every 4-6 hrs prn)    Acute right-sided low back pain without sciatica        PRENATAL GUMMIES/DHA & FA 0.4-32.5 MG Chew      Take 1 chew tab by mouth daily        valACYclovir 500 MG tablet    VALTREX    30 tablet    Take 1 tablet (500 mg) by mouth daily    Herpes simplex virus (HSV) infection of vagina       * Notice:  This list has 2 medication(s) that are the same as other medications prescribed for you. Read the directions carefully, and ask your doctor or other care provider to review them with you.

## 2017-10-08 ENCOUNTER — HOSPITAL ENCOUNTER (EMERGENCY)
Facility: HOSPITAL | Age: 22
Discharge: HOME | End: 2017-10-08
Attending: INTERNAL MEDICINE | Admitting: INTERNAL MEDICINE
Payer: MEDICAID

## 2017-10-08 ENCOUNTER — HOSPITAL ENCOUNTER (OUTPATIENT)
Facility: HOSPITAL | Age: 22
Discharge: HOME | End: 2017-10-08
Attending: OBSTETRICS & GYNECOLOGY | Admitting: OBSTETRICS & GYNECOLOGY
Payer: MEDICAID

## 2017-10-08 VITALS
SYSTOLIC BLOOD PRESSURE: 115 MMHG | DIASTOLIC BLOOD PRESSURE: 60 MMHG | OXYGEN SATURATION: 97 % | TEMPERATURE: 98 F | RESPIRATION RATE: 24 BRPM

## 2017-10-08 VITALS
OXYGEN SATURATION: 98 % | TEMPERATURE: 97.9 F | DIASTOLIC BLOOD PRESSURE: 80 MMHG | RESPIRATION RATE: 20 BRPM | SYSTOLIC BLOOD PRESSURE: 123 MMHG | HEART RATE: 80 BPM

## 2017-10-08 DIAGNOSIS — R07.89 CHEST WALL PAIN: ICD-10-CM

## 2017-10-08 LAB
ALBUMIN UR-MCNC: 30 MG/DL
APPEARANCE UR: ABNORMAL
BACTERIA #/AREA URNS HPF: ABNORMAL /HPF
BILIRUB UR QL STRIP: NEGATIVE
COLOR UR AUTO: YELLOW
GLUCOSE UR STRIP-MCNC: NEGATIVE MG/DL
HGB UR QL STRIP: NEGATIVE
KETONES UR STRIP-MCNC: NEGATIVE MG/DL
LEUKOCYTE ESTERASE UR QL STRIP: ABNORMAL
MUCOUS THREADS #/AREA URNS LPF: PRESENT /LPF
NITRATE UR QL: NEGATIVE
PH UR STRIP: 6 PH (ref 4.7–8)
RBC #/AREA URNS AUTO: 17 /HPF (ref 0–2)
SOURCE: ABNORMAL
SP GR UR STRIP: 1.02 (ref 1–1.03)
SQUAMOUS #/AREA URNS AUTO: 6 /HPF (ref 0–1)
UROBILINOGEN UR STRIP-MCNC: NORMAL MG/DL (ref 0–2)
WBC #/AREA URNS AUTO: 44 /HPF (ref 0–2)

## 2017-10-08 PROCEDURE — 87086 URINE CULTURE/COLONY COUNT: CPT | Performed by: OBSTETRICS & GYNECOLOGY

## 2017-10-08 PROCEDURE — 81001 URINALYSIS AUTO W/SCOPE: CPT | Performed by: OBSTETRICS & GYNECOLOGY

## 2017-10-08 PROCEDURE — 25000132 ZZH RX MED GY IP 250 OP 250 PS 637: Performed by: INTERNAL MEDICINE

## 2017-10-08 PROCEDURE — 59025 FETAL NON-STRESS TEST: CPT | Mod: 26 | Performed by: OBSTETRICS & GYNECOLOGY

## 2017-10-08 PROCEDURE — 99214 OFFICE O/P EST MOD 30 MIN: CPT | Mod: 25,27

## 2017-10-08 PROCEDURE — 99283 EMERGENCY DEPT VISIT LOW MDM: CPT | Performed by: INTERNAL MEDICINE

## 2017-10-08 PROCEDURE — 59025 FETAL NON-STRESS TEST: CPT

## 2017-10-08 PROCEDURE — 99283 EMERGENCY DEPT VISIT LOW MDM: CPT

## 2017-10-08 PROCEDURE — 25000132 ZZH RX MED GY IP 250 OP 250 PS 637: Performed by: OBSTETRICS & GYNECOLOGY

## 2017-10-08 RX ORDER — OXYCODONE HYDROCHLORIDE 5 MG/1
5 TABLET ORAL ONCE
Status: COMPLETED | OUTPATIENT
Start: 2017-10-08 | End: 2017-10-08

## 2017-10-08 RX ORDER — ACETAMINOPHEN 325 MG/1
975 TABLET ORAL ONCE
Status: COMPLETED | OUTPATIENT
Start: 2017-10-08 | End: 2017-10-08

## 2017-10-08 RX ADMIN — ACETAMINOPHEN 975 MG: 325 TABLET, FILM COATED ORAL at 07:40

## 2017-10-08 RX ADMIN — OXYCODONE HYDROCHLORIDE 5 MG: 5 TABLET ORAL at 08:54

## 2017-10-08 ASSESSMENT — ENCOUNTER SYMPTOMS
CONFUSION: 0
PALPITATIONS: 0
ARTHRALGIAS: 0
BACK PAIN: 0
FEVER: 0
ABDOMINAL PAIN: 0
VOMITING: 0
HEMATURIA: 0
FLANK PAIN: 0
HEADACHES: 0
NAUSEA: 0
MYALGIAS: 0
NECK PAIN: 0
COLOR CHANGE: 0
ANAL BLEEDING: 0
COUGH: 0
WOUND: 0
BLOOD IN STOOL: 0
ABDOMINAL DISTENTION: 0
CHILLS: 0
CHEST TIGHTNESS: 0
DYSURIA: 0
NECK STIFFNESS: 0
SHORTNESS OF BREATH: 0
LIGHT-HEADEDNESS: 0
DIZZINESS: 0
NUMBNESS: 0
DIAPHORESIS: 0
WHEEZING: 0
VOICE CHANGE: 0

## 2017-10-08 NOTE — PLAN OF CARE
Patient much more comfortable at this time.  Patient has 2/10 pain at left chest when not moving, or coughing.  Patient encouraged to push fluids, hot steamy room/shower for breathing comfort.  The pain has gone down since administration of 5mg oxycodone.  Was 9/10, now 6/10.  NST reactive.  Serial BPs' all with in normal parameters.  UA noted per .  Patient on zithromax for acute bronchitis.

## 2017-10-08 NOTE — ED NOTES
Patient being evaluated today for chest pain. Patient denies any cardiac history. She C/O a cough X 2.5 weeks; so hard that she has ruptured blood vessels in her eyes. Patient was sleeping this morning when she woke up suddenly to left-sided chest wall pain. She C/O some dizziness and SOB as well. Patient is 39 weeks gestation. OB called for fetal heart tones.

## 2017-10-08 NOTE — ED AVS SNAPSHOT
[facility]    [address] [address]    Phone:  [phone]                                       Lorena Echevarria   MRN: [id number]    Department:  [facility]   Date of Visit:  10/8/2017           After Visit Summary Signature Page     I have received my discharge instructions, and my questions have been answered. I have discussed any challenges I see with this plan with the nurse or doctor.    ..........................................................................................................................................  Patient/Patient Representative Signature      ..........................................................................................................................................  Patient Representative Print Name and Relationship to Patient    ..................................................               ................................................  Date                                            Time    ..........................................................................................................................................  Reviewed by Signature/Title    ...................................................              ..............................................  Date                                                            Time

## 2017-10-08 NOTE — IP AVS SNAPSHOT
MRN:[id number]                      After Visit Summary   10/8/2017    Lorena Echevarria    MRN: [id number]           Thank you!     Thank you for choosing [facility] for your care. Our goal is always to provide you with excellent care. Hearing back from our patients is one way we can continue to improve our services. Please take a few minutes to complete the written survey that you may receive in the mail after you visit with us. Thank you!        Patient Information     Date Of Birth          1995        About your hospital stay     You were admitted on:  October 8, 2017 You last received care in the:  [facility]    You were discharged on:  October 8, 2017       Who to Call     For medical emergencies, please call 911.  For non-urgent questions about your medical care, please call your primary care provider or clinic, None          Attending Provider     Provider Specialty    Fidel Severino MD OB/Gyn       Primary Care Provider Fax #    Physician No Ref-Primary [phone]      Your next 10 appointments already scheduled     Oct 09, 2017 11:10 AM CDT   New Visit with Khris White DC   [facility] ([facility])    [address]  [facility] [address]   [phone]            Oct 10, 2017  1:45 PM CDT   (Arrive by 1:30 PM)   ESTABLISHED PRENATAL with Fidel Severino MD   [facility] ([facility] [place] )    [address]  [facility] [address]   [phone]            Nov 06, 2017 10:30 AM CST   (Arrive by 10:15 AM)   Office Visit with SAGAR Bolton   [facility] ([facility] [place] )    [address] [address] [facility] [address] [facility] [address]  [facility] [address]   [phone]              Further instructions from your care team       Discharge Instructions for Undelivered Patients    Diet:  * Drink 8 to 12 glasses of liquids (milk, juice, water) every day  * You may eat meals and snacks.    Activity:  * Count fetal kicks every day.  * Call  "your doctor if your baby is moving less than usual.    Call your provider if you notice:  * Swelling in your face or increased swelling in your hands or legs.  * Headaches that are not relieved by Tylenol (acetaminophen).  * Changes in your vision (blurring; seeing spots or stars).  * Nausea (sick to your stomach) and vomiting (throwing up).  * Weight gain of 5 pounds per week.  * Heartburn that doesn't go away.  * Signs of bladder infection: Pain when you urinate (use the toilet), needing to go more often or more urgently.  * The bag of lockett (membrane) breaks, or you notice leaking in your underwear.  * Bright red blood in your underwear.  * Abdominal (lower belly) or stomach pain.  * For first baby: Contractions (tightenings) less than 5 minutes apart for one hour or more.  * Second (plus) baby: Contractions (tightenings) less than 10 minutes apart and getting stronger.  * Increase or change in vaginal discharge (note the color and amount).    Follow up/establish care with  Family Practice at [facility].    [facility]: [phone]          Pending Results     Date and Time Order Name Status Description    10/8/2017 0855 Urine Culture Aerobic Bacterial In process             Admission Information     Date & Time Provider Department Dept. Phone    10/8/2017 Fidel Severino MD [facility] [phone]      Your Vitals Were     Blood Pressure Temperature Respirations Pulse Oximetry          115/60 98  F (36.7  C) (Oral) 24 97%        MyChart Information     [employer]Up lets you send messages to your doctor, view your test results, renew your prescriptions, schedule appointments and more. To sign up, go to [email or web address] . Click on \"Log in\" on the left side of the screen, which will take you to the Welcome page. Then click on \"Sign up Now\" on the right side of the page.     You will be asked to enter the access code listed below, as well as some personal information. " Please follow the directions to create your username and password.     Your access code is: [email or web address]  Expires: 2017 11:23 AM     Your access code will  in 90 days. If you need help or a new code, please call your [facility] clinic or [phone].        Care EveryWhere ID     This is your Care EveryWhere ID. This could be used by other organizations to access your [facility] medical records  [id number]        Equal Access to Services     EDMOND QUIÑONES : Hadii aad ku hadasho Soomaali, waaxda luqadaha, qaybta kaalmada adeegyada, waxay dewaynein hayparveenn tor nunez. So [facility] [phone].    ATENCIÓN: Si habla español, tiene a hein disposición servicios gratuitos de asistencia lingüística. [facility] [phone].    We comply with applicable federal civil rights laws and [place] laws. We do not discriminate on the basis of race, color, national origin, age, disability, sex, sexual orientation, or gender identity.               Review of your medicines      UNREVIEWED medicines. Ask your doctor about these medicines        Dose / Directions    azithromycin 250 MG tablet   Commonly known as:  ZITHROMAX Z-SYLVAIN        Two tablets on the first day, then one tablet daily for the next 4 days   Quantity:  6 tablet   Refills:  0       buPROPion 75 MG tablet   Commonly known as:  WELLBUTRIN   Used for:  Adjustment disorder with depressed mood        Dose:  150 mg   Take 2 tablets (150 mg) by mouth daily   Quantity:  90 tablet   Refills:  0       cyclobenzaprine 10 MG tablet   Commonly known as:  FLEXERIL   Used for:  Acute right-sided low back pain without sciatica        Dose:  5-10 mg   Take 0.5-1 tablets (5-10 mg) by mouth 3 times daily as needed for muscle spasms   Quantity:  30 tablet   Refills:  1       insulin isophane human 100 UNIT/ML injection   Commonly known as:  HumuLIN N PEN   Used for:  Gestational diabetes mellitus (GDM) in third trimester controlled on oral hypoglycemic drug        Dose:  10  Units   Inject 10 Units Subcutaneous At Bedtime   Refills:  0       oxyCODONE-acetaminophen 5-325 MG per tablet   Commonly known as:  PERCOCET   Used for:  Acute right-sided low back pain without sciatica        Dose:  1-2 tablet   Take 1-2 tablets by mouth every 6 hours as needed for moderate to severe pain (every 4-6 hrs prn)   Quantity:  30 tablet   Refills:  0       PRENATAL GUMMIES/DHA & FA 0.4-32.5 MG Chew   Used for:  High-risk first pregnancy of young woman, third trimester        Dose:  1 chew tab   Take 1 chew tab by mouth daily   Quantity:  90 tablet   Refills:  3       valACYclovir 500 MG tablet   Commonly known as:  VALTREX   Used for:  Herpes simplex virus (HSV) infection of vagina        Dose:  500 mg   Take 1 tablet (500 mg) by mouth daily   Quantity:  30 tablet   Refills:  1         CONTINUE these medicines which have NOT CHANGED        Dose / Directions    blood glucose monitoring lancets   Used for:  Insulin controlled gestational diabetes mellitus (GDM) in third trimester        Use to test blood sugar 4 times daily or as directed.   Quantity:  100 each   Refills:  1       * blood glucose monitoring test strip   Commonly known as:  ONE TOUCH ULTRA   Used for:  Gestational diabetes mellitus (GDM) in third trimester controlled on oral hypoglycemic drug        Use to test blood sugar 4 times daily.   Quantity:  100 each   Refills:  5       * blood glucose monitoring test strip   Commonly known as:  GABE CONTOUR NEXT   Used for:  Insulin controlled gestational diabetes mellitus (GDM) in third trimester        Use to test blood sugar 4 times daily or as directed.   Quantity:  200 each   Refills:  4       * Notice:  This list has 2 medication(s) that are the same as other medications prescribed for you. Read the directions carefully, and ask your doctor or other care provider to review them with you.             Protect others around you: Learn how to safely use, store and throw away your medicines at  www.disposemymeds.org.             Medication List: This is a list of all your medications and when to take them. Check marks below indicate your daily home schedule. Keep this list as a reference.      Medications           Morning Afternoon Evening Bedtime As Needed    azithromycin 250 MG tablet   Commonly known as:  ZITHROMAX Z-SYLVAIN   Two tablets on the first day, then one tablet daily for the next 4 days                                blood glucose monitoring lancets   Use to test blood sugar 4 times daily or as directed.                                * blood glucose monitoring test strip   Commonly known as:  ONE TOUCH ULTRA   Use to test blood sugar 4 times daily.                                * blood glucose monitoring test strip   Commonly known as:  GABE CONTOUR NEXT   Use to test blood sugar 4 times daily or as directed.                                buPROPion 75 MG tablet   Commonly known as:  WELLBUTRIN   Take 2 tablets (150 mg) by mouth daily                                cyclobenzaprine 10 MG tablet   Commonly known as:  FLEXERIL   Take 0.5-1 tablets (5-10 mg) by mouth 3 times daily as needed for muscle spasms                                insulin isophane human 100 UNIT/ML injection   Commonly known as:  HumuLIN N PEN   Inject 10 Units Subcutaneous At Bedtime                                oxyCODONE-acetaminophen 5-325 MG per tablet   Commonly known as:  PERCOCET   Take 1-2 tablets by mouth every 6 hours as needed for moderate to severe pain (every 4-6 hrs prn)                                PRENATAL GUMMIES/DHA & FA 0.4-32.5 MG Chew   Take 1 chew tab by mouth daily                                valACYclovir 500 MG tablet   Commonly known as:  VALTREX   Take 1 tablet (500 mg) by mouth daily                                * Notice:  This list has 2 medication(s) that are the same as other medications prescribed for you. Read the directions carefully, and ask your doctor or other care provider to  review them with you.

## 2017-10-08 NOTE — DISCHARGE INSTRUCTIONS

## 2017-10-08 NOTE — PLAN OF CARE
Patient admitted as OP into room 4208 for scheduled NST, serial BPs, UA, etc. See MD orders.  Patient denies LOF, no bleeding.  Patient presents uncomfortable in her chest.  Patient coughing hard and feels tight in lungs.  Patient's eyes blood shot.  Patient denies contractions.  Patient can not sit back at all, related to pain in her chest.

## 2017-10-08 NOTE — ED AVS SNAPSHOT
[facility]    [address]    [place] [address]    Phone:  [phone]                                       Lorena Echevarria   MRN: [id number]    Department:  [facility]   Date of Visit:  10/8/2017           Patient Information     Date Of Birth          1995        Your diagnoses for this visit were:     Chest wall pain        You were seen by Eagle Bella MD.      Follow-up Information     Schedule an appointment as soon as possible for a visit with [facility], [facility], [place].        Discharge Instructions         Chest Wall Pain: Costochondritis    The chest pain that you have had today is caused by costochondritis. This condition is caused by an inflammation of the cartilage joining your ribs to your breastbone. It is not caused by heart or lung problems. Your healthcare team has made sure that the chest pain you feel is not from a life threatening cause of chest pain such as heart attack, collapsed lung, blood clot in the lung, tear in the aorta, or esophageal rupture. The inflammation may have been brought on by a blow to the chest, lifting heavy objects, intense exercise, or an illness that made you cough and sneeze a lot. It often occurs during times of emotional stress. It can be painful, but it is not dangerous. It usually goes away in 1 to 2 weeks. But it may happen again. Rarely, a more serious condition may cause symptoms similar to costochondritis. That s why it s important to watch for the warning signs listed below.  Home care  Follow these guidelines when caring for yourself at home:    If you feel that emotional stress is a cause of your condition, try to figure out the sources of that stress. It may not be obvious. Learn ways to deal with the stress in your life. This can include regular exercise, muscle relaxation, meditation, or simply taking time out for yourself.    You may use acetaminophen, ibuprofen, or naproxen to control pain, unless  another pain medicine was prescribed. If you have liver or kidney disease or ever had a stomach ulcer, talk with your healthcare provider before using these medicines.    You can also help ease pain by using a hot, wet compress or heating pad. Use this with or without a medicated skin cream that helps relieves pain.    Do stretching exercise as advised by your provider.    Take any prescribed medicines as directed.  Follow-up care  Follow up with your healthcare provider, or as advised, if you do not start to get better in the next 2 days.  When to seek medical advice  Call your healthcare provider right away if any of these occur:    A change in the type of pain. Call if it feels different, becomes more serious, lasts longer, or spreads into your shoulder, arm, neck, jaw, or back.    Shortness of breath or pain gets worse when you breathe    Weakness, dizziness, or fainting    Cough with dark-colored sputum (phlegm) or blood    Abdominal pain    Dark red or black stools    Fever of 100.4 F (38 C) or higher, or as directed by your healthcare provider  Date Last Reviewed: 12/1/2016 2000-2017 [employer]. [address] [place] [address]. All rights reserved. This information is not intended as a substitute for professional medical care. Always follow your healthcare professional's instructions.          Future Appointments        Provider Department Dept Phone Center    10/9/2017 11:10 AM Khris White DC  [facility] [phone] [place]    10/10/2017 1:45 PM Fidel Severino MD [facility] [phone] [place]    11/6/2017 10:30 AM SAGAR Victor [facility] [phone] [place]         Review of your medicines      Our records show that you are taking the medicines listed below. If these are incorrect, please call your family doctor or clinic.        Dose / Directions Last dose taken    azithromycin 250 MG tablet   Commonly known  as:  ZITHROMAX Z-SYLVAIN   Quantity:  6 tablet        Two tablets on the first day, then one tablet daily for the next 4 days   Refills:  0        blood glucose monitoring lancets   Quantity:  100 each        Use to test blood sugar 4 times daily or as directed.   Refills:  1        * blood glucose monitoring test strip   Commonly known as:  ONE TOUCH ULTRA   Quantity:  100 each        Use to test blood sugar 4 times daily.   Refills:  5        * blood glucose monitoring test strip   Commonly known as:  GABE CONTOUR NEXT   Quantity:  200 each        Use to test blood sugar 4 times daily or as directed.   Refills:  4        buPROPion 75 MG tablet   Commonly known as:  WELLBUTRIN   Dose:  150 mg   Quantity:  90 tablet        Take 2 tablets (150 mg) by mouth daily   Refills:  0        cyclobenzaprine 10 MG tablet   Commonly known as:  FLEXERIL   Dose:  5-10 mg   Quantity:  30 tablet        Take 0.5-1 tablets (5-10 mg) by mouth 3 times daily as needed for muscle spasms   Refills:  1        insulin isophane human 100 UNIT/ML injection   Commonly known as:  HumuLIN N PEN   Dose:  10 Units        Inject 10 Units Subcutaneous At Bedtime   Refills:  0        oxyCODONE-acetaminophen 5-325 MG per tablet   Commonly known as:  PERCOCET   Dose:  1-2 tablet   Quantity:  30 tablet        Take 1-2 tablets by mouth every 6 hours as needed for moderate to severe pain (every 4-6 hrs prn)   Refills:  0        PRENATAL GUMMIES/DHA & FA 0.4-32.5 MG Chew   Dose:  1 chew tab   Quantity:  90 tablet        Take 1 chew tab by mouth daily   Refills:  3        valACYclovir 500 MG tablet   Commonly known as:  VALTREX   Dose:  500 mg   Quantity:  30 tablet        Take 1 tablet (500 mg) by mouth daily   Refills:  1        * Notice:  This list has 2 medication(s) that are the same as other medications prescribed for you. Read the directions carefully, and ask your doctor or other care provider to review them with you.            Orders Needing Specimen  "Collection     None      Pending Results     No orders found from 10/6/2017 to 10/9/2017.            Pending Culture Results     No orders found from 10/6/2017 to 10/9/2017.            Thank you for choosing [facility]       Thank you for choosing [facility] for your care. Our goal is always to provide you with excellent care. Hearing back from our patients is one way we can continue to improve our services. Please take a few minutes to complete the written survey that you may receive in the mail after you visit with us. Thank you!        skyrockit Information     [employer]rockit lets you send messages to your doctor, view your test results, renew your prescriptions, schedule appointments and more. To sign up, go to [email or web address] . Click on \"Log in\" on the left side of the screen, which will take you to the Welcome page. Then click on \"Sign up Now\" on the right side of the page.     You will be asked to enter the access code listed below, as well as some personal information. Please follow the directions to create your username and password.     Your access code is: [email or web address]  Expires: 2017 11:23 AM     Your access code will  in 90 days. If you need help or a new code, please call your [facility] clinic or [phone].        Care EveryWhere ID     This is your Care EveryWhere ID. This could be used by other organizations to access your [facility] medical records  [id number]        Equal Access to Services     EDMOND QUIÑONES : Hadii emelia [place], waaxda luqadaha, qaybta kaalmada sury, mayito dove . So [facility] [phone].    ATENCIÓN: Si habla español, tiene a hein disposición servicios gratuitos de asistencia lingüística. Llame al [phone].    We comply with applicable federal civil rights laws and [place] laws. We do not discriminate on the basis of race, color, national origin, age, disability, sex, sexual orientation, or gender identity.            After " Visit Summary       This is your record. Keep this with you and show to your community pharmacist(s) and doctor(s) at your next visit.

## 2017-10-08 NOTE — PLAN OF CARE
Lorena Echevarria is a 22 year old  and 39w1d patient came in complaining of Non Stress Test    Patient Active Problem List   Diagnosis     Suicidal ideation     Mild intellectual disability     Autism spectrum     Anxiety     Mood disorder (H)     Suicidal thoughts     Left Ankle Pain     Irregular menstrual cycle (PERIODS)     Depression     Elevated serum cholesterol     Obesity     Sinus tachycardia     Galactorrhea of both breasts     High-risk first pregnancy of young woman, third trimester     Recurrent HSV (herpes simplex virus)     Gestational diabetes mellitus (GDM) in third trimester controlled on oral hypoglycemic drug     Papanicolaou smear of cervix with low grade squamous intraepithelial lesion (LGSIL)     Encounter for triage in pregnant patient       Pt discharged to home at 10:20 AM and encouraged to rest and drink plenty of fluids.  Pt told to call/return if bleeding more than spotting, water breaks, contractions 3-5 minutes apart that she has to breath through.     Nursing education on comfort for bronchitis, and signs of labor provided. Self-management instructions reviewed. Patient has not been checking her blood sugar QID, encouraged patient to do this, especially while she has bronchitis.  AVS given and signed. All questions answered and patient verbalizes understanding.    /60  Temp 98  F (36.7  C) (Oral)  Resp 24  SpO2 97%    Cervical status: not examined  Fetal Assessment: Reactive    Discharge support:  Family/Friend Support    Obstetric History       T0      L0     SAB0   TAB0   Ectopic0   Multiple0   Live Births0       # Outcome Date GA Lbr López/2nd Weight Sex Delivery Anes PTL Lv   1 Current                   CORTEZ CUELLAR

## 2017-10-08 NOTE — PLAN OF CARE
Lorena Echevarria        NST:  reactive  Start:0915  Stop:0939    Physician:  for   Reason For Test: Gestational Diabetes  EDC:10/14/2017  Gestational Age: 39 1/7    Comments:  Acute bronchitis-  pleuresy type pain in left chest.Encouraged to follow up with primary MD.  Broken blood vessels in hayley eyes related to coughing.        CORTEZ CUELLAR

## 2017-10-08 NOTE — ED NOTES
Patient discharged to OB with spouse. Patient and family verbalize/demonstrate understanding of all written and verbal instructions. All questions answered.

## 2017-10-08 NOTE — IP AVS SNAPSHOT
[facility]    [address] [address]    Phone:  [phone]    Fax:  [phone]                                       After Visit Summary   10/8/2017    Lorena Echevarria    MRN: [id number]           After Visit Summary Signature Page     I have received my discharge instructions, and my questions have been answered. I have discussed any challenges I see with this plan with the nurse or doctor.    ..........................................................................................................................................  Patient/Patient Representative Signature      ..........................................................................................................................................  Patient Representative Print Name and Relationship to Patient    ..................................................               ................................................  Date                                            Time    ..........................................................................................................................................  Reviewed by Signature/Title    ...................................................              ..............................................  Date                                                            Time

## 2017-10-08 NOTE — DISCHARGE INSTRUCTIONS
Chest Wall Pain: Costochondritis    The chest pain that you have had today is caused by costochondritis. This condition is caused by an inflammation of the cartilage joining your ribs to your breastbone. It is not caused by heart or lung problems. Your healthcare team has made sure that the chest pain you feel is not from a life threatening cause of chest pain such as heart attack, collapsed lung, blood clot in the lung, tear in the aorta, or esophageal rupture. The inflammation may have been brought on by a blow to the chest, lifting heavy objects, intense exercise, or an illness that made you cough and sneeze a lot. It often occurs during times of emotional stress. It can be painful, but it is not dangerous. It usually goes away in 1 to 2 weeks. But it may happen again. Rarely, a more serious condition may cause symptoms similar to costochondritis. That s why it s important to watch for the warning signs listed below.  Home care  Follow these guidelines when caring for yourself at home:    If you feel that emotional stress is a cause of your condition, try to figure out the sources of that stress. It may not be obvious. Learn ways to deal with the stress in your life. This can include regular exercise, muscle relaxation, meditation, or simply taking time out for yourself.    You may use acetaminophen, ibuprofen, or naproxen to control pain, unless another pain medicine was prescribed. If you have liver or kidney disease or ever had a stomach ulcer, talk with your healthcare provider before using these medicines.    You can also help ease pain by using a hot, wet compress or heating pad. Use this with or without a medicated skin cream that helps relieves pain.    Do stretching exercise as advised by your provider.    Take any prescribed medicines as directed.  Follow-up care  Follow up with your healthcare provider, or as advised, if you do not start to get better in the next 2 days.  When to seek medical  advice  Call your healthcare provider right away if any of these occur:    A change in the type of pain. Call if it feels different, becomes more serious, lasts longer, or spreads into your shoulder, arm, neck, jaw, or back.    Shortness of breath or pain gets worse when you breathe    Weakness, dizziness, or fainting    Cough with dark-colored sputum (phlegm) or blood    Abdominal pain    Dark red or black stools    Fever of 100.4 F (38 C) or higher, or as directed by your healthcare provider  Date Last Reviewed: 12/1/2016 2000-2017 [employer]. [address] [place] [address]. All rights reserved. This information is not intended as a substitute for professional medical care. Always follow your healthcare professional's instructions.

## 2017-10-09 NOTE — ED PROVIDER NOTES
History     Chief Complaint   Patient presents with     Chest Pain     Woke patient up one hour ago. Patient 39 weeks gestation     Patient is a 22 year old female presenting with chest pain. The history is provided by the patient.   Chest Pain   Pain location:  L chest  Pain quality: sharp    Pain radiates to:  L shoulder  Onset quality:  Gradual  Duration:  2 days  Timing:  Constant  Progression:  Worsening  Chronicity:  New  Context: lifting and raising an arm    Context: not breathing    Relieved by:  None tried  Associated symptoms: no abdominal pain, no back pain, no cough, no diaphoresis, no dizziness, no fever, no headache, no nausea, no numbness, no palpitations, no shortness of breath and no vomiting          I have reviewed the Medications, Allergies, Past Medical and Surgical History, and Social History in the Epic system.    Allergies:   Allergies   Allergen Reactions     Lamictal [Lamotrigine] Other (See Comments)     Increases aggression.     Amoxicillin Rash     Zoloft [Sertraline] Other (See Comments)     Depression worsens         Current Facility-Administered Medications on File Prior to Encounter:  [COMPLETED] oxyCODONE (ROXICODONE) IR tablet 5 mg     Current Outpatient Prescriptions on File Prior to Encounter:  buPROPion (WELLBUTRIN) 75 MG tablet Take 2 tablets (150 mg) by mouth daily   Prenatal MV-Min-FA-Omega-3 (PRENATAL GUMMIES/DHA & FA) 0.4-32.5 MG CHEW Take 1 chew tab by mouth daily   blood glucose monitoring (GABE CONTOUR NEXT) test strip Use to test blood sugar 4 times daily or as directed.   blood glucose monitoring (GABE MICROLET) lancets Use to test blood sugar 4 times daily or as directed.   azithromycin (ZITHROMAX Z-SYLVAIN) 250 MG tablet Two tablets on the first day, then one tablet daily for the next 4 days   oxyCODONE-acetaminophen (PERCOCET) 5-325 MG per tablet Take 1-2 tablets by mouth every 6 hours as needed for moderate to severe pain (every 4-6 hrs prn)   cyclobenzaprine  "(FLEXERIL) 10 MG tablet Take 0.5-1 tablets (5-10 mg) by mouth 3 times daily as needed for muscle spasms   valACYclovir (VALTREX) 500 MG tablet Take 1 tablet (500 mg) by mouth daily   blood glucose monitoring (ONE TOUCH ULTRA) test strip Use to test blood sugar 4 times daily.   insulin isophane human (HUMULIN N PEN) 100 UNIT/ML injection Inject 10 Units Subcutaneous At Bedtime (Patient taking differently: Inject 15 Units Subcutaneous At Bedtime )       Patient Active Problem List   Diagnosis     Suicidal ideation     Mild intellectual disability     Autism spectrum     Anxiety     Mood disorder (H)     Suicidal thoughts     Left Ankle Pain     Irregular menstrual cycle (PERIODS)     Depression     Elevated serum cholesterol     Obesity     Sinus tachycardia     Galactorrhea of both breasts     High-risk first pregnancy of young woman, third trimester     Recurrent HSV (herpes simplex virus)     Gestational diabetes mellitus (GDM) in third trimester controlled on oral hypoglycemic drug     Papanicolaou smear of cervix with low grade squamous intraepithelial lesion (LGSIL)     Encounter for triage in pregnant patient       No past surgical history on file.    Social History   Substance Use Topics     Smoking status: Former Smoker     Smokeless tobacco: Never Used     Alcohol use No      Comment: Past use       Most Recent Immunizations   Administered Date(s) Administered     Influenza Vaccine IM 3yrs+ 4 Valent IIV4 09/15/2017     TDAP Vaccine (Adacel) 09/15/2017       BMI: Estimated body mass index is 41.48 kg/(m^2) as calculated from the following:    Height as of 10/6/17: 1.676 m (5' 6\").    Weight as of 10/6/17: 116.6 kg (257 lb).      Review of Systems   Constitutional: Negative for chills, diaphoresis and fever.   HENT: Negative for voice change.    Eyes: Negative for visual disturbance.   Respiratory: Negative for cough, chest tightness, shortness of breath and wheezing.    Cardiovascular: Positive for chest " pain. Negative for palpitations and leg swelling.   Gastrointestinal: Negative for abdominal distention, abdominal pain, anal bleeding, blood in stool, nausea and vomiting.   Genitourinary: Negative for decreased urine volume, dysuria, flank pain and hematuria.   Musculoskeletal: Negative for arthralgias, back pain, gait problem, myalgias, neck pain and neck stiffness.   Skin: Negative for color change, pallor, rash and wound.   Neurological: Negative for dizziness, syncope, light-headedness, numbness and headaches.   Psychiatric/Behavioral: Negative for confusion and suicidal ideas.       Physical Exam   BP: 123/80  Pulse: 80  Temp: 97.9  F (36.6  C)  Resp: 20  SpO2: 98 %  Physical Exam   Constitutional: She is oriented to person, place, and time. She appears well-developed and well-nourished.   HENT:   Head: Normocephalic and atraumatic.   Mouth/Throat: No oropharyngeal exudate.   Eyes: Conjunctivae are normal. Pupils are equal, round, and reactive to light.   Neck: Normal range of motion. Neck supple. No JVD present. No tracheal deviation present. No thyromegaly present.   Cardiovascular: Normal rate, regular rhythm, normal heart sounds and intact distal pulses.  Exam reveals no gallop and no friction rub.    No murmur heard.  Pulmonary/Chest: Effort normal and breath sounds normal. No stridor. No respiratory distress. She has no wheezes. She has no rales. She exhibits tenderness and bony tenderness. She exhibits no edema, no deformity and no swelling.       Abdominal: Soft. Bowel sounds are normal. She exhibits no distension and no mass. There is no tenderness. There is no rebound and no guarding.   Musculoskeletal: Normal range of motion. She exhibits no edema or tenderness.   Lymphadenopathy:     She has no cervical adenopathy.   Neurological: She is alert and oriented to person, place, and time.   Skin: Skin is warm and dry. No rash noted. No erythema. No pallor.   Psychiatric: Her behavior is normal.    Nursing note and vitals reviewed.      ED Course     ED Course     Procedures               Labs Ordered and Resulted from Time of ED Arrival Up to the Time of Departure from the ED - No data to display    Assessments & Plan (with Medical Decision Making)   Left upper chest wall pain , with area 5x 5 cm, costochondral tenderness  Tylenol given , pain improved  39 wk pregnant  I advised her to return to ER if pain persisted , felt SOB, fever  She understood and agreed  Pt transferred to OB triage  I have reviewed the nursing notes.    I have reviewed the findings, diagnosis, plan and need for follow up with the patient.      Discharge Medication List as of 10/8/2017  7:54 AM          Final diagnoses:   Chest wall pain       10/8/2017   [facility]     Eagle Bella MD  10/08/17 8604

## 2017-10-10 ENCOUNTER — PRENATAL OFFICE VISIT (OUTPATIENT)
Dept: OBGYN | Facility: OTHER | Age: 22
End: 2017-10-10
Attending: OBSTETRICS & GYNECOLOGY
Payer: MEDICAID

## 2017-10-10 VITALS
HEIGHT: 66 IN | BODY MASS INDEX: 41.78 KG/M2 | HEART RATE: 91 BPM | DIASTOLIC BLOOD PRESSURE: 76 MMHG | OXYGEN SATURATION: 97 % | SYSTOLIC BLOOD PRESSURE: 120 MMHG | WEIGHT: 260 LBS

## 2017-10-10 DIAGNOSIS — O99.810 ABNORMAL MATERNAL GLUCOSE TOLERANCE, ANTEPARTUM: ICD-10-CM

## 2017-10-10 DIAGNOSIS — O09.613 HIGH-RISK FIRST PREGNANCY OF YOUNG WOMAN, THIRD TRIMESTER: Primary | ICD-10-CM

## 2017-10-10 DIAGNOSIS — R82.90 ABNORMAL URINE FINDINGS: ICD-10-CM

## 2017-10-10 LAB
ALBUMIN UR-MCNC: 30 MG/DL
APPEARANCE UR: ABNORMAL
BACTERIA #/AREA URNS HPF: ABNORMAL /HPF
BACTERIA SPEC CULT: ABNORMAL
BILIRUB UR QL STRIP: NEGATIVE
COLOR UR AUTO: YELLOW
GLUCOSE UR STRIP-MCNC: NEGATIVE MG/DL
HGB UR QL STRIP: NEGATIVE
KETONES UR STRIP-MCNC: NEGATIVE MG/DL
LEUKOCYTE ESTERASE UR QL STRIP: ABNORMAL
MUCOUS THREADS #/AREA URNS LPF: PRESENT /LPF
NITRATE UR QL: NEGATIVE
PH UR STRIP: 6 PH (ref 4.7–8)
RBC #/AREA URNS AUTO: 0 /HPF (ref 0–2)
SOURCE: ABNORMAL
SP GR UR STRIP: 1.04 (ref 1–1.03)
SPECIMEN SOURCE: ABNORMAL
SQUAMOUS #/AREA URNS AUTO: 4 /HPF (ref 0–1)
UROBILINOGEN UR STRIP-MCNC: 2 MG/DL (ref 0–2)
WBC #/AREA URNS AUTO: 27 /HPF (ref 0–2)

## 2017-10-10 PROCEDURE — 81001 URINALYSIS AUTO W/SCOPE: CPT | Mod: ZL | Performed by: OBSTETRICS & GYNECOLOGY

## 2017-10-10 PROCEDURE — 87086 URINE CULTURE/COLONY COUNT: CPT | Mod: ZL | Performed by: OBSTETRICS & GYNECOLOGY

## 2017-10-10 PROCEDURE — 99207 ZZC COMPLICATED OB VISIT: CPT | Performed by: OBSTETRICS & GYNECOLOGY

## 2017-10-10 PROCEDURE — 99213 OFFICE O/P EST LOW 20 MIN: CPT

## 2017-10-10 NOTE — MR AVS SNAPSHOT
"              After Visit Summary   10/10/2017    Lorena Echevarria    MRN: [id number]           Patient Information     Date Of Birth          1995        Visit Information        Provider Department      10/10/2017 1:45 PM Fidel Severino MD [facility]        Today's Diagnoses     High-risk first pregnancy of young woman, third trimester    -  1    Abnormal urine findings        Abnormal maternal glucose tolerance, antepartum          Care Instructions    L and D tomorrow          Follow-ups after your visit        Your next 10 appointments already scheduled     Nov 06, 2017 10:30 AM CST   (Arrive by 10:15 AM)   Office Visit with SAGAR Bolton   [facility] ([facility] )    [address]  [facility] [address]   [phone]              Who to contact     If you have questions or need follow up information about today's clinic visit or your schedule please contact [facility] directly at [phone].  Normal or non-critical lab and imaging results will be communicated to you by MyChart, letter or phone within 4 business days after the clinic has received the results. If you do not hear from us within 7 days, please contact the clinic through [employer] or phone. If you have a critical or abnormal lab result, we will notify you by phone as soon as possible.  Submit refill requests through [employer] or call your pharmacy and they will forward the refill request to us. Please allow 3 business days for your refill to be completed.          Additional Information About Your Visit        [employer] Information     Data[employer] lets you send messages to your doctor, view your test results, renew your prescriptions, schedule appointments and more. To sign up, go to [email or web address] . Click on \"Log in\" on the left side of the screen, which will take you to the Welcome page. Then click on \"Sign up Now\" on the right side of the page.     You will be asked to " "enter the access code listed below, as well as some personal information. Please follow the directions to create your username and password.     Your access code is: [email or web address]  Expires: 2017 11:23 AM     Your access code will  in 90 days. If you need help or a new code, please call your [facility] clinic or [phone].        Care EveryWhere ID     This is your Care EveryWhere ID. This could be used by other organizations to access your [facility] medical records  [id number]        Your Vitals Were     Pulse Height Pulse Oximetry BMI (Body Mass Index)          91 5' 6\" (1.676 m) 97% 41.97 kg/m2         Blood Pressure from Last 3 Encounters:   10/10/17 120/76   10/08/17 115/60   10/08/17 123/80    Weight from Last 3 Encounters:   10/10/17 260 lb (117.9 kg)   10/06/17 257 lb (116.6 kg)   17 256 lb (116.1 kg)              We Performed the Following     UA with Microscopic reflex to Culture     Urine Culture Aerobic Bacterial          Today's Medication Changes          These changes are accurate as of: 10/10/17  2:30 PM.  If you have any questions, ask your nurse or doctor.               These medicines have changed or have updated prescriptions.        Dose/Directions    insulin isophane human 100 UNIT/ML injection   Commonly known as:  HumuLIN N PEN   This may have changed:  how much to take   Used for:  Gestational diabetes mellitus (GDM) in third trimester controlled on oral hypoglycemic drug        Dose:  10 Units   Inject 10 Units Subcutaneous At Bedtime   Refills:  0                Primary Care Provider    Physician No Ref-Primary       NO REF-PRIMARY PHYSICIAN        Equal Access to Services     [facility] AH: Hadii [place], wabennieda luqadaha, qaybta kaalmayito yeh . So [facility] [phone].    ATENCIÓN: Si habla español, tiene a hein disposición servicios gratuitos de asistencia lingüística. Llame al [phone].    We comply with " applicable federal civil rights laws and [place] laws. We do not discriminate on the basis of race, color, national origin, age, disability, sex, sexual orientation, or gender identity.            Thank you!     Thank you for choosing [facility]  for your care. Our goal is always to provide you with excellent care. Hearing back from our patients is one way we can continue to improve our services. Please take a few minutes to complete the written survey that you may receive in the mail after your visit with us. Thank you!             Your Updated Medication List - Protect others around you: Learn how to safely use, store and throw away your medicines at www.disposemymeds.org.          This list is accurate as of: 10/10/17  2:30 PM.  Always use your most recent med list.                   Brand Name Dispense Instructions for use Diagnosis    blood glucose monitoring lancets     100 each    Use to test blood sugar 4 times daily or as directed.    Insulin controlled gestational diabetes mellitus (GDM) in third trimester       * blood glucose monitoring test strip    ONE TOUCH ULTRA    100 each    Use to test blood sugar 4 times daily.    Gestational diabetes mellitus (GDM) in third trimester controlled on oral hypoglycemic drug       * blood glucose monitoring test strip    GABE CONTOUR NEXT    200 each    Use to test blood sugar 4 times daily or as directed.    Insulin controlled gestational diabetes mellitus (GDM) in third trimester       buPROPion 75 MG tablet    WELLBUTRIN    90 tablet    Take 2 tablets (150 mg) by mouth daily    Adjustment disorder with depressed mood       cyclobenzaprine 10 MG tablet    FLEXERIL    30 tablet    Take 0.5-1 tablets (5-10 mg) by mouth 3 times daily as needed for muscle spasms    Acute right-sided low back pain without sciatica       insulin isophane human 100 UNIT/ML injection    HumuLIN N PEN     Inject 10 Units Subcutaneous At Bedtime    Gestational diabetes  mellitus (GDM) in third trimester controlled on oral hypoglycemic drug       oxyCODONE-acetaminophen 5-325 MG per tablet    PERCOCET    30 tablet    Take 1-2 tablets by mouth every 6 hours as needed for moderate to severe pain (every 4-6 hrs prn)    Acute right-sided low back pain without sciatica       PRENATAL GUMMIES/DHA & FA 0.4-32.5 MG Chew     90 tablet    Take 1 chew tab by mouth daily    High-risk first pregnancy of young woman, third trimester       valACYclovir 500 MG tablet    VALTREX    30 tablet    Take 1 tablet (500 mg) by mouth daily    Herpes simplex virus (HSV) infection of vagina       * Notice:  This list has 2 medication(s) that are the same as other medications prescribed for you. Read the directions carefully, and ask your doctor or other care provider to review them with you.

## 2017-10-10 NOTE — PROGRESS NOTES
Fetal Non-Stress Test Results    NST Ordered By: Fidel Severino  NST Medical Indication: GDM    NST Start & Stop Times  NST Start Time: 1210  NST Stop Time: 1256               NST Results  Fetus A   Baseline Rate: nml  Accelerations: Present  Decelerations: None  Interpretation: reactive

## 2017-10-10 NOTE — PROGRESS NOTES
Doing well.  Cough improving.  BS reviewed and improved with occasional elevations but no changes per diabetes center.    NO HSV prodrome on valtrex.  Patient is aware of the risks of an induction including medications used, risk, benefits and alternatives.  She is willing to take these risks and would like to proceed and was scheduled for Thursday with cervical ripening/IOL.  Discussed kick counts and fetal movement.  NST/BPP scheduled tomorrow.    Please call if persistent HA, blurred vision, or swelling  Denies regular contractions, vaginal bleeding, LOCHARLES Severino MD  10/10/2017

## 2017-10-10 NOTE — NURSING NOTE
"Chief Complaint   Patient presents with     Prenatal Care     39 weeks and 3 days       Initial /76 (BP Location: Left arm, Cuff Size: Adult Large)  Pulse 91  Ht 5' 6\" (1.676 m)  Wt 260 lb (117.9 kg)  SpO2 97%  BMI 41.97 kg/m2 Estimated body mass index is 41.97 kg/(m^2) as calculated from the following:    Height as of this encounter: 5' 6\" (1.676 m).    Weight as of this encounter: 260 lb (117.9 kg).  Medication Reconciliation: complete     Danii Florez      "

## 2017-10-11 ENCOUNTER — HOSPITAL ENCOUNTER (OUTPATIENT)
Facility: HOSPITAL | Age: 22
Discharge: HOME | End: 2017-10-11
Attending: OBSTETRICS & GYNECOLOGY | Admitting: OBSTETRICS & GYNECOLOGY
Payer: MEDICAID

## 2017-10-11 ENCOUNTER — APPOINTMENT (OUTPATIENT)
Dept: ULTRASOUND IMAGING | Facility: HOSPITAL | Age: 22
End: 2017-10-11
Attending: OBSTETRICS & GYNECOLOGY
Payer: MEDICAID

## 2017-10-11 VITALS
HEART RATE: 86 BPM | OXYGEN SATURATION: 98 % | TEMPERATURE: 98.7 F | DIASTOLIC BLOOD PRESSURE: 62 MMHG | SYSTOLIC BLOOD PRESSURE: 126 MMHG | RESPIRATION RATE: 18 BRPM

## 2017-10-11 LAB
ALBUMIN UR-MCNC: 30 MG/DL
APPEARANCE UR: ABNORMAL
BACTERIA #/AREA URNS HPF: ABNORMAL /HPF
BILIRUB UR QL STRIP: NEGATIVE
COLOR UR AUTO: YELLOW
GLUCOSE UR STRIP-MCNC: NEGATIVE MG/DL
HGB UR QL STRIP: NEGATIVE
KETONES UR STRIP-MCNC: NEGATIVE MG/DL
LEUKOCYTE ESTERASE UR QL STRIP: ABNORMAL
MUCOUS THREADS #/AREA URNS LPF: PRESENT /LPF
NITRATE UR QL: NEGATIVE
PH UR STRIP: 6 PH (ref 4.7–8)
RBC #/AREA URNS AUTO: 12 /HPF (ref 0–2)
SOURCE: ABNORMAL
SP GR UR STRIP: 1.02 (ref 1–1.03)
SQUAMOUS #/AREA URNS AUTO: 12 /HPF (ref 0–1)
UROBILINOGEN UR STRIP-MCNC: NORMAL MG/DL (ref 0–2)
WBC #/AREA URNS AUTO: 17 /HPF (ref 0–2)

## 2017-10-11 PROCEDURE — 59025 FETAL NON-STRESS TEST: CPT | Mod: 59

## 2017-10-11 PROCEDURE — 81001 URINALYSIS AUTO W/SCOPE: CPT | Performed by: OBSTETRICS & GYNECOLOGY

## 2017-10-11 PROCEDURE — 76819 FETAL BIOPHYS PROFIL W/O NST: CPT | Mod: TC

## 2017-10-11 PROCEDURE — 59025 FETAL NON-STRESS TEST: CPT | Mod: 26 | Performed by: OBSTETRICS & GYNECOLOGY

## 2017-10-11 PROCEDURE — 99214 OFFICE O/P EST MOD 30 MIN: CPT | Mod: 25

## 2017-10-11 NOTE — IP AVS SNAPSHOT
MRN:[id number]                      After Visit Summary   10/11/2017    Lorena Echevarria    MRN: [id number]           Thank you!     Thank you for choosing [facility] for your care. Our goal is always to provide you with excellent care. Hearing back from our patients is one way we can continue to improve our services. Please take a few minutes to complete the written survey that you may receive in the mail after you visit with us. Thank you!        Patient Information     Date Of Birth          1995        About your hospital stay     You were admitted on:  October 11, 2017 You last received care in the:  [facility]    You were discharged on:  October 11, 2017       Who to Call     For medical emergencies, please call 911.  For non-urgent questions about your medical care, please call your primary care provider or clinic, None          Attending Provider     Provider Specialty    Fidel Severino MD OB/Gyn       Primary Care Provider    Physician No Ref-Primary      Your next 10 appointments already scheduled     Nov 06, 2017 10:30 AM CST   (Arrive by 10:15 AM)   Office Visit with SAGAR Bolton   [facility] )    [address] [place] [address]   [phone]              Further instructions from your care team       Discharge Instructions for Undelivered Patients    Diet:  * Drink 8 to 12 glasses of liquids (milk, juice, water) every day  * You may eat meals and snacks.    Activity:  * Count fetal kicks every day.  * Call your doctor if your baby is moving less than usual.    Call your provider if you notice:  * Swelling in your face or increased swelling in your hands or legs.  * Headaches that are not relieved by Tylenol (acetaminophen).  * Changes in your vision (blurring; seeing spots or stars).  * Nausea (sick to your stomach) and vomiting (throwing up).  * Weight gain of 5 pounds per week.  * Heartburn that doesn't go  away.  * Signs of bladder infection: Pain when you urinate (use the toilet), needing to go more often or more urgently.  * The bag of lockett (membrane) breaks, or you notice leaking in your underwear.  * Bright red blood in your underwear.  * Abdominal (lower belly) or stomach pain.  * For first baby: Contractions (tightenings) less than 5 minutes apart for one hour or more.  * Second (plus) baby: Contractions (tightenings) less than 10 minutes apart and getting stronger.  * Increase or change in vaginal discharge (note the color and amount).    ***    [facility]: [phone]    Discharge Instructions for Undelivered Patients    Diet:  * Drink 8 to 12 glasses of liquids (milk, juice, water) every day  * You may eat meals and snacks.    Activity:  * Count fetal kicks every day.  * Call your doctor if your baby is moving less than usual.    Call your provider if you notice:  * Swelling in your face or increased swelling in your hands or legs.  * Headaches that are not relieved by Tylenol (acetaminophen).  * Changes in your vision (blurring; seeing spots or stars).  * Nausea (sick to your stomach) and vomiting (throwing up).  * Weight gain of 5 pounds per week.  * Heartburn that doesn't go away.  * Signs of bladder infection: Pain when you urinate (use the toilet), needing to go more often or more urgently.  * The bag of lockett (membrane) breaks, or you notice leaking in your underwear.  * Bright red blood in your underwear.  * Abdominal (lower belly) or stomach pain.  * For first baby: Contractions (tightenings) less than 5 minutes apart for one hour or more.  * Second (plus) baby: Contractions (tightenings) less than 10 minutes apart and getting stronger.  * Increase or change in vaginal discharge (note the color and amount).    ***    [facility]: [phone]      Understanding Preeclampsia  Preeclampsia is pregnancy-related hypertension that develops after 20 weeks'  gestation. It can lead to health risks for you and your baby. No one knows what causes preeclampsia. But it is known that the only cure is delivery.     Your blood pressure will be monitored regularly throughout your pregnancy to help check for preeclampsia.   Signs and symptoms  A common sign of preeclampsia is high blood pressure. Other signs and symptoms may include:    Rapid weight gain    Protein in your urine    Headache    Abdominal pain on your right side    Vision problems (flashes or spots)    Edema (swelling) in your face or hands (this also commonly happens near the end of normal pregnancies, even without preeclampsia)  Tests you may have  Your healthcare provider will want to check your blood pressure throughout your pregnancy. If your blood pressure is high, you may have the following tests:    Urine tests to look for protein    Blood tests to confirm preeclampsia    Fetal monitoring to ensure that your baby is healthy  Treating preeclampsia  A daily low dose of aspirin may be prescribed to those at risk for preeclampsia. Preeclampsia almost always ends soon after you give birth. Until then, your healthcare provider can help manage your condition. If your symptoms are mild, you may need bed rest at home. If your symptoms are severe, you will be hospitalized. Hospital treatment includes:    Complete bed rest to help control blood pressure    Magnesium IV (intravenous) drip during labor to prevent seizures    Induced labor or surgical delivery by  section  When to call your healthcare provider  Call your healthcare provider if swelling, weight gain, or other symptoms come on quickly or are severe. Some cases of preeclampsia are more severe than others. Your signs and symptoms also may change or worsen as you get closer to your due date.  Who s at risk?  Preeclampsia can happen in any pregnant woman. Factors that increase the risk include:    Previous pregnancies. Preeclampsia, intrauterine growth  "retardation (IUGR),  birth, placental abruption, or fetal death    Medical history of mother. Diabetes, high blood pressure, obesity, kidney disease, autoimmune disease (for example lupus), or family history of preeclampsia    Current pregnancy. First pregnancy, multiple fetuses, over the age of 40 years, or in vitro fertilization  Dangers of preeclampsia  If not treated, preeclampsia can cause problems for you and your baby. The placenta (organ that nourishes your baby) may tear away from the uterine wall. This can lead to fetal distress (the baby is at risk for health problems) and premature delivery. Preeclampsia can also cause these health problems:    Kidney failure or other organ damage    Seizures    Stroke  Once you give birth  In most cases, preeclampsia goes away on its own soon after you give birth. Within days of delivery, your blood pressure, swelling, and other signs should decrease.  Date Last Reviewed: 2016-2017 [employer]. [address] [place] [address]. All rights reserved. This information is not intended as a substitute for professional medical care. Always follow your healthcare professional's instructions.          Pending Results     Date and Time Order Name Status Description    10/11/2017 1721 US Fetal Biophys Prof w/o Non Stress Test In process     10/10/2017 1408 URINE CULTURE AEROBIC BACTERIAL Preliminary             Admission Information     Date & Time Provider Department Dept. Phone    10/11/2017 Fidel Severino MD [facility] [phone]      Your Vitals Were     Blood Pressure Pulse Temperature Respirations Pulse Oximetry       126/62 86 98.7  F (37.1  C) (Oral) 18 98%       MyChart Information     [email or web address] lets you send messages to your doctor, view your test results, renew your prescriptions, schedule appointments and more. To sign up, go to [email or web address] . Click on \"Log in\" on the left side of the screen, which will " "take you to the Welcome page. Then click on \"Sign up Now\" on the right side of the page.     You will be asked to enter the access code listed below, as well as some personal information. Please follow the directions to create your username and password.     Your access code is: [email or web address]  Expires: 2017 11:23 AM     Your access code will  in 90 days. If you need help or a new code, please call your [facility] clinic or [phone].        Care EveryWhere ID     This is your Care EveryWhere ID. This could be used by other organizations to access your [facility] medical records  [id number]        Equal Access to Services     EDMOND QUIÑONES : [place], richelle alcala, stephanie ga, mayito nunez. So [facility] [phone].    ATENCIÓN: Si habla español, tiene a hein disposición servicios gratuitos de asistencia lingüística. Llame al [phone].    We comply with applicable federal civil rights laws and [place] laws. We do not discriminate on the basis of race, color, national origin, age, disability, sex, sexual orientation, or gender identity.               Review of your medicines      UNREVIEWED medicines. Ask your doctor about these medicines        Dose / Directions    buPROPion 75 MG tablet   Commonly known as:  WELLBUTRIN   Used for:  Adjustment disorder with depressed mood        Dose:  150 mg   Take 2 tablets (150 mg) by mouth daily   Quantity:  90 tablet   Refills:  0       cyclobenzaprine 10 MG tablet   Commonly known as:  FLEXERIL   Used for:  Acute right-sided low back pain without sciatica        Dose:  5-10 mg   Take 0.5-1 tablets (5-10 mg) by mouth 3 times daily as needed for muscle spasms   Quantity:  30 tablet   Refills:  1       insulin isophane human 100 UNIT/ML injection   Commonly known as:  HumuLIN N PEN   Used for:  Gestational diabetes mellitus (GDM) in third trimester controlled on oral hypoglycemic drug        Dose:  10 " Units   Inject 10 Units Subcutaneous At Bedtime   Refills:  0       oxyCODONE-acetaminophen 5-325 MG per tablet   Commonly known as:  PERCOCET   Used for:  Acute right-sided low back pain without sciatica        Dose:  1-2 tablet   Take 1-2 tablets by mouth every 6 hours as needed for moderate to severe pain (every 4-6 hrs prn)   Quantity:  30 tablet   Refills:  0       PRENATAL GUMMIES/DHA & FA 0.4-32.5 MG Chew   Used for:  High-risk first pregnancy of young woman, third trimester        Dose:  1 chew tab   Take 1 chew tab by mouth daily   Quantity:  90 tablet   Refills:  3       valACYclovir 500 MG tablet   Commonly known as:  VALTREX   Used for:  Herpes simplex virus (HSV) infection of vagina        Dose:  500 mg   Take 1 tablet (500 mg) by mouth daily   Quantity:  30 tablet   Refills:  1         CONTINUE these medicines which have NOT CHANGED        Dose / Directions    blood glucose monitoring lancets   Used for:  Insulin controlled gestational diabetes mellitus (GDM) in third trimester        Use to test blood sugar 4 times daily or as directed.   Quantity:  100 each   Refills:  1       * blood glucose monitoring test strip   Commonly known as:  ONE TOUCH ULTRA   Used for:  Gestational diabetes mellitus (GDM) in third trimester controlled on oral hypoglycemic drug        Use to test blood sugar 4 times daily.   Quantity:  100 each   Refills:  5       * blood glucose monitoring test strip   Commonly known as:  GABE CONTOUR NEXT   Used for:  Insulin controlled gestational diabetes mellitus (GDM) in third trimester        Use to test blood sugar 4 times daily or as directed.   Quantity:  200 each   Refills:  4       * Notice:  This list has 2 medication(s) that are the same as other medications prescribed for you. Read the directions carefully, and ask your doctor or other care provider to review them with you.             Protect others around you: Learn how to safely use, store and throw away your medicines at  www.disposemymeds.org.             Medication List: This is a list of all your medications and when to take them. Check marks below indicate your daily home schedule. Keep this list as a reference.      Medications           Morning Afternoon Evening Bedtime As Needed    blood glucose monitoring lancets   Use to test blood sugar 4 times daily or as directed.                                * blood glucose monitoring test strip   Commonly known as:  ONE TOUCH ULTRA   Use to test blood sugar 4 times daily.                                * blood glucose monitoring test strip   Commonly known as:  [employer] Health[employer] CONTOUR NEXT   Use to test blood sugar 4 times daily or as directed.                                buPROPion 75 MG tablet   Commonly known as:  WELLBUTRIN   Take 2 tablets (150 mg) by mouth daily                                cyclobenzaprine 10 MG tablet   Commonly known as:  FLEXERIL   Take 0.5-1 tablets (5-10 mg) by mouth 3 times daily as needed for muscle spasms                                insulin isophane human 100 UNIT/ML injection   Commonly known as:  HumuLIN N PEN   Inject 10 Units Subcutaneous At Bedtime                                oxyCODONE-acetaminophen 5-325 MG per tablet   Commonly known as:  PERCOCET   Take 1-2 tablets by mouth every 6 hours as needed for moderate to severe pain (every 4-6 hrs prn)                                PRENATAL GUMMIES/DHA & FA 0.4-32.5 MG Chew   Take 1 chew tab by mouth daily                                valACYclovir 500 MG tablet   Commonly known as:  VALTREX   Take 1 tablet (500 mg) by mouth daily                                * Notice:  This list has 2 medication(s) that are the same as other medications prescribed for you. Read the directions carefully, and ask your doctor or other care provider to review them with you.

## 2017-10-11 NOTE — IP AVS SNAPSHOT
[facility]    [address] [address]    Phone:  [phone]    Fax:  [phone]                                       After Visit Summary   10/11/2017    Lorena Echevarria    MRN: [id number]           After Visit Summary Signature Page     I have received my discharge instructions, and my questions have been answered. I have discussed any challenges I see with this plan with the nurse or doctor.    ..........................................................................................................................................  Patient/Patient Representative Signature      ..........................................................................................................................................  Patient Representative Print Name and Relationship to Patient    ..................................................               ................................................  Date                                            Time    ..........................................................................................................................................  Reviewed by Signature/Title    ...................................................              ..............................................  Date                                                            Time

## 2017-10-11 NOTE — PLAN OF CARE
Lorena Echevarria      NST:  Reactive  Start:1702  Stop:1728    Physician: Dr. Severino  Reason For Test: Diabetes Mellitus  TAMIKA: 10/14/2017  Gestational Age: 39 4/7    Comments:  BPP 8/8 fluid 6-7cm per U/S MD aware, UA Results given,  Last /62  Pulse 86  Temp 98.7  F (37.1  C) (Oral)  Resp 18  SpO2 98% (See MAR for other BP-MD updated on).  Pt denies headache, reflexes 2+/normal, 2-3 +pitting edema to bilat l/e's, no clonus.  Pt educated on s/s of labor and preeclamsia.  Pt encouraged to go home and rest and if any changes call OB.  Pt back tomorrow for rippening.  MD ok for pt to discharge-education given, no further questions.         Jane Hurley

## 2017-10-11 NOTE — DISCHARGE INSTRUCTIONS
Discharge Instructions for Undelivered Patients    Diet:  * Drink 8 to 12 glasses of liquids (milk, juice, water) every day  * You may eat meals and snacks.    Activity:  * Count fetal kicks every day.  * Call your doctor if your baby is moving less than usual.    Call your provider if you notice:  * Swelling in your face or increased swelling in your hands or legs.  * Headaches that are not relieved by Tylenol (acetaminophen).  * Changes in your vision (blurring; seeing spots or stars).  * Nausea (sick to your stomach) and vomiting (throwing up).  * Weight gain of 5 pounds per week.  * Heartburn that doesn't go away.  * Signs of bladder infection: Pain when you urinate (use the toilet), needing to go more often or more urgently.  * The bag of lockett (membrane) breaks, or you notice leaking in your underwear.  * Bright red blood in your underwear.  * Abdominal (lower belly) or stomach pain.  * For first baby: Contractions (tightenings) less than 5 minutes apart for one hour or more.  * Second (plus) baby: Contractions (tightenings) less than 10 minutes apart and getting stronger.  * Increase or change in vaginal discharge (note the color and amount).    ***    [facility] and [facility]: [phone]    Discharge Instructions for Undelivered Patients    Diet:  * Drink 8 to 12 glasses of liquids (milk, juice, water) every day  * You may eat meals and snacks.    Activity:  * Count fetal kicks every day.  * Call your doctor if your baby is moving less than usual.    Call your provider if you notice:  * Swelling in your face or increased swelling in your hands or legs.  * Headaches that are not relieved by Tylenol (acetaminophen).  * Changes in your vision (blurring; seeing spots or stars).  * Nausea (sick to your stomach) and vomiting (throwing up).  * Weight gain of 5 pounds per week.  * Heartburn that doesn't go away.  * Signs of bladder infection: Pain when you urinate (use the toilet), needing to go more often  or more urgently.  * The bag of lockett (membrane) breaks, or you notice leaking in your underwear.  * Bright red blood in your underwear.  * Abdominal (lower belly) or stomach pain.  * For first baby: Contractions (tightenings) less than 5 minutes apart for one hour or more.  * Second (plus) baby: Contractions (tightenings) less than 10 minutes apart and getting stronger.  * Increase or change in vaginal discharge (note the color and amount).    ***    [facility]: [phone]      Understanding Preeclampsia  Preeclampsia is pregnancy-related hypertension that develops after 20 weeks' gestation. It can lead to health risks for you and your baby. No one knows what causes preeclampsia. But it is known that the only cure is delivery.     Your blood pressure will be monitored regularly throughout your pregnancy to help check for preeclampsia.   Signs and symptoms  A common sign of preeclampsia is high blood pressure. Other signs and symptoms may include:    Rapid weight gain    Protein in your urine    Headache    Abdominal pain on your right side    Vision problems (flashes or spots)    Edema (swelling) in your face or hands (this also commonly happens near the end of normal pregnancies, even without preeclampsia)  Tests you may have  Your healthcare provider will want to check your blood pressure throughout your pregnancy. If your blood pressure is high, you may have the following tests:    Urine tests to look for protein    Blood tests to confirm preeclampsia    Fetal monitoring to ensure that your baby is healthy  Treating preeclampsia  A daily low dose of aspirin may be prescribed to those at risk for preeclampsia. Preeclampsia almost always ends soon after you give birth. Until then, your healthcare provider can help manage your condition. If your symptoms are mild, you may need bed rest at home. If your symptoms are severe, you will be hospitalized. Hospital treatment includes:    Complete  bed rest to help control blood pressure    Magnesium IV (intravenous) drip during labor to prevent seizures    Induced labor or surgical delivery by  section  When to call your healthcare provider  Call your healthcare provider if swelling, weight gain, or other symptoms come on quickly or are severe. Some cases of preeclampsia are more severe than others. Your signs and symptoms also may change or worsen as you get closer to your due date.  Who s at risk?  Preeclampsia can happen in any pregnant woman. Factors that increase the risk include:    Previous pregnancies. Preeclampsia, intrauterine growth retardation (IUGR),  birth, placental abruption, or fetal death    Medical history of mother. Diabetes, high blood pressure, obesity, kidney disease, autoimmune disease (for example lupus), or family history of preeclampsia    Current pregnancy. First pregnancy, multiple fetuses, over the age of 40 years, or in vitro fertilization  Dangers of preeclampsia  If not treated, preeclampsia can cause problems for you and your baby. The placenta (organ that nourishes your baby) may tear away from the uterine wall. This can lead to fetal distress (the baby is at risk for health problems) and premature delivery. Preeclampsia can also cause these health problems:    Kidney failure or other organ damage    Seizures    Stroke  Once you give birth  In most cases, preeclampsia goes away on its own soon after you give birth. Within days of delivery, your blood pressure, swelling, and other signs should decrease.  Date Last Reviewed: 2016-2017 [employer]. [address] [place] [address], [place] [address]. All rights reserved. This information is not intended as a substitute for professional medical care. Always follow your healthcare professional's instructions.

## 2017-10-12 ENCOUNTER — HOSPITAL ENCOUNTER (OUTPATIENT)
Facility: HOSPITAL | Age: 22
Setting detail: OBSERVATION
Discharge: HOME | End: 2017-10-13
Attending: OBSTETRICS & GYNECOLOGY | Admitting: OBSTETRICS & GYNECOLOGY
Payer: MEDICAID

## 2017-10-12 DIAGNOSIS — Z36.89 ENCOUNTER FOR TRIAGE IN PREGNANT PATIENT: Primary | ICD-10-CM

## 2017-10-12 LAB
ABO + RH BLD: NORMAL
ABO + RH BLD: NORMAL
ALBUMIN UR-MCNC: 10 MG/DL
AMPHETAMINES UR QL SCN: NEGATIVE
APPEARANCE UR: ABNORMAL
AST SERPL W P-5'-P-CCNC: 22 U/L (ref 0–45)
BACTERIA #/AREA URNS HPF: ABNORMAL /HPF
BACTERIA SPEC CULT: NO GROWTH
BARBITURATES UR QL: NEGATIVE
BENZODIAZ UR QL: NEGATIVE
BILIRUB UR QL STRIP: NEGATIVE
BLD GP AB SCN SERPL QL: NORMAL
BLOOD BANK CMNT PATIENT-IMP: NORMAL
CANNABINOIDS UR QL SCN: NEGATIVE
COCAINE UR QL: NEGATIVE
COLOR UR AUTO: YELLOW
CREAT SERPL-MCNC: 0.63 MG/DL (ref 0.52–1.04)
ERYTHROCYTE [DISTWIDTH] IN BLOOD BY AUTOMATED COUNT: 13.4 % (ref 10–15)
GFR SERPL CREATININE-BSD FRML MDRD: >90 ML/MIN/1.7M2
GLUCOSE UR STRIP-MCNC: NEGATIVE MG/DL
HCT VFR BLD AUTO: 36.7 % (ref 35–47)
HGB BLD-MCNC: 12.1 G/DL (ref 11.7–15.7)
HGB UR QL STRIP: ABNORMAL
KETONES UR STRIP-MCNC: NEGATIVE MG/DL
LEUKOCYTE ESTERASE UR QL STRIP: ABNORMAL
MCH RBC QN AUTO: 30.2 PG (ref 26.5–33)
MCHC RBC AUTO-ENTMCNC: 33 G/DL (ref 31.5–36.5)
MCV RBC AUTO: 92 FL (ref 78–100)
METHADONE UR QL SCN: NEGATIVE
MUCOUS THREADS #/AREA URNS LPF: PRESENT /LPF
NITRATE UR QL: NEGATIVE
OPIATES UR QL SCN: NEGATIVE
PCP UR QL SCN: NEGATIVE
PH UR STRIP: 6.5 PH (ref 4.7–8)
PLATELET # BLD AUTO: 236 10E9/L (ref 150–450)
RBC # BLD AUTO: 4.01 10E12/L (ref 3.8–5.2)
RBC #/AREA URNS AUTO: 17 /HPF (ref 0–2)
SOURCE: ABNORMAL
SP GR UR STRIP: 1.01 (ref 1–1.03)
SPECIMEN EXP DATE BLD: NORMAL
SPECIMEN SOURCE: NORMAL
SQUAMOUS #/AREA URNS AUTO: 6 /HPF (ref 0–1)
UROBILINOGEN UR STRIP-MCNC: NORMAL MG/DL (ref 0–2)
WBC # BLD AUTO: 8.5 10E9/L (ref 4–11)
WBC #/AREA URNS AUTO: 26 /HPF (ref 0–2)

## 2017-10-12 PROCEDURE — 86900 BLOOD TYPING SEROLOGIC ABO: CPT | Performed by: OBSTETRICS & GYNECOLOGY

## 2017-10-12 PROCEDURE — 81001 URINALYSIS AUTO W/SCOPE: CPT | Mod: 59 | Performed by: OBSTETRICS & GYNECOLOGY

## 2017-10-12 PROCEDURE — 25000132 ZZH RX MED GY IP 250 OP 250 PS 637: Performed by: OBSTETRICS & GYNECOLOGY

## 2017-10-12 PROCEDURE — 85027 COMPLETE CBC AUTOMATED: CPT | Performed by: OBSTETRICS & GYNECOLOGY

## 2017-10-12 PROCEDURE — 25000131 ZZH RX MED GY IP 250 OP 636 PS 637: Performed by: OBSTETRICS & GYNECOLOGY

## 2017-10-12 PROCEDURE — 59025 FETAL NON-STRESS TEST: CPT | Mod: 26 | Performed by: OBSTETRICS & GYNECOLOGY

## 2017-10-12 PROCEDURE — 59025 FETAL NON-STRESS TEST: CPT

## 2017-10-12 PROCEDURE — 86850 RBC ANTIBODY SCREEN: CPT | Performed by: OBSTETRICS & GYNECOLOGY

## 2017-10-12 PROCEDURE — 82565 ASSAY OF CREATININE: CPT | Performed by: OBSTETRICS & GYNECOLOGY

## 2017-10-12 PROCEDURE — 84450 TRANSFERASE (AST) (SGOT): CPT | Performed by: OBSTETRICS & GYNECOLOGY

## 2017-10-12 PROCEDURE — 36415 COLL VENOUS BLD VENIPUNCTURE: CPT | Performed by: OBSTETRICS & GYNECOLOGY

## 2017-10-12 PROCEDURE — 59200 INSERT CERVICAL DILATOR: CPT

## 2017-10-12 PROCEDURE — 80307 DRUG TEST PRSMV CHEM ANLYZR: CPT | Performed by: OBSTETRICS & GYNECOLOGY

## 2017-10-12 PROCEDURE — 99214 OFFICE O/P EST MOD 30 MIN: CPT | Mod: 25

## 2017-10-12 PROCEDURE — 86901 BLOOD TYPING SEROLOGIC RH(D): CPT | Performed by: OBSTETRICS & GYNECOLOGY

## 2017-10-12 RX ORDER — CYCLOBENZAPRINE HCL 5 MG
5-10 TABLET ORAL 3 TIMES DAILY PRN
Status: DISCONTINUED | OUTPATIENT
Start: 2017-10-12 | End: 2017-10-13 | Stop reason: HOSPADM

## 2017-10-12 RX ORDER — BUPROPION HYDROCHLORIDE 75 MG/1
150 TABLET ORAL DAILY
Status: DISCONTINUED | OUTPATIENT
Start: 2017-10-13 | End: 2017-10-13 | Stop reason: HOSPADM

## 2017-10-12 RX ORDER — LIDOCAINE 40 MG/G
CREAM TOPICAL
Status: DISCONTINUED | OUTPATIENT
Start: 2017-10-12 | End: 2017-10-13 | Stop reason: HOSPADM

## 2017-10-12 RX ORDER — GUAIFENESIN/DEXTROMETHORPHAN 100-10MG/5
SYRUP ORAL
Status: DISPENSED
Start: 2017-10-12 | End: 2017-10-13

## 2017-10-12 RX ORDER — NALOXONE HYDROCHLORIDE 0.4 MG/ML
.1-.4 INJECTION, SOLUTION INTRAMUSCULAR; INTRAVENOUS; SUBCUTANEOUS
Status: DISCONTINUED | OUTPATIENT
Start: 2017-10-12 | End: 2017-10-13 | Stop reason: HOSPADM

## 2017-10-12 RX ORDER — NICOTINE POLACRILEX 4 MG
15-30 LOZENGE BUCCAL
Status: DISCONTINUED | OUTPATIENT
Start: 2017-10-12 | End: 2017-10-13 | Stop reason: HOSPADM

## 2017-10-12 RX ORDER — HYDROXYZINE PAMOATE 50 MG/1
100 CAPSULE ORAL EVERY 6 HOURS PRN
Status: DISCONTINUED | OUTPATIENT
Start: 2017-10-12 | End: 2017-10-13 | Stop reason: HOSPADM

## 2017-10-12 RX ORDER — DEXTROSE MONOHYDRATE 25 G/50ML
25-50 INJECTION, SOLUTION INTRAVENOUS
Status: DISCONTINUED | OUTPATIENT
Start: 2017-10-12 | End: 2017-10-13 | Stop reason: HOSPADM

## 2017-10-12 RX ORDER — GUAIFENESIN/DEXTROMETHORPHAN 100-10MG/5
10 SYRUP ORAL EVERY 4 HOURS PRN
Status: DISCONTINUED | OUTPATIENT
Start: 2017-10-12 | End: 2017-10-13 | Stop reason: HOSPADM

## 2017-10-12 RX ORDER — VALACYCLOVIR HYDROCHLORIDE 500 MG/1
500 TABLET, FILM COATED ORAL DAILY
Status: DISCONTINUED | OUTPATIENT
Start: 2017-10-13 | End: 2017-10-13 | Stop reason: HOSPADM

## 2017-10-12 RX ORDER — OXYCODONE AND ACETAMINOPHEN 5; 325 MG/1; MG/1
1-2 TABLET ORAL EVERY 6 HOURS PRN
Status: DISCONTINUED | OUTPATIENT
Start: 2017-10-12 | End: 2017-10-13 | Stop reason: HOSPADM

## 2017-10-12 RX ADMIN — OXYCODONE HYDROCHLORIDE AND ACETAMINOPHEN 1 TABLET: 5; 325 TABLET ORAL at 22:35

## 2017-10-12 RX ADMIN — DINOPROSTONE 10 MG: 10 INSERT VAGINAL at 20:48

## 2017-10-12 RX ADMIN — HUMAN INSULIN 15 UNITS: 100 INJECTION, SUSPENSION SUBCUTANEOUS at 22:30

## 2017-10-12 RX ADMIN — HYDROXYZINE PAMOATE 100 MG: 50 CAPSULE ORAL at 21:53

## 2017-10-12 NOTE — PROGRESS NOTES
Fetal Non-Stress Test Results    NST Ordered By: Fidel Severino  NST Medical Indication: GDM on Insulin    NST Start & Stop Times  NST Start Time: 0703  NST Stop Time: 1728                            NST Results  Fetus A   Baseline Rate: nml  Accelerations: Present  Decelerations: None  Interpretation: reactive  BPP 10/10

## 2017-10-12 NOTE — IP AVS SNAPSHOT
[facility]    [address] [address]    Phone:  [phone]    Fax:  [phone]                                       After Visit Summary   10/12/2017    Lorena Echevarria    MRN: [id number]           After Visit Summary Signature Page     I have received my discharge instructions, and my questions have been answered. I have discussed any challenges I see with this plan with the nurse or doctor.    ..........................................................................................................................................  Patient/Patient Representative Signature      ..........................................................................................................................................  Patient Representative Print Name and Relationship to Patient    ..................................................               ................................................  Date                                            Time    ..........................................................................................................................................  Reviewed by Signature/Title    ...................................................              ..............................................  Date                                                            Time

## 2017-10-12 NOTE — IP AVS SNAPSHOT
MRN:[id number]                      After Visit Summary   10/12/2017    Lorena Echevarria    MRN: [id number]           Thank you!     Thank you for choosing [facility] for your care. Our goal is always to provide you with excellent care. Hearing back from our patients is one way we can continue to improve our services. Please take a few minutes to complete the written survey that you may receive in the mail after you visit with us. Thank you!        Patient Information     Date Of Birth          1995        Designated Caregiver       Most Recent Value    Caregiver    Will someone help with your care after discharge? no      About your hospital stay     You were admitted on:  October 12, 2017 You last received care in the:  [facility]    You were discharged on:  October 13, 2017       Who to Call     For medical emergencies, please call 911.  For non-urgent questions about your medical care, please call your primary care provider or clinic, None          Attending Provider     Provider Specialty    Fidel Severino MD OB/Gyn       Primary Care Provider    Physician No Ref-Primary      Your next 10 appointments already scheduled     Nov 06, 2017 10:30 AM CST   (Arrive by 10:15 AM)   Office Visit with SAGAR Bolton   [facility] ([facility]    [address]  [facility] [address]   [phone]              Further instructions from your care team       Discharge Instructions for Undelivered Patients    Diet:  * Drink 8 to 12 glasses of liquids (milk, juice, water) every day  * You may eat meals and snacks.    Activity:  * Count fetal kicks every day.  * Call your doctor if your baby is moving less than usual.    Call your provider if you notice:  * Swelling in your face or increased swelling in your hands or legs.  * Headaches that are not relieved by Tylenol (acetaminophen).  * Changes in your vision (blurring; seeing spots or stars).  * Nausea  "(sick to your stomach) and vomiting (throwing up).  * Weight gain of 5 pounds per week.  * Heartburn that doesn't go away.  * Signs of bladder infection: Pain when you urinate (use the toilet), needing to go more often or more urgently.  * The bag of lockett (membrane) breaks, or you notice leaking in your underwear.  * Bright red blood in your underwear.  * Abdominal (lower belly) or stomach pain.  * For first baby: Contractions (tightenings) less than 5 minutes apart for one hour or more.  * Increase or change in vaginal discharge (note the color and amount).        [facility]: [phone]    Rest at home and return tomorrow at 8am.         Pending Results     No orders found for last 3 day(s).            Admission Information     Date & Time Provider Department Dept. Phone    10/12/2017 Fidel Severino MD [facility] [phone]      Your Vitals Were     Blood Pressure Pulse Temperature Respirations Height Pulse Oximetry    123/69 86 98  F (36.7  C) (Oral) 16 1.676 m (5' 6\") 97%      MyChart Information     [employer] lets you send messages to your doctor, view your test results, renew your prescriptions, schedule appointments and more. To sign up, go to [email or web address] . Click on \"Log in\" on the left side of the screen, which will take you to the Welcome page. Then click on \"Sign up Now\" on the right side of the page.     You will be asked to enter the access code listed below, as well as some personal information. Please follow the directions to create your username and password.     Your access code is: [email or web address]  Expires: 2017 11:23 AM     Your access code will  in 90 days. If you need help or a new code, please call your [facility] or [phone].        Care EveryWhere ID     This is your Care EveryWhere ID. This could be used by other organizations to access your [facility] medical records  [id number]        Equal Access to Services     [facility] " AH: Hadii emelia pyle Aleksandra, waaxda luqadaha, qaybta katammy ga, waxmichael perez eladioana malloychloeannabelle nunez. So [facility] [phone].    ATENCIÓN: Si habla español, tiene a hein disposición servicios gratuitos de asistencia lingüística. Llame al [phone].    We comply with applicable federal civil rights laws and [place] laws. We do not discriminate on the basis of race, color, national origin, age, disability, sex, sexual orientation, or gender identity.               Review of your medicines      START taking        Dose / Directions    guaiFENesin-dextromethorphan 100-10 MG/5ML syrup   Commonly known as:  ROBITUSSIN DM   Used for:  Encounter for triage in pregnant patient        Dose:  10 mL   Take 10 mLs by mouth every 4 hours as needed for cough   Quantity:  560 mL   Refills:  0       omeprazole 40 MG capsule   Commonly known as:  priLOSEC   Used for:  Encounter for triage in pregnant patient        Dose:  40 mg   Take 1 capsule (40 mg) by mouth every morning   Quantity:  30 capsule   Refills:  0         CONTINUE these medicines which may have CHANGED, or have new prescriptions. If we are uncertain of the size of tablets/capsules you have at home, strength may be listed as something that might have changed.        Dose / Directions    insulin isophane human 100 UNIT/ML injection   Commonly known as:  HumuLIN N PEN   This may have changed:  how much to take   Used for:  Gestational diabetes mellitus (GDM) in third trimester controlled on oral hypoglycemic drug        Dose:  10 Units   Inject 10 Units Subcutaneous At Bedtime   Refills:  0         CONTINUE these medicines which have NOT CHANGED        Dose / Directions    blood glucose monitoring lancets   Used for:  Insulin controlled gestational diabetes mellitus (GDM) in third trimester        Use to test blood sugar 4 times daily or as directed.   Quantity:  100 each   Refills:  1       * blood glucose monitoring test strip   Commonly known as:   ONE TOUCH ULTRA   Used for:  Gestational diabetes mellitus (GDM) in third trimester controlled on oral hypoglycemic drug        Use to test blood sugar 4 times daily.   Quantity:  100 each   Refills:  5       * blood glucose monitoring test strip   Commonly known as:  GABE CONTOUR NEXT   Used for:  Insulin controlled gestational diabetes mellitus (GDM) in third trimester        Use to test blood sugar 4 times daily or as directed.   Quantity:  200 each   Refills:  4       buPROPion 75 MG tablet   Commonly known as:  WELLBUTRIN   Used for:  Adjustment disorder with depressed mood        Dose:  150 mg   Take 2 tablets (150 mg) by mouth daily   Quantity:  90 tablet   Refills:  0       cyclobenzaprine 10 MG tablet   Commonly known as:  FLEXERIL   Used for:  Acute right-sided low back pain without sciatica        Dose:  5-10 mg   Take 0.5-1 tablets (5-10 mg) by mouth 3 times daily as needed for muscle spasms   Quantity:  30 tablet   Refills:  1       oxyCODONE-acetaminophen 5-325 MG per tablet   Commonly known as:  PERCOCET   Used for:  Acute right-sided low back pain without sciatica        Dose:  1-2 tablet   Take 1-2 tablets by mouth every 6 hours as needed for moderate to severe pain (every 4-6 hrs prn)   Quantity:  30 tablet   Refills:  0       PRENATAL GUMMIES/DHA & FA 0.4-32.5 MG Chew   Used for:  High-risk first pregnancy of young woman, third trimester        Dose:  1 chew tab   Take 1 chew tab by mouth daily   Quantity:  90 tablet   Refills:  3       valACYclovir 500 MG tablet   Commonly known as:  VALTREX   Used for:  Herpes simplex virus (HSV) infection of vagina        Dose:  500 mg   Take 1 tablet (500 mg) by mouth daily   Quantity:  30 tablet   Refills:  1       * Notice:  This list has 2 medication(s) that are the same as other medications prescribed for you. Read the directions carefully, and ask your doctor or other care provider to review them with you.         Where to get your medicines       Information about where to get these medications is not yet available     ! Ask your nurse or doctor about these medications     guaiFENesin-dextromethorphan 100-10 MG/5ML syrup    omeprazole 40 MG capsule                Protect others around you: Learn how to safely use, store and throw away your medicines at www.disposemymeds.org.             Medication List: This is a list of all your medications and when to take them. Check marks below indicate your daily home schedule. Keep this list as a reference.      Medications           Morning Afternoon Evening Bedtime As Needed    blood glucose monitoring lancets   Use to test blood sugar 4 times daily or as directed.                                * blood glucose monitoring test strip   Commonly known as:  ONE TOUCH ULTRA   Use to test blood sugar 4 times daily.                                * blood glucose monitoring test strip   Commonly known as:  [employer] CONTOUR NEXT   Use to test blood sugar 4 times daily or as directed.                                buPROPion 75 MG tablet   Commonly known as:  WELLBUTRIN   Take 2 tablets (150 mg) by mouth daily   Last time this was given:  150 mg on 10/13/2017  8:34 AM                                cyclobenzaprine 10 MG tablet   Commonly known as:  FLEXERIL   Take 0.5-1 tablets (5-10 mg) by mouth 3 times daily as needed for muscle spasms                                guaiFENesin-dextromethorphan 100-10 MG/5ML syrup   Commonly known as:  ROBITUSSIN DM   Take 10 mLs by mouth every 4 hours as needed for cough   Last time this was given:  10 mLs on 10/13/2017  8:32 AM                                insulin isophane human 100 UNIT/ML injection   Commonly known as:  HumuLIN N PEN   Inject 10 Units Subcutaneous At Bedtime                                omeprazole 40 MG capsule   Commonly known as:  priLOSEC   Take 1 capsule (40 mg) by mouth every morning   Last time this was given:  40 mg on 10/13/2017  8:34 AM                                 oxyCODONE-acetaminophen 5-325 MG per tablet   Commonly known as:  PERCOCET   Take 1-2 tablets by mouth every 6 hours as needed for moderate to severe pain (every 4-6 hrs prn)   Last time this was given:  1 tablet on 10/12/2017 10:35 PM                                PRENATAL GUMMIES/DHA & FA 0.4-32.5 MG Chew   Take 1 chew tab by mouth daily                                valACYclovir 500 MG tablet   Commonly known as:  VALTREX   Take 1 tablet (500 mg) by mouth daily   Last time this was given:  500 mg on 10/13/2017  8:35 AM                                * Notice:  This list has 2 medication(s) that are the same as other medications prescribed for you. Read the directions carefully, and ask your doctor or other care provider to review them with you.

## 2017-10-13 ENCOUNTER — APPOINTMENT (OUTPATIENT)
Dept: ULTRASOUND IMAGING | Facility: HOSPITAL | Age: 22
End: 2017-10-13
Attending: OBSTETRICS & GYNECOLOGY
Payer: MEDICAID

## 2017-10-13 ENCOUNTER — APPOINTMENT (OUTPATIENT)
Dept: GENERAL RADIOLOGY | Facility: HOSPITAL | Age: 22
End: 2017-10-13
Attending: OBSTETRICS & GYNECOLOGY
Payer: MEDICAID

## 2017-10-13 VITALS
OXYGEN SATURATION: 97 % | HEART RATE: 86 BPM | HEIGHT: 66 IN | RESPIRATION RATE: 16 BRPM | SYSTOLIC BLOOD PRESSURE: 123 MMHG | DIASTOLIC BLOOD PRESSURE: 69 MMHG | TEMPERATURE: 98 F

## 2017-10-13 PROBLEM — O24.419 GDM (GESTATIONAL DIABETES MELLITUS): Status: ACTIVE | Noted: 2017-10-13

## 2017-10-13 PROCEDURE — 25000132 ZZH RX MED GY IP 250 OP 250 PS 637: Performed by: HOSPITALIST

## 2017-10-13 PROCEDURE — 25000125 ZZHC RX 250: Performed by: OBSTETRICS & GYNECOLOGY

## 2017-10-13 PROCEDURE — 71020 XR CHEST 2 VW: CPT | Mod: TC

## 2017-10-13 PROCEDURE — 25000132 ZZH RX MED GY IP 250 OP 250 PS 637: Performed by: OBSTETRICS & GYNECOLOGY

## 2017-10-13 PROCEDURE — 94640 AIRWAY INHALATION TREATMENT: CPT

## 2017-10-13 PROCEDURE — G0378 HOSPITAL OBSERVATION PER HR: HCPCS

## 2017-10-13 PROCEDURE — S0191 MISOPROSTOL, ORAL, 200 MCG: HCPCS | Performed by: OBSTETRICS & GYNECOLOGY

## 2017-10-13 PROCEDURE — 76819 FETAL BIOPHYS PROFIL W/O NST: CPT | Mod: TC

## 2017-10-13 PROCEDURE — 25000132 ZZH RX MED GY IP 250 OP 250 PS 637

## 2017-10-13 PROCEDURE — 99242 OFF/OP CONSLTJ NEW/EST SF 20: CPT | Performed by: HOSPITALIST

## 2017-10-13 PROCEDURE — 25000125 ZZHC RX 250: Performed by: INTERNAL MEDICINE

## 2017-10-13 PROCEDURE — 99219 ZZC INITIAL OBSERVATION CARE,LEVL II: CPT | Performed by: OBSTETRICS & GYNECOLOGY

## 2017-10-13 RX ORDER — BENZONATATE 100 MG/1
CAPSULE ORAL
Status: COMPLETED
Start: 2017-10-13 | End: 2017-10-13

## 2017-10-13 RX ORDER — IPRATROPIUM BROMIDE AND ALBUTEROL SULFATE 2.5; .5 MG/3ML; MG/3ML
3 SOLUTION RESPIRATORY (INHALATION) ONCE
Status: COMPLETED | OUTPATIENT
Start: 2017-10-13 | End: 2017-10-13

## 2017-10-13 RX ORDER — BENZONATATE 100 MG/1
100 CAPSULE ORAL 3 TIMES DAILY PRN
Status: DISCONTINUED | OUTPATIENT
Start: 2017-10-13 | End: 2017-10-13 | Stop reason: HOSPADM

## 2017-10-13 RX ORDER — LIDOCAINE HYDROCHLORIDE 40 MG/ML
2.5 INJECTION, SOLUTION RETROBULBAR EVERY 6 HOURS PRN
Status: DISCONTINUED | OUTPATIENT
Start: 2017-10-13 | End: 2017-10-13 | Stop reason: HOSPADM

## 2017-10-13 RX ORDER — GUAIFENESIN/DEXTROMETHORPHAN 100-10MG/5
10 SYRUP ORAL EVERY 4 HOURS PRN
Qty: 560 ML | Refills: 0 | Status: SHIPPED | OUTPATIENT
Start: 2017-10-13 | End: 2017-10-27

## 2017-10-13 RX ORDER — PREDNISONE 20 MG/1
60 TABLET ORAL DAILY
Status: DISCONTINUED | OUTPATIENT
Start: 2017-10-13 | End: 2017-10-13 | Stop reason: HOSPADM

## 2017-10-13 RX ORDER — GUAIFENESIN/DEXTROMETHORPHAN 100-10MG/5
SYRUP ORAL
Status: COMPLETED
Start: 2017-10-13 | End: 2017-10-13

## 2017-10-13 RX ORDER — IPRATROPIUM BROMIDE AND ALBUTEROL SULFATE 2.5; .5 MG/3ML; MG/3ML
3 SOLUTION RESPIRATORY (INHALATION)
Status: DISCONTINUED | OUTPATIENT
Start: 2017-10-13 | End: 2017-10-13 | Stop reason: HOSPADM

## 2017-10-13 RX ORDER — MISOPROSTOL 100 UG/1
25 TABLET ORAL
Status: COMPLETED | OUTPATIENT
Start: 2017-10-13 | End: 2017-10-13

## 2017-10-13 RX ORDER — OMEPRAZOLE 40 MG/1
40 CAPSULE, DELAYED RELEASE ORAL EVERY MORNING
Qty: 30 CAPSULE | Refills: 0 | Status: SHIPPED | OUTPATIENT
Start: 2017-10-13 | End: 2018-06-01

## 2017-10-13 RX ADMIN — OMEPRAZOLE 40 MG: 20 CAPSULE, DELAYED RELEASE ORAL at 08:34

## 2017-10-13 RX ADMIN — BUPROPION HYDROCHLORIDE 150 MG: 75 TABLET, FILM COATED ORAL at 08:34

## 2017-10-13 RX ADMIN — MISOPROSTOL 25 MCG: 100 TABLET ORAL at 11:02

## 2017-10-13 RX ADMIN — BENZONATATE 100 MG: 100 CAPSULE, LIQUID FILLED ORAL at 00:50

## 2017-10-13 RX ADMIN — GUAIFENESIN AND DEXTROMETHORPHAN 10 ML: 100; 10 SYRUP ORAL at 04:14

## 2017-10-13 RX ADMIN — BENZONATATE 100 MG: 100 CAPSULE ORAL at 00:50

## 2017-10-13 RX ADMIN — OMEPRAZOLE 20 MG: 20 CAPSULE, DELAYED RELEASE ORAL at 00:37

## 2017-10-13 RX ADMIN — VALACYCLOVIR HYDROCHLORIDE 500 MG: 500 TABLET, FILM COATED ORAL at 08:35

## 2017-10-13 RX ADMIN — MISOPROSTOL 25 MCG: 100 TABLET ORAL at 13:21

## 2017-10-13 RX ADMIN — MISOPROSTOL 25 MCG: 100 TABLET ORAL at 15:28

## 2017-10-13 RX ADMIN — GUAIFENESIN AND DEXTROMETHORPHAN 10 ML: 100; 10 SYRUP ORAL at 00:26

## 2017-10-13 RX ADMIN — IPRATROPIUM BROMIDE AND ALBUTEROL SULFATE 3 ML: .5; 3 SOLUTION RESPIRATORY (INHALATION) at 10:33

## 2017-10-13 RX ADMIN — GUAIFENESIN AND DEXTROMETHORPHAN 10 ML: 100; 10 SYRUP ORAL at 08:32

## 2017-10-13 RX ADMIN — PREDNISONE 60 MG: 20 TABLET ORAL at 08:34

## 2017-10-13 NOTE — DISCHARGE SUMMARY
DC NOTE:    (S)  Feels better.  Minimal cough.  Neb did not seem to help though.  Mild cramping after 3 doses of Cytotec.    (O)  Vitals:    10/13/17 0030 10/13/17 0418 10/13/17 0800 10/13/17 1500   BP: 130/60 121/71 127/73 123/69   Pulse:       Resp: 22 16 18 16   Temp: 99.1  F (37.3  C) 98  F (36.7  C) 98  F (36.7  C) 98  F (36.7  C)   TempSrc: Oral Oral Oral Oral   SpO2: 97% 96% 97% 97%   Height:             Fetal Heart Rate Tracing: reactive and reassuring  Cat:1          Tocometer: external monitor and no contractions  BPP 8/8 earlier today  BS good today    (A/P)  Cervical ripening  URI/paroxyxmal cough-improving on current tx.      Plan DC home tonight and pt retun in AM for induction.  Erx Robitussin and Prilosec.  Precautions discussed.

## 2017-10-13 NOTE — PLAN OF CARE
OB Triage Note  Lorena Echevarria  MRN: [id number]  Gestational Age: 39w5d      Lorena Echevarria presents for scheduled cervical ripening prior to induction  Denies contractions.  Denies pain except when coughing.  Bleeding:not present.  Denies LOF.  Dr. Severino notified of arrival and condition.  Oriented patient to surroundings. Call light within reach.     FHT: 150's moderate variability  NST Start Time:   NST Stop Time:   NST: Reactive.  Uterine Assessment:Contractions: none.  Abdomen soft to palpation    Plan:  -Initial NST, then fetal/uterine monitoring per MD/patient plan.  -Sterile vaginal exam/sterile speculum exam.  Dr. Severino called for orders for scheduled cervical ripening.  -Nursing education on cervical ripening provided.

## 2017-10-13 NOTE — PLAN OF CARE
FHT's 150's moderate variability. No decelerations presents. Mild irritability noted on toco, pt coughing a lot. Pt. Denies cramping or contractions. Cervidil in place. Complaining of anxiety and wanting medication for it. Dr. Severino notified. 100 mg of po vistaril given at this time per order for anxiety. Will continue to assess patient.

## 2017-10-13 NOTE — PLAN OF CARE
Pt was seen by hospitalist Dr. Lou. New orders obtained. Ordered robitussin DM given for cough and tessalon pearls given. Ordered prilosec given.

## 2017-10-13 NOTE — PLAN OF CARE
"Pt. Tearful and miserable with her cough. 1 percocet given for coughing discomfort per order from Dr. Severino. Some urinary incontinence noted with coughing. Coughing up clear mucous and small clear emesis. Pt. Reports being miserable with this cough for 2 weeks. Lying on left side in bed. Significant other rubbing her back. Pt. States \"I just want to pull this thing out\". Instructed patient to leave it in so it can soften her cervix. Pt. Agreeable at this time to leave it in. Will continue to assess.  "

## 2017-10-13 NOTE — CONSULTS
[facility]    Hospitalist Consultation    Date of Admission:  10/12/2017  Date of Consult (When I saw the patient): 10/13/17    Assessment & Plan   Lorena Echevarria is a 22 year old female who was admitted on 10/12/2017. I was asked to see the patient for evaluation of cough ongoing for 2-3 weeks.    1. Subacute cough; likely URI/post infectious cough; ddx includes undiagnosed GERD or possibly asthma; vs bronchitis vs post nasal drip.    -tessalon perles prn   -continue robitussin   -will check procalcitonin, CBC and BMP in AM   -consider CXR PA and Lateral in AM if ok with OBGYN service   -trial of PPI; one time dose of prilosec given    -if cough continues can consider initiation of prednisone if ok with OBGYN service.         Karl Lou    Reason for Consult   Reason for consult: cough    Primary Care Physician   Physician No Ref-Primary    Chief Complaint   cough    History is obtained from the patient    History of Present Illness   Lorena Echevarria is a 22 year old female who presents with evaluation of cough. She was seen in on 10/4 for evaluation of cough. The cough is productive of clear phlegm. She also has had nausea and vomiting. She denies history of asthma or GERD. She was diagnosed with URI and given zpack. She denies fevers. She has tried robitussin with minimal improvement.     Past Medical History    I have reviewed this patient's medical history and updated it with pertinent information if needed.   Past Medical History:   Diagnosis Date     Anxiety      Autism spectrum disorder      Depressed      LGSIL on Pap smear of cervix 03/30/2017     Mild intellectual disabilities      Schizoaffective disorder (H)        Past Surgical History   I have reviewed this patient's surgical history and updated it with pertinent information if needed.  History reviewed. No pertinent surgical history.    Prior to Admission Medications   Prior to Admission Medications   Prescriptions Last Dose  Informant Patient Reported? Taking?   Prenatal MV-Min-FA-Omega-3 (PRENATAL GUMMIES/DHA & FA) 0.4-32.5 MG CHEW 10/12/2017 at 1030  No Yes   Sig: Take 1 chew tab by mouth daily   blood glucose monitoring (GABE CONTOUR NEXT) test strip 10/11/2017 at Unknown time  No Yes   Sig: Use to test blood sugar 4 times daily or as directed.   blood glucose monitoring (GABE MICROLET) lancets 10/11/2017 at Unknown time  No Yes   Sig: Use to test blood sugar 4 times daily or as directed.   blood glucose monitoring (ONE TOUCH ULTRA) test strip 10/11/2017 at Unknown time  No Yes   Sig: Use to test blood sugar 4 times daily.   buPROPion (WELLBUTRIN) 75 MG tablet 10/12/2017 at 1030t  No Yes   Sig: Take 2 tablets (150 mg) by mouth daily   cyclobenzaprine (FLEXERIL) 10 MG tablet 10/12/2017 at 1030  No Yes   Sig: Take 0.5-1 tablets (5-10 mg) by mouth 3 times daily as needed for muscle spasms   insulin isophane human (HUMULIN N PEN) 100 UNIT/ML injection 10/11/2017 at 2100  No Yes   Sig: Inject 10 Units Subcutaneous At Bedtime   Patient taking differently: Inject 15 Units Subcutaneous At Bedtime    oxyCODONE-acetaminophen (PERCOCET) 5-325 MG per tablet 10/12/2017 at 1030  No Yes   Sig: Take 1-2 tablets by mouth every 6 hours as needed for moderate to severe pain (every 4-6 hrs prn)   valACYclovir (VALTREX) 500 MG tablet 10/12/2017 at 1030  No Yes   Sig: Take 1 tablet (500 mg) by mouth daily      Facility-Administered Medications: None     Allergies   Allergies   Allergen Reactions     Lamictal [Lamotrigine] Other (See Comments)     Increases aggression.     Amoxicillin Rash     Zoloft [Sertraline] Other (See Comments)     Depression worsens       Social History   I have reviewed this patient's social history and updated it with pertinent information if needed. Lorena DANIELLE Echevarria  reports that she has quit smoking. She has never used smokeless tobacco. She reports that she does not drink alcohol or use illicit drugs.    Family History   I  have reviewed this patient's family history and updated it with pertinent information if needed.   No family history on file.    Review of Systems   10 point ROS is negative except as noted in hpi    Physical Exam   Temp: 98.9  F (37.2  C) Temp src: Oral BP: 127/73 Pulse: 86   Resp: 16        Vital Signs with Ranges  Temp:  [98.9  F (37.2  C)] 98.9  F (37.2  C)  Pulse:  [86] 86  Resp:  [16] 16  BP: (127)/(73) 127/73  0 lbs 0 oz    Constitutional: appears uncomfortable  Eyes: EOMI; ecchymosis around orbits; subconjunctival hemorrhage  HEENT: NCAT  Respiratory: CTAB  Cardiovascular: RRR. Normal s1 s2  GI: soft, nt, nd  Skin: warm, dry  Musculoskeletal: age appropriate muscle mass  Psychiatric: appropriate affect    Data   -Data reviewed today: All pertinent laboratory and imaging results from this encounter were reviewed. I personally reviewed today's labs    Recent Labs  Lab 10/12/17  2034   WBC 8.5   HGB 12.1   MCV 92      CR 0.63   AST 22       No results found for this or any previous visit (from the past 24 hour(s)).        Karl Lou MD

## 2017-10-13 NOTE — PROGRESS NOTES
[facility]    Hospitalist Progress Note    Date of Service (when I saw the patient): 10/13/2017    Assessment & Plan   Lorena Echevarria is a 22 year old  woman who was admitted on 10/12/2017. Interval history is significant for severe paroxysmal cough.  In the context of several recent emergency department visits. We will she has no chest discomfort currently.  She did during one of these visits have some pleuritic pain. No fevers.  Cough is is without particular exacerbating factors, although perhaps worse for her at night.  Minimally productive of clear secretions.  No symptoms suggesting gastroesophageal reflux. Mother has asthma, but she has never had breathing difficulties.  No dyspnea or cough in association with exercise, animal dander, dust, perfume or irritant inhalation, no symptoms in response to seasonal changes.  During her  Prior emergency department visits.  She was prescribed azithromycin without significant change in her symptoms.  She notes perhaps a minimal decrease with addition of Tessalon Perles. To Guaifenesin.    1.  Acute paroxysmal cough.  Suspect this is a viral process.  Indeed recently have observed several patients with marked airway reactivity  With leading diagnostic considerations acute upper respiratory infection, likely viral. She has not had any response to azithromycin, which in addition to antibiotic has some although very modest anti-inflammatory processes.  Examination does show diffuse sonorous rhonchi as well as some wheezing.  Trial of bronchodilators.  Agree that prednisone as an anti-inflammatory is quite reasonable.  If she shows no response to this.  Other admittedly anecdotal treatment with little evidence base in the context of asthmatic airway reactivity would include nebulized lidocaine (100 mg-2.5 mL 4 percent) , or nebulized heparin (1000 units/Kg or 120 000 units) have been used with variable responses.  We will add these.  If she continues to have  cough.  Her cough is particularly problematic as it is ccomplicating efforts at inducing labor.  Other considerations include gastroesophageal reflux.  Certainly not uncommon particularly later phases of pregnancy.  Proton pump inhibitors already be done.  Reasonable to continue this.  After delivery reevaluation of a cough.  We will allow plans for any ongoing treatment.  However, it is quite likely that after delivery, the symptom well improve.    For other issues, including peripartum management and gestational diabetes to Dr. Owens and obstetrics team.    Active Problems:    Encounter for triage in pregnant patient    DVT Prophylaxis: Defer to primary service  Code Status: Prior      Marcalexi Dumont    Interval History   She notes some improvement in cough, although it still present.  No chest discomfort.  No dyspnea.    -Data reviewed today: I reviewed all new labs and imaging results over the last 24 hours. I personally reviewed chest radiograph.  No focal airspace infiltrate.  No hyperinflation, although lung volumes may be somewhat greater than expected given pregnancy.  No effusion.  Heart size within expected limits.    Peripheral IV 10/12/17 Right Hand (Active)   Site Assessment [facility] 10/13/2017  8:00 AM   Line Status Saline locked 10/13/2017  8:00 AM   Phlebitis Scale 0-->no symptoms 10/13/2017  8:00 AM   Infiltration Scale 0 10/13/2017  8:00 AM   Number of days:1         Physical Exam   Temp: 98  F (36.7  C) Temp src: Oral BP: 127/73 Pulse: 86 Heart Rate: 93 Resp: 18 SpO2: 97 %      There were no vitals filed for this visit.  Vital Signs with Ranges  Temp:  [98  F (36.7  C)-99.1  F (37.3  C)] 98  F (36.7  C)  Pulse:  [86] 86  Heart Rate:  [] 93  Resp:  [16-22] 18  BP: (121-130)/(60-73) 127/73  SpO2:  [96 %-97 %] 97 %       Peripheral IV 10/12/17 Right Hand (Active)   Site Assessment [facility] 10/13/2017  8:00 AM   Line Status Saline locked 10/13/2017  8:00 AM   Phlebitis Scale 0-->no symptoms  10/13/2017  8:00 AM   Infiltration Scale 0 10/13/2017  8:00 AM   Number of days:1     Line/device assessment(s) completed for medical necessity    Awake, alert, mildly fatigued woman sitting on bed  On obstructive unit.  HEENT: Pupils equal, conjugate. Mild periorbital ecchymoses. No icterus or nystagmus. Oral mucosa moist. No cobbling. No facial asymmetry.   Neck: Supple, jugular veins not clearly elevated, difficult to determine with precision. Trachea midline   Chest: No chest wall movement asymmetry. Aeration mildly diminished at bases. Accessory muscles not in use. Expiratory time not increased. Soft abuse tidal wheezes. Diffuse sonorous rhonchi. No discrete crackles.   Cardiac: PMI not displaced. S1, S2 unremarkable. No S3, S4. P2 not accentuated. No murmurs. Carotid upstroke preserved. Carotid amplitude preserved.   Abdomen: Soft. Gravid uterus. No palpation or percussion tenderness. No distention. Normoactive bowel sounds. Liver and spleen not increased in size. No bruits, masses, or pulsations.   Extremities: No significant lower extremity edema. Extremities warm distally. No eccymoses, clubbing.   Neurologic: Mental state above. Motor 5/5 and bilaterally equal. Tone preserved. DTR 2/4 and bilaterally equal.     Medications        omeprazole  40 mg Oral QAM     predniSONE  60 mg Oral Daily     ipratropium - albuterol 0.5 mg/2.5 mg/3 mL  3 mL Nebulization Once     buPROPion  150 mg Oral Daily     valACYclovir  500 mg Oral Daily     sodium chloride (PF)  3 mL Intracatheter Q8H     insulin NPH  15 Units Subcutaneous At Bedtime     guaiFENesin-dextromethorphan               Data     Recent Labs  Lab 10/12/17  2034   WBC 8.5   HGB 12.1   MCV 92      CR 0.63   AST 22            Recent Results (from the past 24 hour(s))   US Fetal Biophys Prof w/o Non Stress Test    Narrative    OB ULTRASOUND REPORT     Clinical:  Gestational diabetes  Gestation Number:  1  Presentation:    Cephalic  Lie:     Longitudinal  Cardiac Activity:   Regular  BPM:  143  Movement:  Yes  Placenta:   Anterior  Previa:  No Previa  Adnexa:    Not Visualized  Cervix:    Not assessed. cm in length  Amniotic Fluid:    Normal  PEGGY:  9.23 cm    Measurements:    BPD       HC       AC       FL       Estimated Fetal Weight      grams  HC/AC       US age (Current US)       Gestational Age by LMP       US EDC (First Study)    US EDC (Current Study)                %WT for EGA  (Based on First Study)      %      Structural Survey:    Head  Not assessed  Spine  Not assessed  Stomach  Unremarkable  Kidneys  Unremarkable  Bladder  Unremarkable  3 Vessel Cord  Not assessed  Cord Insertion  Not assessed  4 Chamber Heart  Not assessed  Ant. Abd Wall  Not assessed  Diaphragm  Not assessed  Situs  Not assessed    Biophysical profile score:  Fetal Movement:  Score 2: At least 3 discrete body/limb movements in 30 minutes  Score 0: Up to 2 episodes of limb/body movements in 30 minutes                    FM = 2    Fetal Breathing movements:  Score 2: At least one episode, at least 30 seconds duration in 30  minutes of observation.  Score 0: Absent or no episodes of greater than 30 seconds    duration in 30 minutes observation.                    FBM = 2    Fetal Tone:  Score 2: At least one episode of active extension with return to     flexion of fetal limbs or trunk, opening and closing of     hands considered normal tone.  Score 0: Absent or no episodes in 30 minutes of observation.                    FT = 2    Amniotic Fluid Volume:  Score 2: At least one pocket of amniotic fluid measuring at least    1 cm in two perpendicular planes.  Score 0: Either no amniotic fluid or a pocket less than 1 cm in    two perpendicular planes.                    AF = 2                        TOTAL = 8/8    PEGGY: 9.23 cm    HRT Rate: 142.5 bpm    Placenta Location: Anterior        Impression    Impression: Single living intrauterine pregnancy with a biophysical  profile  score of 8/8.        SG MENDOZA MD

## 2017-10-13 NOTE — PLAN OF CARE
"Pt. States \"I'm feeling ok\". Reports occasional small cramps. Sitting up at the bedside. Coughing a little less. Still unable to sleep well. FHT's 140's-150's moderate variability.  Declines needs at this time.  "

## 2017-10-13 NOTE — PLAN OF CARE
Pt. Is coughing less often now. Sleeping intermittently sitting upright in the bed. Will continue to assess.

## 2017-10-13 NOTE — PLAN OF CARE
Pt. Doing well. Intermittent harsh cough. NSL IV in place. Reactive NST. SVE closed, thick and cephalic. Cervidil placed at 2048. Pt. Uncomfortable with vaginal exams and placement of cervidil. Was difficult to place. Education provided regarding cervical ripening and fetal and uterine monitoring. Continuous fetal monitoring after cervidil was placed.

## 2017-10-13 NOTE — PLAN OF CARE
Face to face report given with opportunity to observe patient.    Report given to Pippa Meléndez   10/13/2017  8:05 AM

## 2017-10-13 NOTE — PLAN OF CARE
Pt. Calmer. Still having difficulty sleeping d/t frequent cough. Robitussin DM given per order for cough. Continues to cough up clear sputum. AVSS. EFM shows mild irritability and FHT's 150's with moderate variability. Will continue to assess.

## 2017-10-13 NOTE — PLAN OF CARE
Lorena Echevarria    NST:  reactive  Start: 1924  Stop:1957  Reactive    Physician: Dr. Severino  Reason For Test: Gestational Diabetes on Insulin. NST prior to cervical ripening  EDC:10-14-17  Gestational Age: 39 5/7      Annamarie Meléndez

## 2017-10-13 NOTE — PLAN OF CARE
Dr. Severino notified again of patient's persistent harsh cough. Orders obtained to remove cervidil and not to proceed with cervical ripening and induction of labor at this time. Cervidil removed at this time. Plan for hospitalist to see patient.

## 2017-10-13 NOTE — PLAN OF CARE
Problem: Patient Care Overview  Goal: Plan of Care/Patient Progress Review  Outcome: Improving  VSS. Pt started on prednisone and nebulizer tx this am. PRN robitussin DM given X 1 with relief. Pt states is feeling much better. Coughing infrequently and no gaging or vomiting. Pt receiving cytotec for cervical ripening Q2 hrs, and tolerating well. Pt reports some cramping. BG 84 and 107. Pt up ambulating in hallways. Good appetite and fluid intake. +3 edema to LE bilat. Significant other and family members at bedside throughout shift.

## 2017-10-13 NOTE — DISCHARGE INSTRUCTIONS
Discharge Instructions for Undelivered Patients    Diet:  * Drink 8 to 12 glasses of liquids (milk, juice, water) every day  * You may eat meals and snacks.    Activity:  * Count fetal kicks every day.  * Call your doctor if your baby is moving less than usual.    Call your provider if you notice:  * Swelling in your face or increased swelling in your hands or legs.  * Headaches that are not relieved by Tylenol (acetaminophen).  * Changes in your vision (blurring; seeing spots or stars).  * Nausea (sick to your stomach) and vomiting (throwing up).  * Weight gain of 5 pounds per week.  * Heartburn that doesn't go away.  * Signs of bladder infection: Pain when you urinate (use the toilet), needing to go more often or more urgently.  * The bag of lockett (membrane) breaks, or you notice leaking in your underwear.  * Bright red blood in your underwear.  * Abdominal (lower belly) or stomach pain.  * For first baby: Contractions (tightenings) less than 5 minutes apart for one hour or more.  * Increase or change in vaginal discharge (note the color and amount).        [facility]: [phone]    Rest at home and return tomorrow at 8am.

## 2017-10-13 NOTE — PHARMACY
[facility]    Pharmacy      Antimicrobial Stewardship Note     Current antimicrobial therapy:  Anti-infectives (Future)    Start     Dose/Rate Route Frequency Ordered Stop    10/13/17 0900  valACYclovir (VALTREX) tablet 500 mg      500 mg Oral DAILY 10/12/17 2006            Indication: Prophylaxis of herpes simplex.     Pertinent labs:  Creatinine   Creatinine   Date Value Ref [facility] Status   10/12/2017 0.63 0.52 - 1.04 mg/dL Final   10/02/2017 0.61 0.52 - 1.04 mg/dL Final   09/27/2017 0.57 0.52 - 1.04 mg/dL Final     WBC   WBC   Date Value Ref Range Status   10/12/2017 8.5 4.0 - 11.0 10e9/L Final   10/02/2017 12.0 (H) 4.0 - 11.0 10e9/L Final   09/27/2017 10.3 4.0 - 11.0 10e9/L Final     ANC No components found for: ANC     Culture Results:     Rupture of membranes by Amnisure [[id number]]        Order Status: No result Specimen: Cervicovaginal Secretions                 Recommendations/Interventions:  1. No recommendations at this time; current antimicrobial pharmacotherapy is appropriate.    Jay Jay Dove [facility]  October 13, 2017

## 2017-10-13 NOTE — PROGRESS NOTES
"(S)  Cough improved, feeling better after prednisone.  Nebulizer treatment ordered.   (O)  Vitals:    10/12/17 1922 10/13/17 0030 10/13/17 0418 10/13/17 0800   BP: 127/73 130/60 121/71 127/73   Pulse: 86      Resp: 16 22 16 18   Temp: 98.9  F (37.2  C) 99.1  F (37.3  C) 98  F (36.7  C) 98  F (36.7  C)   TempSrc: Oral Oral Oral Oral   SpO2:  97% 96% 97%   Height: 1.676 m (5' 6\")        BPP 8/8.  PEGGY 9.   NST reactive.      Tocometer: no contractions    (A/P)  GDM:  Reassuring fetal assessment.  Unfavorable cervix.    Patient is aware of the risks of an induction including medications used, risk, benefits and alternatives.  She is willing to take these risks and would like to proceed with cervical ripening/IOLwith cytotec per protocol.  Appreciate hospitalist assistance regarding uri/cough/bronchospasm. Following recommendations with clinical improvement.     "

## 2017-10-13 NOTE — PROGRESS NOTES
Cough appears to be improved, likely an effect of prednisone.  Markedly decreased rhonchi and wheezes.  Few coarse rhonchi and upper airway.  Most suspicious of a acute infectious bronchitis, likely viral.  Would continue present management including oral prednisone until after delivery.  At that time, prednisone can be discontinued or decreased on a rapid taper. Continuing short acting bronchodilators on an as-needed basis to every 4 hours.  Would also not be unreasonable. Not unlikely that cough will resolve after delivery.      If she continues to have cough, would add addition of lidocaine via nebulizer up to every 6 hours; ordered as prn

## 2017-10-13 NOTE — PLAN OF CARE
"Lorena Echevarria is a 22 year old  and 39w6d patient came in for cervical ripening.  Patient Active Problem List   Diagnosis     Suicidal ideation     Mild intellectual disability     Autism spectrum     Anxiety     Mood disorder (H)     Suicidal thoughts     Left Ankle Pain     Irregular menstrual cycle (PERIODS)     Depression     Elevated serum cholesterol     Obesity     Sinus tachycardia     Galactorrhea of both breasts     High-risk first pregnancy of young woman, third trimester     Recurrent HSV (herpes simplex virus)     Gestational diabetes mellitus (GDM) in third trimester controlled on oral hypoglycemic drug     Papanicolaou smear of cervix with low grade squamous intraepithelial lesion (LGSIL)     Encounter for triage in pregnant patient     GDM (gestational diabetes mellitus)       Pt discharged to home at 5:40 PM and encouraged to rest and drink plenty of fluids.  Pt told to call/return if bleeding more than spotting, water breaks, contractions 3-5 minutes apart that she has to breath through.     Nursing education on s/s of labor provided. Self-management instructions reviewed. New medications and therapies reviewed. AVS given and signed. All questions answered and patient verbalizes understanding.    /69  Pulse 86  Temp 98  F (36.7  C) (Oral)  Resp 16  Ht 1.676 m (5' 6\")  SpO2 97%    Cervical status: closed  Fetal Assessment: Reactive    Discharge support:  Family/Friend Support    Obstetric History       T0      L0     SAB0   TAB0   Ectopic0   Multiple0   Live Births0       # Outcome Date GA Lbr López/2nd Weight Sex Delivery Anes PTL Lv   1 Current                   Gail Coyle RN     "

## 2017-10-14 ENCOUNTER — HOSPITAL ENCOUNTER (INPATIENT)
Facility: HOSPITAL | Age: 22
LOS: 3 days | Discharge: HOME | End: 2017-10-20
Attending: OBSTETRICS & GYNECOLOGY | Admitting: OBSTETRICS & GYNECOLOGY
Payer: MEDICAID

## 2017-10-14 LAB
ABO + RH BLD: NORMAL
ABO + RH BLD: NORMAL
BASOPHILS # BLD AUTO: 0 10E9/L (ref 0–0.2)
BASOPHILS NFR BLD AUTO: 0.2 %
BLD GP AB SCN SERPL QL: NORMAL
BLOOD BANK CMNT PATIENT-IMP: NORMAL
DIFFERENTIAL METHOD BLD: NORMAL
EOSINOPHIL # BLD AUTO: 0.1 10E9/L (ref 0–0.7)
EOSINOPHIL NFR BLD AUTO: 0.6 %
ERYTHROCYTE [DISTWIDTH] IN BLOOD BY AUTOMATED COUNT: 13.4 % (ref 10–15)
GLUCOSE BLDC GLUCOMTR-MCNC: 83 MG/DL (ref 70–99)
HCT VFR BLD AUTO: 35.8 % (ref 35–47)
HGB BLD-MCNC: 11.7 G/DL (ref 11.7–15.7)
IMM GRANULOCYTES # BLD: 0 10E9/L (ref 0–0.4)
IMM GRANULOCYTES NFR BLD: 0.2 %
LYMPHOCYTES # BLD AUTO: 2.2 10E9/L (ref 0.8–5.3)
LYMPHOCYTES NFR BLD AUTO: 26.1 %
MCH RBC QN AUTO: 29.8 PG (ref 26.5–33)
MCHC RBC AUTO-ENTMCNC: 32.7 G/DL (ref 31.5–36.5)
MCV RBC AUTO: 91 FL (ref 78–100)
MONOCYTES # BLD AUTO: 0.7 10E9/L (ref 0–1.3)
MONOCYTES NFR BLD AUTO: 7.9 %
NEUTROPHILS # BLD AUTO: 5.4 10E9/L (ref 1.6–8.3)
NEUTROPHILS NFR BLD AUTO: 65 %
NRBC # BLD AUTO: 0 10*3/UL
NRBC BLD AUTO-RTO: 0 /100
PLATELET # BLD AUTO: 230 10E9/L (ref 150–450)
RBC # BLD AUTO: 3.92 10E12/L (ref 3.8–5.2)
SPECIMEN EXP DATE BLD: NORMAL
WBC # BLD AUTO: 8.3 10E9/L (ref 4–11)

## 2017-10-14 PROCEDURE — 25000125 ZZHC RX 250: Performed by: OBSTETRICS & GYNECOLOGY

## 2017-10-14 PROCEDURE — 36415 COLL VENOUS BLD VENIPUNCTURE: CPT | Performed by: OBSTETRICS & GYNECOLOGY

## 2017-10-14 PROCEDURE — S0191 MISOPROSTOL, ORAL, 200 MCG: HCPCS | Performed by: OBSTETRICS & GYNECOLOGY

## 2017-10-14 PROCEDURE — 86901 BLOOD TYPING SEROLOGIC RH(D): CPT | Performed by: OBSTETRICS & GYNECOLOGY

## 2017-10-14 PROCEDURE — 96372 THER/PROPH/DIAG INJ SC/IM: CPT

## 2017-10-14 PROCEDURE — 96374 THER/PROPH/DIAG INJ IV PUSH: CPT

## 2017-10-14 PROCEDURE — 86900 BLOOD TYPING SEROLOGIC ABO: CPT | Performed by: OBSTETRICS & GYNECOLOGY

## 2017-10-14 PROCEDURE — 40000275 ZZH STATISTIC RCP TIME EA 10 MIN

## 2017-10-14 PROCEDURE — 25000131 ZZH RX MED GY IP 250 OP 636 PS 637: Performed by: OBSTETRICS & GYNECOLOGY

## 2017-10-14 PROCEDURE — 86850 RBC ANTIBODY SCREEN: CPT | Performed by: OBSTETRICS & GYNECOLOGY

## 2017-10-14 PROCEDURE — 25000128 H RX IP 250 OP 636: Performed by: OBSTETRICS & GYNECOLOGY

## 2017-10-14 PROCEDURE — 82962 GLUCOSE BLOOD TEST: CPT

## 2017-10-14 PROCEDURE — 94640 AIRWAY INHALATION TREATMENT: CPT

## 2017-10-14 PROCEDURE — 85025 COMPLETE CBC W/AUTO DIFF WBC: CPT | Performed by: OBSTETRICS & GYNECOLOGY

## 2017-10-14 PROCEDURE — 25000132 ZZH RX MED GY IP 250 OP 250 PS 637: Performed by: OBSTETRICS & GYNECOLOGY

## 2017-10-14 RX ORDER — MISOPROSTOL 100 UG/1
25 TABLET ORAL
Status: DISCONTINUED | OUTPATIENT
Start: 2017-10-14 | End: 2017-10-15

## 2017-10-14 RX ORDER — LIDOCAINE 40 MG/G
CREAM TOPICAL
Status: DISCONTINUED | OUTPATIENT
Start: 2017-10-14 | End: 2017-10-18

## 2017-10-14 RX ORDER — ACETAMINOPHEN 325 MG/1
650 TABLET ORAL EVERY 4 HOURS PRN
Status: DISCONTINUED | OUTPATIENT
Start: 2017-10-14 | End: 2017-10-18

## 2017-10-14 RX ORDER — CARBOPROST TROMETHAMINE 250 UG/ML
250 INJECTION, SOLUTION INTRAMUSCULAR
Status: DISCONTINUED | OUTPATIENT
Start: 2017-10-14 | End: 2017-10-18

## 2017-10-14 RX ORDER — ALBUTEROL SULFATE 0.83 MG/ML
2.5 SOLUTION RESPIRATORY (INHALATION)
Status: DISCONTINUED | OUTPATIENT
Start: 2017-10-14 | End: 2017-10-20 | Stop reason: HOSPADM

## 2017-10-14 RX ORDER — BUPROPION HYDROCHLORIDE 75 MG/1
150 TABLET ORAL DAILY
Status: DISCONTINUED | OUTPATIENT
Start: 2017-10-14 | End: 2017-10-20 | Stop reason: HOSPADM

## 2017-10-14 RX ORDER — METHYLERGONOVINE MALEATE 0.2 MG/ML
200 INJECTION INTRAVENOUS
Status: COMPLETED | OUTPATIENT
Start: 2017-10-14 | End: 2017-10-18

## 2017-10-14 RX ORDER — NICOTINE POLACRILEX 4 MG
15-30 LOZENGE BUCCAL
Status: DISCONTINUED | OUTPATIENT
Start: 2017-10-14 | End: 2017-10-18

## 2017-10-14 RX ORDER — OXYTOCIN/0.9 % SODIUM CHLORIDE 30/500 ML
100-340 PLASTIC BAG, INJECTION (ML) INTRAVENOUS CONTINUOUS PRN
Status: COMPLETED | OUTPATIENT
Start: 2017-10-14 | End: 2017-10-18

## 2017-10-14 RX ORDER — OXYCODONE AND ACETAMINOPHEN 5; 325 MG/1; MG/1
1-2 TABLET ORAL EVERY 6 HOURS PRN
Status: DISCONTINUED | OUTPATIENT
Start: 2017-10-14 | End: 2017-10-18

## 2017-10-14 RX ORDER — ONDANSETRON 2 MG/ML
4 INJECTION INTRAMUSCULAR; INTRAVENOUS EVERY 6 HOURS PRN
Status: DISCONTINUED | OUTPATIENT
Start: 2017-10-14 | End: 2017-10-18

## 2017-10-14 RX ORDER — OXYTOCIN 10 [USP'U]/ML
10 INJECTION, SOLUTION INTRAMUSCULAR; INTRAVENOUS
Status: DISCONTINUED | OUTPATIENT
Start: 2017-10-14 | End: 2017-10-18

## 2017-10-14 RX ORDER — PREDNISONE 20 MG/1
60 TABLET ORAL DAILY
Status: DISCONTINUED | OUTPATIENT
Start: 2017-10-14 | End: 2017-10-18

## 2017-10-14 RX ORDER — FENTANYL CITRATE 50 UG/ML
50-100 INJECTION, SOLUTION INTRAMUSCULAR; INTRAVENOUS
Status: DISCONTINUED | OUTPATIENT
Start: 2017-10-14 | End: 2017-10-18

## 2017-10-14 RX ORDER — SODIUM CHLORIDE, SODIUM LACTATE, POTASSIUM CHLORIDE, CALCIUM CHLORIDE 600; 310; 30; 20 MG/100ML; MG/100ML; MG/100ML; MG/100ML
INJECTION, SOLUTION INTRAVENOUS CONTINUOUS
Status: DISCONTINUED | OUTPATIENT
Start: 2017-10-14 | End: 2017-10-18

## 2017-10-14 RX ORDER — NALOXONE HYDROCHLORIDE 0.4 MG/ML
.1-.4 INJECTION, SOLUTION INTRAMUSCULAR; INTRAVENOUS; SUBCUTANEOUS
Status: DISCONTINUED | OUTPATIENT
Start: 2017-10-14 | End: 2017-10-18

## 2017-10-14 RX ORDER — IBUPROFEN 800 MG/1
800 TABLET, FILM COATED ORAL
Status: DISCONTINUED | OUTPATIENT
Start: 2017-10-14 | End: 2017-10-18

## 2017-10-14 RX ORDER — VALACYCLOVIR HYDROCHLORIDE 500 MG/1
500 TABLET, FILM COATED ORAL DAILY
Status: DISCONTINUED | OUTPATIENT
Start: 2017-10-14 | End: 2017-10-20 | Stop reason: HOSPADM

## 2017-10-14 RX ORDER — CYCLOBENZAPRINE HCL 5 MG
5-10 TABLET ORAL 3 TIMES DAILY PRN
Status: DISCONTINUED | OUTPATIENT
Start: 2017-10-14 | End: 2017-10-18

## 2017-10-14 RX ORDER — GUAIFENESIN/DEXTROMETHORPHAN 100-10MG/5
10 SYRUP ORAL EVERY 4 HOURS PRN
Status: DISCONTINUED | OUTPATIENT
Start: 2017-10-14 | End: 2017-10-15

## 2017-10-14 RX ORDER — OXYCODONE AND ACETAMINOPHEN 5; 325 MG/1; MG/1
1 TABLET ORAL
Status: DISCONTINUED | OUTPATIENT
Start: 2017-10-14 | End: 2017-10-18

## 2017-10-14 RX ORDER — DEXTROSE MONOHYDRATE 25 G/50ML
25-50 INJECTION, SOLUTION INTRAVENOUS
Status: DISCONTINUED | OUTPATIENT
Start: 2017-10-14 | End: 2017-10-18

## 2017-10-14 RX ADMIN — ALBUTEROL SULFATE 2.5 MG: 2.5 SOLUTION RESPIRATORY (INHALATION) at 21:32

## 2017-10-14 RX ADMIN — SODIUM CHLORIDE, POTASSIUM CHLORIDE, SODIUM LACTATE AND CALCIUM CHLORIDE: 600; 310; 30; 20 INJECTION, SOLUTION INTRAVENOUS at 21:25

## 2017-10-14 RX ADMIN — PREDNISONE 60 MG: 20 TABLET ORAL at 21:28

## 2017-10-14 RX ADMIN — MISOPROSTOL 25 MCG: 100 TABLET ORAL at 21:38

## 2017-10-14 RX ADMIN — INSULIN HUMAN 15 UNITS: 100 INJECTION, SUSPENSION SUBCUTANEOUS at 22:29

## 2017-10-14 RX ADMIN — GUAIFENESIN AND DEXTROMETHORPHAN 10 ML: 100; 10 SYRUP ORAL at 21:28

## 2017-10-14 RX ADMIN — BUPROPION HYDROCHLORIDE 150 MG: 75 TABLET, FILM COATED ORAL at 21:29

## 2017-10-14 RX ADMIN — MISOPROSTOL 25 MCG: 100 TABLET ORAL at 23:44

## 2017-10-14 RX ADMIN — VALACYCLOVIR HYDROCHLORIDE 500 MG: 500 TABLET, FILM COATED ORAL at 21:28

## 2017-10-14 NOTE — IP AVS SNAPSHOT
MRN:[id number]                      After Visit Summary   10/14/2017    Lorena Echevarria    MRN: [id number]           Thank you!     Thank you for choosing [facility] for your care. Our goal is always to provide you with excellent care. Hearing back from our patients is one way we can continue to improve our services. Please take a few minutes to complete the written survey that you may receive in the mail after you visit with us. Thank you!        Patient Information     Date Of Birth          1995        About your hospital stay     You were admitted on:  October 14, 2017 You last received care in the:  [facility]    You were discharged on:  October 20, 2017       Who to Call     For medical emergencies, please call 911.  For non-urgent questions about your medical care, please call your primary care provider or clinic, None          Attending Provider     Provider Specialty    Fidel Severino MD OB/Gyn       Primary Care Provider    Physician No Ref-Primary      Your next 10 appointments already scheduled     Nov 06, 2017 10:30 AM CST   (Arrive by 10:15 AM)   Office Visit with SAGAR Bolton   [facility] ([facility] )    [address] [place] [address]   [phone]              Further instructions from your care team         After a Vaginal Birth  After having a baby, your body may be very tired. It can take time to recover from a vaginal delivery. You may stay in the hospital or birth center from 1 to 4 days. In some cases, you may be able to go home the same day.    Right after the delivery  Your temperature and blood pressure will be taken until they are stable. A nurse or other healthcare provider will observe you as you rest. You may have afterbirth pains. These are cramps caused by the uterus shrinking. Sanitary pads are used to soak up the discharge of the uterine lining. To make sure that you aren t bleeding too much, the pad  will be checked. And the firmness of your uterus will be checked. To do this, a nurse will gently push down on your stomach. If you had anesthesia, you ll be watched closely until you can feel and move your toes. If you have perineal pain (pain between the vagina and anus), an ice pack can help.  [place] care  While still in the hospital or birth center, you ll learn how to hold and feed your baby. You will also be given instructions on how to care for your baby. This includes bathing and feeding.   Preparing to go home  You may be anxious to go home as soon as possible. Before you and your baby go home, a healthcare provider will check to be sure you are healthy enough to take care of your baby and yourself. You re ready to go home when:    You can walk to the bathroom and use the bathroom without help.    You can eat solid food and swallow pills (if needed).    You have no sign of infection or other health problems, including fever.     You have adequate pain control.    Your bleeding isn't excessive.    You are able to care for your  and are emotionally stable.  Before leaving the hospital or birth center, you ll be given written instructions for home self-care after vaginal delivery. Be sure to follow these instructions carefully. If you have questions or concerns, talk about them now.  If you have stitches  You may have received stitches in the skin near your vagina. The stitches might have closed an episiotomy (an incision that enlarges the opening of the vagina). Or you may have needed stitches to repair torn skin. Either way, your stitches should dissolve within weeks. Until then, you can help reduce discomfort, aid healing, and reduce your risk of infection by keeping the stitches clean. These tips can help:    Gently wipe from front to back after you urinate or have a bowel movement.    After wiping, spray warm water on the area. Or you can have a sitz bath. This means sitting in a tub with a few  inches of water in it. Then pat the area dry or use a hairdryer on a cool setting.    Do not use soap or any solution except water on the area.    You can take a shower unless told not to.    Change sanitary pads at least every 2 to 4 hours.    Place cold or heat packs on the area as directed by your healthcare providers or nurses. Keep a thin towel between the pack and your skin.    Sit on firm seats so the stitches pull less.   follow-up  Schedule a  follow-up exam with your healthcare provider for about 6 weeks after delivery. During this exam, your uterus and vaginal area will be checked. Contact your healthcare provider if you think you or your baby are having any problems.  When to call your healthcare provider  Call your health care provider right away if you have:    A fever of 100.4 F (38.0 C) or higher    Bleeding that requires a new sanitary pad after an hour, or large blood clots    Pain in your vagina that gets worse and isn't relieved with medicine    Swelling, discharge, or increased pain from vaginal tear or episiotomy    Burning, pain, red streaks, or lumpy areas in your breasts that may be accompanied by flu-like symptoms    Cracks, blisters, or blood on your nipples    Burning or pain when you urinate    Nausea or vomiting    Dizziness or fainting    Feelings of extreme sadness or anxiety, or a feeling that you don t want to be with your baby    Abdominal pain that isn t relieved with medicine    Vaginal discharge that has a bad odor    No bowel movement for 5 days    Painful urination, orinability to control urination    Redness, warmth, or pain in the lower leg    Chest pain   Date Last Reviewed: 2015-2017 [employer]. [address] [place] [id number]. All rights reserved. This information is not intended as a substitute for professional medical care. Always follow your healthcare professional's instructions.        BREASTFEEDING TIPS  1.  "Breastfeed every 2-4 hours. If your baby is sleepy - use breast compression, push on chin to \"start up\" baby, switch breasts, undress to diaper and wake before relatching.   Some babies \"cluster\" feed every 1 hour for a while- this is normal. Feed your baby whenever he/she is awake-  even if every hour for a while. This frequent feeding will help you make more milk and encourage your baby to sleep for longer stretches later in the evening or night.    - Position your baby close to you with pillows so he/she is facing you -belly to belly laying horizontally across your lap at the level of your breast and looking a bit \"upwards\" to your breast   -One hand holds the baby's neck behind the ears and the other hand holds your breast  -Baby's nose should start out pointing to your nipple before latching  - Hold your breast in a \"sandwich\" position by gently squeezing your breast in an oval shape and make sure your hands are not covering the areola  This \"nipple sandwich\" will make it easier for your breast to fit inside the baby's mouth-making latching more comfortable for you and baby and preventing sore nipples. Your baby should take a \"mouthful\" of breast!  - You may want to use hand expression to \"prime the pump\" and get a drip of milk out on your nipple to wake baby   (see website: newborns.[employer].edu/Breastfeeding/HandExpression.html)  - Swipe your nipple on baby's upper lip and wait for a BIG open mouth  - YOU bring baby to the breast (hold baby's neck with your fingers just below the ears) and bring baby's head to the breast--leading with the chin.  Try to avoid pushing your breast into baby's mouth- bring baby to you instead!  - Aim to get your baby's bottom lip LOW DOWN ON AREOLA (baby's upper lip just needs to \"clear\" the nipple) .   Your baby should latch onto the areola and NOT just the nipple. That way your baby gets more milk and you don't get sore nipples!      Useful web " "sites:  Www.infantrisk.com  Www.aap.org  Www.ibreastfeeding.com  Www.health.[facility].mn.    No driving while taking narcotics.  No tampons or intercourse until after 6 week postpartum exam.  See Mony Fernández NP in 2 weeks and Mony or Dr. Severino in 6 weeks.     Pending Results     No orders found from 10/12/2017 to 10/15/2017.            Statement of Approval     Ordered          10/20/17 0749  I have reviewed and agree with all the recommendations and orders detailed in this document.  EFFECTIVE NOW     Approved and electronically signed by:  Mony Fernández NP             Admission Information     Date & Time Provider Department Dept. Phone    10/14/2017 Fidel Severino MD [facility] [phone]      Your Vitals Were     Blood Pressure Pulse Temperature Respirations Pulse Oximetry       119/75 92 98.7  F (37.1  C) (Oral) 16 98%       MyChart Information     [employer] lets you send messages to your doctor, view your test results, renew your prescriptions, schedule appointments and more. To sign up, go to [email or web address] . Click on \"Log in\" on the left side of the screen, which will take you to the Welcome page. Then click on \"Sign up Now\" on the right side of the page.     You will be asked to enter the access code listed below, as well as some personal information. Please follow the directions to create your username and password.     Your access code is: [email or web address]  Expires: 2017 11:23 AM     Your access code will  in 90 days. If you need help or a new code, please call your [facility] clinic or [phone].        Care EveryWhere ID     This is your Care EveryWhere ID. This could be used by other organizations to access your [facility] [place] medical records  [id number]        Equal Access to Services     EDMOND QUIÑONES : [place], richelle alcala, mayito zavala. So [facility] [phone].    ATENCIÓN: Si tung payton " hein disposición servicios gratuitos de asistencia lingüística. Huyen rosa [phone].    We comply with applicable federal civil rights laws and [place] laws. We do not discriminate on the basis of race, color, national origin, age, disability, sex, sexual orientation, or gender identity.               Review of your medicines      CONTINUE these medicines which have NOT CHANGED        Dose / Directions    blood glucose monitoring lancets   Used for:  Insulin controlled gestational diabetes mellitus (GDM) in third trimester        Use to test blood sugar 4 times daily or as directed.   Quantity:  100 each   Refills:  1       * blood glucose monitoring test strip   Commonly known as:  ONE TOUCH ULTRA   Used for:  Gestational diabetes mellitus (GDM) in third trimester controlled on oral hypoglycemic drug        Use to test blood sugar 4 times daily.   Quantity:  100 each   Refills:  5       * blood glucose monitoring test strip   Commonly known as:  GABE CONTOUR NEXT   Used for:  Insulin controlled gestational diabetes mellitus (GDM) in third trimester        Use to test blood sugar 4 times daily or as directed.   Quantity:  200 each   Refills:  4       buPROPion 75 MG tablet   Commonly known as:  WELLBUTRIN   Used for:  Adjustment disorder with depressed mood        Dose:  150 mg   Take 2 tablets (150 mg) by mouth daily   Quantity:  90 tablet   Refills:  0       cyclobenzaprine 10 MG tablet   Commonly known as:  FLEXERIL   Used for:  Acute right-sided low back pain without sciatica        Dose:  5-10 mg   Take 0.5-1 tablets (5-10 mg) by mouth 3 times daily as needed for muscle spasms   Quantity:  30 tablet   Refills:  1       guaiFENesin-dextromethorphan 100-10 MG/5ML syrup   Commonly known as:  ROBITUSSIN DM   Used for:  Encounter for triage in pregnant patient        Dose:  10 mL   Take 10 mLs by mouth every 4 hours as needed for cough   Quantity:  560 mL   Refills:  0       omeprazole 40 MG capsule   Commonly  known as:  priLOSEC   Used for:  Encounter for triage in pregnant patient        Dose:  40 mg   Take 1 capsule (40 mg) by mouth every morning   Quantity:  30 capsule   Refills:  0       oxyCODONE-acetaminophen 5-325 MG per tablet   Commonly known as:  PERCOCET   Used for:  Acute right-sided low back pain without sciatica        Dose:  1-2 tablet   Take 1-2 tablets by mouth every 6 hours as needed for moderate to severe pain (every 4-6 hrs prn)   Quantity:  30 tablet   Refills:  0       PRENATAL GUMMIES/DHA & FA 0.4-32.5 MG Chew   Used for:  High-risk first pregnancy of young woman, third trimester        Dose:  1 chew tab   Take 1 chew tab by mouth daily   Quantity:  90 tablet   Refills:  3       valACYclovir 500 MG tablet   Commonly known as:  VALTREX   Used for:  Herpes simplex virus (HSV) infection of vagina        Dose:  500 mg   Take 1 tablet (500 mg) by mouth daily   Quantity:  30 tablet   Refills:  1       * Notice:  This list has 2 medication(s) that are the same as other medications prescribed for you. Read the directions carefully, and ask your doctor or other care provider to review them with you.      STOP taking     insulin isophane human 100 UNIT/ML injection   Commonly known as:  HumuLIN N PEN                    Protect others around you: Learn how to safely use, store and throw away your medicines at www.disposemymeds.org.             Medication List: This is a list of all your medications and when to take them. Check marks below indicate your daily home schedule. Keep this list as a reference.      Medications           Morning Afternoon Evening Bedtime As Needed    blood glucose monitoring lancets   Use to test blood sugar 4 times daily or as directed.                                * blood glucose monitoring test strip   Commonly known as:  ONE TOUCH ULTRA   Use to test blood sugar 4 times daily.                                * blood glucose monitoring test strip   Commonly known as:  GABE  CONTOUR NEXT   Use to test blood sugar 4 times daily or as directed.                                buPROPion 75 MG tablet   Commonly known as:  WELLBUTRIN   Take 2 tablets (150 mg) by mouth daily   Last time this was given:  150 mg on 10/20/2017  9:48 AM                                cyclobenzaprine 10 MG tablet   Commonly known as:  FLEXERIL   Take 0.5-1 tablets (5-10 mg) by mouth 3 times daily as needed for muscle spasms   Last time this was given:  10 mg on 10/16/2017  8:22 PM                                guaiFENesin-dextromethorphan 100-10 MG/5ML syrup   Commonly known as:  ROBITUSSIN DM   Take 10 mLs by mouth every 4 hours as needed for cough   Last time this was given:  10 mLs on 10/18/2017  6:41 AM                                omeprazole 40 MG capsule   Commonly known as:  priLOSEC   Take 1 capsule (40 mg) by mouth every morning   Last time this was given:  40 mg on 10/20/2017  9:48 AM                                oxyCODONE-acetaminophen 5-325 MG per tablet   Commonly known as:  PERCOCET   Take 1-2 tablets by mouth every 6 hours as needed for moderate to severe pain (every 4-6 hrs prn)                                PRENATAL GUMMIES/DHA & FA 0.4-32.5 MG Chew   Take 1 chew tab by mouth daily                                valACYclovir 500 MG tablet   Commonly known as:  VALTREX   Take 1 tablet (500 mg) by mouth daily   Last time this was given:  500 mg on 10/20/2017  9:48 AM                                * Notice:  This list has 2 medication(s) that are the same as other medications prescribed for you. Read the directions carefully, and ask your doctor or other care provider to review them with you.

## 2017-10-14 NOTE — IP AVS SNAPSHOT
[facility]    [address] [address]    Phone:  [phone]    Fax:  [phone]                                       After Visit Summary   10/14/2017    Lorena Echevarria    MRN: [id number]           After Visit Summary Signature Page     I have received my discharge instructions, and my questions have been answered. I have discussed any challenges I see with this plan with the nurse or doctor.    ..........................................................................................................................................  Patient/Patient Representative Signature      ..........................................................................................................................................  Patient Representative Print Name and Relationship to Patient    ..................................................               ................................................  Date                                            Time    ..........................................................................................................................................  Reviewed by Signature/Title    ...................................................              ..............................................  Date                                                            Time

## 2017-10-15 PROCEDURE — 94640 AIRWAY INHALATION TREATMENT: CPT | Mod: 76

## 2017-10-15 PROCEDURE — 96372 THER/PROPH/DIAG INJ SC/IM: CPT

## 2017-10-15 PROCEDURE — 25000131 ZZH RX MED GY IP 250 OP 636 PS 637: Performed by: OBSTETRICS & GYNECOLOGY

## 2017-10-15 PROCEDURE — S0191 MISOPROSTOL, ORAL, 200 MCG: HCPCS | Performed by: OBSTETRICS & GYNECOLOGY

## 2017-10-15 PROCEDURE — 94640 AIRWAY INHALATION TREATMENT: CPT

## 2017-10-15 PROCEDURE — 96374 THER/PROPH/DIAG INJ IV PUSH: CPT

## 2017-10-15 PROCEDURE — 25000125 ZZHC RX 250: Performed by: OBSTETRICS & GYNECOLOGY

## 2017-10-15 PROCEDURE — 25000132 ZZH RX MED GY IP 250 OP 250 PS 637: Performed by: OBSTETRICS & GYNECOLOGY

## 2017-10-15 PROCEDURE — 40000275 ZZH STATISTIC RCP TIME EA 10 MIN

## 2017-10-15 PROCEDURE — 25000128 H RX IP 250 OP 636: Performed by: OBSTETRICS & GYNECOLOGY

## 2017-10-15 RX ORDER — MISOPROSTOL 200 UG/1
200 TABLET ORAL
Status: DISCONTINUED | OUTPATIENT
Start: 2017-10-15 | End: 2017-10-16

## 2017-10-15 RX ORDER — GUAIFENESIN/DEXTROMETHORPHAN 100-10MG/5
10 SYRUP ORAL EVERY 4 HOURS PRN
Status: DISCONTINUED | OUTPATIENT
Start: 2017-10-15 | End: 2017-10-18

## 2017-10-15 RX ADMIN — GUAIFENESIN AND DEXTROMETHORPHAN 10 ML: 100; 10 SYRUP ORAL at 01:44

## 2017-10-15 RX ADMIN — OMEPRAZOLE 40 MG: 20 CAPSULE, DELAYED RELEASE ORAL at 08:22

## 2017-10-15 RX ADMIN — ALBUTEROL SULFATE 2.5 MG: 2.5 SOLUTION RESPIRATORY (INHALATION) at 15:10

## 2017-10-15 RX ADMIN — GUAIFENESIN AND DEXTROMETHORPHAN 10 ML: 100; 10 SYRUP ORAL at 19:00

## 2017-10-15 RX ADMIN — MISOPROSTOL 25 MCG: 100 TABLET ORAL at 12:29

## 2017-10-15 RX ADMIN — MISOPROSTOL 25 MCG: 100 TABLET ORAL at 01:40

## 2017-10-15 RX ADMIN — BUPROPION HYDROCHLORIDE 150 MG: 75 TABLET, FILM COATED ORAL at 09:03

## 2017-10-15 RX ADMIN — SODIUM CHLORIDE, POTASSIUM CHLORIDE, SODIUM LACTATE AND CALCIUM CHLORIDE: 600; 310; 30; 20 INJECTION, SOLUTION INTRAVENOUS at 10:20

## 2017-10-15 RX ADMIN — GUAIFENESIN AND DEXTROMETHORPHAN 10 ML: 100; 10 SYRUP ORAL at 13:58

## 2017-10-15 RX ADMIN — ALBUTEROL SULFATE 2.5 MG: 2.5 SOLUTION RESPIRATORY (INHALATION) at 21:14

## 2017-10-15 RX ADMIN — MISOPROSTOL 200 MCG: 200 TABLET ORAL at 14:52

## 2017-10-15 RX ADMIN — MISOPROSTOL 25 MCG: 100 TABLET ORAL at 08:22

## 2017-10-15 RX ADMIN — ALBUTEROL SULFATE 2.5 MG: 2.5 SOLUTION RESPIRATORY (INHALATION) at 08:47

## 2017-10-15 RX ADMIN — MISOPROSTOL 200 MCG: 200 TABLET ORAL at 18:58

## 2017-10-15 RX ADMIN — MISOPROSTOL 200 MCG: 200 TABLET ORAL at 16:58

## 2017-10-15 RX ADMIN — GUAIFENESIN AND DEXTROMETHORPHAN 10 ML: 100; 10 SYRUP ORAL at 05:41

## 2017-10-15 RX ADMIN — VALACYCLOVIR HYDROCHLORIDE 500 MG: 500 TABLET, FILM COATED ORAL at 09:04

## 2017-10-15 RX ADMIN — MISOPROSTOL 25 MCG: 100 TABLET ORAL at 10:20

## 2017-10-15 RX ADMIN — ALBUTEROL SULFATE 2.5 MG: 2.5 SOLUTION RESPIRATORY (INHALATION) at 02:49

## 2017-10-15 RX ADMIN — MISOPROSTOL 200 MCG: 200 TABLET ORAL at 23:16

## 2017-10-15 RX ADMIN — INSULIN HUMAN 15 UNITS: 100 INJECTION, SUSPENSION SUBCUTANEOUS at 22:36

## 2017-10-15 RX ADMIN — PREDNISONE 60 MG: 20 TABLET ORAL at 09:04

## 2017-10-15 NOTE — PLAN OF CARE
Induction of Labor Admit Note  Lorena Echevarria  MRN: [id number]  Gestational Age: 40w0d      Lorena Echevarria presents for induction of labor for medical induction GD on insulin.  Patient denies contractions, bleeding or ROM.    Past Medical History:   Diagnosis Date     Anxiety      Autism spectrum disorder      Depressed      LGSIL on Pap smear of cervix 2017     Mild intellectual disabilities      Schizoaffective disorder (H)      OB Induction Plan: Completed, Dr. Severino on unit entering orders at this time and available in epic chart.    Dr. Severino notified of patient's arrival and condition.      Patient is alert and oriented X 3, denies any pain. pain. Patient oriented to room, unit, hourly rounding, and plan of care. Call light within reach.  Explained admission packet with patient bill of rights brochure. Will continue to monitor and document as needed.     Inpatient nursing criteria listed below was met:    Health care directives status obtained and documented: Yes  Patient identifies a surrogate decision maker: Yes   If yes, who: Efren  Contact Information: on board  Core Measure diagnosis present:: No  Vaccine assessment done and vaccines ordered if appropriate. Yes  Clergy visit ordered if patient requests: N/A  Skin issues/needs documented:N/A  Isolation needs addressed, if appropriate: N/A  Fall Prevention (Med and High risk): Care plan updated, Education given and documented and signage used: N/A  Care Plan initiated: Yes  Education Documented (Reminder to educate patient if MRSA is present on admission): Yes  Education Assessment documented:Yes  Patient has discharge needs (If yes, please explain): {YES / NO:[id number]    Plan:   Physician will see yina first/ plan OROM

## 2017-10-15 NOTE — PLAN OF CARE
Pt resting at this time. Dr. Severino on unit, asked to hold PO cytotec until 0800 and that pt ok to rest without fetal monitors at this time unless uncomfortable or andreia.

## 2017-10-15 NOTE — PLAN OF CARE
Problem: Patient Care Overview  Goal: Plan of Care/Patient Progress Review  Outcome: No Change  Labor Shift Note  Data: See Flowsheets activity for contraction and fetal documentation.   Vitals:     10/15/17 0252 10/15/17 0354 10/15/17 0700 10/15/17 0847   BP:     103/64     Pulse:     105     Resp:   14 16     Temp: 98.4  F (36.9  C)   98.2  F (36.8  C)     TempSrc: Oral   Oral     SpO2:     97% 98%   . Signs and symptoms of infection Absent.    Complications in labor: Absent. Support person Efren present.  Interventions: Continue uterine/fetal assessment per orders and nursing discretion. Vital Signs per order set. Comfort measures/pain control/labor management: Ambulation, hydration, position changes, birthing ball/sling and tub options to facilitate labor.  Plan: Anticipate . Provide labor/coping assistance as needed by patient and support person.  Observe for and notify care provider of indications of progressing labor, need for pain medications, or signs of fetal/maternal compromise.

## 2017-10-15 NOTE — PHARMACY
[facility]    Pharmacy      Antimicrobial Stewardship Note     Current antimicrobial therapy:  Anti-infectives (Future)    Start     Dose/Rate Route Frequency Ordered Stop    10/14/17 2030  valACYclovir (VALTREX) tablet 500 mg      500 mg Oral DAILY 10/14/17 2020            Indication: Prophylaxis of Herpes Simplex     Pertinent labs:  Creatinine   Creatinine   Date Value Ref Range Status   10/12/2017 0.63 0.52 - 1.04 mg/dL Final   10/02/2017 0.61 0.52 - 1.04 mg/dL Final   09/27/2017 0.57 0.52 - 1.04 mg/dL Final     WBC   WBC   Date Value Ref Range Status   10/14/2017 8.3 4.0 - 11.0 10e9/L Final   10/12/2017 8.5 4.0 - 11.0 10e9/L Final   10/02/2017 12.0 (H) 4.0 - 11.0 10e9/L Final     ANC No components found for: ANC     Culture Results: none     Recommendations/Interventions:  1. None at this time.    Linda Hutchins, [facility]  October 15, 2017

## 2017-10-15 NOTE — PLAN OF CARE
Nebs given as ordered. Breath sounds beginning of shift were dim with exp and insp wheezes clearing by end of this shift. SpO2 on RA 98%

## 2017-10-15 NOTE — H&P
[facility] Labor and Delivery History and Physical     Lorena Echevarria MRN# [id number]   Age: 22 year old YOB: 1995      Date of Admission:                                      10/12/2017     Primary care provider: No Ref-Primary, Physician             Chief Complaint:   Lorena Echevarria is a 22 year old female who is 39w6d pregnant and being admitted for induction of labor, indication GDM on insulin. Prenatal record/H and P reviewed with patient and unchanged.  Pt continues with severe poorly controlled  cough, paroxymal despite completion of antibiotic course and several ED visits. She was started on prednisone, omeprazole, robitussin DM.  She has h/o HSV on Valtrex, no prodrome.  Denies Ctx, VB, LOF.  Occ FM.             Pregnancy history:      OBSTETRIC HISTORY:                 Obstetric History       T0      L0     SAB0   TAB0   Ectopic0   Multiple0   Live Births0        # Outcome Date GA Lbr López/2nd Weight Sex Delivery Anes PTL Lv   1 Current                               EDC: Estimated Date of Delivery: Oct 14, 2017     Prenatal Labs:         Lab Results   Component Value Date     ABO A 10/12/2017     RH Pos 10/12/2017     AS Neg 10/12/2017     HEPBANG Neg 2017     CHPCRT Neg 2017     CHPCRT negative 2017     GCPCRT Neg 2017     GCPCRT negative 2017     TREPAB non reactive 2017     RUBELLAABIGG non immune 2017     HGB 12.1 10/12/2017         GBS Status:   Lab Results   Component Value Date     GBS Negative 09/15/2017         Active Problem List      Patient Active Problem List   Diagnosis     Suicidal ideation     Mild intellectual disability     Autism spectrum     Anxiety     Mood disorder (H)     Suicidal thoughts     Left Ankle Pain     Irregular menstrual cycle (PERIODS)     Depression     Elevated serum cholesterol     Obesity     Sinus tachycardia     Galactorrhea of both breasts     High-risk first pregnancy of young  woman, third trimester     Recurrent HSV (herpes simplex virus)     Gestational diabetes mellitus (GDM) in third trimester controlled on oral hypoglycemic drug     Papanicolaou smear of cervix with low grade squamous intraepithelial lesion (LGSIL)     Encounter for triage in pregnant patient         Medication Prior to Admission   Prescriptions Prior to Admission            Prescriptions Prior to Admission   Medication Sig Dispense Refill Last Dose     buPROPion (WELLBUTRIN) 75 MG tablet Take 2 tablets (150 mg) by mouth daily 90 tablet 0 10/12/2017 at 1030t     Prenatal MV-Min-FA-Omega-3 (PRENATAL GUMMIES/DHA & FA) 0.4-32.5 MG CHEW Take 1 chew tab by mouth daily 90 tablet 3 10/12/2017 at 1030     blood glucose monitoring (GABE CONTOUR NEXT) test strip Use to test blood sugar 4 times daily or as directed. 200 each 4 10/11/2017 at Unknown time     blood glucose monitoring (GABE MICROLET) lancets Use to test blood sugar 4 times daily or as directed. 100 each 1 10/11/2017 at Unknown time     oxyCODONE-acetaminophen (PERCOCET) 5-325 MG per tablet Take 1-2 tablets by mouth every 6 hours as needed for moderate to severe pain (every 4-6 hrs prn) 30 tablet 0 10/12/2017 at 1030     cyclobenzaprine (FLEXERIL) 10 MG tablet Take 0.5-1 tablets (5-10 mg) by mouth 3 times daily as needed for muscle spasms 30 tablet 1 10/12/2017 at 1030     valACYclovir (VALTREX) 500 MG tablet Take 1 tablet (500 mg) by mouth daily 30 tablet 1 10/12/2017 at 1030     blood glucose monitoring (ONE TOUCH ULTRA) test strip Use to test blood sugar 4 times daily. 100 each 5 10/11/2017 at Unknown time     insulin isophane human (HUMULIN N PEN) 100 UNIT/ML injection Inject 10 Units Subcutaneous At Bedtime (Patient taking differently: Inject 15 Units Subcutaneous At Bedtime )     10/11/2017 at 2100      .         Maternal Past Medical History:       Past Medical History         Past Medical History:   Diagnosis Date     Anxiety       Autism spectrum  "disorder       Depressed       LGSIL on Pap smear of cervix 03/30/2017     Mild intellectual disabilities       Schizoaffective disorder (H)                   Family History:   Unchanged from prenatal record              Social History:   Unchanged from prenatal record          Review of Systems:   Per HPI.  Other systems reviewed and negative          Physical Exam:      Vital signs:  Temp: 98.6  F (37  C) Temp src: Oral BP: 129/74 Pulse: 99   Resp: 16 SpO2: 97 %          Estimated body mass index is 41.97 kg/(m^2) as calculated from the following:    Height as of 10/10/17: 1.676 m (5' 6\").    Weight as of 10/10/17: 117.9 kg (260 lb).        Chest: scattered wheezing  CV:  RRR without murmers  General:  Alert and oriented  Abdomen soft, non-tender, gravid  Ext: neg  Cervix:    FT and post per RN  Presentation:Vtx on US yesterday  Fetal Heart Rate Tracing: reactive and reassuring.  NST reactive.   Tocometer: external monitor, no ctx      A/P:   GDM on insulin at 40 wks.   Paroxysmal cough with RAD component.  Likely post infections/viral.  See prior hospitalist mehdi.  Plan Cytotec cervical ripening and IOL.  Patient is aware of the risks of an induction including medications used, risk, benefits and alternatives.  She is willing to take these risks and would like to proceed. BS monitoring per protocol.    "

## 2017-10-15 NOTE — PLAN OF CARE
Problem: Patient Care Overview  Goal: Plan of Care/Patient Progress Review  Outcome: No Change  Labor Shift Note  Data: See Flowsheets activity for contraction and fetal documentation.   Vitals:     10/15/17 0700 10/15/17 0847 10/15/17 1510 10/15/17 1542   BP: 103/64     105/74   Pulse: 105         Resp: 16         Temp: 98.2  F (36.8  C)     98.5  F (36.9  C)   TempSrc: Oral     Oral   SpO2: 97% 98% 97% 97%   . Signs and symptoms of infection Absent.    Complications in labor: Absent. Support person Efren and patient's mother present.  Interventions: Continue uterine/fetal assessment per orders and nursing discretion. Vital Signs per order set. Comfort measures/pain control/labor management: Ambulation, hydration, position changes, birthing ball/sling and tub options to facilitate labor.  Plan: Anticipate . Provide labor/coping assistance as needed by patient and support person.  Observe for and notify care provider of indications of progressing labor, need for pain medications, or signs of fetal/maternal compromise.

## 2017-10-15 NOTE — PLAN OF CARE
Face to face report given with opportunity to observe patient.    Report given to Pippa Lim   10/15/2017  7:15 AM

## 2017-10-15 NOTE — PLAN OF CARE
Problem: Labor (Cervical Ripen, Induct, Augment) (Adult,Obstetrics,Pediatric)  Goal: Signs and Symptoms of Listed Potential Problems Will be Absent, Minimized or Managed (Labor)  Signs and symptoms of listed potential problems will be absent, minimized or managed by discharge/transition of care (reference Labor (Cervical Ripen, Induct, Augment) (Adult,Obstetrics,Pediatric) CPG).   Outcome: Improving    10/15/17 0230   Labor (Cervical Ripen, Induct, Augment)   Problems Assessed (Labor) all   Problems Present (Labor) none      Labor Shift Note  Data: See Flowsheets activity for contraction and fetal documentation.   Vitals:     10/14/17 1928 10/14/17 2327   BP: 129/74 117/68   Pulse: 99 87   Resp: 16 18   Temp: 98.6  F (37  C) 98.4  F (36.9  C)   TempSrc: Oral Oral   SpO2: 97% 98%   . Signs and symptoms of infection Absent.    Complications in labor: Present and Absent. Support person Efren present.  Interventions: Continue uterine/fetal assessment per orders and nursing discretion. Vital Signs per order set. Comfort measures/pain control/labor management: Ambulation, hydration, position changes, birthing ball/sling and tub options to facilitate labor.  Labor augmentation with Misoprostol oral reviewed with pt. Agreeable to plan.  Plan: Anticipate . Provide labor/coping assistance as needed by patient and support person.  Observe for and notify care provider of indications of progressing labor, need for pain medications, or signs of fetal/maternal compromise.

## 2017-10-16 PROBLEM — O24.419 GESTATIONAL DIABETES: Status: ACTIVE | Noted: 2017-10-16

## 2017-10-16 PROCEDURE — 40000275 ZZH STATISTIC RCP TIME EA 10 MIN

## 2017-10-16 PROCEDURE — 25000131 ZZH RX MED GY IP 250 OP 636 PS 637: Performed by: OBSTETRICS & GYNECOLOGY

## 2017-10-16 PROCEDURE — 25000125 ZZHC RX 250: Performed by: OBSTETRICS & GYNECOLOGY

## 2017-10-16 PROCEDURE — 94640 AIRWAY INHALATION TREATMENT: CPT | Mod: 76

## 2017-10-16 PROCEDURE — G0378 HOSPITAL OBSERVATION PER HR: HCPCS

## 2017-10-16 PROCEDURE — 94640 AIRWAY INHALATION TREATMENT: CPT

## 2017-10-16 PROCEDURE — 25000132 ZZH RX MED GY IP 250 OP 250 PS 637: Performed by: OBSTETRICS & GYNECOLOGY

## 2017-10-16 RX ADMIN — VALACYCLOVIR HYDROCHLORIDE 500 MG: 500 TABLET, FILM COATED ORAL at 08:28

## 2017-10-16 RX ADMIN — ALBUTEROL SULFATE 2.5 MG: 2.5 SOLUTION RESPIRATORY (INHALATION) at 20:53

## 2017-10-16 RX ADMIN — GUAIFENESIN AND DEXTROMETHORPHAN 10 ML: 100; 10 SYRUP ORAL at 01:04

## 2017-10-16 RX ADMIN — CYCLOBENZAPRINE HYDROCHLORIDE 10 MG: 5 TABLET ORAL at 20:22

## 2017-10-16 RX ADMIN — ALBUTEROL SULFATE 2.5 MG: 2.5 SOLUTION RESPIRATORY (INHALATION) at 15:03

## 2017-10-16 RX ADMIN — OMEPRAZOLE 40 MG: 20 CAPSULE, DELAYED RELEASE ORAL at 08:27

## 2017-10-16 RX ADMIN — ALBUTEROL SULFATE 2.5 MG: 2.5 SOLUTION RESPIRATORY (INHALATION) at 08:50

## 2017-10-16 RX ADMIN — BUPROPION HYDROCHLORIDE 150 MG: 75 TABLET, FILM COATED ORAL at 08:28

## 2017-10-16 RX ADMIN — INSULIN HUMAN 15 UNITS: 100 INJECTION, SUSPENSION SUBCUTANEOUS at 22:23

## 2017-10-16 RX ADMIN — PREDNISONE 60 MG: 20 TABLET ORAL at 08:27

## 2017-10-16 NOTE — PLAN OF CARE
Problem: Labor (Cervical Ripen, Induct, Augment) (Adult,Obstetrics,Pediatric)  Goal: Signs and Symptoms of Listed Potential Problems Will be Absent, Minimized or Managed (Labor)  Signs and symptoms of listed potential problems will be absent, minimized or managed by discharge/transition of care (reference Labor (Cervical Ripen, Induct, Augment) (Adult,Obstetrics,Pediatric) CPG).   Outcome: Improving  Labor Shift Note   Data: See Flowsheets activity for contraction and fetal documentation.   Vitals:     10/15/17 1542 10/15/17 2114 10/15/17 2137 10/15/17 2336   BP: 105/74   121/66 119/59   Pulse:     108 85   Resp:     20 20   Temp: 98.5  F (36.9  C)   98.1  F (36.7  C) 97.5  F (36.4  C)   TempSrc: Oral   Oral Oral   SpO2: 97% 97% 97% 97%   . Signs and symptoms of infection not present in regards to labor, pt does have a respiratory infection.    Complications in labor: no cervical change with cytotec. Support person Efren present.  Interventions: Continue uterine/fetal assessment per orders and nursing discretion. Vital Signs per order set. Comfort measures/pain control/labor management: pt is comfortable, not having contractions   Plan: Anticipate . Provide labor/coping assistance as needed by patient and support person.  Observe for and notify care provider of indications of progressing labor, need for pain medications, or signs of fetal/maternal compromise.

## 2017-10-16 NOTE — PROGRESS NOTES
Lorena Echevarria is a 22 year old female in for non compliance,gdm on insulin. States cough has not improved. She states she is in pain with the contractions she is having now and does not want to go home or start pitocin at this time. The RN had just checked her so I didn't check her. I plan to before any intervention.  used for SO  Strips reviewed. Need a good strip today.      O:   /66  Pulse 95  Temp 98.6  F (37  C) (Oral)  Resp 20  SpO2 97%   Moderate distress with contractions  MO WF  Evidence of cough still present    A:  TIUP with MO,GDM,non compliance    P:  NST  Pit when patient will allow      Cherie Horton MD

## 2017-10-16 NOTE — PLAN OF CARE
1 neb given this shift. Pt refused last neb, was having cramping and was very uncomfortable. RN reports that post evening neb treatment, fetal HR increased from the 140s to the 180s. Pt also had copious amounts of sputum production during neb. SPO2 on RA 97%

## 2017-10-16 NOTE — PLAN OF CARE
Face to face report given with opportunity to observe patient.    Report given to Jane Izaguirre   10/16/2017  7:30 AM

## 2017-10-16 NOTE — PHARMACY
[facility]    Pharmacy      Antimicrobial Stewardship Note     Current antimicrobial therapy:  Anti-infectives (Future)    Start     Dose/Rate Route Frequency Ordered Stop    10/14/17 2030  valACYclovir (VALTREX) tablet 500 mg      500 mg Oral DAILY 10/14/17 2020            Indication: Herpes simplex prophylaxis.     Pertinent labs:  Creatinine   Creatinine   Date Value Ref Range Status   10/12/2017 0.63 0.52 - 1.04 mg/dL Final   10/02/2017 0.61 0.52 - 1.04 mg/dL Final   09/27/2017 0.57 0.52 - 1.04 mg/dL Final     WBC   WBC   Date Value Ref Range Status   10/14/2017 8.3 4.0 - 11.0 10e9/L Final   10/12/2017 8.5 4.0 - 11.0 10e9/L Final   10/02/2017 12.0 (H) 4.0 - 11.0 10e9/L Final     ANC No components found for: ANC     Culture Results: None performed.     Recommendations/Interventions:  1. No recommendations at this time; current antimicrobial pharmacotherapy is appropriate.    Jay Jay Dove RPH  October 16, 2017

## 2017-10-16 NOTE — PROGRESS NOTES
(S)  No ctx.  Cough improved.  (O)  Vitals:    10/15/17 0700 10/15/17 0847 10/15/17 1510 10/15/17 1542   BP: 103/64   105/74   Pulse: 105      Resp: 16      Temp: 98.2  F (36.8  C)   98.5  F (36.9  C)   TempSrc: Oral   Oral   SpO2: 97% 98% 97% 97%     Cervix:  Not repeated    after supper    Fetal Heart Rate Tracing: reactive and reassuring  Cat:1          Tocometer: external monitor and inadequate    (A/P)  GDM on insulin.  Cytotec cervical ripening/IOL.  Poor response to this point.   Has two more doses of  60mcg prior to reaching max dosage.  Options of going home if no labor and retrying in a couple days vs continuing induction discussed with Pt and SO (with ).  Desires to continue so if no labor following Cytotec administration discussed Pitocin in AM.  R/B discussed.    Cough improved on current treatment.  CPM.

## 2017-10-16 NOTE — PLAN OF CARE
Face to face report given with opportunity to observe patient.    Report given to Pippa Coyle RN  10/15/2017  7:28 PM

## 2017-10-16 NOTE — PLAN OF CARE
Plan of care for pm. Pt received last dose of Cytotec 40 cc. Then is to rest and begin pitocin in am, if all is well throughout the night. Pt is resting in room now, fetus tolerated last dose of Cytotec. Pt is not painful at this time, is feeling some random cramping.

## 2017-10-16 NOTE — PROGRESS NOTES
/68  Pulse 95  Temp 97.9  F (36.6  C) (Oral)  Resp 18  SpO2 96%  Strips reviewed. Some sharp variables otherwise cat I  Contractions regular off and on throughout the day.  cx 3/70/-3/ceph/ soft  Sugars all less than 120  Patient refusing pitocin and AROM  Using tub for comfort  Still coughing  SO not present

## 2017-10-17 ENCOUNTER — ANESTHESIA (OUTPATIENT)
Dept: OBGYN | Facility: HOSPITAL | Age: 22
End: 2017-10-17
Payer: MEDICAID

## 2017-10-17 ENCOUNTER — ANESTHESIA EVENT (OUTPATIENT)
Dept: OBGYN | Facility: HOSPITAL | Age: 22
End: 2017-10-17
Payer: MEDICAID

## 2017-10-17 PROCEDURE — 94640 AIRWAY INHALATION TREATMENT: CPT

## 2017-10-17 PROCEDURE — 12000031 ZZH R&B OB CRITICAL

## 2017-10-17 PROCEDURE — 00000146 ZZHCL STATISTIC GLUCOSE BY METER IP

## 2017-10-17 PROCEDURE — 40000275 ZZH STATISTIC RCP TIME EA 10 MIN

## 2017-10-17 PROCEDURE — 25000125 ZZHC RX 250: Performed by: OBSTETRICS & GYNECOLOGY

## 2017-10-17 PROCEDURE — 3E0R3BZ INTRODUCTION OF ANESTHETIC AGENT INTO SPINAL CANAL, PERCUTANEOUS APPROACH: ICD-10-PCS | Performed by: NURSE ANESTHETIST, CERTIFIED REGISTERED

## 2017-10-17 PROCEDURE — 12000027 ZZH R&B OB

## 2017-10-17 PROCEDURE — 94640 AIRWAY INHALATION TREATMENT: CPT | Mod: 76

## 2017-10-17 PROCEDURE — 37000011 ZZH ANESTHESIA WARD SERVICE: Performed by: NURSE ANESTHETIST, CERTIFIED REGISTERED

## 2017-10-17 PROCEDURE — 27210995 ZZH RX 272: Performed by: NURSE ANESTHETIST, CERTIFIED REGISTERED

## 2017-10-17 PROCEDURE — 25000132 ZZH RX MED GY IP 250 OP 250 PS 637: Performed by: OBSTETRICS & GYNECOLOGY

## 2017-10-17 PROCEDURE — 25000128 H RX IP 250 OP 636: Performed by: OBSTETRICS & GYNECOLOGY

## 2017-10-17 PROCEDURE — 00HU33Z INSERTION OF INFUSION DEVICE INTO SPINAL CANAL, PERCUTANEOUS APPROACH: ICD-10-PCS | Performed by: NURSE ANESTHETIST, CERTIFIED REGISTERED

## 2017-10-17 PROCEDURE — 25000128 H RX IP 250 OP 636: Performed by: NURSE ANESTHETIST, CERTIFIED REGISTERED

## 2017-10-17 PROCEDURE — 10907ZC DRAINAGE OF AMNIOTIC FLUID, THERAPEUTIC FROM PRODUCTS OF CONCEPTION, VIA NATURAL OR ARTIFICIAL OPENING: ICD-10-PCS | Performed by: OBSTETRICS & GYNECOLOGY

## 2017-10-17 PROCEDURE — G0378 HOSPITAL OBSERVATION PER HR: HCPCS

## 2017-10-17 PROCEDURE — 25000131 ZZH RX MED GY IP 250 OP 636 PS 637: Performed by: OBSTETRICS & GYNECOLOGY

## 2017-10-17 RX ORDER — EPHEDRINE SULFATE 50 MG/ML
5 INJECTION, SOLUTION INTRAMUSCULAR; INTRAVENOUS; SUBCUTANEOUS
Status: DISCONTINUED | OUTPATIENT
Start: 2017-10-17 | End: 2017-10-18

## 2017-10-17 RX ORDER — LIDOCAINE HYDROCHLORIDE AND EPINEPHRINE 15; 5 MG/ML; UG/ML
3 INJECTION, SOLUTION EPIDURAL
Status: DISCONTINUED | OUTPATIENT
Start: 2017-10-17 | End: 2017-10-18

## 2017-10-17 RX ORDER — NALOXONE HYDROCHLORIDE 0.4 MG/ML
.1-.4 INJECTION, SOLUTION INTRAMUSCULAR; INTRAVENOUS; SUBCUTANEOUS
Status: DISCONTINUED | OUTPATIENT
Start: 2017-10-17 | End: 2017-10-18

## 2017-10-17 RX ORDER — ONDANSETRON 2 MG/ML
4 INJECTION INTRAMUSCULAR; INTRAVENOUS EVERY 6 HOURS PRN
Status: DISCONTINUED | OUTPATIENT
Start: 2017-10-17 | End: 2017-10-18

## 2017-10-17 RX ORDER — FENTANYL CITRATE 50 UG/ML
INJECTION, SOLUTION INTRAMUSCULAR; INTRAVENOUS PRN
Status: DISCONTINUED | OUTPATIENT
Start: 2017-10-17 | End: 2017-10-19

## 2017-10-17 RX ORDER — NALBUPHINE HYDROCHLORIDE 20 MG/ML
2.5-5 INJECTION, SOLUTION INTRAMUSCULAR; INTRAVENOUS; SUBCUTANEOUS EVERY 6 HOURS PRN
Status: DISCONTINUED | OUTPATIENT
Start: 2017-10-17 | End: 2017-10-18

## 2017-10-17 RX ORDER — EPHEDRINE SULFATE 50 MG/ML
INJECTION, SOLUTION INTRAMUSCULAR; INTRAVENOUS; SUBCUTANEOUS
Status: DISCONTINUED
Start: 2017-10-17 | End: 2017-10-18 | Stop reason: WASHOUT

## 2017-10-17 RX ORDER — ONDANSETRON 4 MG/1
4 TABLET, ORALLY DISINTEGRATING ORAL EVERY 6 HOURS PRN
Status: DISCONTINUED | OUTPATIENT
Start: 2017-10-17 | End: 2017-10-18

## 2017-10-17 RX ORDER — OXYTOCIN/0.9 % SODIUM CHLORIDE 30/500 ML
1-24 PLASTIC BAG, INJECTION (ML) INTRAVENOUS CONTINUOUS
Status: DISCONTINUED | OUTPATIENT
Start: 2017-10-17 | End: 2017-10-18

## 2017-10-17 RX ORDER — ROPIVACAINE HYDROCHLORIDE 2 MG/ML
8 INJECTION, SOLUTION EPIDURAL; INFILTRATION; PERINEURAL ONCE
Status: DISCONTINUED | OUTPATIENT
Start: 2017-10-17 | End: 2017-10-18

## 2017-10-17 RX ORDER — LIDOCAINE HYDROCHLORIDE 15 MG/ML
INJECTION, SOLUTION EPIDURAL; INFILTRATION; INTRACAUDAL; PERINEURAL PRN
Status: DISCONTINUED | OUTPATIENT
Start: 2017-10-17 | End: 2017-10-19

## 2017-10-17 RX ADMIN — OXYTOCIN-SODIUM CHLORIDE 0.9% IV SOLN 30 UNIT/500ML 2 MILLI-UNITS/MIN: 30-0.9/5 SOLUTION at 10:37

## 2017-10-17 RX ADMIN — ALBUTEROL SULFATE 2.5 MG: 2.5 SOLUTION RESPIRATORY (INHALATION) at 21:21

## 2017-10-17 RX ADMIN — OXYTOCIN-SODIUM CHLORIDE 0.9% IV SOLN 30 UNIT/500ML 2 MILLI-UNITS/MIN: 30-0.9/5 SOLUTION at 15:51

## 2017-10-17 RX ADMIN — VALACYCLOVIR HYDROCHLORIDE 500 MG: 500 TABLET, FILM COATED ORAL at 09:25

## 2017-10-17 RX ADMIN — ALBUTEROL SULFATE 2.5 MG: 2.5 SOLUTION RESPIRATORY (INHALATION) at 02:57

## 2017-10-17 RX ADMIN — SODIUM CHLORIDE, POTASSIUM CHLORIDE, SODIUM LACTATE AND CALCIUM CHLORIDE 1000 ML: 600; 310; 30; 20 INJECTION, SOLUTION INTRAVENOUS at 13:30

## 2017-10-17 RX ADMIN — OMEPRAZOLE 40 MG: 20 CAPSULE, DELAYED RELEASE ORAL at 09:25

## 2017-10-17 RX ADMIN — SODIUM CHLORIDE, POTASSIUM CHLORIDE, SODIUM LACTATE AND CALCIUM CHLORIDE: 600; 310; 30; 20 INJECTION, SOLUTION INTRAVENOUS at 19:19

## 2017-10-17 RX ADMIN — BUPROPION HYDROCHLORIDE 150 MG: 75 TABLET, FILM COATED ORAL at 09:25

## 2017-10-17 RX ADMIN — OXYTOCIN-SODIUM CHLORIDE 0.9% IV SOLN 30 UNIT/500ML 3 MILLI-UNITS/MIN: 30-0.9/5 SOLUTION at 15:31

## 2017-10-17 RX ADMIN — ALBUTEROL SULFATE 2.5 MG: 2.5 SOLUTION RESPIRATORY (INHALATION) at 15:22

## 2017-10-17 RX ADMIN — GUAIFENESIN AND DEXTROMETHORPHAN 10 ML: 100; 10 SYRUP ORAL at 12:03

## 2017-10-17 RX ADMIN — ALBUTEROL SULFATE 2.5 MG: 2.5 SOLUTION RESPIRATORY (INHALATION) at 09:01

## 2017-10-17 RX ADMIN — PREDNISONE 60 MG: 20 TABLET ORAL at 09:26

## 2017-10-17 RX ADMIN — FENTANYL CITRATE 50 MCG: 50 INJECTION, SOLUTION INTRAMUSCULAR; INTRAVENOUS at 20:57

## 2017-10-17 RX ADMIN — Medication 8 ML/HR: at 14:00

## 2017-10-17 RX ADMIN — OXYTOCIN-SODIUM CHLORIDE 0.9% IV SOLN 30 UNIT/500ML 4 MILLI-UNITS/MIN: 30-0.9/5 SOLUTION at 11:00

## 2017-10-17 RX ADMIN — INSULIN HUMAN 15 UNITS: 100 INJECTION, SUSPENSION SUBCUTANEOUS at 23:38

## 2017-10-17 RX ADMIN — LIDOCAINE HYDROCHLORIDE 3 ML: 15 INJECTION, SOLUTION EPIDURAL; INFILTRATION; INTRACAUDAL; PERINEURAL at 13:54

## 2017-10-17 NOTE — PLAN OF CARE
Labor Shift Note  Data:   Vitals:    10/16/17 2026 10/17/17 0032 10/17/17 0257 10/17/17 0447   BP: 100/57 102/58  110/63   Pulse: 94 99  93   Resp: 18 18  18   Temp: 98  F (36.7  C) 98  F (36.7  C)  97.9  F (36.6  C)   TempSrc: Oral Oral  Oral   SpO2:   97% 98%   Patient has slept on and off through out night. She states that contractions are every 25 minutes and are the quality of cramping.  Complications in labor: Absent. Support person Efren present.  Interventions: Continue uterine/fetal assessment per orders and nursing discretion. Vital Signs per order set. Comfort measures/pain control/labor management: Ambulation, hydration, position changes, birthing ball/sling and tub options to facilitate labor.  Observation and reevaluate in 1-2 hours prn  Plan: Anticipate . Provide labor/coping assistance as needed by patient and support person.  Observe for and notify care provider of indications of progressing labor, need for pain medications, or signs of fetal/maternal compromise.

## 2017-10-17 NOTE — PROGRESS NOTES
(S)  Contractions decreased overnight.  No other complaints.  BS 80  (O)  Vitals:    10/17/17 1504 10/17/17 1522 10/17/17 1536 10/17/17 1606   BP: 128/67  137/77 125/63   Pulse:    96   Resp:    18   Temp:    98.7  F (37.1  C)   TempSrc:    Oral   SpO2: 94% 95%  98%     Cervix:   Membranes: meconium stained fluid   Dilation: 3   Effacement: 90%   Station:-3 engaged    Fetal Heart Rate Tracing: reactive and reassuring  Cat:1          Tocometer: external monitor and inadequate    (A/P)  Pt desires to proceed with pitocin augmentation/IOL.   R/B discussed.    MSAF-notify peds for delivery.

## 2017-10-17 NOTE — PLAN OF CARE
Labor Shift Note  Data: See Flowsheets activity for contraction and fetal documentation.   Vitals:    10/16/17 1649 10/16/17 1837 10/16/17 2008 10/16/17 2026   BP: 134/68 113/61 100/57 100/57   Pulse:    94   Resp: 18 18  18   Temp: 97.9  F (36.6  C) 97.9  F (36.6  C)  98  F (36.7  C)   TempSrc: Oral Oral  Oral   SpO2: 96% 96%     . Signs and symptoms of infection Absent.    Complications in labor: Absent. Support person Efren present.  Interventions: Continue uterine/fetal assessment per orders and nursing discretion. Vital Signs per order set. Comfort measures/pain control/labor management: Ambulation, hydration, position changes, birthing ball/sling and tub options to facilitate labor.  Observation and reevaluate in 1-2 hours prn  Plan: Anticipate . Provide labor/coping assistance as needed by patient and support person.  Observe for and notify care provider of indications of progressing labor, need for pain medications, or signs of fetal/maternal compromise.

## 2017-10-17 NOTE — ANESTHESIA PREPROCEDURE EVALUATION
Anesthesia Evaluation       history and physical reviewed .      No history of anesthetic complications          ROS/MED HX    ENT/Pulmonary:     (+)asthma , recent URI . .    Neurologic:  - neg neurologic ROS     Cardiovascular:  - neg cardiovascular ROS       METS/Exercise Tolerance:     Hematologic:         Musculoskeletal:         GI/Hepatic:  - neg GI/hepatic ROS       Renal/Genitourinary:         Endo:         Psychiatric:         Infectious Disease:         Malignancy:         Other:                     Physical Exam  Normal systems: cardiovascular, pulmonary and dental    Airway   Mallampati: II  TM distance: > 3 FB  Neck ROM: full  Mouth opening: > 3 cm    Dental     Cardiovascular       Pulmonary     Other findings: Psychological history of schizo-affective disorder,depression and anxiety, autism spectrum disorder and mild intellectual disabilities.  Also the URI is believed to be viral in nature per hos(italist.      neg OB ROS  (+) gestational diabetes               Anesthesia Plan      History & Physical Review      ASA Status:  .  OB Epidural Asa: 2 and emergent            Postoperative Care      Consents  Anesthetic plan, risks, benefits and alternatives discussed with:  Patient..                          .

## 2017-10-17 NOTE — ANESTHESIA PROCEDURE NOTES
Peripheral nerve/Neuraxial procedure note : epidural catheter  Pre-Procedure  Performed by   Referred by DR. HOWARD  Location: OB    Procedure Times:10/17/2017 1:55 PM and 10/17/2017 2:15 PM  Pre-Anesthestic Checklist: patient identified, IV checked, risks and benefits discussed, informed consent, monitors and equipment checked, pre-op evaluation, at physician/surgeon's request and post-op pain management    Timeout  Correct Patient: Yes   Correct Procedure: Yes   Correct Site: Yes   Correct Laterality: N/A   Correct Position: Yes   Site Marked: N/A   .   Procedure Documentation    Diagnosis:pregnant.    Procedure:    Epidural catheter.  Insertion Site:L3-4  (midline approach) Injection technique: LORT saline   Local skin infiltrated with mL of 1% lidocaine.  VAHID at 8 cm     Patient Prep;povidone-iodine 7.5% surgical scrub.  .  Needle: Touhy needle Needle Gauge: 18.    Needle Length (Inches) 3.5  # of attempts: 1 and # of redirects:  .   Catheter: 20 G . .  Catheter threaded easily  5 cm epidural space.  13 cm at skin.   .    Assessment/Narrative  Paresthesias: No.  .  .  Aspiration negative for heme or CSF  . Test dose of mL lidocaine 1.5% w/ 1:200,000 epinephrine at 13:59. Test dose negative for signs of intravascular, subdural or intrathecal injection. Sensory Level Left: T6  Sensory Level Right: T6

## 2017-10-17 NOTE — PLAN OF CARE
Face to face report given with opportunity to observe patient.  Report given to NETTE Lara.    Kaya Osorio  10/17/2017, 6:56 AM

## 2017-10-17 NOTE — PROGRESS NOTES
(S)  Still comfortable.    (O)  Vitals:    10/17/17 1504 10/17/17 1522 10/17/17 1536 10/17/17 1606   BP: 128/67  137/77 125/63   Pulse:    96   Resp:    18   Temp:    98.7  F (37.1  C)   TempSrc:    Oral   SpO2: 94% 95%  98%     Cervix:   Membranes: meconium stained fluid   Dilation: 5   Effacement: 90%   Station:-3    Fetal Heart Rate Tracing: reactive and reassuring  Cat:1          Tocometer: external monitor and frequency q 2-3 minutes    (A/P)  IUPC placed to determine adequacy of labor and contractions.  Dr. Horton to assume care.

## 2017-10-17 NOTE — ANESTHESIA CARE TRANSFER NOTE
Patient: Lorena Echevarria    * No procedures listed *    Diagnosis: * No pre-op diagnosis entered *  Diagnosis Additional Information: No value filed.    Anesthesia Type:   No value filed.     Note:  Airway :Room Air  Patient transferred to:Labor and Delivery  Handoff Report: Identifed the Patient, Identified the Reponsible Provider, Reviewed the pertinent medical history, Discussed the surgical course, Reviewed Intra-OP anesthesia mangement and issues during anesthesia, Set expectations for post-procedure period and Allowed opportunity for questions and acknowledgement of understanding      Vitals: (Last set prior to Anesthesia Care Transfer)              Electronically Signed By: RUDY Hastings CRNA  October 17, 2017  2:24 PM

## 2017-10-17 NOTE — PROGRESS NOTES
(S)  Getting comfortable now, epidural in.  On Pitocin 2 mu  (O)  Vitals:    10/17/17 1440 10/17/17 1442 10/17/17 1444 10/17/17 1446   BP: 127/64 126/69 125/70 123/69   Pulse:       Resp:       Temp:       TempSrc:       SpO2: 96% 94%       Cervix:   Membranes: meconium stained fluid   Dilation: 5   Effacement: 90%   Station:-2  BS pending.  Fetal Heart Rate Tracing: reactive and reassuring  Cat:1          Tocometer: external monitor and frequency q 2-3 minutes    (A/P)  Doing will.  CPM.  Pitocin IOL.  BS q 2 hours

## 2017-10-17 NOTE — PLAN OF CARE
Problem: Labor (Cervical Ripen, Induct, Augment) (Adult,Obstetrics,Pediatric)  Goal: Signs and Symptoms of Listed Potential Problems Will be Absent, Minimized or Managed (Labor)  Signs and symptoms of listed potential problems will be absent, minimized or managed by discharge/transition of care (reference Labor (Cervical Ripen, Induct, Augment) (Adult,Obstetrics,Pediatric) CPG).   Outcome: No Change  Labor Shift Note  Data: See Flowsheets activity for contraction and fetal documentation.   Vitals:     10/16/17 1351 10/16/17 1512 10/16/17 1649 10/16/17 1837   BP: 120/65 118/70 134/68 113/61   Pulse: 95         Resp: 18 18 18 18   Temp: 97.9  F (36.6  C) 98  F (36.7  C) 97.9  F (36.6  C) 97.9  F (36.6  C)   TempSrc: Oral Oral Oral Oral   SpO2: 98% 94% 96% 96%   . Signs and symptoms of infection Present and Absent.    Complications in labor: Present and Absent. Support person Mother and significant other present.  Interventions: Continue uterine/fetal assessment per orders and nursing discretion. Vital Signs per order set. Comfort measures/pain control/labor management: Ambulation, hydration, position changes, birthing ball/sling and tub options to facilitate labor.  Plan: Anticipate . Provide labor/coping assistance as needed by patient and support person.  Observe for and notify care provider of indications of progressing labor, need for pain medications, or signs of fetal/maternal compromise.    Comments:   Face to face report given at bedside with opportunity to observe patient.     Kaya PERDUE

## 2017-10-17 NOTE — PHARMACY
[facility]    Pharmacy      Antimicrobial Stewardship Note     Current antimicrobial therapy:  Anti-infectives (Future)    Start     Dose/Rate Route Frequency Ordered Stop    10/14/17 2030  valACYclovir (VALTREX) tablet 500 mg      500 mg Oral DAILY 10/14/17 2020            Indication: Herpes simplex prophylaxis.     Pertinent labs:  Creatinine   Creatinine   Date Value Ref Range Status   10/12/2017 0.63 0.52 - 1.04 mg/dL Final   10/02/2017 0.61 0.52 - 1.04 mg/dL Final   09/27/2017 0.57 0.52 - 1.04 mg/dL Final     WBC   WBC   Date Value Ref Range Status   10/14/2017 8.3 4.0 - 11.0 10e9/L Final   10/12/2017 8.5 4.0 - 11.0 10e9/L Final   10/02/2017 12.0 (H) 4.0 - 11.0 10e9/L Final     ANC No components found for: ANC     Culture Results: None performed.     Recommendations/Interventions:  1. No recommendations at this time; current antimicrobial pharmacotherapy is appropriate.    Jay Jay Dove RPH  October 17, 2017

## 2017-10-18 PROCEDURE — 94640 AIRWAY INHALATION TREATMENT: CPT | Mod: 76

## 2017-10-18 PROCEDURE — 59400 OBSTETRICAL CARE: CPT | Performed by: OBSTETRICS & GYNECOLOGY

## 2017-10-18 PROCEDURE — 12000031 ZZH R&B OB CRITICAL

## 2017-10-18 PROCEDURE — 94640 AIRWAY INHALATION TREATMENT: CPT

## 2017-10-18 PROCEDURE — 25000132 ZZH RX MED GY IP 250 OP 250 PS 637: Performed by: OBSTETRICS & GYNECOLOGY

## 2017-10-18 PROCEDURE — 25000128 H RX IP 250 OP 636: Performed by: OBSTETRICS & GYNECOLOGY

## 2017-10-18 PROCEDURE — S0191 MISOPROSTOL, ORAL, 200 MCG: HCPCS | Performed by: OBSTETRICS & GYNECOLOGY

## 2017-10-18 PROCEDURE — 25000128 H RX IP 250 OP 636: Performed by: NURSE ANESTHETIST, CERTIFIED REGISTERED

## 2017-10-18 PROCEDURE — 25000132 ZZH RX MED GY IP 250 OP 250 PS 637

## 2017-10-18 PROCEDURE — S0191 MISOPROSTOL, ORAL, 200 MCG: HCPCS

## 2017-10-18 PROCEDURE — 40000275 ZZH STATISTIC RCP TIME EA 10 MIN

## 2017-10-18 PROCEDURE — 72200001 ZZH LABOR CARE VAGINAL DELIVERY SINGLE

## 2017-10-18 PROCEDURE — 25000125 ZZHC RX 250: Performed by: OBSTETRICS & GYNECOLOGY

## 2017-10-18 RX ORDER — TERBUTALINE SULFATE 1 MG/ML
0.25 INJECTION, SOLUTION SUBCUTANEOUS
Status: DISCONTINUED | OUTPATIENT
Start: 2017-10-18 | End: 2017-10-18

## 2017-10-18 RX ORDER — SODIUM CHLORIDE, SODIUM LACTATE, POTASSIUM CHLORIDE, CALCIUM CHLORIDE 600; 310; 30; 20 MG/100ML; MG/100ML; MG/100ML; MG/100ML
INJECTION, SOLUTION INTRAVENOUS CONTINUOUS
Status: DISCONTINUED | OUTPATIENT
Start: 2017-10-18 | End: 2017-10-18

## 2017-10-18 RX ORDER — PREDNISONE 20 MG/1
40 TABLET ORAL DAILY
Status: DISCONTINUED | OUTPATIENT
Start: 2017-10-19 | End: 2017-10-20 | Stop reason: HOSPADM

## 2017-10-18 RX ORDER — OXYTOCIN/0.9 % SODIUM CHLORIDE 30/500 ML
100 PLASTIC BAG, INJECTION (ML) INTRAVENOUS CONTINUOUS
Status: DISCONTINUED | OUTPATIENT
Start: 2017-10-18 | End: 2017-10-20 | Stop reason: HOSPADM

## 2017-10-18 RX ORDER — BISACODYL 10 MG
10 SUPPOSITORY, RECTAL RECTAL DAILY PRN
Status: DISCONTINUED | OUTPATIENT
Start: 2017-10-20 | End: 2017-10-20 | Stop reason: HOSPADM

## 2017-10-18 RX ORDER — MISOPROSTOL 200 UG/1
TABLET ORAL
Status: COMPLETED
Start: 2017-10-18 | End: 2017-10-18

## 2017-10-18 RX ORDER — LANOLIN 100 %
OINTMENT (GRAM) TOPICAL
Status: DISCONTINUED | OUTPATIENT
Start: 2017-10-18 | End: 2017-10-20 | Stop reason: HOSPADM

## 2017-10-18 RX ORDER — OXYTOCIN/0.9 % SODIUM CHLORIDE 30/500 ML
340 PLASTIC BAG, INJECTION (ML) INTRAVENOUS CONTINUOUS PRN
Status: DISCONTINUED | OUTPATIENT
Start: 2017-10-18 | End: 2017-10-20 | Stop reason: HOSPADM

## 2017-10-18 RX ORDER — ACETAMINOPHEN 325 MG/1
650 TABLET ORAL EVERY 4 HOURS PRN
Status: DISCONTINUED | OUTPATIENT
Start: 2017-10-18 | End: 2017-10-20 | Stop reason: HOSPADM

## 2017-10-18 RX ORDER — MISOPROSTOL 200 UG/1
400 TABLET ORAL
Status: DISCONTINUED | OUTPATIENT
Start: 2017-10-18 | End: 2017-10-20 | Stop reason: HOSPADM

## 2017-10-18 RX ORDER — IBUPROFEN 400 MG/1
400-800 TABLET, FILM COATED ORAL EVERY 6 HOURS PRN
Status: DISCONTINUED | OUTPATIENT
Start: 2017-10-18 | End: 2017-10-20 | Stop reason: HOSPADM

## 2017-10-18 RX ORDER — DOCUSATE SODIUM 100 MG/1
100 CAPSULE, LIQUID FILLED ORAL 2 TIMES DAILY
Status: DISCONTINUED | OUTPATIENT
Start: 2017-10-18 | End: 2017-10-20 | Stop reason: HOSPADM

## 2017-10-18 RX ORDER — HYDROCORTISONE 2.5 %
CREAM (GRAM) TOPICAL 3 TIMES DAILY PRN
Status: DISCONTINUED | OUTPATIENT
Start: 2017-10-18 | End: 2017-10-20 | Stop reason: HOSPADM

## 2017-10-18 RX ORDER — HYDROCODONE BITARTRATE AND ACETAMINOPHEN 5; 325 MG/1; MG/1
1-2 TABLET ORAL EVERY 4 HOURS PRN
Status: DISCONTINUED | OUTPATIENT
Start: 2017-10-18 | End: 2017-10-20 | Stop reason: HOSPADM

## 2017-10-18 RX ORDER — NALOXONE HYDROCHLORIDE 0.4 MG/ML
.1-.4 INJECTION, SOLUTION INTRAMUSCULAR; INTRAVENOUS; SUBCUTANEOUS
Status: DISCONTINUED | OUTPATIENT
Start: 2017-10-18 | End: 2017-10-20 | Stop reason: HOSPADM

## 2017-10-18 RX ORDER — OXYTOCIN/0.9 % SODIUM CHLORIDE 30/500 ML
1-24 PLASTIC BAG, INJECTION (ML) INTRAVENOUS CONTINUOUS
Status: DISCONTINUED | OUTPATIENT
Start: 2017-10-18 | End: 2017-10-18

## 2017-10-18 RX ORDER — MISOPROSTOL 200 UG/1
200 TABLET ORAL
Status: COMPLETED | OUTPATIENT
Start: 2017-10-18 | End: 2017-10-18

## 2017-10-18 RX ORDER — OXYTOCIN 10 [USP'U]/ML
10 INJECTION, SOLUTION INTRAMUSCULAR; INTRAVENOUS
Status: DISCONTINUED | OUTPATIENT
Start: 2017-10-18 | End: 2017-10-20 | Stop reason: HOSPADM

## 2017-10-18 RX ADMIN — PREDNISONE 60 MG: 20 TABLET ORAL at 08:57

## 2017-10-18 RX ADMIN — MISOPROSTOL 200 MCG: 200 TABLET ORAL at 12:43

## 2017-10-18 RX ADMIN — MISOPROSTOL 200 MCG: 200 TABLET ORAL at 12:51

## 2017-10-18 RX ADMIN — FENTANYL CITRATE 50 MCG: 50 INJECTION, SOLUTION INTRAMUSCULAR; INTRAVENOUS at 04:14

## 2017-10-18 RX ADMIN — ALBUTEROL SULFATE 2.5 MG: 2.5 SOLUTION RESPIRATORY (INHALATION) at 20:22

## 2017-10-18 RX ADMIN — OXYTOCIN-SODIUM CHLORIDE 0.9% IV SOLN 30 UNIT/500ML 340 ML/HR: 30-0.9/5 SOLUTION at 13:06

## 2017-10-18 RX ADMIN — ALBUTEROL SULFATE 2.5 MG: 2.5 SOLUTION RESPIRATORY (INHALATION) at 15:13

## 2017-10-18 RX ADMIN — OMEPRAZOLE 40 MG: 20 CAPSULE, DELAYED RELEASE ORAL at 08:57

## 2017-10-18 RX ADMIN — HYDROCODONE BITARTRATE AND ACETAMINOPHEN 1 TABLET: 5; 325 TABLET ORAL at 19:01

## 2017-10-18 RX ADMIN — SODIUM CHLORIDE, POTASSIUM CHLORIDE, SODIUM LACTATE AND CALCIUM CHLORIDE 300 ML: 600; 310; 30; 20 INJECTION, SOLUTION INTRAVENOUS at 01:41

## 2017-10-18 RX ADMIN — SODIUM CHLORIDE, POTASSIUM CHLORIDE, SODIUM LACTATE AND CALCIUM CHLORIDE: 600; 310; 30; 20 INJECTION, SOLUTION INTRAVENOUS at 07:12

## 2017-10-18 RX ADMIN — GUAIFENESIN AND DEXTROMETHORPHAN 10 ML: 100; 10 SYRUP ORAL at 06:41

## 2017-10-18 RX ADMIN — OXYTOCIN-SODIUM CHLORIDE 0.9% IV SOLN 30 UNIT/500ML 3 MILLI-UNITS/MIN: 30-0.9/5 SOLUTION at 05:44

## 2017-10-18 RX ADMIN — METHYLERGONOVINE MALEATE 200 MCG: 0.2 INJECTION INTRAVENOUS at 12:43

## 2017-10-18 RX ADMIN — BUPROPION HYDROCHLORIDE 150 MG: 75 TABLET, FILM COATED ORAL at 08:57

## 2017-10-18 RX ADMIN — SODIUM CHLORIDE, POTASSIUM CHLORIDE, SODIUM LACTATE AND CALCIUM CHLORIDE: 600; 310; 30; 20 INJECTION, SOLUTION INTRAVENOUS at 01:09

## 2017-10-18 RX ADMIN — VALACYCLOVIR HYDROCHLORIDE 500 MG: 500 TABLET, FILM COATED ORAL at 08:57

## 2017-10-18 RX ADMIN — DOCUSATE SODIUM 100 MG: 100 CAPSULE, LIQUID FILLED ORAL at 22:13

## 2017-10-18 RX ADMIN — OXYTOCIN-SODIUM CHLORIDE 0.9% IV SOLN 30 UNIT/500ML 100 ML/HR: 30-0.9/5 SOLUTION at 13:59

## 2017-10-18 RX ADMIN — IBUPROFEN 800 MG: 800 TABLET ORAL at 13:32

## 2017-10-18 RX ADMIN — IBUPROFEN 800 MG: 800 TABLET ORAL at 22:13

## 2017-10-18 RX ADMIN — OXYTOCIN-SODIUM CHLORIDE 0.9% IV SOLN 30 UNIT/500ML 2 MILLI-UNITS/MIN: 30-0.9/5 SOLUTION at 05:00

## 2017-10-18 RX ADMIN — OXYTOCIN-SODIUM CHLORIDE 0.9% IV SOLN 30 UNIT/500ML 4 MILLI-UNITS/MIN: 30-0.9/5 SOLUTION at 11:42

## 2017-10-18 RX ADMIN — ALBUTEROL SULFATE 2.5 MG: 2.5 SOLUTION RESPIRATORY (INHALATION) at 07:58

## 2017-10-18 ASSESSMENT — PAIN DESCRIPTION - DESCRIPTORS: DESCRIPTORS: ACHING

## 2017-10-18 NOTE — PLAN OF CARE
In pt room, pt is crying in pain.  Dr Horton on the unit and updated she stated to have Jacinta RETANA bolus her epidural.  Jacinta MG on the unit and into room at this time to re-bolus epidural. 50mcg of fentanyl to epidural and 3ml bolus per epidural pump, per Jacinta MG.

## 2017-10-18 NOTE — PROGRESS NOTES
(S)  Comfortable again with epidural.  On low dose pitocin with adequate labor pattern.  (O)  Vitals:    10/18/17 0830 10/18/17 0835 10/18/17 0840 10/18/17 0845   BP:       Pulse:       Resp:       Temp:       TempSrc:       SpO2: 98% 98% 98% 97%     Cervix:   Membranes: meconium stained fluid   Dilation: 10   Effacement: 100%   Station:-1    Fetal Heart Rate Tracing: reactive and reassuring  Cat:1          Tocometer: IUPC/adequate    (A/P)  Doing well.  Will allow to labor down prior to pushing  given high station and dense epidural and reassuring fetal status.

## 2017-10-18 NOTE — PLAN OF CARE
Problem: Labor (Cervical Ripen, Induct, Augment) (Adult,Obstetrics,Pediatric)  Goal: Signs and Symptoms of Listed Potential Problems Will be Absent, Minimized or Managed (Labor)  Signs and symptoms of listed potential problems will be absent, minimized or managed by discharge/transition of care (reference Labor (Cervical Ripen, Induct, Augment) (Adult,Obstetrics,Pediatric) CPG).   Outcome: Improving  Labor Shift Note  Data: See Flowsheets activity for contraction and fetal documentation.   Vitals:     10/17/17 1903 10/17/17 2000 10/17/17 2121 10/17/17 2220   BP: 123/70 118/66   138/65   Pulse: 98 98   90   Resp: 18 18   18   Temp: 98.4  F (36.9  C) 99  F (37.2  C)   98.9  F (37.2  C)   TempSrc: Oral Oral   Oral   SpO2: 99% 99% 98% 97%   . Signs and symptoms of infection Present and Absent.    Complications in labor: Present and Absent. Support person mother, father, sister, and significant other present.  Interventions: Continue uterine/fetal assessment per orders and nursing discretion. Vital Signs per order set. Comfort measures/pain control/labor management: Pain medication- Epidural  Plan: Anticipate . Provide labor/coping assistance as needed by patient and support person.  Observe for and notify care provider of indications of progressing labor, need for pain medications, or signs of fetal/maternal compromise.    Comments:   Face to face report given at bedside with opportunity to observe patient.     Pippa BLOOD

## 2017-10-18 NOTE — PLAN OF CARE
Labor Shift Note  Data: See Flowsheets activity for contraction and fetal documentation.   Vitals:    10/18/17 1305 10/18/17 1340 10/18/17 1345 10/18/17 1504   BP: 121/59  121/70 127/75   Pulse:       Resp:  16  16   Temp:  98.9  F (37.2  C)  98.1  F (36.7  C)   TempSrc:  Oral  Oral   SpO2: 93% 94%  97%   . Signs and symptoms of infection Absent.    Complications in labor: Present and Absent. Support person Efren and mother liane present.  Interventions: Continue uterine/fetal assessment per orders and nursing discretion. Vital Signs per order set. Comfort measures/pain control/labor management: Frequent position changes to facilitate labor with epidural anesthesia.  Labor down   Anticipate   Plan: Anticipate . Provide labor/coping assistance as needed by patient and support person.  Observe for and notify care provider of indications of progressing labor, need for pain medications, or signs of fetal/maternal compromise.

## 2017-10-18 NOTE — PLAN OF CARE
Problem: Patient Care Overview  Goal: Plan of Care/Patient Progress Review  Outcome: Improving    10/18/17 1600   OTHER   Plan Of Care Reviewed With patient   Plan of Care Review   Progress improving         Assessments completed as charted. B/P: 127/75, T: 98.1, P: 90, R: 16. Rates pain: 5/10. Voiding without difficulty. Fundus: Midline, firm . Lochia: Light. Activity: encouraged. Infant feeding: Breast feeding going well.      LATCH Score:   Latch: 2 - Good Latch  Audible Swallowin - Few  Type of Nipple: (Breast/Nipple) 2 - Everted  Comfort: 2 - Soft, Nontender  Hold: 1 - Min. Assist   Total LATCH Score: 8     Postpartum breastfeeding assessment completed and education provided, see Patient Education Activity.  Items included in the education are:     proper positioning and latch    effectiveness of feeding    manual expression    handling and storing breastmilk    maintenance of breastfeeding for the first 6 months    sign/symptoms of infant feeding issues requiring referral to qualified health care provider  Postpartum care education provided, see Patient Education activity. Patient denies needs. Will monitor.  Ratna Lim           Problem: Postpartum (Vaginal Delivery) (Adult,Obstetrics,Pediatric)  Goal: Signs and Symptoms of Listed Potential Problems Will be Absent, Minimized or Managed (Postpartum)  Signs and symptoms of listed potential problems will be absent, minimized or managed by discharge/transition of care (reference Postpartum (Vaginal Delivery) (Adult,Obstetrics,Pediatric) CPG).   Outcome: Improving    10/18/17 1600   Postpartum (Vaginal Delivery)   Problems Assessed (Postpartum Vaginal Delivery) all   Problems Present (Postpartum Vag Deliv) none

## 2017-10-18 NOTE — PLAN OF CARE
Face to face report given with opportunity to observe patient.    Report given to Ratna PERDUE.     Mounika Winston   10/18/2017  7:21 AM

## 2017-10-18 NOTE — PLAN OF CARE
Problem: Patient Care Overview  Goal: Plan of Care/Patient Progress Review  Outcome: Improving  Labor Shift Note  Data: See Flowsheets activity for contraction and fetal documentation.   Vitals:     10/17/17 2000 10/17/17 2121 10/17/17 2220 10/17/17 2300   BP: 118/66   138/65 117/60   Pulse: 98   90     Resp: 18   18     Temp: 99  F (37.2  C)   98.9  F (37.2  C) 98.8  F (37.1  C)   TempSrc: Oral   Oral Oral   SpO2: 99% 98% 97% 99%   . Signs and symptoms of infection Absent.    Complications in labor: Absent. Support person present.  Interventions: Continue uterine/fetal assessment per orders and nursing discretion. Vital Signs per order set. Comfort measures/pain control/labor management: Frequent position changes to facilitate labor with epidural anesthesia.  Plan: Anticipate . Provide labor/coping assistance as needed by patient and support person.  Observe for and notify care provider of indications of progressing labor, need for pain medications, or signs of fetal/maternal compromise.

## 2017-10-18 NOTE — PHARMACY
[facility]    Pharmacy      Antimicrobial Stewardship Note     Current antimicrobial therapy:  Anti-infectives (Future)    Start     Dose/Rate Route Frequency Ordered Stop    10/14/17 2030  valACYclovir (VALTREX) tablet 500 mg      500 mg Oral DAILY 10/14/17 2020            Indication: Herpes simplex prophylaxis     Pertinent labs:  Creatinine   Creatinine   Date Value Ref Range Status   10/12/2017 0.63 0.52 - 1.04 mg/dL Final   10/02/2017 0.61 0.52 - 1.04 mg/dL Final   09/27/2017 0.57 0.52 - 1.04 mg/dL Final     WBC   WBC   Date Value Ref Range Status   10/14/2017 8.3 4.0 - 11.0 10e9/L Final   10/12/2017 8.5 4.0 - 11.0 10e9/L Final   10/02/2017 12.0 (H) 4.0 - 11.0 10e9/L Final     ANC No components found for: ANC     Culture Results: none performed     Recommendations/Interventions:  1. No recommendations at this time; current antimicrobial pharmacotherapy is appropriate    Cindi Owen  October 18, 2017

## 2017-10-18 NOTE — PLAN OF CARE
Pt crying in pain, Dr Horton paged and updated and requested CRNA be notified to come and bolus pt's epidural.  Pt updated of plan.  Pt appears very uncomfortable and is unsure if she feels pressure or urge to push.  SVE done and pt as a anterior rim of cervix, difficult exam so Pippa URIOSTEGUI RN rechecked pt and agreed.  Dr Horton paged and updated of pt's status and stated she is fine with pt getting epidural bolus still.  Pt aware.  Dr Williamson MDA her and bolused epidural and set epidural to include the patient controlled doses.

## 2017-10-19 LAB — HGB BLD-MCNC: 9.6 G/DL (ref 11.7–15.7)

## 2017-10-19 PROCEDURE — 85018 HEMOGLOBIN: CPT | Performed by: OBSTETRICS & GYNECOLOGY

## 2017-10-19 PROCEDURE — 94640 AIRWAY INHALATION TREATMENT: CPT | Mod: 76

## 2017-10-19 PROCEDURE — 40000275 ZZH STATISTIC RCP TIME EA 10 MIN

## 2017-10-19 PROCEDURE — 25000125 ZZHC RX 250: Performed by: OBSTETRICS & GYNECOLOGY

## 2017-10-19 PROCEDURE — 12000027 ZZH R&B OB

## 2017-10-19 PROCEDURE — 12000031 ZZH R&B OB CRITICAL

## 2017-10-19 PROCEDURE — 25000132 ZZH RX MED GY IP 250 OP 250 PS 637: Performed by: NURSE PRACTITIONER

## 2017-10-19 PROCEDURE — 94640 AIRWAY INHALATION TREATMENT: CPT

## 2017-10-19 PROCEDURE — 25000132 ZZH RX MED GY IP 250 OP 250 PS 637: Performed by: OBSTETRICS & GYNECOLOGY

## 2017-10-19 PROCEDURE — 36415 COLL VENOUS BLD VENIPUNCTURE: CPT | Performed by: OBSTETRICS & GYNECOLOGY

## 2017-10-19 RX ORDER — FERROUS SULFATE 325(65) MG
325 TABLET ORAL DAILY
Status: DISCONTINUED | OUTPATIENT
Start: 2017-10-19 | End: 2017-10-20 | Stop reason: HOSPADM

## 2017-10-19 RX ORDER — GUAIFENESIN/DEXTROMETHORPHAN 100-10MG/5
10 SYRUP ORAL EVERY 4 HOURS PRN
Status: DISCONTINUED | OUTPATIENT
Start: 2017-10-19 | End: 2017-10-20 | Stop reason: HOSPADM

## 2017-10-19 RX ADMIN — HYDROCODONE BITARTRATE AND ACETAMINOPHEN 1 TABLET: 5; 325 TABLET ORAL at 15:02

## 2017-10-19 RX ADMIN — VALACYCLOVIR HYDROCHLORIDE 500 MG: 500 TABLET, FILM COATED ORAL at 09:35

## 2017-10-19 RX ADMIN — OMEPRAZOLE 40 MG: 20 CAPSULE, DELAYED RELEASE ORAL at 09:35

## 2017-10-19 RX ADMIN — ALBUTEROL SULFATE 2.5 MG: 2.5 SOLUTION RESPIRATORY (INHALATION) at 22:54

## 2017-10-19 RX ADMIN — Medication 2 TABLET: at 09:42

## 2017-10-19 RX ADMIN — BUPROPION HYDROCHLORIDE 150 MG: 75 TABLET, FILM COATED ORAL at 09:35

## 2017-10-19 RX ADMIN — ALBUTEROL SULFATE 2.5 MG: 2.5 SOLUTION RESPIRATORY (INHALATION) at 16:30

## 2017-10-19 RX ADMIN — ALBUTEROL SULFATE 2.5 MG: 2.5 SOLUTION RESPIRATORY (INHALATION) at 02:56

## 2017-10-19 RX ADMIN — IRON 325 MG: 65 TABLET ORAL at 09:35

## 2017-10-19 RX ADMIN — IBUPROFEN 800 MG: 800 TABLET ORAL at 05:54

## 2017-10-19 RX ADMIN — ALBUTEROL SULFATE 2.5 MG: 2.5 SOLUTION RESPIRATORY (INHALATION) at 11:32

## 2017-10-19 RX ADMIN — DOCUSATE SODIUM 100 MG: 100 CAPSULE, LIQUID FILLED ORAL at 09:35

## 2017-10-19 RX ADMIN — DOCUSATE SODIUM 100 MG: 100 CAPSULE, LIQUID FILLED ORAL at 21:03

## 2017-10-19 RX ADMIN — HYDROCODONE BITARTRATE AND ACETAMINOPHEN 2 TABLET: 5; 325 TABLET ORAL at 19:55

## 2017-10-19 RX ADMIN — IBUPROFEN 800 MG: 800 TABLET ORAL at 12:02

## 2017-10-19 RX ADMIN — PREDNISONE 40 MG: 20 TABLET ORAL at 09:35

## 2017-10-19 NOTE — ANESTHESIA POSTPROCEDURE EVALUATION
Patient: Lorena Echevarria    * No procedures listed *    Diagnosis:* No pre-op diagnosis entered *  Diagnosis Additional Information: No value filed.    Anesthesia Type:  No value filed.    Note:  Anesthesia Post Evaluation    Patient location during evaluation: Bedside  Patient participation: Able to fully participate in evaluation  Level of consciousness: awake  Pain management: adequate  Airway patency: patent  Cardiovascular status: acceptable  Respiratory status: acceptable  Hydration status: acceptable  PONV: none     Anesthetic complications: None          Last vitals:  Vitals:    10/18/17 2304 10/19/17 0711 10/19/17 0800   BP: 117/72  126/69   Pulse:      Resp: 16 14 16   Temp: 97.8  F (36.6  C)  97.4  F (36.3  C)   SpO2:            Electronically Signed By: RUDY Loyd CRNA  October 19, 2017  10:03 AM

## 2017-10-19 NOTE — PLAN OF CARE
Face to face report given with opportunity to observe patient.    Report given to Katie Lim   10/19/2017  4:26 PM

## 2017-10-19 NOTE — PLAN OF CARE
Face to face report given with opportunity to observe patient.    Report given to Ivette Lim   10/18/2017  7:24 PM

## 2017-10-19 NOTE — PROGRESS NOTES
DAILY NOTE - POSTPARTUM DAY 1    SUBJECTIVE:  No complaints    Tolerating a regular diet? YES  Ambulating? YES  Voiding without difficulty? Yes  Lochia? minimal  Breastfeeding:  yes      OBJECTIVE:  Vitals:    10/18/17 1504 10/18/17 2304 10/19/17 0711 10/19/17 0800   BP: 127/75 117/72  126/69   Pulse:       Resp: 16 16 14 16   Temp: 98.1  F (36.7  C) 97.8  F (36.6  C)  97.4  F (36.3  C)   TempSrc: Oral Oral  Oral   SpO2: 97%          Constitutional: Alert and oriented, no acute distress    Abdomen: Uterine fundus is firm, non-tender and at the level of the umbilicus    Extremeties:  trace edema, neg Ja's sign    LABS:  Hemoglobin   Date Value Ref Range Status   10/19/2017 9.6 (L) 11.7 - 15.7 g/dL Final   10/14/2017 11.7 11.7 - 15.7 g/dL Final     Lab Results   Component Value Date    RUBELLAABIGG non immune 05/23/2017      Lab Results   Component Value Date    ABO A 10/14/2017           Lab Results   Component Value Date    RH Pos 10/14/2017        ASSESSMENT:  Postpartum day #1  Mild postpartum anemia due to blood loss and dilution  Normal spontaneous vaginal delivery  Doing well.     PLAN:   Teaching and breastfeeding support today.  Plan discharge tomorrow

## 2017-10-19 NOTE — PROGRESS NOTES
"Met with Lorena and her mother Alicia as requested by Dr. Severino. Majority of information came from Lorena's mother, Alicia.  Lorena looked to Liane for answers to most questions.  Lorena is a 22 year old female, who lives at home with her parents and siblings.  They shared they have all of the supplies that baby will need.  Alicia shared that have applied for the financial support that she would need, WIC and insurance. Lorena plans to see Kaya Servin for primary care.  Baby to see Dr. Jefferson.  Lorena and Fidel deny concerns about mood, even given her history.  Alicia shared that \"it was behavioral.\"  Lorena and Alicia believe that she has plenty of support in the home.  Shared that father of baby was not going to be involved with baby.  Appeared to be a stressful topic, neither wished to share any further.  Reminded them about available resources ([place]ounty support, public health nurse).  Both declined at this time.  CTS will remain available for resources.  Unable to update Radha PERDUE but did request that Deana PERDUE pass on information.  No concerns identified by writer at this time.  "

## 2017-10-19 NOTE — PLAN OF CARE
Problem: Postpartum (Vaginal Delivery) (Adult,Obstetrics,Pediatric)  Goal: Signs and Symptoms of Listed Potential Problems Will be Absent, Minimized or Managed (Postpartum)  Signs and symptoms of listed potential problems will be absent, minimized or managed by discharge/transition of care (reference Postpartum (Vaginal Delivery) (Adult,Obstetrics,Pediatric) CPG).   Outcome: Improving  Assessments completed as charted. B/P: 117/72, T: 97.8, P: 90, R: 16. Rates pain: 6/10. Ibuprofen and Norco providing adequate pain relief. Voiding without difficulty. Fundus: Midline firm. Lochia: Light. Activity: moving with difficulty due to perineal pain. Infant feeding: Breast feeding going well.      Postpartum breastfeeding assessment completed and education provided, see Patient Education Activity.  Items included in the education are:     proper positioning and latch    effectiveness of feeding    manual expression    handling and storing breastmilk    maintenance of breastfeeding for the first 6 months    sign/symptoms of infant feeding issues requiring referral to qualified health care provider  Postpartum care education provided, see Patient Education activity. Patient denies needs. Will monitor.  Ivette Lima

## 2017-10-19 NOTE — PHARMACY
[facility]    Pharmacy      Antimicrobial Stewardship Note     Current antimicrobial therapy:  Anti-infectives (Future)    Start     Dose/Rate Route Frequency Ordered Stop    10/14/17 2030  valACYclovir (VALTREX) tablet 500 mg      500 mg Oral DAILY 10/14/17 2020            Indication: Herpes simplex prophylaxis     Pertinent labs:  Creatinine   Creatinine   Date Value Ref Range Status   10/12/2017 0.63 0.52 - 1.04 mg/dL Final   10/02/2017 0.61 0.52 - 1.04 mg/dL Final   09/27/2017 0.57 0.52 - 1.04 mg/dL Final     WBC   WBC   Date Value Ref Range Status   10/14/2017 8.3 4.0 - 11.0 10e9/L Final   10/12/2017 8.5 4.0 - 11.0 10e9/L Final   10/02/2017 12.0 (H) 4.0 - 11.0 10e9/L Final     ANC No components found for: ANC     Culture Results: None performed.     Recommendations/Interventions:  1. No recommendations at this time; current antimicrobial pharmacotherapy is appropriate.      Jay Jay Dove RP  October 19, 2017

## 2017-10-20 VITALS
HEART RATE: 92 BPM | DIASTOLIC BLOOD PRESSURE: 75 MMHG | RESPIRATION RATE: 16 BRPM | SYSTOLIC BLOOD PRESSURE: 119 MMHG | OXYGEN SATURATION: 98 % | TEMPERATURE: 98.7 F

## 2017-10-20 PROCEDURE — 94640 AIRWAY INHALATION TREATMENT: CPT

## 2017-10-20 PROCEDURE — 40000275 ZZH STATISTIC RCP TIME EA 10 MIN

## 2017-10-20 PROCEDURE — 25000132 ZZH RX MED GY IP 250 OP 250 PS 637: Performed by: OBSTETRICS & GYNECOLOGY

## 2017-10-20 PROCEDURE — 25000125 ZZHC RX 250: Performed by: OBSTETRICS & GYNECOLOGY

## 2017-10-20 PROCEDURE — 25000132 ZZH RX MED GY IP 250 OP 250 PS 637: Performed by: NURSE PRACTITIONER

## 2017-10-20 PROCEDURE — 94640 AIRWAY INHALATION TREATMENT: CPT | Mod: 76

## 2017-10-20 RX ADMIN — ALBUTEROL SULFATE 2.5 MG: 2.5 SOLUTION RESPIRATORY (INHALATION) at 05:07

## 2017-10-20 RX ADMIN — IBUPROFEN 800 MG: 800 TABLET ORAL at 07:50

## 2017-10-20 RX ADMIN — VALACYCLOVIR HYDROCHLORIDE 500 MG: 500 TABLET, FILM COATED ORAL at 09:48

## 2017-10-20 RX ADMIN — ALBUTEROL SULFATE 2.5 MG: 2.5 SOLUTION RESPIRATORY (INHALATION) at 11:10

## 2017-10-20 RX ADMIN — Medication 2 TABLET: at 09:53

## 2017-10-20 RX ADMIN — PREDNISONE 40 MG: 20 TABLET ORAL at 09:48

## 2017-10-20 RX ADMIN — IRON 325 MG: 65 TABLET ORAL at 09:48

## 2017-10-20 RX ADMIN — HYDROCODONE BITARTRATE AND ACETAMINOPHEN 2 TABLET: 5; 325 TABLET ORAL at 02:05

## 2017-10-20 RX ADMIN — BUPROPION HYDROCHLORIDE 150 MG: 75 TABLET, FILM COATED ORAL at 09:48

## 2017-10-20 RX ADMIN — DOCUSATE SODIUM 100 MG: 100 CAPSULE, LIQUID FILLED ORAL at 09:48

## 2017-10-20 RX ADMIN — OMEPRAZOLE 40 MG: 20 CAPSULE, DELAYED RELEASE ORAL at 09:48

## 2017-10-20 NOTE — DISCHARGE SUMMARY
Discharge Summary    Lorena Echevarria MRN# [id number]   YOB: 1995 Age: 22 year old     Date of Admission:  10/14/2017  Date of Discharge:  10/20/2017  Admitting Physician:  Fidel Severino MD  Discharge Physician:  Mony Fernández NP  Discharging Service:  Obstetrics and Gynecology     Primary Provider: No Ref-Primary, Physician          Admission Diagnoses:   Gestational diabetes  Normal labor and delivery          Discharge Diagnosis:     Term intrauterine pregnancy, delivered             Discharge Disposition:   Discharged to home           Condition on Discharge:   Discharge condition: Stable   Discharge vitals: Blood pressure 119/75, pulse 92, temperature 98.7  F (37.1  C), temperature source Oral, resp. rate 16, SpO2 98 %.   Code status on discharge: Full Code           Procedures / Labs / Imaging:             Medications Prior to Admission:     Prescriptions Prior to Admission   Medication Sig Dispense Refill Last Dose     guaiFENesin-dextromethorphan (ROBITUSSIN DM) 100-10 MG/5ML syrup Take 10 mLs by mouth every 4 hours as needed for cough 560 mL 0 10/14/2017 at Unknown time     omeprazole (PRILOSEC) 40 MG capsule Take 1 capsule (40 mg) by mouth every morning 30 capsule 0 Past Week at Unknown time     buPROPion (WELLBUTRIN) 75 MG tablet Take 2 tablets (150 mg) by mouth daily 90 tablet 0 10/13/2017 at Unknown time     Prenatal MV-Min-FA-Omega-3 (PRENATAL GUMMIES/DHA & FA) 0.4-32.5 MG CHEW Take 1 chew tab by mouth daily 90 tablet 3 10/13/2017 at Unknown time     blood glucose monitoring (GABE CONTOUR NEXT) test strip Use to test blood sugar 4 times daily or as directed. 200 each 4 10/13/2017 at Unknown time     blood glucose monitoring (GABE MICROLET) lancets Use to test blood sugar 4 times daily or as directed. 100 each 1 10/13/2017 at Unknown time     oxyCODONE-acetaminophen (PERCOCET) 5-325 MG per tablet Take 1-2 tablets by mouth every 6 hours as needed for moderate to severe pain (every 4-6  hrs prn) 30 tablet 0 10/13/2017 at Unknown time     cyclobenzaprine (FLEXERIL) 10 MG tablet Take 0.5-1 tablets (5-10 mg) by mouth 3 times daily as needed for muscle spasms 30 tablet 1 10/13/2017 at Unknown time     valACYclovir (VALTREX) 500 MG tablet Take 1 tablet (500 mg) by mouth daily 30 tablet 1 10/13/2017 at Unknown time     blood glucose monitoring (ONE TOUCH ULTRA) test strip Use to test blood sugar 4 times daily. 100 each 5 10/13/2017 at Unknown time     [DISCONTINUED] insulin isophane human (HUMULIN N PEN) 100 UNIT/ML injection Inject 10 Units Subcutaneous At Bedtime (Patient taking differently: Inject 15 Units Subcutaneous At Bedtime )   10/13/2017 at Unknown time             Discharge Medications:     Current Discharge Medication List      CONTINUE these medications which have NOT CHANGED    Details   guaiFENesin-dextromethorphan (ROBITUSSIN DM) 100-10 MG/5ML syrup Take 10 mLs by mouth every 4 hours as needed for cough  Qty: 560 mL, Refills: 0    Associated Diagnoses: Encounter for triage in pregnant patient      omeprazole (PRILOSEC) 40 MG capsule Take 1 capsule (40 mg) by mouth every morning  Qty: 30 capsule, Refills: 0    Associated Diagnoses: Encounter for triage in pregnant patient      buPROPion (WELLBUTRIN) 75 MG tablet Take 2 tablets (150 mg) by mouth daily  Qty: 90 tablet, Refills: 0    Associated Diagnoses: Adjustment disorder with depressed mood      Prenatal MV-Min-FA-Omega-3 (PRENATAL GUMMIES/DHA & FA) 0.4-32.5 MG CHEW Take 1 chew tab by mouth daily  Qty: 90 tablet, Refills: 3    Associated Diagnoses: High-risk first pregnancy of young woman, third trimester      !! blood glucose monitoring (GABE CONTOUR NEXT) test strip Use to test blood sugar 4 times daily or as directed.  Qty: 200 each, Refills: 4    Associated Diagnoses: Insulin controlled gestational diabetes mellitus (GDM) in third trimester      blood glucose monitoring (GABE MICROLET) lancets Use to test blood sugar 4 times daily  or as directed.  Qty: 100 each, Refills: 1    Associated Diagnoses: Insulin controlled gestational diabetes mellitus (GDM) in third trimester      oxyCODONE-acetaminophen (PERCOCET) 5-325 MG per tablet Take 1-2 tablets by mouth every 6 hours as needed for moderate to severe pain (every 4-6 hrs prn)  Qty: 30 tablet, Refills: 0    Associated Diagnoses: Acute right-sided low back pain without sciatica      cyclobenzaprine (FLEXERIL) 10 MG tablet Take 0.5-1 tablets (5-10 mg) by mouth 3 times daily as needed for muscle spasms  Qty: 30 tablet, Refills: 1    Associated Diagnoses: Acute right-sided low back pain without sciatica      valACYclovir (VALTREX) 500 MG tablet Take 1 tablet (500 mg) by mouth daily  Qty: 30 tablet, Refills: 1    Associated Diagnoses: Herpes simplex virus (HSV) infection of vagina      !! blood glucose monitoring (ONE TOUCH ULTRA) test strip Use to test blood sugar 4 times daily.  Qty: 100 each, Refills: 5    Associated Diagnoses: Gestational diabetes mellitus (GDM) in third trimester controlled on oral hypoglycemic drug       !! - Potential duplicate medications found. Please discuss with provider.      STOP taking these medications       insulin isophane human (HUMULIN N PEN) 100 UNIT/ML injection Comments:   Reason for Stopping:                     Consultations:   No consultations were requested during this admission             Brief History of Illness:   Lorena Echevarria is a 22 year old female who was admitted for induction of labor at term.  GDM. Poor compliance with care.           Hospital Course:    .   Uncomplicated postpartum course.  Discharged on ppd# 2               Significant Results:   None             Pending Results:   None           Discharge Instructions and Follow-Up:   Discharge diet: Regular   Discharge activity: Activity as tolerated.  Pelvic rest for 6 weeks   Discharge follow-up: Follow up for postpartum exam in 6 weeks

## 2017-10-20 NOTE — PROGRESS NOTES
Checked in with Lorena as she and her baby are discharging home today.  She states that her mom will be here about one to pick her and baby up.  She shared that she feels that her parents provide her with positive support.  She states that either her mom or dad will be at home with her to help take care of her baby.  She shared that father of baby works full time at a [employer]. They have a good, positive relationship.  She states that he plans to be involved with baby.  Shared that some of the stress is legal concerns as he is from [facility] originally.  Did discuss further about her mental health.  She states that she is feeling a little depressed but nothing unmanageable. She was open to a list of local mental health providers for future reference.  Also provided her with information on parent support outreach program and public health nurse information.  She denies additional questions or concerns.

## 2017-10-20 NOTE — DISCHARGE INSTRUCTIONS
After a Vaginal Birth  After having a baby, your body may be very tired. It can take time to recover from a vaginal delivery. You may stay in the hospital or birth center from 1 to 4 days. In some cases, you may be able to go home the same day.    Right after the delivery  Your temperature and blood pressure will be taken until they are stable. A nurse or other healthcare provider will observe you as you rest. You may have afterbirth pains. These are cramps caused by the uterus shrinking. Sanitary pads are used to soak up the discharge of the uterine lining. To make sure that you aren t bleeding too much, the pad will be checked. And the firmness of your uterus will be checked. To do this, a nurse will gently push down on your stomach. If you had anesthesia, you ll be watched closely until you can feel and move your toes. If you have perineal pain (pain between the vagina and anus), an ice pack can help.   care  While still in the hospital or birth center, you ll learn how to hold and feed your baby. You will also be given instructions on how to care for your baby. This includes bathing and feeding.   Preparing to go home  You may be anxious to go home as soon as possible. Before you and your baby go home, a healthcare provider will check to be sure you are healthy enough to take care of your baby and yourself. You re ready to go home when:    You can walk to the bathroom and use the bathroom without help.    You can eat solid food and swallow pills (if needed).    You have no sign of infection or other health problems, including fever.     You have adequate pain control.    Your bleeding isn't excessive.    You are able to care for your  and are emotionally stable.  Before leaving the hospital or birth center, you ll be given written instructions for home self-care after vaginal delivery. Be sure to follow these instructions carefully. If you have questions or concerns, talk about them now.  If you  have stitches  You may have received stitches in the skin near your vagina. The stitches might have closed an episiotomy (an incision that enlarges the opening of the vagina). Or you may have needed stitches to repair torn skin. Either way, your stitches should dissolve within weeks. Until then, you can help reduce discomfort, aid healing, and reduce your risk of infection by keeping the stitches clean. These tips can help:    Gently wipe from front to back after you urinate or have a bowel movement.    After wiping, spray warm water on the area. Or you can have a sitz bath. This means sitting in a tub with a few inches of water in it. Then pat the area dry or use a hairdryer on a cool setting.    Do not use soap or any solution except water on the area.    You can take a shower unless told not to.    Change sanitary pads at least every 2 to 4 hours.    Place cold or heat packs on the area as directed by your healthcare providers or nurses. Keep a thin towel between the pack and your skin.    Sit on firm seats so the stitches pull less.   follow-up  Schedule a  follow-up exam with your healthcare provider for about 6 weeks after delivery. During this exam, your uterus and vaginal area will be checked. Contact your healthcare provider if you think you or your baby are having any problems.  When to call your healthcare provider  Call your health care provider right away if you have:    A fever of 100.4 F (38.0 C) or higher    Bleeding that requires a new sanitary pad after an hour, or large blood clots    Pain in your vagina that gets worse and isn't relieved with medicine    Swelling, discharge, or increased pain from vaginal tear or episiotomy    Burning, pain, red streaks, or lumpy areas in your breasts that may be accompanied by flu-like symptoms    Cracks, blisters, or blood on your nipples    Burning or pain when you urinate    Nausea or vomiting    Dizziness or fainting    Feelings of extreme  "sadness or anxiety, or a feeling that you don t want to be with your baby    Abdominal pain that isn t relieved with medicine    Vaginal discharge that has a bad odor    No bowel movement for 5 days    Painful urination, orinability to control urination    Redness, warmth, or pain in the lower leg    Chest pain   Date Last Reviewed: 8/2/2015 2000-2017 [employer]. [address] [place] [address], [place] [address]. All rights reserved. This information is not intended as a substitute for professional medical care. Always follow your healthcare professional's instructions.        BREASTFEEDING TIPS  1. Breastfeed every 2-4 hours. If your baby is sleepy - use breast compression, push on chin to \"start up\" baby, switch breasts, undress to diaper and wake before relatching.   Some babies \"cluster\" feed every 1 hour for a while- this is normal. Feed your baby whenever he/she is awake-  even if every hour for a while. This frequent feeding will help you make more milk and encourage your baby to sleep for longer stretches later in the evening or night.    - Position your baby close to you with pillows so he/she is facing you -belly to belly laying horizontally across your lap at the level of your breast and looking a bit \"upwards\" to your breast   -One hand holds the baby's neck behind the ears and the other hand holds your breast  -Baby's nose should start out pointing to your nipple before latching  - Hold your breast in a \"sandwich\" position by gently squeezing your breast in an oval shape and make sure your hands are not covering the areola  This \"nipple sandwich\" will make it easier for your breast to fit inside the baby's mouth-making latching more comfortable for you and baby and preventing sore nipples. Your baby should take a \"mouthful\" of breast!  - You may want to use hand expression to \"prime the pump\" and get a drip of milk out on your nipple to wake baby   (see website: " "newborns.[email or web address]/Breastfeeding/HandExpression.html)  - Swipe your nipple on baby's upper lip and wait for a BIG open mouth  - YOU bring baby to the breast (hold baby's neck with your fingers just below the ears) and bring baby's head to the breast--leading with the chin.  Try to avoid pushing your breast into baby's mouth- bring baby to you instead!  - Aim to get your baby's bottom lip LOW DOWN ON AREOLA (baby's upper lip just needs to \"clear\" the nipple) .   Your baby should latch onto the areola and NOT just the nipple. That way your baby gets more milk and you don't get sore nipples!      Useful web sites:  Www.infantrisk.com  Www.aap.org  Www.ibreastfeeding.com  Www.health.state.[place]    No driving while taking narcotics.  No tampons or intercourse until after 6 week postpartum exam.  See Mony Fernández NP in 2 weeks and Mony or Dr. Severino in 6 weeks.   "

## 2017-10-20 NOTE — PROGRESS NOTES
DAILY NOTE - POSTPARTUM DAY 2    SUBJECTIVE:  No complaints    Tolerating a regular diet? YES  Ambulating? YES  Voiding without difficulty? Yes  Lochia? minimal  Breastfeeding:  yes      OBJECTIVE:  Vitals:    10/19/17 2254 10/19/17 2324 10/20/17 0507 10/20/17 0735   BP:  124/68  119/75   Pulse:  111  92   Resp:  16  16   Temp:  98.4  F (36.9  C)  98.7  F (37.1  C)   TempSrc:  Oral  Oral   SpO2: 98% 97% 98%        Constitutional: Alert and oriented, no acute distress    Abdomen: Uterine fundus is firm, non-tender and at the level of the umbilicus    Extremeties:  neg edema, neg Ja's sign    LABS:  Hemoglobin   Date Value Ref Range Status   10/19/2017 9.6 (L) 11.7 - 15.7 g/dL Final   10/14/2017 11.7 11.7 - 15.7 g/dL Final     Lab Results   Component Value Date    RUBELLAABIGG non immune 05/23/2017      Lab Results   Component Value Date    ABO A 10/14/2017           Lab Results   Component Value Date    RH Pos 10/14/2017        ASSESSMENT:  Post-partum day #2  Normal spontaneous vaginal delivery  Breastfeeding well.  Doing well.     PLAN:   Discharge when ready

## 2017-10-20 NOTE — PLAN OF CARE
"Problem: Postpartum (Vaginal Delivery) (Adult,Obstetrics,Pediatric)  Goal: Signs and Symptoms of Listed Potential Problems Will be Absent, Minimized or Managed (Postpartum)  Signs and symptoms of listed potential problems will be absent, minimized or managed by discharge/transition of care (reference Postpartum (Vaginal Delivery) (Adult,Obstetrics,Pediatric) CPG).   Outcome: No Change  Patient has been c/o cramping pain and pain in perineum, she's rating pain 4-7/10. She requested 2 Norco at 1950, she states this brought her pain down to 5/10 which she also stated is tolerable. Patient has declined offers for additional pain medication. VSS, afebrile. Patient states she's having spotting lochia. Blood shot eyes from coughing. Left eye does have some bruising. Coughing is incessant at times to the point where patient has an emesis, she also states that she does get short of breath with coughing. Robitussin DM available. Occasional clear to \"mucousy\" sputum per patient. Lung sounds had rhonchi in left base, clear in right base. Neb treatments ordered every 6 hours. Oral intake adequate. Patient states she's voided 3 times since 1500. States she's had a BM today. Breastfeeding baby. Has been affectionate and caring with baby. Dad not present this shift.    Face to face report given with opportunity to observe patient.    Report given to Susanna Marti   10/19/2017  10:45 PM      "

## 2017-10-20 NOTE — PLAN OF CARE
Problem: Postpartum (Vaginal Delivery) (Adult,Obstetrics,Pediatric)  Goal: Signs and Symptoms of Listed Potential Problems Will be Absent, Minimized or Managed (Postpartum)  Signs and symptoms of listed potential problems will be absent, minimized or managed by discharge/transition of care (reference Postpartum (Vaginal Delivery) (Adult,Obstetrics,Pediatric) CPG).   Outcome: Improving    10/20/17 0410   Postpartum (Vaginal Delivery)   Problems Assessed (Postpartum Vaginal Delivery) all   Problems Present (Postpartum Vag Deliv) none   Assessments completed as charted. B/P: 124/68, T: 98.4, P: 111, R: 16. Rates pain: 3/10 prn medications as reqested. Voiding without difficulty. Fundus: Midline firm u/2. Lochia: Light. Activity: unrestricted with out pain  and moving with difficulty due to perineal pain. Infant feeding: Breast feeding going well.      LATCH Score:   Latch: 2 - Good Latch  Audible Swallowin - Spontaneous & frequent  Type of Nipple: (Breast/Nipple) 2 - Everted  Comfort: 2 - Soft, Nontender  Hold: 2 - No Assist   Total LATCH Score: 10     Postpartum breastfeeding assessment completed and education provided, see Patient Education Activity.  Items included in the education are:     proper positioning and latch    effectiveness of feeding    manual expression    handling and storing breastmilk    maintenance of breastfeeding for the first 6 months    sign/symptoms of infant feeding issues requiring referral to qualified health care provider  Postpartum care education provided, see Patient Education activity. Patient denies needs. Will monitor.  Susanna Markham           Face to face report given with opportunity to observe patient.    Report given to NETTE Garcia   10/20/2017  7:34 AM

## 2017-10-20 NOTE — PROGRESS NOTES
Sp02 98% on r/a. Albuterol nebs given as ordered this shift. BS clear/dim/slightly coarse in L base.

## 2017-10-20 NOTE — PLAN OF CARE
Problem: Patient Care Overview  Goal: Plan of Care/Patient Progress Review  Outcome: Adequate for Discharge Date Met:  10/20/17    10/20/17 1303   OTHER   Plan Of Care Reviewed With patient   Plan of Care Review   Progress improving         Problem: Postpartum (Vaginal Delivery) (Adult,Obstetrics,Pediatric)  Goal: Signs and Symptoms of Listed Potential Problems Will be Absent, Minimized or Managed (Postpartum)  Signs and symptoms of listed potential problems will be absent, minimized or managed by discharge/transition of care (reference Postpartum (Vaginal Delivery) (Adult,Obstetrics,Pediatric) CPG).   Outcome: Adequate for Discharge Date Met:  10/20/17    10/20/17 1303   Postpartum (Vaginal Delivery)   Problems Assessed (Postpartum Vaginal Delivery) all   Problems Present (Postpartum Vag Deliv) none         Comments:   Patient discharged at 1:05 PM via ambulation accompanied by father and staff. Prescriptions - None ordered for discharge. All belongings sent with patient.      Discharge instructions reviewed with Lorena. Listed belongings gathered and returned to patient.  Room cleared of personal belongings with patient at time of discharge     Patient discharged to home.      Core Measures and Vaccines  Core Measures applicable during stay: none  Pneumonia and Influenza given prior to discharge, if indicated: No, pt declines     Surgical Patient   Surgical Procedures during stay: none  Did patient receive discharge instruction on wound care and recognition of infection symptoms? Yes     MISC  Follow up appointment made:  Yes  Home and hospital aquired medications returned to patient: N/A  Patient reports pain was well managed at discharge: Yes

## 2017-10-26 ENCOUNTER — PRENATAL OFFICE VISIT (OUTPATIENT)
Dept: OBGYN | Facility: OTHER | Age: 22
End: 2017-10-26
Attending: NURSE PRACTITIONER
Payer: MEDICAID

## 2017-10-26 VITALS
OXYGEN SATURATION: 97 % | SYSTOLIC BLOOD PRESSURE: 118 MMHG | BODY MASS INDEX: 39.21 KG/M2 | DIASTOLIC BLOOD PRESSURE: 86 MMHG | HEART RATE: 86 BPM | WEIGHT: 244 LBS | HEIGHT: 66 IN

## 2017-10-26 DIAGNOSIS — R52 POSTPARTUM PAIN: ICD-10-CM

## 2017-10-26 DIAGNOSIS — F43.21 ADJUSTMENT DISORDER WITH DEPRESSED MOOD: ICD-10-CM

## 2017-10-26 DIAGNOSIS — D62 ANEMIA DUE TO BLOOD LOSS, ACUTE: Primary | ICD-10-CM

## 2017-10-26 LAB
ALBUMIN SERPL-MCNC: 2.9 G/DL (ref 3.4–5)
ALP SERPL-CCNC: 116 U/L (ref 40–150)
ALT SERPL W P-5'-P-CCNC: 34 U/L (ref 0–50)
ANION GAP SERPL CALCULATED.3IONS-SCNC: 7 MMOL/L (ref 3–14)
AST SERPL W P-5'-P-CCNC: 13 U/L (ref 0–45)
BILIRUB SERPL-MCNC: 0.1 MG/DL (ref 0.2–1.3)
BUN SERPL-MCNC: 11 MG/DL (ref 7–30)
CALCIUM SERPL-MCNC: 8.2 MG/DL (ref 8.5–10.1)
CHLORIDE SERPL-SCNC: 106 MMOL/L (ref 94–109)
CO2 SERPL-SCNC: 28 MMOL/L (ref 20–32)
CREAT SERPL-MCNC: 0.8 MG/DL (ref 0.52–1.04)
ERYTHROCYTE [DISTWIDTH] IN BLOOD BY AUTOMATED COUNT: 13 % (ref 10–15)
GFR SERPL CREATININE-BSD FRML MDRD: >90 ML/MIN/1.7M2
GLUCOSE SERPL-MCNC: 107 MG/DL (ref 70–99)
HCT VFR BLD AUTO: 35 % (ref 35–47)
HGB BLD-MCNC: 11.7 G/DL (ref 11.7–15.7)
MCH RBC QN AUTO: 29.9 PG (ref 26.5–33)
MCHC RBC AUTO-ENTMCNC: 33.4 G/DL (ref 31.5–36.5)
MCV RBC AUTO: 90 FL (ref 78–100)
PLATELET # BLD AUTO: 317 10E9/L (ref 150–450)
POTASSIUM SERPL-SCNC: 3.8 MMOL/L (ref 3.4–5.3)
PROT SERPL-MCNC: 7 G/DL (ref 6.8–8.8)
RBC # BLD AUTO: 3.91 10E12/L (ref 3.8–5.2)
SODIUM SERPL-SCNC: 141 MMOL/L (ref 133–144)
TSH SERPL DL<=0.005 MIU/L-ACNC: 1.79 MU/L (ref 0.4–4)
WBC # BLD AUTO: 9.9 10E9/L (ref 4–11)

## 2017-10-26 PROCEDURE — 99207 ZZC NO CHARGE LOS: CPT | Performed by: NURSE PRACTITIONER

## 2017-10-26 PROCEDURE — 85027 COMPLETE CBC AUTOMATED: CPT | Mod: ZL | Performed by: NURSE PRACTITIONER

## 2017-10-26 PROCEDURE — 84443 ASSAY THYROID STIM HORMONE: CPT | Mod: ZL | Performed by: NURSE PRACTITIONER

## 2017-10-26 PROCEDURE — 80053 COMPREHEN METABOLIC PANEL: CPT | Mod: ZL | Performed by: NURSE PRACTITIONER

## 2017-10-26 PROCEDURE — 36415 COLL VENOUS BLD VENIPUNCTURE: CPT | Mod: ZL | Performed by: NURSE PRACTITIONER

## 2017-10-26 PROCEDURE — 99212 OFFICE O/P EST SF 10 MIN: CPT | Performed by: NURSE PRACTITIONER

## 2017-10-26 ASSESSMENT — PAIN SCALES - GENERAL: PAINLEVEL: SEVERE PAIN (6)

## 2017-10-26 NOTE — NURSING NOTE
"Chief Complaint   Patient presents with     Post Partum Exam     2 week       Initial /86  Pulse 86  Ht 5' 6\" (1.676 m)  Wt 244 lb (110.7 kg)  SpO2 97%  BMI 39.38 kg/m2 Estimated body mass index is 39.38 kg/(m^2) as calculated from the following:    Height as of this encounter: 5' 6\" (1.676 m).    Weight as of this encounter: 244 lb (110.7 kg).  Medication Reconciliation: complete     Annamarie Aguayo      "

## 2017-10-26 NOTE — MR AVS SNAPSHOT
"              After Visit Summary   10/26/2017    Lorena Echevarria    MRN: [id number]           Patient Information     Date Of Birth          1995        Visit Information        Provider Department      10/26/2017 1:45 PM Mony Fernández NP [facility]        Today's Diagnoses     Anemia due to blood loss, acute    -  1    Adjustment disorder with depressed mood        Postpartum pain          Care Instructions    BREASTFEEDING TIPS  1. Breastfeed every 2-4 hours. If your baby is sleepy - use breast compression, push on chin to \"start up\" baby, switch breasts, undress to diaper and wake before relatching.   Some babies \"cluster\" feed every 1 hour for a while- this is normal. Feed your baby whenever he/she is awake-  even if every hour for a while. This frequent feeding will help you make more milk and encourage your baby to sleep for longer stretches later in the evening or night.    - Position your baby close to you with pillows so he/she is facing you -belly to belly laying horizontally across your lap at the level of your breast and looking a bit \"upwards\" to your breast   -One hand holds the baby's neck behind the ears and the other hand holds your breast  -Baby's nose should start out pointing to your nipple before latching  - Hold your breast in a \"sandwich\" position by gently squeezing your breast in an oval shape and make sure your hands are not covering the areola  This \"nipple sandwich\" will make it easier for your breast to fit inside the baby's mouth-making latching more comfortable for you and baby and preventing sore nipples. Your baby should take a \"mouthful\" of breast!  - You may want to use hand expression to \"prime the pump\" and get a drip of milk out on your nipple to wake baby   (see website: newborns.[employer] [email or web address]/Breastfeeding/HandExpression.html)  - Swipe your nipple on baby's upper lip and wait for a BIG open mouth  - YOU bring baby to the breast (hold baby's neck with your " "fingers just below the ears) and bring baby's head to the breast--leading with the chin.  Try to avoid pushing your breast into baby's mouth- bring baby to you instead!  - Aim to get your baby's bottom lip LOW DOWN ON AREOLA (baby's upper lip just needs to \"clear\" the nipple) .   Your baby should latch onto the areola and NOT just the nipple. That way your baby gets more milk and you don't get sore nipples!      Useful web sites:  Www.infantrisk.com  Www.aap.org  Www.ibreastfeeding.com  Www.health.[facility].[email or web address].us          Follow-ups after your visit        Your next 10 appointments already scheduled     Oct 27, 2017  2:00 PM CDT   (Arrive by 1:45 PM)   New Visit with RUDY Palmer CNP   [facility] ([facility] [place] )    [address] [place] [id number]   [phone]            Oct 31, 2017  9:30 AM CDT   (Arrive by 9:15 AM)   SHORT with Mony Fernández NP   [facility] ([facility] [place] )    [address]  [place] [address]   [phone]            Nov 06, 2017 10:30 AM CST   (Arrive by 10:15 AM)   Office Visit with SAGAR Bolton   [facility] ([facility] [place] )    [address]  [place] [address]   [phone]              Who to contact     If you have questions or need follow up information about today's clinic visit or your schedule please contact [facility] ([facility] directly at [phone].  Normal or non-critical lab and imaging results will be communicated to you by MyChart, letter or phone within 4 business days after the clinic has received the results. If you do not hear from us within 7 days, please contact the clinic through MyChart or phone. If you have a critical or abnormal lab result, we will notify you by phone as soon as possible.  Submit refill requests through [facility] or call your pharmacy and they will forward the refill request to us. Please allow 3 business days for your " "refill to be completed.          Additional Information About Your Visit        SOLO Information     SOLO lets you send messages to your doctor, view your test results, renew your prescriptions, schedule appointments and more. To sign up, go to [email or web address] . Click on \"Log in\" on the left side of the screen, which will take you to the Welcome page. Then click on \"Sign up Now\" on the right side of the page.     You will be asked to enter the access code listed below, as well as some personal information. Please follow the directions to create your username and password.     Your access code is: [email or web address]  Expires: 2017 11:23 AM     Your access code will  in 90 days. If you need help or a new code, please call your [facility] clinic or [phone].        Care EveryWhere ID     This is your Care EveryWhere ID. This could be used by other organizations to access your [facility] medical records  [id number]        Your Vitals Were     Pulse Height Pulse Oximetry BMI (Body Mass Index)          86 5' 6\" (1.676 m) 97% 39.38 kg/m2         Blood Pressure from Last 3 Encounters:   10/26/17 118/86   10/20/17 119/75   10/13/17 123/69    Weight from Last 3 Encounters:   10/26/17 244 lb (110.7 kg)   10/10/17 260 lb (117.9 kg)   10/06/17 257 lb (116.6 kg)              We Performed the Following     CBC with platelets     Comprehensive metabolic panel (BMP + Alb, Alk Phos, ALT, AST, Total. Bili, TP)     TSH with free T4 reflex        Primary Care Provider    Physician No Ref-Primary       NO REF-PRIMARY PHYSICIAN        Equal Access to Services     EDMOND QUIÑONES : Hadii emelia [place], richelle alcala, mayito zavala. So [facility] [phone].    ATENCIÓN: Si habla español, tiene a hein disposición servicios gratuitos de asistencia lingüística. Llame al [phone].    We comply with applicable federal civil rights laws and [place] laws. We do " not discriminate on the basis of race, color, national origin, age, disability, sex, sexual orientation, or gender identity.            Thank you!     Thank you for choosing [facility]  for your [facility]. Our goal is always to provide you with excellent care. Hearing back from our patients is one way we can continue to improve our services. Please take a few minutes to complete the written survey that you may receive in the mail after your visit with us. Thank you!             Your Updated Medication List - Protect others around you: Learn how to safely use, store and throw away your medicines at www.disposemymeds.org.          This list is accurate as of: 10/26/17 11:59 PM.  Always use your most recent med list.                   Brand Name Dispense Instructions for use Diagnosis    blood glucose monitoring lancets     100 each    Use to test blood sugar 4 times daily or as directed.    Insulin controlled gestational diabetes mellitus (GDM) in third trimester       * blood glucose monitoring test strip    ONE TOUCH ULTRA    100 each    Use to test blood sugar 4 times daily.    Gestational diabetes mellitus (GDM) in third trimester controlled on oral hypoglycemic drug       * blood glucose monitoring test strip    GABE CONTOUR NEXT    200 each    Use to test blood sugar 4 times daily or as directed.    Insulin controlled gestational diabetes mellitus (GDM) in third trimester       buPROPion 75 MG tablet    WELLBUTRIN    90 tablet    Take 2 tablets (150 mg) by mouth daily    Adjustment disorder with depressed mood       cyclobenzaprine 10 MG tablet    FLEXERIL    30 tablet    Take 0.5-1 tablets (5-10 mg) by mouth 3 times daily as needed for muscle spasms    Acute right-sided low back pain without sciatica       guaiFENesin-dextromethorphan 100-10 MG/5ML syrup    ROBITUSSIN DM    560 mL    Take 10 mLs by mouth every 4 hours as needed for cough    Encounter for triage in pregnant patient       omeprazole 40  MG capsule    priLOSEC    30 capsule    Take 1 capsule (40 mg) by mouth every morning    Encounter for triage in pregnant patient       oxyCODONE-acetaminophen 5-325 MG per tablet    PERCOCET    30 tablet    Take 1-2 tablets by mouth every 6 hours as needed for moderate to severe pain (every 4-6 hrs prn)    Acute right-sided low back pain without sciatica       PRENATAL GUMMIES/DHA & FA 0.4-32.5 MG Chew     90 tablet    Take 1 chew tab by mouth daily    High-risk first pregnancy of young woman, third trimester       valACYclovir 500 MG tablet    VALTREX    30 tablet    Take 1 tablet (500 mg) by mouth daily    Herpes simplex virus (HSV) infection of vagina       * Notice:  This list has 2 medication(s) that are the same as other medications prescribed for you. Read the directions carefully, and ask your doctor or other care provider to review them with you.

## 2017-10-27 ENCOUNTER — APPOINTMENT (OUTPATIENT)
Dept: LAB | Facility: OTHER | Age: 22
End: 2017-10-27
Attending: PHYSICIAN ASSISTANT
Payer: MEDICAID

## 2017-10-27 ENCOUNTER — RADIANT APPOINTMENT (OUTPATIENT)
Dept: GENERAL RADIOLOGY | Facility: OTHER | Age: 22
End: 2017-10-27
Attending: PHYSICIAN ASSISTANT
Payer: MEDICAID

## 2017-10-27 ENCOUNTER — OFFICE VISIT (OUTPATIENT)
Dept: PSYCHIATRY | Facility: OTHER | Age: 22
End: 2017-10-27
Attending: NURSE PRACTITIONER
Payer: MEDICAID

## 2017-10-27 ENCOUNTER — OFFICE VISIT (OUTPATIENT)
Dept: FAMILY MEDICINE | Facility: OTHER | Age: 22
End: 2017-10-27
Attending: PHYSICIAN ASSISTANT
Payer: MEDICAID

## 2017-10-27 VITALS
SYSTOLIC BLOOD PRESSURE: 122 MMHG | HEART RATE: 91 BPM | HEIGHT: 66 IN | WEIGHT: 244 LBS | OXYGEN SATURATION: 98 % | DIASTOLIC BLOOD PRESSURE: 80 MMHG | BODY MASS INDEX: 39.21 KG/M2

## 2017-10-27 VITALS
DIASTOLIC BLOOD PRESSURE: 74 MMHG | OXYGEN SATURATION: 98 % | RESPIRATION RATE: 18 BRPM | HEIGHT: 64 IN | HEART RATE: 76 BPM | BODY MASS INDEX: 40.12 KG/M2 | SYSTOLIC BLOOD PRESSURE: 122 MMHG | TEMPERATURE: 98.2 F | WEIGHT: 235 LBS

## 2017-10-27 DIAGNOSIS — R05.9 COUGH: ICD-10-CM

## 2017-10-27 DIAGNOSIS — H11.33 SUBCONJUNCTIVAL HEMORRHAGE OF BOTH EYES: ICD-10-CM

## 2017-10-27 DIAGNOSIS — Z36.89 ENCOUNTER FOR TRIAGE IN PREGNANT PATIENT: ICD-10-CM

## 2017-10-27 DIAGNOSIS — R05.9 COUGH: Primary | ICD-10-CM

## 2017-10-27 DIAGNOSIS — R05.3 COUGH, PERSISTENT: ICD-10-CM

## 2017-10-27 DIAGNOSIS — F34.1 PERSISTENT DEPRESSIVE DISORDER: Primary | ICD-10-CM

## 2017-10-27 LAB
BASOPHILS # BLD AUTO: 0 10E9/L (ref 0–0.2)
BASOPHILS NFR BLD AUTO: 0.4 %
CRP SERPL-MCNC: 28.9 MG/L (ref 0–8)
DIFFERENTIAL METHOD BLD: ABNORMAL
EOSINOPHIL # BLD AUTO: 0.1 10E9/L (ref 0–0.7)
EOSINOPHIL NFR BLD AUTO: 1.1 %
ERYTHROCYTE [DISTWIDTH] IN BLOOD BY AUTOMATED COUNT: 13.1 % (ref 10–15)
GLUCOSE BLDC GLUCOMTR-MCNC: 88 MG/DL (ref 70–99)
HCT VFR BLD AUTO: 35.2 % (ref 35–47)
HGB BLD-MCNC: 11.4 G/DL (ref 11.7–15.7)
IMM GRANULOCYTES # BLD: 0 10E9/L (ref 0–0.4)
IMM GRANULOCYTES NFR BLD: 0.2 %
LYMPHOCYTES # BLD AUTO: 2 10E9/L (ref 0.8–5.3)
LYMPHOCYTES NFR BLD AUTO: 37.3 %
MCH RBC QN AUTO: 29.3 PG (ref 26.5–33)
MCHC RBC AUTO-ENTMCNC: 32.4 G/DL (ref 31.5–36.5)
MCV RBC AUTO: 91 FL (ref 78–100)
MONOCYTES # BLD AUTO: 0.5 10E9/L (ref 0–1.3)
MONOCYTES NFR BLD AUTO: 9.8 %
NEUTROPHILS # BLD AUTO: 2.7 10E9/L (ref 1.6–8.3)
NEUTROPHILS NFR BLD AUTO: 51.2 %
NRBC # BLD AUTO: 0 10*3/UL
NRBC BLD AUTO-RTO: 0 /100
PLATELET # BLD AUTO: 309 10E9/L (ref 150–450)
RBC # BLD AUTO: 3.89 10E12/L (ref 3.8–5.2)
WBC # BLD AUTO: 5.3 10E9/L (ref 4–11)

## 2017-10-27 PROCEDURE — 99203 OFFICE O/P NEW LOW 30 MIN: CPT | Performed by: PHYSICIAN ASSISTANT

## 2017-10-27 PROCEDURE — 71020 XR CHEST 2 VW: CPT | Mod: TC

## 2017-10-27 PROCEDURE — 36415 COLL VENOUS BLD VENIPUNCTURE: CPT | Mod: ZL | Performed by: PHYSICIAN ASSISTANT

## 2017-10-27 PROCEDURE — 99203 OFFICE O/P NEW LOW 30 MIN: CPT | Performed by: NURSE PRACTITIONER

## 2017-10-27 PROCEDURE — 86140 C-REACTIVE PROTEIN: CPT | Mod: ZL | Performed by: PHYSICIAN ASSISTANT

## 2017-10-27 PROCEDURE — 99212 OFFICE O/P EST SF 10 MIN: CPT

## 2017-10-27 PROCEDURE — 99213 OFFICE O/P EST LOW 20 MIN: CPT | Mod: 25,27

## 2017-10-27 PROCEDURE — 85025 COMPLETE CBC W/AUTO DIFF WBC: CPT | Mod: ZL | Performed by: PHYSICIAN ASSISTANT

## 2017-10-27 RX ORDER — AZITHROMYCIN 250 MG/1
TABLET, FILM COATED ORAL
Qty: 6 TABLET | Refills: 1 | Status: SHIPPED | OUTPATIENT
Start: 2017-10-27 | End: 2017-11-06

## 2017-10-27 RX ORDER — BENZONATATE 200 MG/1
200 CAPSULE ORAL 3 TIMES DAILY PRN
Qty: 21 CAPSULE | Refills: 0 | Status: SHIPPED | OUTPATIENT
Start: 2017-10-27 | End: 2017-11-06

## 2017-10-27 RX ORDER — ALBUTEROL SULFATE 90 UG/1
2 AEROSOL, METERED RESPIRATORY (INHALATION) EVERY 6 HOURS
Qty: 1 INHALER | Refills: 0 | Status: SHIPPED | OUTPATIENT
Start: 2017-10-27 | End: 2018-06-01

## 2017-10-27 ASSESSMENT — PATIENT HEALTH QUESTIONNAIRE - PHQ9: SUM OF ALL RESPONSES TO PHQ QUESTIONS 1-9: 18

## 2017-10-27 ASSESSMENT — PAIN SCALES - GENERAL
PAINLEVEL: SEVERE PAIN (6)
PAINLEVEL: NO PAIN (0)

## 2017-10-27 NOTE — NURSING NOTE
"Chief Complaint   Patient presents with     Cough       Initial /74 (BP Location: Left arm, Patient Position: Sitting, Cuff Size: Adult Large)  Pulse 76  Temp 98.2  F (36.8  C) (Tympanic)  Resp 18  Ht 5' 4.2\" (1.631 m)  Wt 235 lb (106.6 kg)  SpO2 98%  BMI 40.09 kg/m2 Estimated body mass index is 40.09 kg/(m^2) as calculated from the following:    Height as of this encounter: 5' 4.2\" (1.631 m).    Weight as of this encounter: 235 lb (106.6 kg).  Medication Reconciliation: complete   Pratibha Sharp LPN    "

## 2017-10-27 NOTE — PROGRESS NOTES
"SUBJECTIVE:  Lorena Echevarria is a 22 year old female P1 here for a 2 week postpartum visit.  She had a  on 10/18/17 delivering a healthy baby girl weighing 7 lbs 1 oz at term.  Pregnancy complicated by GDM on insulin. History of depression with suicidal attempt and increased suicidal ideation with start of SSRI previously.  Use of Wellbutrin throughout pregnancy.  Lorena states she is feeling \"very depressed\".  Breast and bottle feeding.  She does feel she is bonding well with her baby.     Today's Depression Rating was No Value exists for the : [id number]    delivery complications:no  breast feeding:  Yes, bottle supplement  bladder problems:  No  bowel problems/hemorrhoids:  No  episiotomy/laceration/incision healed? No  vaginal flow:  Light to moderate  Downing:  No  contraception:  none  emotional adjustment:  having some difficulties adjusting and still with pain from delivery      OBJECTIVE:  Blood pressure 118/86, pulse 86, height 5' 6\" (1.676 m), weight 244 lb (110.7 kg), SpO2 97 %, unknown if currently breastfeeding.   General - pleasant female in no acute distress. Affect flat.    ASSESSMENT:  2 week postpartum follow up -   Postpartum depression    PLAN:  Discussed kegel exercises   Continue postpartum restrictions  Breastfeeding support as needed  Return for 6 week postpartum exam    Depression - continue wellbutrin for now.  Scheduled with MHNP tomorrow due to past history of depression with suicidal attempt and suicidal ideation following medication start.  Crisis information provided.  "

## 2017-10-27 NOTE — PATIENT INSTRUCTIONS
"BREASTFEEDING TIPS  1. Breastfeed every 2-4 hours. If your baby is sleepy - use breast compression, push on chin to \"start up\" baby, switch breasts, undress to diaper and wake before relatching.   Some babies \"cluster\" feed every 1 hour for a while- this is normal. Feed your baby whenever he/she is awake-  even if every hour for a while. This frequent feeding will help you make more milk and encourage your baby to sleep for longer stretches later in the evening or night.    - Position your baby close to you with pillows so he/she is facing you -belly to belly laying horizontally across your lap at the level of your breast and looking a bit \"upwards\" to your breast   -One hand holds the baby's neck behind the ears and the other hand holds your breast  -Baby's nose should start out pointing to your nipple before latching  - Hold your breast in a \"sandwich\" position by gently squeezing your breast in an oval shape and make sure your hands are not covering the areola  This \"nipple sandwich\" will make it easier for your breast to fit inside the baby's mouth-making latching more comfortable for you and baby and preventing sore nipples. Your baby should take a \"mouthful\" of breast!  - You may want to use hand expression to \"prime the pump\" and get a drip of milk out on your nipple to wake baby   (see website: [email or web address].[email or web address]/Breastfeeding/HandExpression.html)  - Swipe your nipple on baby's upper lip and wait for a BIG open mouth  - YOU bring baby to the breast (hold baby's neck with your fingers just below the ears) and bring baby's head to the breast--leading with the chin.  Try to avoid pushing your breast into baby's mouth- bring baby to you instead!  - Aim to get your baby's bottom lip LOW DOWN ON AREOLA (baby's upper lip just needs to \"clear\" the nipple) .   Your baby should latch onto the areola and NOT just the nipple. That way your baby gets more milk and you don't get sore nipples!      Useful web " sites:  Www.infantrisk.com  Www.aap.org  Www.ibreastfeeding.com  Www.[email or web address]

## 2017-10-27 NOTE — PROGRESS NOTES
SUBJECTIVE:   Lorena Echevarria is a 22 year old female who presents to clinic today for the following health issues:      Cough      Duration: 1.5 months    Description (location/character/radiation): cough with clear sputum, chest pain and shortness of breath. Has trouble breathing when coughs stuff up Denies fever    Intensity:  moderate    Accompanying signs and symptoms: redness in eyes started 2 weeks ago, is improving    History (similar episodes/previous evaluation): none    Precipitating or alleviating factors: None    Therapies tried and outcome: Robitussin DM not helping             Problem list and histories reviewed & adjusted, as indicated.  Additional history: as documented    Patient Active Problem List   Diagnosis     Suicidal ideation     Mild intellectual disability     Autism spectrum     Anxiety     Mood disorder (H)     Suicidal thoughts     Left Ankle Pain     Irregular menstrual cycle (PERIODS)     Depression     Elevated serum cholesterol     Obesity     Sinus tachycardia     Galactorrhea of both breasts     High-risk first pregnancy of young woman, third trimester     Recurrent HSV (herpes simplex virus)     Gestational diabetes mellitus (GDM) in third trimester controlled on oral hypoglycemic drug     Papanicolaou smear of cervix with low grade squamous intraepithelial lesion (LGSIL)     Encounter for triage in pregnant patient     GDM (gestational diabetes mellitus)     Gestational diabetes     Normal labor and delivery     History reviewed. No pertinent surgical history.    Social History   Substance Use Topics     Smoking status: Former Smoker     Quit date: 10/27/2016     Smokeless tobacco: Never Used     Alcohol use No      Comment: Past use     Family History   Problem Relation Age of Onset     DIABETES Paternal Grandfather          Current Outpatient Prescriptions   Medication Sig Dispense Refill     azithromycin (ZITHROMAX) 250 MG tablet Two tablets first day, then one tablet daily  for four days. 6 tablet 1     benzonatate (TESSALON) 200 MG capsule Take 1 capsule (200 mg) by mouth 3 times daily as needed for cough 21 capsule 0     albuterol (PROAIR HFA) 108 (90 BASE) MCG/ACT Inhaler Inhale 2 puffs into the lungs every 6 hours 1 Inhaler 0     guaiFENesin-dextromethorphan (ROBITUSSIN DM) 100-10 MG/5ML syrup Take 10 mLs by mouth every 4 hours as needed for cough 560 mL 0     omeprazole (PRILOSEC) 40 MG capsule Take 1 capsule (40 mg) by mouth every morning 30 capsule 0     buPROPion (WELLBUTRIN) 75 MG tablet Take 2 tablets (150 mg) by mouth daily 90 tablet 0     blood glucose monitoring (GABE CONTOUR NEXT) test strip Use to test blood sugar 4 times daily or as directed. 200 each 4     blood glucose monitoring (GABE MICROLET) lancets Use to test blood sugar 4 times daily or as directed. 100 each 1     cyclobenzaprine (FLEXERIL) 10 MG tablet Take 0.5-1 tablets (5-10 mg) by mouth 3 times daily as needed for muscle spasms 30 tablet 1     valACYclovir (VALTREX) 500 MG tablet Take 1 tablet (500 mg) by mouth daily 30 tablet 1     blood glucose monitoring (ONE TOUCH ULTRA) test strip Use to test blood sugar 4 times daily. 100 each 5     Allergies   Allergen Reactions     Lamictal [Lamotrigine] Other (See Comments)     Increases aggression.     Amoxicillin Rash     Zoloft [Sertraline] Other (See Comments)     Depression worsens     BP Readings from Last 3 Encounters:   10/27/17 122/74   10/27/17 122/80   10/26/17 118/86    Wt Readings from Last 3 Encounters:   10/27/17 235 lb (106.6 kg)   10/27/17 244 lb (110.7 kg)   10/26/17 244 lb (110.7 kg)                        Reviewed and updated as needed this visit by clinical staff     Reviewed and updated as needed this visit by Provider         ROS:  Constitutional, neuro, ENT, endocrine, pulmonary, cardiac, gastrointestinal, genitourinary, musculoskeletal, integument and psychiatric systems are negative, except as otherwise noted.      OBJECTIVE:              "                                       /74 (BP Location: Left arm, Patient Position: Sitting, Cuff Size: Adult Large)  Pulse 76  Temp 98.2  F (36.8  C) (Tympanic)  Resp 18  Ht 5' 4.2\" (1.631 m)  Wt 235 lb (106.6 kg)  SpO2 98%  BMI 40.09 kg/m2  Body mass index is 40.09 kg/(m^2).  GENERAL APPEARANCE: healthy, alert, mild distress and fatigued  EYES: subconjunctival hemorrhage. Bilateral.   HENT: ear canals and TM's normal and nose and mouth without ulcers or lesions  NECK: no adenopathy, no asymmetry, masses, or scars and thyroid normal to palpation  RESP: lungs clear to auscultation - no rales, rhonchi or wheezes  CV: regular rates and rhythm, normal S1 S2, no S3 or S4 and no murmur, click or rub  ABDOMEN: soft, nontender, without hepatosplenomegaly or masses and bowel sounds normal  MS: extremely swollen feet.   SKIN: no suspicious lesions or rashes  NEURO: Normal strength and tone, mentation intact and speech normal  PSYCH: mentation appears normal and affect normal/bright    Diagnostic test results:  Diagnostic Test Results:  Results for orders placed or performed in visit on 10/27/17 (from the past 24 hour(s))   CBC with platelets differential   Result Value Ref Range    WBC 5.3 4.0 - 11.0 10e9/L    RBC Count 3.89 3.8 - 5.2 10e12/L    Hemoglobin 11.4 (L) 11.7 - 15.7 g/dL    Hematocrit 35.2 35.0 - 47.0 %    MCV 91 78 - 100 fl    MCH 29.3 26.5 - 33.0 pg    MCHC 32.4 31.5 - 36.5 g/dL    RDW 13.1 10.0 - 15.0 %    Platelet Count 309 150 - 450 10e9/L    Diff Method Automated Method     % Neutrophils 51.2 %    % Lymphocytes 37.3 %    % Monocytes 9.8 %    % Eosinophils 1.1 %    % Basophils 0.4 %    % Immature Granulocytes 0.2 %    Nucleated RBCs 0 0 /100    Absolute Neutrophil 2.7 1.6 - 8.3 10e9/L    Absolute Lymphocytes 2.0 0.8 - 5.3 10e9/L    Absolute Monocytes 0.5 0.0 - 1.3 10e9/L    Absolute Eosinophils 0.1 0.0 - 0.7 10e9/L    Absolute Basophils 0.0 0.0 - 0.2 10e9/L    Abs Immature Granulocytes 0.0 0 - " 0.4 10e9/L    Absolute Nucleated RBC 0.0    CRP, inflammation   Result Value Ref Range    CRP Inflammation 28.9 (H) 0.0 - 8.0 mg/L          ASSESSMENT/PLAN:                                                    1. Cough  She is 6 week with this infection. Had a baby 10 days ago.  Really tired and really misserable.  No sign of pneumonia.  She is given belwo. See us back if sx are not better.  Coughing is productive and wheezing is present.  Hope the inhaler will help her.    - CBC with platelets differential  - CRP, inflammation  - XR CHEST 2 VW (Clinic Performed); Future  - azithromycin (ZITHROMAX) 250 MG tablet; Two tablets first day, then one tablet daily for four days.  Dispense: 6 tablet; Refill: 1  - benzonatate (TESSALON) 200 MG capsule; Take 1 capsule (200 mg) by mouth 3 times daily as needed for cough  Dispense: 21 capsule; Refill: 0  - albuterol (PROAIR HFA) 108 (90 BASE) MCG/ACT Inhaler; Inhale 2 puffs into the lungs every 6 hours  Dispense: 1 Inhaler; Refill: 0    2. Subconjunctival hemorrhage of both eyes  As above.  Reassured.       See Patient Instructions    SAGAR Victor  [facility]

## 2017-10-27 NOTE — MR AVS SNAPSHOT
After Visit Summary   10/27/2017    Lorena Echevarria    MRN: [id number]           Patient Information     Date Of Birth          1995        Visit Information        Provider Department      10/27/2017 2:00 PM Ruthy Noble APRN CNP [facility]        Today's Diagnoses     Persistent depressive disorder    -  1    Cough, persistent           Follow-ups after your visit        Your next 10 appointments already scheduled     Oct 31, 2017  9:30 AM CDT   (Arrive by 9:15 AM)   SHORT with Mony Fernández NP   [facility] ([facility] [place] )    [address]  [place] [address]   [phone]            Nov 03, 2017 11:00 AM CDT   (Arrive by 10:45 AM)   Return Visit with RUDY Palmer CNP   [facility] [place] ([facility] [place] )    [address] [place] [address]  [place] [address]   [phone]            Nov 06, 2017 10:30 AM CST   (Arrive by 10:15 AM)   Office Visit with SAGAR Bolton   [facility] ([facility] [place] )    [address]  [place] [address]   [phone]              Who to contact     If you have questions or need follow up information about today's clinic visit or your schedule please contact [facility] directly at [phone].  Normal or non-critical lab and imaging results will be communicated to you by MyChart, letter or phone within 4 business days after the clinic has received the results. If you do not hear from us within 7 days, please contact the clinic through [facility] or phone. If you have a critical or abnormal lab result, we will notify you by phone as soon as possible.  Submit refill requests through [facility] or call your pharmacy and they will forward the refill request to us. Please allow 3 business days for your refill to be completed.          Additional Information About Your Visit        [facility] Information     [facility] lets you send messages to  "your doctor, view your test results, renew your prescriptions, schedule appointments and more. To sign up, go to [email or web address] . Click on \"Log in\" on the left side of the screen, which will take you to the Welcome page. Then click on \"Sign up Now\" on the right side of the page.     You will be asked to enter the access code listed below, as well as some personal information. Please follow the directions to create your username and password.     Your access code is: [email or web address]  Expires: 2017 11:23 AM     Your access code will  in 90 days. If you need help or a new code, please call your [facility] clinic or [phone].        Care EveryWhere ID     This is your Care EveryWhere ID. This could be used by other organizations to access your [facility] medical records  [id number]        Your Vitals Were     Pulse Height Pulse Oximetry BMI (Body Mass Index)          91 5' 6\" (1.676 m) 98% 39.38 kg/m2         Blood Pressure from Last 3 Encounters:   10/27/17 122/80   10/26/17 118/86   10/20/17 119/75    Weight from Last 3 Encounters:   10/27/17 244 lb (110.7 kg)   10/26/17 244 lb (110.7 kg)   10/10/17 260 lb (117.9 kg)              Today, you had the following     No orders found for display       Primary Care Provider    Physician No Ref-Primary       NO REF-PRIMARY PHYSICIAN        Equal Access to Services     [facility] : Hadii emelia ku hadasho Soomaali, waaxda luqadaha, qaybta kaalmada adeegyada, mayito dove . So [facility] [phone].    ATENCIÓN: Si habla español, tiene a hein disposición servicios gratuitos de asistencia lingüística. Llame al [phone].    We comply with applicable federal civil rights laws and [place] laws. We do not discriminate on the basis of race, color, national origin, age, disability, sex, sexual orientation, or gender identity.            Thank you!     Thank you for choosing [facility]  for your care. Our goal is always to " provide you with excellent care. Hearing back from our patients is one way we can continue to improve our services. Please take a few minutes to complete the written survey that you may receive in the mail after your visit with us. Thank you!             Your Updated Medication List - Protect others around you: Learn how to safely use, store and throw away your medicines at www.disposemymeds.org.          This list is accurate as of: 10/27/17  3:37 PM.  Always use your most recent med list.                   Brand Name Dispense Instructions for use Diagnosis    blood glucose monitoring lancets     100 each    Use to test blood sugar 4 times daily or as directed.    Insulin controlled gestational diabetes mellitus (GDM) in third trimester       * blood glucose monitoring test strip    ONE TOUCH ULTRA    100 each    Use to test blood sugar 4 times daily.    Gestational diabetes mellitus (GDM) in third trimester controlled on oral hypoglycemic drug       * blood glucose monitoring test strip    GABE CONTOUR NEXT    200 each    Use to test blood sugar 4 times daily or as directed.    Insulin controlled gestational diabetes mellitus (GDM) in third trimester       buPROPion 75 MG tablet    WELLBUTRIN    90 tablet    Take 2 tablets (150 mg) by mouth daily    Adjustment disorder with depressed mood       cyclobenzaprine 10 MG tablet    FLEXERIL    30 tablet    Take 0.5-1 tablets (5-10 mg) by mouth 3 times daily as needed for muscle spasms    Acute right-sided low back pain without sciatica       guaiFENesin-dextromethorphan 100-10 MG/5ML syrup    ROBITUSSIN DM    560 mL    Take 10 mLs by mouth every 4 hours as needed for cough    Encounter for triage in pregnant patient       omeprazole 40 MG capsule    priLOSEC    30 capsule    Take 1 capsule (40 mg) by mouth every morning    Encounter for triage in pregnant patient       oxyCODONE-acetaminophen 5-325 MG per tablet    PERCOCET    30 tablet    Take 1-2 tablets by mouth  every 6 hours as needed for moderate to severe pain (every 4-6 hrs prn)    Acute right-sided low back pain without sciatica       PRENATAL GUMMIES/DHA & FA 0.4-32.5 MG Chew     90 tablet    Take 1 chew tab by mouth daily    High-risk first pregnancy of young woman, third trimester       valACYclovir 500 MG tablet    VALTREX    30 tablet    Take 1 tablet (500 mg) by mouth daily    Herpes simplex virus (HSV) infection of vagina       * Notice:  This list has 2 medication(s) that are the same as other medications prescribed for you. Read the directions carefully, and ask your doctor or other care provider to review them with you.

## 2017-10-27 NOTE — NURSING NOTE
"Chief Complaint   Patient presents with     Mental Health Problem     referred by suly pruett for major depression       Initial /80  Pulse 91  Ht 5' 6\" (1.676 m)  Wt 244 lb (110.7 kg)  SpO2 98%  BMI 39.38 kg/m2 Estimated body mass index is 39.38 kg/(m^2) as calculated from the following:    Height as of this encounter: 5' 6\" (1.676 m).    Weight as of this encounter: 244 lb (110.7 kg).  Medication Reconciliation: complete     Becca De La Torre      "

## 2017-10-27 NOTE — MR AVS SNAPSHOT
After Visit Summary   10/27/2017    Lorena Echevarria    MRN: [id number]           Patient Information     Date Of Birth          1995        Visit Information        Provider Department      10/27/2017 4:15 PM Kaya Servin PA [facility]        Today's Diagnoses     Cough    -  1    Subconjunctival hemorrhage of both eyes          Care Instructions      Thank you for choosing [facility].   I have office hours 8:00 am to 4:30 pm on Monday's, Wednesday's, Thursday's and Friday's. My nurse and I are out of the office every Tuesday.    Following your visit, when your labs and diagnostic testing have returned, I will review then and you will be contacted by my nurse.  If you are on My Chart, you can also view results there.    For refills, notify your pharmacy regarding what you need and the pharmacy will generate a refill request. Do not call my nurse as she is unable to process refill request. Please plan ahead and allow 3-5 days for refill requests.    You will generally receive a reminder call the day prior to your appointment.  If you cannot attend your appointment, please cancel your appointment with as much notice as possible.  If there is a pattern of failure to present for your appointments, I cannot provide consistent, meaningful, ongoing care for you. It is very important to me that you come in for your care, so we can best assist you with your health care needs.    IMPORTANT:  Please note that it is my standard of practice to NOT participate in prescribing ongoing requested Narcotic Analgesic therapy, and/or participate in the prescribing of other controlled substances.  My nurse and I am happy to assist you with the process of referral for alternative pain management as needed, and other treatment modalities including but not limited to:  Physical Therapy, Physical Medicine and Rehab, Counseling, Chiropractic Care, Orthopedic Care, and non-narcotic medication  management.     In the event that you need to be seen for emergent concerns and I am out of office,  please see one of my colleagues for acute concerns.  You may also present to UC or ER.  I appreciate the opportunity to serve you and look forward to supporting your healthcare needs in the future. Please contact me with any questions or concerns that you may have.    Sincerely,      Kaya Servin RN, PA-C               Follow-ups after your visit        Your next 10 appointments already scheduled     Oct 31, 2017  9:30 AM CDT   (Arrive by 9:15 AM)   SHORT with Mony Fernández NP   [facility] ([facility] )    [address]  [place] [address]   [phone]            Nov 03, 2017 11:00 AM CDT   (Arrive by 10:45 AM)   Return Visit with RUDY Palmer CNP   [facility] ([facility] [place] [facility] [place] )    [address] [place] [address]  [place] [address]   [phone]            Nov 06, 2017 10:30 AM CST   (Arrive by 10:15 AM)   Office Visit with SAGAR Bolton   [facility] [place] ([facility] [place] [facility] [place] )    [address]  [place] [address]   [phone]              Who to contact     If you have questions or need follow up information about today's clinic visit or your schedule please contact [facility] directly at [phone].  Normal or non-critical lab and imaging results will be communicated to you by MyChart, letter or phone within 4 business days after the clinic has received the results. If you do not hear from us within 7 days, please contact the clinic through MyChart or phone. If you have a critical or abnormal lab result, we will notify you by phone as soon as possible.  Submit refill requests through [employer] or call your pharmacy and they will forward the refill request to us. Please allow 3 business days for your refill to be completed.          Additional Information About Your Visit        MyChart  "Information     [facility] lets you send messages to your doctor, view your test results, renew your prescriptions, schedule appointments and more. To sign up, go to [email or web address] . Click on \"Log in\" on the left side of the screen, which will take you to the Welcome page. Then click on \"Sign up Now\" on the right side of the page.     You will be asked to enter the access code listed below, as well as some personal information. Please follow the directions to create your username and password.     Your access code is: [email or web address]  Expires: 2017 11:23 AM     Your access code will  in 90 days. If you need help or a new code, please call your [facility] clinic or [phone].        Care EveryWhere ID     This is your Care EveryWhere ID. This could be used by other organizations to access your [facility] medical records  [id number]        Your Vitals Were     Pulse Temperature Respirations Height Pulse Oximetry BMI (Body Mass Index)    76 98.2  F (36.8  C) (Tympanic) 18 5' 4.2\" (1.631 m) 98% 40.09 kg/m2       Blood Pressure from Last 3 Encounters:   10/27/17 122/74   10/27/17 122/80   10/26/17 118/86    Weight from Last 3 Encounters:   10/27/17 235 lb (106.6 kg)   10/27/17 244 lb (110.7 kg)   10/26/17 244 lb (110.7 kg)              We Performed the Following     CBC with platelets differential     CRP, inflammation          Today's Medication Changes          These changes are accurate as of: 10/27/17  5:16 PM.  If you have any questions, ask your nurse or doctor.               Start taking these medicines.        Dose/Directions    albuterol 108 (90 BASE) MCG/ACT Inhaler   Commonly known as:  PROAIR HFA   Used for:  Cough   Started by:  Kaya Servin PA        Dose:  2 puff   Inhale 2 puffs into the lungs every 6 hours   Quantity:  1 Inhaler   Refills:  0       azithromycin 250 MG tablet   Commonly known as:  ZITHROMAX   Used for:  Cough   Started by:  Kaya Servin PA        Two tablets " first day, then one tablet daily for four days.   Quantity:  6 tablet   Refills:  1       benzonatate 200 MG capsule   Commonly known as:  TESSALON   Used for:  Cough   Started by:  Kaya Servin PA        Dose:  200 mg   Take 1 capsule (200 mg) by mouth 3 times daily as needed for cough   Quantity:  21 capsule   Refills:  0            Where to get your medicines      These medications were sent to [id number] - [place], [place] - [address]  [address] [facility] [address]     Phone:  [phone]     albuterol 108 (90 BASE) MCG/ACT Inhaler    azithromycin 250 MG tablet    benzonatate 200 MG capsule                Primary Care Provider    Physician No Ref-Primary       NO REF-PRIMARY PHYSICIAN        Equal Access to Services     EDMOND QUIÑONES : Lesly [place], wabennieda cari, qaybta kaalmada sury, mayito dove . So [facility] [phone].    ATENCIÓN: Si habla español, tiene a hein disposición servicios gratuitos de asistencia lingüística. Llame al [phone].    We comply with applicable federal civil rights laws and [place] laws. We do not discriminate on the basis of race, color, national origin, age, disability, sex, sexual orientation, or gender identity.            Thank you!     Thank you for choosing [facility]  for your care. Our goal is always to provide you with excellent care. Hearing back from our patients is one way we can continue to improve our services. Please take a few minutes to complete the written survey that you may receive in the mail after your visit with us. Thank you!             Your Updated Medication List - Protect others around you: Learn how to safely use, store and throw away your medicines at www.disposemymeds.org.          This list is accurate as of: 10/27/17  5:16 PM.  Always use your most recent med list.                   Brand Name Dispense Instructions for use Diagnosis    albuterol 108 (90  BASE) MCG/ACT Inhaler    PROAIR HFA    1 Inhaler    Inhale 2 puffs into the lungs every 6 hours    Cough       azithromycin 250 MG tablet    ZITHROMAX    6 tablet    Two tablets first day, then one tablet daily for four days.    Cough       benzonatate 200 MG capsule    TESSALON    21 capsule    Take 1 capsule (200 mg) by mouth 3 times daily as needed for cough    Cough       blood glucose monitoring lancets     100 each    Use to test blood sugar 4 times daily or as directed.    Insulin controlled gestational diabetes mellitus (GDM) in third trimester       * blood glucose monitoring test strip    ONE TOUCH ULTRA    100 each    Use to test blood sugar 4 times daily.    Gestational diabetes mellitus (GDM) in third trimester controlled on oral hypoglycemic drug       * blood glucose monitoring test strip    GABE CONTOUR NEXT    200 each    Use to test blood sugar 4 times daily or as directed.    Insulin controlled gestational diabetes mellitus (GDM) in third trimester       buPROPion 75 MG tablet    WELLBUTRIN    90 tablet    Take 2 tablets (150 mg) by mouth daily    Adjustment disorder with depressed mood       cyclobenzaprine 10 MG tablet    FLEXERIL    30 tablet    Take 0.5-1 tablets (5-10 mg) by mouth 3 times daily as needed for muscle spasms    Acute right-sided low back pain without sciatica       guaiFENesin-dextromethorphan 100-10 MG/5ML syrup    ROBITUSSIN DM    560 mL    Take 10 mLs by mouth every 4 hours as needed for cough    Encounter for triage in pregnant patient       omeprazole 40 MG capsule    priLOSEC    30 capsule    Take 1 capsule (40 mg) by mouth every morning    Encounter for triage in pregnant patient       valACYclovir 500 MG tablet    VALTREX    30 tablet    Take 1 tablet (500 mg) by mouth daily    Herpes simplex virus (HSV) infection of vagina       * Notice:  This list has 2 medication(s) that are the same as other medications prescribed for you. Read the directions carefully, and ask  your doctor or other care provider to review them with you.

## 2017-10-30 NOTE — L&D DELIVERY NOTE
Delivery Summary    Lorena Echevarria MRN# [id number]   Age: 22 year old YOB: 1995     ASSESSMENT & PLAN: Vacuum assisted vaginal delivery        Labor Event Times    Start pushing date/time:  10/18/2017 1005            Labor Events     labor?:  No   Labor Type:  Induction   Predominate monitoring during 1st stage:  intermittent electronic fetal monitoring      Antibiotics received during labor?:  No      Rupture date/time: 10/17/17 0941   Rupture type:  Artificial Rupture of Membranes   Fluid color:  Meconium   Fluid odor:  Normal      Induction:  Misoprostol, Cervidil, Oxytocin, AROM   Induction date/time:     Cervical ripening date/time:        Delivery/Placenta Date and Time    Delivery Date:  10/18/17 Delivery Time:  12:34 PM      Vaginal Counts    Initial count performed by 2 team members:   Two Team Members   ratna wilks RN          Needles Suture [employer]   Initial counts 2  15 10   Added to count  2     Final counts 2 2 15 10      Placed during labor Accounted for at the end of labor   Yes Yes      Final count performed by 2 team members:   Two Team Members   Ratna Richard RN         Final count correct?:  Yes         Apgars    Living status:  Living    1 Minute 5 Minute 10 Minute 15 Minute 20 Minute   Skin color: 0  1  2      Heart rate: 2  2  2      Reflex irritability: 0  1  2      Muscle tone: 0  1  1      Respiratory effort: 0  2  2      Total: 2  7  9         Apgars assigned by:  DR DIXON       Resuscitation    Methods:  Bag Mask, Oximetry      [place] Care at Delivery:  Vacuum assisted delivery of a viable baby girl with Dr Severino in attendance.  Ela PERDUE, Dr Dixon and RT present for delivery.  Baby delivered, cord clamped and cut per MD and taken to the warmer per Ela PERDUE.  Baby dried and stimulated with warm blankets, pale in color and no respiratory effort.  No improvement with stimulation.  PPV started at 55 seconds of age and  "continued until 4 min 45 seconds of age.  Good air exchange noted with PPV.  HR tachy from birth.  Pulse ox applied.  Apgars 2, 7, and 9, per Dr Dixon.  Blood glucose checked and was 140.  Baby remained at the warmer until 1258 then placed skin to skin with mom with continuous pulse oximetry in place, per Dr Dixon's request.            Measurements    Weight:  7 lb 1.8 oz Length:  1' 7.5\"   Head circumference:  34.9 cm Chest circumference:  33 cm   Abdominal girth:  33 cm      Skin to Skin and Feeding Plan    Skin to skin initiation date/time: 10/18/17 1258   Skin to skin with:  Mother   Skin to skin end date/time:        Labor Events and Shoulder Dystocia    Fetal Tracing Prior to Delivery:  Category 2   Shoulder dystocia present?:  Neg            Delivery (Maternal) (Provider to Complete) ([id number])    Perineal lacerations:  2nd Repaired?:  Yes   Vaginal laceration?:  Yes Repaired?:  Yes   Est. blood loss (mL):  300         Mother's Information  Mother: Lorena Echevarria #[id number]    Start of Mother's Information     IO Blood Loss  10/18/17 0034 - 10/19/17 1234    Mom's I/O Activity            End of Mother's Information  Mother: Lorena Echevarria #[id number]            Delivery - Provider to Complete ([id number])    Delivering clinician:  LITZY HOWARD   Attempted Delivery Types (Choose all that apply):  Vacuum (Extractor), Spontaneous Vaginal Delivery   Delivery Type (Choose the 1 that will go to the Birth History):  Vaginal, Vacuum (Extractor)   Verbal Informed Consent Obtained For Vacuum:  Yes Alternate Labor Strategies Discussed (vacuum):  Yes      Emergency Resources Available (vacuum):  Resuscitation Team   Type (vacuum):  kiwi       # of Pop-Offs (vacuum):  2    Position (vacuum):  Other, OA Fetal Station (vacuum):  +3      Indication for Operative Vaginal Delivery (vacuum):  Arrest of Descent, Maternal Exhaustion   Operative Vaginal Delivery Brief Note Vacuum:  R/B/A discussed with pt who desired to " proceed.  Maternal exhaustion with arrest of descent at +3/5.  Kiwi vacuum extractor placed on fetal vtx from LISA (<45 degrees) position) in midsagittal plane away from ant fontanelle.  Gentle pulling was coordinated with maternal expulsive forces and contractions with delivery of the fetal head.  Nuchal x 2 slipped and infant then easily delivered without dystocia and handed to awaiting resuscitation team.                      Placenta    Immediate Cord Clamping:  Done   Removal:  Spontaneous   Disposition:  Hospital disposal      Anesthesia    Method:  Epidural         Presentation and Position    Presentation:  Vertex   Position:  Right Occiput Anterior                    Fidel Severino MD

## 2017-10-30 NOTE — TELEPHONE ENCOUNTER
robitussin      Last Written Prescription Date: 10/13/17  Last Fill Quantity: 560,  # refills: 0   Last Office Visit with [facility], [facility] or [facility] prescribing provider: 10/27/17                                         Next 5 appointments (look out 90 days)     Oct 31, 2017  9:30 AM CDT   (Arrive by 9:15 AM)   SHORT with Mony Fernández NP   [facility] [place] ([facility] [place] )    [address]  [place] [address]   [phone]            Nov 03, 2017 11:00 AM CDT   (Arrive by 10:45 AM)   Return Visit with RUDY Palmer CNP   [facility] [place] ([facility] [place] )    [address]  [place] [address] [phone]            Nov 06, 2017 10:30 AM CST   (Arrive by 10:15 AM)   Office Visit with SAGAR Bolton   [facility] [place] ([facility] [place] )    [address] [place] [address]   [phone]

## 2017-11-03 ENCOUNTER — OFFICE VISIT (OUTPATIENT)
Dept: PSYCHIATRY | Facility: OTHER | Age: 22
End: 2017-11-03
Attending: NURSE PRACTITIONER
Payer: MEDICAID

## 2017-11-03 VITALS
WEIGHT: 235 LBS | SYSTOLIC BLOOD PRESSURE: 102 MMHG | OXYGEN SATURATION: 97 % | HEART RATE: 108 BPM | BODY MASS INDEX: 37.77 KG/M2 | DIASTOLIC BLOOD PRESSURE: 65 MMHG | HEIGHT: 66 IN

## 2017-11-03 DIAGNOSIS — F33.1 MAJOR DEPRESSIVE DISORDER, RECURRENT EPISODE, MODERATE (H): Primary | ICD-10-CM

## 2017-11-03 PROCEDURE — 99212 OFFICE O/P EST SF 10 MIN: CPT

## 2017-11-03 PROCEDURE — 99213 OFFICE O/P EST LOW 20 MIN: CPT | Performed by: NURSE PRACTITIONER

## 2017-11-03 RX ORDER — PAROXETINE 20 MG/1
20 TABLET, FILM COATED ORAL AT BEDTIME
Qty: 90 TABLET | Refills: 1 | Status: SHIPPED | OUTPATIENT
Start: 2017-11-03 | End: 2017-11-27

## 2017-11-03 ASSESSMENT — ANXIETY QUESTIONNAIRES
2. NOT BEING ABLE TO STOP OR CONTROL WORRYING: NEARLY EVERY DAY
3. WORRYING TOO MUCH ABOUT DIFFERENT THINGS: MORE THAN HALF THE DAYS
6. BECOMING EASILY ANNOYED OR IRRITABLE: MORE THAN HALF THE DAYS
5. BEING SO RESTLESS THAT IT IS HARD TO SIT STILL: SEVERAL DAYS
4. TROUBLE RELAXING: MORE THAN HALF THE DAYS
7. FEELING AFRAID AS IF SOMETHING AWFUL MIGHT HAPPEN: MORE THAN HALF THE DAYS
GAD7 TOTAL SCORE: 14
IF YOU CHECKED OFF ANY PROBLEMS ON THIS QUESTIONNAIRE, HOW DIFFICULT HAVE THESE PROBLEMS MADE IT FOR YOU TO DO YOUR WORK, TAKE CARE OF THINGS AT HOME, OR GET ALONG WITH OTHER PEOPLE: EXTREMELY DIFFICULT
1. FEELING NERVOUS, ANXIOUS, OR ON EDGE: MORE THAN HALF THE DAYS

## 2017-11-03 ASSESSMENT — PAIN SCALES - GENERAL: PAINLEVEL: NO PAIN (0)

## 2017-11-03 NOTE — PROGRESS NOTES
"PSYCHIATRY CLINIC PROGRESS NOTE   20 minute medication management, more than 50% of time spent counseling patient on medications, medication side effects, symptom history and management   SUBJECTIVE / INTERIM HISTORY                                                                       Lorena has been trialed on Wellbutrin but presents today stating that it just isn't working for her at all. She has trouble getting out of bed, has been crying out of the blue, can't get excited about anything. She lives with her parents and her dad watches the baby, it's \"hard to get the baby away from him,\" he is such a fond grandparent.  She is feeling blue, and would consider group  therapy. She is sleeping about three hours a night, aside even from her baby needing to wake up. She has insomnia, worries about finances, future, moving. Is looking for a place.  Is not thinking of self-harm or of harming anyone else.  She understands the risks/ benefits of  Antidepressants while breastfeeding. She understands to watch for baby sedation or rash or any irritability but she is also giving the baby formula \"almost every time she eats,\" so there is less breastfeeding going on.   SUBSTANCE USE- no ETOH, no MJ, no street drugs.      SYMPTOMS- Anxiety, depressed mood, anhedonia, low energy. No SI.   MEDICAL ROS-No N/V/D  MEDICAL / SURGICAL HISTORY                pregnant [if applicable]--no   Medical Team:     PMD-     Therapist-       Patient Active Problem List   Diagnosis     Major depressive disorder     Anxiety                           ALLERGY   Review of patient's allergies indicates      Allergies   Allergen Reactions     Lamictal [Lamotrigine] Other (See Comments)     Increases aggression.     Amoxicillin Rash     Zoloft [Sertraline] Other (See Comments)     Depression worsens     MEDICATIONS                                                                                                   Current Outpatient Prescriptions   Medication " "Sig       Current Outpatient Prescriptions:      ROBAFEN -10 MG/5ML syrup, TAKE 10 MLS BY MOUTH EVERY 4 HOURS AS NEEDED FOR COUGH, Disp: 560 mL, Rfl: 0     azithromycin (ZITHROMAX) 250 MG tablet, Two tablets first day, then one tablet daily for four days., Disp: 6 tablet, Rfl: 1     benzonatate (TESSALON) 200 MG capsule, Take 1 capsule (200 mg) by mouth 3 times daily as needed for cough, Disp: 21 capsule, Rfl: 0     albuterol (PROAIR HFA) 108 (90 BASE) MCG/ACT Inhaler, Inhale 2 puffs into the lungs every 6 hours, Disp: 1 Inhaler, Rfl: 0     omeprazole (PRILOSEC) 40 MG capsule, Take 1 capsule (40 mg) by mouth every morning, Disp: 30 capsule, Rfl: 0     buPROPion (WELLBUTRIN) 75 MG tablet, Take 2 tablets (150 mg) by mouth daily, Disp: 90 tablet, Rfl: 0     blood glucose monitoring (GABE CONTOUR NEXT) test strip, Use to test blood sugar 4 times daily or as directed., Disp: 200 each, Rfl: 4     blood glucose monitoring (GABE MICROLET) lancets, Use to test blood sugar 4 times daily or as directed., Disp: 100 each, Rfl: 1     cyclobenzaprine (FLEXERIL) 10 MG tablet, Take 0.5-1 tablets (5-10 mg) by mouth 3 times daily as needed for muscle spasms, Disp: 30 tablet, Rfl: 1     valACYclovir (VALTREX) 500 MG tablet, Take 1 tablet (500 mg) by mouth daily, Disp: 30 tablet, Rfl: 1     blood glucose monitoring (ONE TOUCH ULTRA) test strip, Use to test blood sugar 4 times daily., Disp: 100 each, Rfl: 5                                             No current facility-administered medications for this visit.          VITALS    /65  Pulse 108  Ht 5' 6\" (1.676 m)  Wt 235 lb (106.6 kg)  SpO2 97%  BMI 37.93 kg/m2    PHQ9                        PHQ-9 SCORE date date date   Total Score - - -   Total Score 1 1          MENTAL STATUS EXAM                                                                                        Alert. Oriented to person, place, and date / time. Well groomed, calm, cooperative with good eye " contact. No problems with speech, paucity of speech may be personality or may be depression,  Some psychomotor retardation but again not a med-related behavior as much as mood-related. Mood was dysthymic and with flat affect congruent to speech content and restricted range. Thought process, including associations, was unremarkable and thought content was devoid of suicidal and homicidal ideation and psychotic thought. No hallucinations. Insight was fair. Judgment was intact and adequate for safety. Fund of knowledge was intact. Pt demonstrates no obvious problems with attention, concentration, language, recent or remote memory although these were not formally tested.      ASSESSMENT                                                                                                       HISTORICAL:  Initial psych consult       CURRENT: This patient provides a history which supports the diagnoses depressed mood and anxiety but no psychotic features.      TREATMENT RISK STATEMENT: The risks, benefits, alternatives and potential adverse effects have been explained and are understood by the pt. The pt agrees to the treatment plan with the ability to do so. The pt knows to call the clinic for any problems or access emergency care if needed.    DIAGNOSES              Major Depressive Order, (F33.1)          LABS         PLAN                                                                                                                          1) MEDICATIONS:   Dc Wellbutrin and start Paxil as her allergy to Zoloft was a very mild rash, small, on one of her thighs     2) THERAPY: No Change. May consider PHP, will give brochure.      3) LABS: none ordered     4) PT MONITOR [call for probs]: Worsening symptoms, side effects from medications, SI/HI     5) REFERRALS [CD tx, medical, tests other]:      6)  RTC: 1 month                      Lorena came in to the clinic to try Alpha-stim 1100  On 11/6 and reports that she is taking  the Paxil and having no adverse effects and baby having no adverse effects. She states that she will return to care immediately if any side effects occur.

## 2017-11-03 NOTE — PATIENT INSTRUCTIONS
Take one tab of Paxil, 20 mg by mouth at bedtime and call if you or baby have any rash or adverse reaction.

## 2017-11-03 NOTE — NURSING NOTE
"Chief Complaint   Patient presents with     Mental Health Problem     f/u severe depression       Initial /65  Pulse 108  Ht 5' 6\" (1.676 m)  Wt 235 lb (106.6 kg)  SpO2 97%  BMI 37.93 kg/m2 Estimated body mass index is 37.93 kg/(m^2) as calculated from the following:    Height as of this encounter: 5' 6\" (1.676 m).    Weight as of this encounter: 235 lb (106.6 kg).  Medication Reconciliation: complete     Becca De La Torre      "

## 2017-11-03 NOTE — MR AVS SNAPSHOT
"              After Visit Summary   11/3/2017    Lorena Echevarria    MRN: [id number]           Patient Information     Date Of Birth          1995        Visit Information        Provider Department      11/3/2017 11:00 AM Ruthy Noble APRN CNP [facility]        Today's Diagnoses     Major depressive disorder, recurrent episode, moderate (H)    -  1      Care Instructions    Take one tab of Paxil, 20 mg by mouth at bedtime and call if you or baby have any rash or adverse reaction.           Follow-ups after your visit        Follow-up notes from your care team     Return in about 3 months (around 2/3/2018).      Your next 10 appointments already scheduled     Nov 06, 2017 10:30 AM CST   (Arrive by 10:15 AM)   Office Visit with SAGAR Bolton   [facility] [place] ([facility] )    [address]  [place] [address]   [phone]              Who to contact     If you have questions or need follow up information about today's clinic visit or your schedule please contact [facility] directly at [phone].  Normal or non-critical lab and imaging results will be communicated to you by MyChart, letter or phone within 4 business days after the clinic has received the results. If you do not hear from us within 7 days, please contact the clinic through [employer] or phone. If you have a critical or abnormal lab result, we will notify you by phone as soon as possible.  Submit refill requests through [employer] or call your pharmacy and they will forward the refill request to us. Please allow 3 business days for your refill to be completed.          Additional Information About Your Visit        MyChart Information     [employer] lets you send messages to your doctor, view your test results, renew your prescriptions, schedule appointments and more. To sign up, go to [email or web address] . Click on \"Log in\" on the left side of the screen, which will take " "you to the Welcome page. Then click on \"Sign up Now\" on the right side of the page.     You will be asked to enter the access code listed below, as well as some personal information. Please follow the directions to create your username and password.     Your access code is: [email or web address]  Expires: 2017 11:23 AM     Your access code will  in 90 days. If you need help or a new code, please call your [facility] clinic or [phone].        Care EveryWhere ID     This is your Care EveryWhere ID. This could be used by other organizations to access your [facility] medical records  [id number]        Your Vitals Were     Pulse Height Pulse Oximetry BMI (Body Mass Index)          108 5' 6\" (1.676 m) 97% 37.93 kg/m2         Blood Pressure from Last 3 Encounters:   17 102/65   10/27/17 122/74   10/27/17 122/80    Weight from Last 3 Encounters:   17 235 lb (106.6 kg)   10/27/17 235 lb (106.6 kg)   10/27/17 244 lb (110.7 kg)              Today, you had the following     No orders found for display         Today's Medication Changes          These changes are accurate as of: 11/3/17 11:00 AM.  If you have any questions, ask your nurse or doctor.               Start taking these medicines.        Dose/Directions    PARoxetine 20 MG tablet   Commonly known as:  PAXIL   Used for:  Major depressive disorder, recurrent episode, moderate (H)   Started by:  Ruthy Noble APRN CNP        Dose:  20 mg   Take 1 tablet (20 mg) by mouth At Bedtime   Quantity:  90 tablet   Refills:  1         Stop taking these medicines if you haven't already. Please contact your care team if you have questions.     buPROPion 75 MG tablet   Commonly known as:  WELLBUTRIN   Stopped by:  Ruthy Noble APRN CNP                Where to get your medicines      These medications were sent to [id number] - [place] - [address]  [address] [place] [address]     Phone:  [phone]     PARoxetine 20 MG " tablet                Primary Care Provider    Physician No Ref-Primary       NO REF-PRIMARY PHYSICIAN        Equal Access to Services     EDMOND QUIÑONES : Hadii aad ku hadroman[place], wabennieda beatarishiha, helenta melbaisiahmayito shah. So [facility] [phone].    ATENCIÓN: Si habla español, tiene a hein disposición servicios gratuitos de asistencia lingüística. Llame al [phone].    We comply with applicable federal civil rights laws and [place] laws. We do not discriminate on the basis of race, color, national origin, age, disability, sex, sexual orientation, or gender identity.            Thank you!     Thank you for choosing [facility]  for your care. Our goal is always to provide you with excellent care. Hearing back from our patients is one way we can continue to improve our services. Please take a few minutes to complete the written survey that you may receive in the mail after your visit with us. Thank you!             Your Updated Medication List - Protect others around you: Learn how to safely use, store and throw away your medicines at www.disposemymeds.org.          This list is accurate as of: 11/3/17 11:00 AM.  Always use your most recent med list.                   Brand Name Dispense Instructions for use Diagnosis    albuterol 108 (90 BASE) MCG/ACT Inhaler    PROAIR HFA    1 Inhaler    Inhale 2 puffs into the lungs every 6 hours    Cough       azithromycin 250 MG tablet    ZITHROMAX    6 tablet    Two tablets first day, then one tablet daily for four days.    Cough       benzonatate 200 MG capsule    TESSALON    21 capsule    Take 1 capsule (200 mg) by mouth 3 times daily as needed for cough    Cough       blood glucose monitoring lancets     100 each    Use to test blood sugar 4 times daily or as directed.    Insulin controlled gestational diabetes mellitus (GDM) in third trimester       * blood glucose monitoring test strip    ONETOUCH ULTRA    100  each    Use to test blood sugar 4 times daily.    Gestational diabetes mellitus (GDM) in third trimester controlled on oral hypoglycemic drug       * blood glucose monitoring test strip    GABE CONTOUR NEXT    200 each    Use to test blood sugar 4 times daily or as directed.    Insulin controlled gestational diabetes mellitus (GDM) in third trimester       cyclobenzaprine 10 MG tablet    FLEXERIL    30 tablet    Take 0.5-1 tablets (5-10 mg) by mouth 3 times daily as needed for muscle spasms    Acute right-sided low back pain without sciatica       omeprazole 40 MG capsule    priLOSEC    30 capsule    Take 1 capsule (40 mg) by mouth every morning    Encounter for triage in pregnant patient       PARoxetine 20 MG tablet    PAXIL    90 tablet    Take 1 tablet (20 mg) by mouth At Bedtime    Major depressive disorder, recurrent episode, moderate (H)       ROBAFEN DM  MG/5ML syrup   Generic drug:  Dextromethorphan-guaiFENesin     560 mL    TAKE 10 MLS BY MOUTH EVERY 4 HOURS AS NEEDED FOR COUGH    Encounter for triage in pregnant patient       valACYclovir 500 MG tablet    VALTREX    30 tablet    Take 1 tablet (500 mg) by mouth daily    Herpes simplex virus (HSV) infection of vagina       * Notice:  This list has 2 medication(s) that are the same as other medications prescribed for you. Read the directions carefully, and ask your doctor or other care provider to review them with you.

## 2017-11-04 ASSESSMENT — ANXIETY QUESTIONNAIRES: GAD7 TOTAL SCORE: 14

## 2017-11-06 ENCOUNTER — OFFICE VISIT (OUTPATIENT)
Dept: FAMILY MEDICINE | Facility: OTHER | Age: 22
End: 2017-11-06
Attending: PHYSICIAN ASSISTANT
Payer: MEDICAID

## 2017-11-06 ENCOUNTER — OFFICE VISIT (OUTPATIENT)
Dept: BEHAVIORAL HEALTH | Facility: OTHER | Age: 22
End: 2017-11-06
Attending: SOCIAL WORKER
Payer: MEDICAID

## 2017-11-06 VITALS
SYSTOLIC BLOOD PRESSURE: 116 MMHG | TEMPERATURE: 97.5 F | BODY MASS INDEX: 37.49 KG/M2 | OXYGEN SATURATION: 97 % | HEIGHT: 65 IN | WEIGHT: 225 LBS | HEART RATE: 89 BPM | DIASTOLIC BLOOD PRESSURE: 76 MMHG

## 2017-11-06 DIAGNOSIS — F39 MOOD DISORDER (H): ICD-10-CM

## 2017-11-06 DIAGNOSIS — F32.A DEPRESSION: Primary | ICD-10-CM

## 2017-11-06 DIAGNOSIS — Z09 FOLLOW-UP EXAM: ICD-10-CM

## 2017-11-06 DIAGNOSIS — Z76.89 ESTABLISHING CARE WITH NEW DOCTOR, ENCOUNTER FOR: Primary | ICD-10-CM

## 2017-11-06 DIAGNOSIS — A60.04 HERPES SIMPLEX VIRUS (HSV) INFECTION OF VAGINA: ICD-10-CM

## 2017-11-06 PROCEDURE — 99213 OFFICE O/P EST LOW 20 MIN: CPT | Mod: 25

## 2017-11-06 PROCEDURE — 90832 PSYTX W PT 30 MINUTES: CPT | Performed by: SOCIAL WORKER

## 2017-11-06 PROCEDURE — 99214 OFFICE O/P EST MOD 30 MIN: CPT | Performed by: PHYSICIAN ASSISTANT

## 2017-11-06 RX ORDER — VALACYCLOVIR HYDROCHLORIDE 500 MG/1
500 TABLET, FILM COATED ORAL DAILY
Qty: 30 TABLET | Refills: 1 | Status: SHIPPED | OUTPATIENT
Start: 2017-11-06 | End: 2018-06-01

## 2017-11-06 ASSESSMENT — ANXIETY QUESTIONNAIRES
GAD7 TOTAL SCORE: 20
1. FEELING NERVOUS, ANXIOUS, OR ON EDGE: NEARLY EVERY DAY
3. WORRYING TOO MUCH ABOUT DIFFERENT THINGS: NEARLY EVERY DAY
6. BECOMING EASILY ANNOYED OR IRRITABLE: NEARLY EVERY DAY
5. BEING SO RESTLESS THAT IT IS HARD TO SIT STILL: MORE THAN HALF THE DAYS
7. FEELING AFRAID AS IF SOMETHING AWFUL MIGHT HAPPEN: NEARLY EVERY DAY
IF YOU CHECKED OFF ANY PROBLEMS ON THIS QUESTIONNAIRE, HOW DIFFICULT HAVE THESE PROBLEMS MADE IT FOR YOU TO DO YOUR WORK, TAKE CARE OF THINGS AT HOME, OR GET ALONG WITH OTHER PEOPLE: EXTREMELY DIFFICULT
4. TROUBLE RELAXING: NEARLY EVERY DAY
2. NOT BEING ABLE TO STOP OR CONTROL WORRYING: NEARLY EVERY DAY

## 2017-11-06 ASSESSMENT — PAIN SCALES - GENERAL: PAINLEVEL: MODERATE PAIN (5)

## 2017-11-06 ASSESSMENT — PATIENT HEALTH QUESTIONNAIRE - PHQ9: SUM OF ALL RESPONSES TO PHQ QUESTIONS 1-9: 20

## 2017-11-06 NOTE — PATIENT INSTRUCTIONS
Thank you for choosing [facility].   I have office hours 8:00 am to 4:30 pm on Monday's, Wednesday's, Thursday's and Friday's. My nurse and I are out of the office every Tuesday.    Following your visit, when your labs and diagnostic testing have returned, I will review then and you will be contacted by my nurse.  If you are on My Chart, you can also view results there.    For refills, notify your pharmacy regarding what you need and the pharmacy will generate a refill request. Do not call my nurse as she is unable to process refill request. Please plan ahead and allow 3-5 days for refill requests.    You will generally receive a reminder call the day prior to your appointment.  If you cannot attend your appointment, please cancel your appointment with as much notice as possible.  If there is a pattern of failure to present for your appointments, I cannot provide consistent, meaningful, ongoing care for you. It is very important to me that you come in for your care, so we can best assist you with your health care needs.    IMPORTANT:  Please note that it is my standard of practice to NOT participate in prescribing ongoing requested Narcotic Analgesic therapy, and/or participate in the prescribing of other controlled substances.  My nurse and I am happy to assist you with the process of referral for alternative pain management as needed, and other treatment modalities including but not limited to:  Physical Therapy, Physical Medicine and Rehab, Counseling, Chiropractic Care, Orthopedic Care, and non-narcotic medication management.     In the event that you need to be seen for emergent concerns and I am out of office,  please see one of my colleagues for acute concerns.  You may also present to  or ER.  I appreciate the opportunity to serve you and look forward to supporting your healthcare needs in the future. Please contact me with any questions or concerns that you may  have.    Sincerely,      Kaya Servin RN, PA-C

## 2017-11-06 NOTE — PROGRESS NOTES
SUBJECTIVE:   Lorena Echevarria is a 22 year old female who presents to clinic today for the following health issues:        New Patient/Transfer of Care    Medication Followup of  Antibiotic and cough medication for bronchitis    Taking Medication as prescribed: yes    Side Effects:  None    Medication Helping Symptoms:  Improved by 95 % in 3 days.            Problem list and histories reviewed & adjusted, as indicated.  Additional history: as documented    Patient Active Problem List   Diagnosis     Suicidal ideation     Mild intellectual disability     Autism spectrum     Anxiety     Mood disorder (H)     Suicidal thoughts     Left Ankle Pain     Irregular menstrual cycle (PERIODS)     Depression     Elevated serum cholesterol     Obesity     Sinus tachycardia     Galactorrhea of both breasts     High-risk first pregnancy of young woman, third trimester     Recurrent HSV (herpes simplex virus)     Gestational diabetes mellitus (GDM) in third trimester controlled on oral hypoglycemic drug     Papanicolaou smear of cervix with low grade squamous intraepithelial lesion (LGSIL)     Encounter for triage in pregnant patient     GDM (gestational diabetes mellitus)     Gestational diabetes     Normal labor and delivery     History reviewed. No pertinent surgical history.    Social History   Substance Use Topics     Smoking status: Former Smoker     Quit date: 10/27/2016     Smokeless tobacco: Never Used     Alcohol use No      Comment: Past use     Family History   Problem Relation Age of Onset     DIABETES Paternal Grandfather          Current Outpatient Prescriptions   Medication Sig Dispense Refill     PARoxetine (PAXIL) 20 MG tablet Take 1 tablet (20 mg) by mouth At Bedtime 90 tablet 1     albuterol (PROAIR HFA) 108 (90 BASE) MCG/ACT Inhaler Inhale 2 puffs into the lungs every 6 hours 1 Inhaler 0     omeprazole (PRILOSEC) 40 MG capsule Take 1 capsule (40 mg) by mouth every morning 30 capsule 0     blood glucose  "monitoring (GABE CONTOUR NEXT) test strip Use to test blood sugar 4 times daily or as directed. 200 each 4     blood glucose monitoring (GABE MICROLET) lancets Use to test blood sugar 4 times daily or as directed. 100 each 1     cyclobenzaprine (FLEXERIL) 10 MG tablet Take 0.5-1 tablets (5-10 mg) by mouth 3 times daily as needed for muscle spasms 30 tablet 1     valACYclovir (VALTREX) 500 MG tablet Take 1 tablet (500 mg) by mouth daily 30 tablet 1     blood glucose monitoring (ONE TOUCH ULTRA) test strip Use to test blood sugar 4 times daily. 100 each 5     Allergies   Allergen Reactions     Lamictal [Lamotrigine] Other (See Comments)     Increases aggression.     Amoxicillin Rash     Zoloft [Sertraline] Other (See Comments)     Depression worsens     BP Readings from Last 3 Encounters:   11/06/17 116/76   11/03/17 102/65   10/27/17 122/74    Wt Readings from Last 3 Encounters:   11/06/17 225 lb (102.1 kg)   11/03/17 235 lb (106.6 kg)   10/27/17 235 lb (106.6 kg)                        Reviewed and updated as needed this visit by clinical staffAllergies       Reviewed and updated as needed this visit by Provider         ROS:  Constitutional, neuro, ENT, endocrine, pulmonary, cardiac, gastrointestinal, genitourinary, musculoskeletal, integument and psychiatric systems are negative, except as otherwise noted.      OBJECTIVE:                                                    /76 (BP Location: Left arm, Patient Position: Chair, Cuff Size: Adult Large)  Pulse 89  Temp 97.5  F (36.4  C) (Tympanic)  Ht 5' 5.25\" (1.657 m)  Wt 225 lb (102.1 kg)  SpO2 97%  Breastfeeding? Yes  BMI 37.16 kg/m2  Body mass index is 37.16 kg/(m^2).  GENERAL APPEARANCE: healthy, alert and no distress  EYES: Eyes grossly normal to inspection, PERRL and conjunctivae and sclerae normal  HENT: ear canals and TM's normal and nose and mouth without ulcers or lesions  NECK: no adenopathy, no asymmetry, masses, or scars and thyroid normal " to palpation  RESP: lungs clear to auscultation - no rales, rhonchi or wheezes  CV: regular rates and rhythm, normal S1 S2, no S3 or S4 and no murmur, click or rub  LYMPHATICS: normal ant/post cervical and supraclavicular nodes  MS: extremities normal- no gross deformities noted  SKIN: no suspicious lesions or rashes  NEURO: Normal strength and tone, mentation intact and speech normal  PSYCH: long discussion on her mood and bonding with baby.  Her sleep is haphazard.  Working with Psyche on her mood.  Right now has no concerns.   Father of baby not much help and baby is fussy.   She is living with Mom right now.     30 minutes in visit 50% face to face counseling.   Diagnostic test results:  Diagnostic Test Results:  No results found for this or any previous visit (from the past 24 hour(s)).       ASSESSMENT/PLAN:                                                    1. Herpes simplex virus (HSV) infection of vagina  She is given a refill safe in breast feeding.  Take for daily suppression.   - valACYclovir (VALTREX) 500 MG tablet; Take 1 tablet (500 mg) by mouth daily  Dispense: 30 tablet; Refill: 1    2. Establishing care with new doctor, encounter for  Accept her as long as remains in mental health for medication prescribing.  Has been very ill in the past with suicidal issues.  Doing very well now.     3. Follow-up exam  She is breathing much better.  Discussion on smoking and doesn't smoke now.   Encouraged her to not smoke.       See Patient Instructions    Kaya Servin PA-C  [facility]

## 2017-11-06 NOTE — PROGRESS NOTES
[facility]  November 6, 2017    Behavioral Health Clinician Progress Note    Patient Name: Lorena Echevarria         Service Type: Individual           Service Location:  Face to Face in Clinic      Session Start Time:  10:30  Session End Time: 11:00      Session Length: 16 - 37      Attendees: Patient    Visit Activities (Refresh list every visit): [facility] Only and Warm-handoff       Diagnostic Assessment Date: n/a  Treatment Plan Review Date: n/a    See Flowsheets for PHQ-9 and MILADY-7 results  Previous PHQ-9:   PHQ-9 SCORE 10/27/2017 11/6/2017   Total Score 18 20     Previous MILADY-7:   MILADY-7 SCORE 11/3/2017 11/6/2017   Total Score 14 20       ALEJANDRA LEVEL:  No flowsheet data found.    DATA  Extended Session (60+ minutes): No  Interactive Complexity: No  Crisis: No  [facility] Patient No    Treatment Objective(s) Addressed in This Session:  Target Behavior(s):  Depressed Mood: Increase interest, engagement, and pleasure in doing things  Feel less tired and more energy during the day   Improve diet, appetite, mindful eating, and / or meal planning    Current Stressors / Issues:  Patient referred to see [facility] by Steph Noble NP after meeting with her for med management appointment last week.  They had been talking about Alpha-Stim in that appointment, where patient expressed interest in using it.  Patient was here to see PCP this date and referred to [facility] for services.  Spoke with patient and offered Alpha stim today.  Patient elected to try it, continued discussion about supportive services in [facility].  Patient chose to enroll and completed brief needs assessment    Patient reported that she would like to use alpha-stim during therapy session today.  Patient gave verbal consent to use Alpha- Stim  as a tx modality.  Discussed any false beliefs, myths, concerns about the use.  Facilitated a brief induction with patient.  Reviewed potential for dizzy or nausea feeling.   Patient expressed understanding and in  agreement with continued use.  Patient did have this response, but was able to tolerate treatment with no complications. Patient completed a 20 minute treatment session at 250 microampere (?A) current.  Patient reports feeling calmer and enjoyed using it.      Progress on Treatment Objective(s) / Homework:  New Objective established this session - PRECONTEMPLATION (Not seeing need for change); Intervened by educating the patient about the effects of current behavior on health.  Evoked information about reasons to continue behavior, express concern / recommendations, and explored any change talk    Motivational Interviewing    MI Intervention: Expressed Empathy/Understanding, Supported Autonomy, Collaboration, Evocation and Open-ended questions     Change Talk Expressed by the Patient: NA - Precontemplative    Provider Response to Change Talk: E - Evoked more info from patient about behavior change, A - Affirmed patient's thoughts, decisions, or attempts at behavior change, R - Reflected patient's change talk and S - Summarized patient's change talk statements        SOLUTION FOCUSED: Explored patterns in patient's relationships and discussed options for new behaviors, Explored patterns in patient's actions and choices and discussed options for new behaviors  .......      Care Plan review completed: No    Medication Review:  Changes to psychiatric medications, see updated Medication List in EPIC.     Medication Compliance:  Yes    Changes in Health Issues:   None reported    Chemical Use Review:   Substance Use: Not reviewed this appointment     Tobacco Use: Not reviewed this appointment    Assessment: Current Emotional / Mental Status (status of significant symptoms):    Risk status (Self / Other harm or suicidal ideation)  Patient denies current fears or concerns for personal safety.  Patient denies current or recent suicidal ideation or behaviors.  Patient denies current or recent homicidal ideation or  behaviors.  Patient denies current or recent self injurious behavior or ideation.  Patient denies other safety concerns.  A safety and risk management plan has not been developed at this time, however patient was encouraged to call [place] [phone] should there be a change in any of these risk factors.    Appearance:   Appropriate   Eye Contact:   Fair   Psychomotor Behavior: Normal   Attitude:   Cooperative   Orientation:   All  Speech   Rate / Production: Normal    Volume:  Normal   Mood:    Normal  Affect:    Flat   Thought Content:  Clear   Thought Form:  Coherent  Logical   Insight:    Fair     Diagnoses:  1. Depression    2. Mood disorder (H)        Collateral Reports Completed:  Communicated with: Steph Noble NP    Plan: (Homework, other):  Patient was given information about behavioral services and encouraged to schedule a follow up appointment with the clinic [facility] in 1 week.  She was also given [facility] Referral .  Amparo Monzon [facility], [facility]

## 2017-11-06 NOTE — LETTER
My Depression Action Plan  Name: Lorena Echevarria   Date of Birth 1995  Date: 11/6/2017    My doctor: No Ref-Primary, Physician   My clinic: [facility] [place]  [place] [address]  [phone]          GREEN    ZONE   Good Control    What it looks like:     Things are going generally well. You have normal up s and down s. You may even feel depressed from time to time, but bad moods usually last less than a day.   What you need to do:  1. Continue to care for yourself (see self care plan)  2. Check your depression survival kit and update it as needed  3. Follow your physician s recommendations including any medication.  4. Do not stop taking medication unless you consult with your physician first.           YELLOW         ZONE Getting Worse    What it looks like:     Depression is starting to interfere with your life.     It may be hard to get out of bed; you may be starting to isolate yourself from others.    Symptoms of depression are starting to last most all day and this has happened for several days.     You may have suicidal thoughts but they are not constant.   What you need to do:     1. Call your care team, your response to treatment will improve if you keep your care team informed of your progress. Yellow periods are signs an adjustment may need to be made.     2. Continue your self-care, even if you have to fake it!    3. Talk to someone in your support network    4. Open up your depression survival kit           RED    ZONE Medical Alert - Get Help    What it looks like:     Depression is seriously interfering with your life.     You may experience these or other symptoms: You can t get out of bed most days, can t work or engage in other necessary activities, you have trouble taking care of basic hygiene, or basic responsibilities, thoughts of suicide or death that will not go away, self-injurious behavior.     What you need to do:  1. Call your care team and  request a same-day appointment. If they are not available (weekends or after hours) call your local crisis line, emergency room or 911.      Electronically signed by: Amina Hunt, November 6, 2017    Depression Self Care Plan / Survival Kit    Self-Care for Depression  Here s the deal. Your body and mind are really not as separate as most people think.  What you do and think affects how you feel and how you feel influences what you do and think. This means if you do things that people who feel good do, it will help you feel better.  Sometimes this is all it takes.  There is also a place for medication and therapy depending on how severe your depression is, so be sure to consult with your medical provider and/ or Behavioral Health Consultant if your symptoms are worsening or not improving.     In order to better manage my stress, I will:    Exercise  Get some form of exercise, every day. This will help reduce pain and release endorphins, the  feel good  chemicals in your brain. This is almost as good as taking antidepressants!  This is not the same as joining a gym and then never going! (they count on that by the way ) It can be as simple as just going for a walk or doing some gardening, anything that will get you moving.      Hygiene   Maintain good hygiene (Get out of bed in the morning, Make your bed, Brush your teeth, Take a shower, and Get dressed like you were going to work, even if you are unemployed).  If your clothes don't fit try to get ones that do.    Diet  I will strive to eat foods that are good for me, drink plenty of water, and avoid excessive sugar, caffeine, alcohol, and other mood-altering substances.  Some foods that are helpful in depression are: complex carbohydrates, B vitamins, flaxseed, fish or fish oil, fresh fruits and vegetables.    Psychotherapy  I agree to participate in Individual Therapy (if recommended).    Medication  If prescribed medications, I agree to take them.  Missing  doses can result in serious side effects.  I understand that drinking alcohol, or other illicit drug use, may cause potential side effects.  I will not stop my medication abruptly without first discussing it with my provider.    Staying Connected With Others  I will stay in touch with my friends, family members, and my primary care provider/team.    Use your imagination  Be creative.  We all have a creative side; it doesn t matter if it s oil painting, sand castles, or mud pies! This will also kick up the endorphins.    Witness Beauty  (AKA stop and smell the roses) Take a look outside, even in mid-winter. Notice colors, textures. Watch the squirrels and birds.     Service to others  Be of service to others.  There is always someone else in need.  By helping others we can  get out of ourselves  and remember the really important things.  This also provides opportunities for practicing all the other parts of the program.    Humor  Laugh and be silly!  Adjust your TV habits for less news and crime-drama and more comedy.    Control your stress  Try breathing deep, massage therapy, biofeedback, and meditation. Find time to relax each day.     My support system    Clinic Contact:  Phone number:    Contact 1:  Phone number:    Contact 2:  Phone number:    Denominational/:  Phone number:    Therapist:  Phone number:    Local crisis center:    Phone number:    Other community support:  Phone number:

## 2017-11-06 NOTE — NURSING NOTE
"Chief Complaint   Patient presents with     *_* Health Care Directive *_*     declined     Establish Care       Initial /76 (BP Location: Left arm, Patient Position: Chair, Cuff Size: Adult Large)  Pulse 89  Temp 97.5  F (36.4  C) (Tympanic)  Ht 5' 5.25\" (1.657 m)  Wt 225 lb (102.1 kg)  SpO2 97%  Breastfeeding? Yes  BMI 37.16 kg/m2 Estimated body mass index is 37.16 kg/(m^2) as calculated from the following:    Height as of this encounter: 5' 5.25\" (1.657 m).    Weight as of this encounter: 225 lb (102.1 kg).  Medication Reconciliation: complete   Amina Hunt CMA(AAMA)   "

## 2017-11-06 NOTE — MR AVS SNAPSHOT
After Visit Summary   11/6/2017    Lorena Echevarria    MRN: [id number]           Patient Information     Date Of Birth          1995        Visit Information        Provider Department      11/6/2017 10:30 AM [facility]in [facility]        Today's Diagnoses     Establishing care with new doctor, encounter for    -  1    Herpes simplex virus (HSV) infection of vagina        Follow-up exam          Care Instructions      Thank you for choosing [facility].   I have office hours 8:00 am to 4:30 pm on Monday's, Wednesday's, Thursday's and Friday's. My nurse and I are out of the office every Tuesday.    Following your visit, when your labs and diagnostic testing have returned, I will review then and you will be contacted by my nurse.  If you are on My Chart, you can also view results there.    For refills, notify your pharmacy regarding what you need and the pharmacy will generate a refill request. Do not call my nurse as she is unable to process refill request. Please plan ahead and allow 3-5 days for refill requests.    You will generally receive a reminder call the day prior to your appointment.  If you cannot attend your appointment, please cancel your appointment with as much notice as possible.  If there is a pattern of failure to present for your appointments, I cannot provide consistent, meaningful, ongoing care for you. It is very important to me that you come in for your care, so we can best assist you with your health care needs.    IMPORTANT:  Please note that it is my standard of practice to NOT participate in prescribing ongoing requested Narcotic Analgesic therapy, and/or participate in the prescribing of other controlled substances.  My nurse and I am happy to assist you with the process of referral for alternative pain management as needed, and other treatment modalities including but not limited to:  Physical Therapy, Physical Medicine and Rehab,  "Counseling, Chiropractic Care, Orthopedic Care, and non-narcotic medication management.     In the event that you need to be seen for emergent concerns and I am out of office,  please see one of my colleagues for acute concerns.  You may also present to  or ER.  I appreciate the opportunity to serve you and look forward to supporting your healthcare needs in the future. Please contact me with any questions or concerns that you may have.    Sincerely,      Kaya Servin RN, PA-C               Follow-ups after your visit        Your next 10 appointments already scheduled     Nov 27, 2017  2:00 PM CST   (Arrive by 1:45 PM)   Return Visit [facility] RUD[facility] Palmer CNP   [facility]    [address] [place] [address] [phone]              Who to contact     If you have questions or need follow up information about today's clinic visit or your schedule please contact [facility] directly at [phone].  Normal or non-critical lab and imaging results will be communicated to you by MyChart, letter or phone within 4 business days after the clinic has received the results. If you do not hear from us within 7 days, please contact the clinic through MyChart or phone. If you have a critical or abnormal lab result, we will notify you by phone as soon as possible.  Submit refill requests through "[employer] or call your pharmacy and they will forward the refill request to us. Please allow 3 business days for your refill to be completed.          Additional Information About Your Visit        Care EveryWhere ID     This is your Care EveryWhere ID. This could be used by other organizations to access your [facility] medical records  [id number]        Your Vitals Were     Pulse Temperature Height Pulse Oximetry Breastfeeding? BMI (Body Mass Index)    89 97.5  F (36.4  C) (Tympanic) 5' 5.25\" (1.657 m) 97% Yes 37.16 kg/m2       Blood Pressure from Last 3 " Encounters:   11/06/17 116/76   11/03/17 102/65   10/27/17 122/74    Weight from Last 3 Encounters:   11/06/17 225 lb (102.1 kg)   11/03/17 235 lb (106.6 kg)   10/27/17 235 lb (106.6 kg)              We Performed the Following     LIPID PANEL          Where to get your medicines      These medications were sent to [id number] - [place], [place] - [address]  [address] [place] [address]     Phone:  [phone]     valACYclovir 500 MG tablet          Primary Care Provider    Physician No Ref-Primary       NO REF-PRIMARY PHYSICIAN        Equal Access to Services     [facility]: Hadii emelia [place], waaxda luellisadaha, qaybta kaalmada adejuanyayoselin, mayito dove . So [facility] [phone].    ATENCIÓN: Si habla español, tiene a hein disposición servicios gratuitos de asistencia lingüística. [facility] [phone].    We comply with applicable federal civil rights laws and [place] laws. We do not discriminate on the basis of race, color, national origin, age, disability, sex, sexual orientation, or gender identity.            Thank you!     Thank you for choosing [facility]  for your care. Our goal is always to provide you with excellent care. Hearing back from our patients is one way we can continue to improve our services. Please take a few minutes to complete the written survey that you may receive in the mail after your visit with us. Thank you!             Your Updated Medication List - Protect others around you: Learn how to safely use, store and throw away your medicines at www.disposemymeds.org.          This list is accurate as of: 11/6/17 10:59 AM.  Always use your most recent med list.                   Brand Name Dispense Instructions for use Diagnosis    albuterol 108 (90 BASE) MCG/ACT Inhaler    PROAIR HFA    1 Inhaler    Inhale 2 puffs into the lungs every 6 hours    Cough       blood glucose monitoring lancets     100 each    Use to test  blood sugar 4 times daily or as directed.    Insulin controlled gestational diabetes mellitus (GDM) in third trimester       * blood glucose monitoring test strip    ONETOUCH ULTRA    100 each    Use to test blood sugar 4 times daily.    Gestational diabetes mellitus (GDM) in third trimester controlled on oral hypoglycemic drug       * blood glucose monitoring test strip    GABE CONTOUR NEXT    200 each    Use to test blood sugar 4 times daily or as directed.    Insulin controlled gestational diabetes mellitus (GDM) in third trimester       cyclobenzaprine 10 MG tablet    FLEXERIL    30 tablet    Take 0.5-1 tablets (5-10 mg) by mouth 3 times daily as needed for muscle spasms    Acute right-sided low back pain without sciatica       omeprazole 40 MG capsule    priLOSEC    30 capsule    Take 1 capsule (40 mg) by mouth every morning    Encounter for triage in pregnant patient       PARoxetine 20 MG tablet    PAXIL    90 tablet    Take 1 tablet (20 mg) by mouth At Bedtime    Major depressive disorder, recurrent episode, moderate (H)       valACYclovir 500 MG tablet    VALTREX    30 tablet    Take 1 tablet (500 mg) by mouth daily    Herpes simplex virus (HSV) infection of vagina       * Notice:  This list has 2 medication(s) that are the same as other medications prescribed for you. Read the directions carefully, and ask your doctor or other care provider to review them with you.

## 2017-11-06 NOTE — MR AVS SNAPSHOT
After Visit Summary   11/6/2017    Lorena Echevarria    MRN: [id number]           Patient Information     Date Of Birth          1995        Visit Information        Provider Department      11/6/2017 10:30 AM Amparo Monzon [facility]        Today's Diagnoses     Depression    -  1    Mood disorder (H)           Follow-ups after your visit        Your next 10 appointments already scheduled     Nov 13, 2017  2:00 PM CST   (Arrive by 1:45 PM)   New Visit with DENY Sutton   [facility] [place] [facility] [place] [facility]    [address] [place] [address]  [place] [address] [phone]            Nov 13, 2017  2:00 PM CST   (Arrive by 1:45 PM)   New Visit with SKYE Reyes   [facility] [place] [facility]    [address] [place] [address]  [place] [address] [phone]            Nov 27, 2017  2:00 PM CST   (Arrive by 1:45 PM)   Return Visit with RUDY Palmer CNP   [facility] [place] [facility]    [address] [place] [address]  [place] [address] [phone]            Nov 27, 2017  2:00 PM CST   (Arrive by 1:45 PM)   Return Visit with Amparo Monzon [facility] [place] ([facility] [place] [facility]    [address] [place] [address]  [place] [address] [phone]              Future tests that were ordered for you today     Open Future Orders        Priority Expected Expires Ordered    Lipid Profile (Chol, Trig, HDL, LDL calc) Routine  11/6/2018 11/6/2017            Who to contact     If you have questions or need follow up information about today's clinic visit or your schedule please contact [facility] directly at [phone].  Normal or non-critical lab and imaging results will be communicated to you by MyChart, letter or phone within 4 business days after the clinic has received the results. If you do not hear from us within 7 days, please contact the  clinic through [employer] or phone. If you have a critical or abnormal lab result, we will notify you by phone as soon as possible.  Submit refill requests through [employer] or call your pharmacy and they will forward the refill request to us. Please allow 3 business days for your refill to be completed.          Additional Information About Your Visit        Care EveryWhere ID     This is your Care EveryWhere ID. This could be used by other organizations to access your [facility] medical records  [id number]         Blood Pressure from Last 3 Encounters:   11/06/17 116/76   11/03/17 102/65   10/27/17 122/74    Weight from Last 3 Encounters:   11/06/17 225 lb (102.1 kg)   11/03/17 235 lb (106.6 kg)   10/27/17 235 lb (106.6 kg)              Today, you had the following     No orders found for display         Where to get your medicines      These medications were sent to [id number] - [place], [place] - [address]  [address] [place] [address]     Phone:  [phone]     valACYclovir 500 MG tablet          Primary Care Provider    Physician No Ref-Primary       NO REF-PRIMARY PHYSICIAN        Equal Access to Services     [facility]: Hadii aad ku hadasho So[place]ali, waaxda luqadaha, qaybta kaalmada adeegyada, mayito dove . So [facility] [phone].    ATENCIÓN: Si habla español, tiene a hein disposición servicios gratuitos de asistencia lingüística. [facility] [phone].    We comply with applicable federal civil rights laws and [place] laws. We do not discriminate on the basis of race, color, national origin, age, disability, sex, sexual orientation, or gender identity.            Thank you!     Thank you for choosing [facility]  for your care. Our goal is always to provide you with excellent care. Hearing back from our patients is one way we can continue to improve our services. Please take a few minutes to complete the written survey that you may receive in  the mail after your visit with us. Thank you!             Your Updated Medication List - Protect others around you: Learn how to safely use, store and throw away your medicines at www.disposemymeds.org.          This list is accurate as of: 11/6/17  1:42 PM.  Always use your most recent med list.                   Brand Name Dispense Instructions for use Diagnosis    albuterol 108 (90 BASE) MCG/ACT Inhaler    PROAIR HFA    1 Inhaler    Inhale 2 puffs into the lungs every 6 hours    Cough       blood glucose monitoring lancets     100 each    Use to test blood sugar 4 times daily or as directed.    Insulin controlled gestational diabetes mellitus (GDM) in third trimester       * blood glucose monitoring test strip    ONETOUCH ULTRA    100 each    Use to test blood sugar 4 times daily.    Gestational diabetes mellitus (GDM) in third trimester controlled on oral hypoglycemic drug       * blood glucose monitoring test strip    GABE CONTOUR NEXT    200 each    Use to test blood sugar 4 times daily or as directed.    Insulin controlled gestational diabetes mellitus (GDM) in third trimester       cyclobenzaprine 10 MG tablet    FLEXERIL    30 tablet    Take 0.5-1 tablets (5-10 mg) by mouth 3 times daily as needed for muscle spasms    Acute right-sided low back pain without sciatica       omeprazole 40 MG capsule    priLOSEC    30 capsule    Take 1 capsule (40 mg) by mouth every morning    Encounter for triage in pregnant patient       PARoxetine 20 MG tablet    PAXIL    90 tablet    Take 1 tablet (20 mg) by mouth At Bedtime    Major depressive disorder, recurrent episode, moderate (H)       valACYclovir 500 MG tablet    VALTREX    30 tablet    Take 1 tablet (500 mg) by mouth daily    Herpes simplex virus (HSV) infection of vagina       * Notice:  This list has 2 medication(s) that are the same as other medications prescribed for you. Read the directions carefully, and ask your doctor or other care provider to review them  with you.

## 2017-11-07 ASSESSMENT — ANXIETY QUESTIONNAIRES: GAD7 TOTAL SCORE: 20

## 2017-11-27 ENCOUNTER — OFFICE VISIT (OUTPATIENT)
Dept: PSYCHIATRY | Facility: OTHER | Age: 22
End: 2017-11-27
Attending: NURSE PRACTITIONER
Payer: MEDICAID

## 2017-11-27 ENCOUNTER — OFFICE VISIT (OUTPATIENT)
Dept: BEHAVIORAL HEALTH | Facility: OTHER | Age: 22
End: 2017-11-27
Attending: SOCIAL WORKER
Payer: MEDICAID

## 2017-11-27 VITALS
WEIGHT: 241 LBS | HEIGHT: 65 IN | HEART RATE: 132 BPM | SYSTOLIC BLOOD PRESSURE: 112 MMHG | DIASTOLIC BLOOD PRESSURE: 70 MMHG | BODY MASS INDEX: 40.15 KG/M2

## 2017-11-27 DIAGNOSIS — F33.0 MAJOR DEPRESSIVE DISORDER, RECURRENT EPISODE, MILD (H): Primary | ICD-10-CM

## 2017-11-27 DIAGNOSIS — F32.A DEPRESSION: Primary | ICD-10-CM

## 2017-11-27 PROCEDURE — 90832 PSYTX W PT 30 MINUTES: CPT | Performed by: SOCIAL WORKER

## 2017-11-27 PROCEDURE — 99214 OFFICE O/P EST MOD 30 MIN: CPT | Performed by: NURSE PRACTITIONER

## 2017-11-27 PROCEDURE — 99213 OFFICE O/P EST LOW 20 MIN: CPT

## 2017-11-27 RX ORDER — BUPROPION HYDROCHLORIDE 75 MG/1
75 TABLET ORAL DAILY
Qty: 90 TABLET | Refills: 0 | Status: SHIPPED | OUTPATIENT
Start: 2017-11-27 | End: 2018-06-01

## 2017-11-27 ASSESSMENT — PAIN SCALES - GENERAL: PAINLEVEL: SEVERE PAIN (6)

## 2017-11-27 NOTE — MR AVS SNAPSHOT
After Visit Summary   11/27/2017    Lorena Echevarria    MRN: [id number]           Patient Information     Date Of Birth          1995        Visit Information        Provider Department      11/27/2017 2:00 PM Ruthy Noble APRN CNP [facility]        Today's Diagnoses     Major depressive disorder, recurrent episode, mild (H)    -  1      Care Instructions    Start the Wellbutrin only after you have tapered off the Paxil completely, then take 75 mg (one tab) of the Paxil by mouth daily. Call us in a week, to update us on how  You feel, then if all is well we can go back to your former level.           Follow-ups after your visit        Your next 10 appointments already scheduled     Nov 27, 2017  2:00 PM CST   (Arrive by 1:45 PM)   [facility]UD[facility] Palmer [facility]   [facility]    [address] [place] [address] [phone]            Nov 27, 2017  2:00 PM CST   (Arrive by 1:45 PM)   [facility]    [address] [place], [place] [address] [address] [phone]              Who to contact     If you have questions or need follow up information about today's clinic visit or your schedule please contact [facility] directly at [phone].  Normal or non-critical lab and imaging results will be communicated to you by MyChart, letter or phone within 4 business days after the clinic has received the results. If you do not hear from us within 7 days, please contact the clinic through MyChart or phone. If you have a critical or abnormal lab result, we will notify you by phone as soon as possible.  Submit refill requests through [employer] or call your pharmacy and they will forward the refill request to us. Please allow 3 business days for your refill to be completed.          Additional Information About Your  "Visit        Care EveryWhere ID     This is your Care EveryWhere ID. This could be used by other organizations to access your [facility] medical records  [id number]        Your Vitals Were     Pulse Height BMI (Body Mass Index)             132 5' 5.25\" (1.657 m) 39.8 kg/m2          Blood Pressure from Last 3 Encounters:   11/27/17 112/70   11/06/17 116/76   11/03/17 102/65    Weight from Last 3 Encounters:   11/27/17 241 lb (109.3 kg)   11/06/17 225 lb (102.1 kg)   11/03/17 235 lb (106.6 kg)              Today, you had the following     No orders found for display         Today's Medication Changes          These changes are accurate as of: 11/27/17  1:38 PM.  If you have any questions, ask your nurse or doctor.               Start taking these medicines.        Dose/Directions    buPROPion 75 MG tablet   Commonly known as:  WELLBUTRIN   Used for:  Major depressive disorder, recurrent episode, mild (H)   Started by:  Ruthy Noble APRN CNP        Dose:  75 mg   Take 1 tablet (75 mg) by mouth daily   Quantity:  90 tablet   Refills:  0         Stop taking these medicines if you haven't already. Please contact your care team if you have questions.     PARoxetine 20 MG tablet   Commonly known as:  PAXIL   Stopped by:  Ruthy Noble APRN CNP                Where to get your medicines      These medications were sent to [id number] - [place], [place] - [address]  [address] [facility] [address]     Phone:  [phone]     buPROPion 75 MG tablet                Primary Care Provider Office Phone # Fax #    SAGAR Bolton [phone]       [facility] [address]  [place] [address]        Equal Access to Services     EDMOND QUIÑONES AH: Hadii emelia pyle Solouie, waaxda luqadaha, qaybta kaalmada adeegyada, mayito nunez. So [facility] [phone].    ATENCIÓN: Si habla español, tiene a hein disposición servicios gratuitos de " asistencia lingüística. Huyen al [phone].    We comply with applicable federal civil rights laws and [place] laws. We do not discriminate on the basis of race, color, national origin, age, disability, sex, sexual orientation, or gender identity.            Thank you!     Thank you for [facility]  for your care. Our goal is always to provide you with excellent care. Hearing back from our patients is one way we can continue to improve our services. Please take a few minutes to complete the written survey that you may receive in the mail after your visit with us. Thank you!             Your Updated Medication List - Protect others around you: Learn how to safely use, store and throw away your medicines at www.disposemymeds.org.          This list is accurate as of: 11/27/17  1:38 PM.  Always use your most recent med list.                   Brand Name Dispense Instructions for use Diagnosis    albuterol 108 (90 BASE) MCG/ACT Inhaler    PROAIR HFA    1 Inhaler    Inhale 2 puffs into the lungs every 6 hours    Cough       blood glucose monitoring lancets     100 each    Use to test blood sugar 4 times daily or as directed.    Insulin controlled gestational diabetes mellitus (GDM) in third trimester       * blood glucose monitoring test strip    ONETOUCH ULTRA    100 each    Use to test blood sugar 4 times daily.    Gestational diabetes mellitus (GDM) in third trimester controlled on oral hypoglycemic drug       * blood glucose monitoring test strip    GABE CONTOUR NEXT    200 each    Use to test blood sugar 4 times daily or as directed.    Insulin controlled gestational diabetes mellitus (GDM) in third trimester       buPROPion 75 MG tablet    WELLBUTRIN    90 tablet    Take 1 tablet (75 mg) by mouth daily    Major depressive disorder, recurrent episode, mild (H)       cyclobenzaprine 10 MG tablet    FLEXERIL    30 tablet    Take 0.5-1 tablets (5-10 mg) by mouth 3 times daily as needed for  muscle spasms    Acute right-sided low back pain without sciatica       omeprazole 40 MG capsule    priLOSEC    30 capsule    Take 1 capsule (40 mg) by mouth every morning    Encounter for triage in pregnant patient       valACYclovir 500 MG tablet    VALTREX    30 tablet    Take 1 tablet (500 mg) by mouth daily    Herpes simplex virus (HSV) infection of vagina       * Notice:  This list has 2 medication(s) that are the same as other medications prescribed for you. Read the directions carefully, and ask your doctor or other care provider to review them with you.

## 2017-11-27 NOTE — PROGRESS NOTES
PSYCHIATRY CLINIC PROGRESS NOTE   30 minute medication management, more than 50% of time spent counseling patient on medications, medication side effects, symptom history and management   SUBJECTIVE / INTERIM HISTORY                                                                       Lorena presents for her medication management and Amparo QUACH Is here with her for Alpha-Stim session, while we address the questions Lorena has about her medications. Lorena is here to follow up on her medication for depression and is seeking a change. She feels that the Paxil has had no effect and in fact made her worse,  and feels as if she wants to go back to the Wellbutrin, which she states was more effective.    Lorena was on the Paxil as a safer choice for breastfeeding. She states that she was not making enough milk, and so now she is not breastfeeding at all, so would like to go back to the Wellbutrin for now.   Lorena is feeling somewhat fatigued and low-energy. The baby is waking her, so she gets about 3 or 4 hours of sleep. She is not thinking of hurting herself or anyone else.  She would like to find a way to address her low energy. She is feeling anhedonic.  She has completely stopped taking the Paxil. She takes walks for stress relief, and sometimes watches movies for distraction to help with anxiety. She asserts again that she has no feeling of wanting to hurt herself or others at this time.  She is liking the Alpha-Stim, and does use it today with Amparo QUACH while we speak about her medications.   Lorena has never had a seizure and had good response to Wellbutrin in the past.   SUBSTANCE USE- not using any street drugs, no MJ, no ETOH     SYMPTOMS- Anxiety, depressed mood, anhedonia, poor sleep, low energy. No SI.   MEDICAL ROS- No N/V/ D except once in a while a little nausea when she has high anxiety.   MEDICAL / SURGICAL HISTORY                pregnant [if applicable]--no   Medical Team:     PMD-     Therapist-       Patient  "Active Problem List   Diagnosis     Major depressive disorder     Anxiety                           ALLERGY   Review of patient's allergies indicates      Allergies   Allergen Reactions     Lamictal [Lamotrigine] Other (See Comments)     Increases aggression.     Amoxicillin Rash     Zoloft [Sertraline] Other (See Comments)     Depression worsens       MEDICATIONS                                                                                                   Current Outpatient Prescriptions   Medication Sig       Current Outpatient Prescriptions:      valACYclovir (VALTREX) 500 MG tablet, Take 1 tablet (500 mg) by mouth daily, Disp: 30 tablet, Rfl: 1     PARoxetine (PAXIL) 20 MG tablet, Take 1 tablet (20 mg) by mouth At Bedtime, Disp: 90 tablet, Rfl: 1     albuterol (PROAIR HFA) 108 (90 BASE) MCG/ACT Inhaler, Inhale 2 puffs into the lungs every 6 hours, Disp: 1 Inhaler, Rfl: 0     omeprazole (PRILOSEC) 40 MG capsule, Take 1 capsule (40 mg) by mouth every morning, Disp: 30 capsule, Rfl: 0     blood glucose monitoring (GABE CONTOUR NEXT) test strip, Use to test blood sugar 4 times daily or as directed., Disp: 200 each, Rfl: 4     blood glucose monitoring (GABE MICROLET) lancets, Use to test blood sugar 4 times daily or as directed., Disp: 100 each, Rfl: 1     cyclobenzaprine (FLEXERIL) 10 MG tablet, Take 0.5-1 tablets (5-10 mg) by mouth 3 times daily as needed for muscle spasms, Disp: 30 tablet, Rfl: 1     blood glucose monitoring (ONE TOUCH ULTRA) test strip, Use to test blood sugar 4 times daily., Disp: 100 each, Rfl: 5                                               No current facility-administered medications for this visit.          VITALS    /70  Pulse 132  Ht 5' 5.25\" (1.657 m)  Wt 241 lb (109.3 kg)  BMI 39.8 kg/m2    PHQ9                        PHQ-9 SCORE date date date   Total Score - - -   Total Score 1 1          MENTAL STATUS EXAM                                                                 "                        Alert. Oriented to person, place, and date / time. Adequately groomed, calm, cooperative with good eye contact. No problems with speech or psychomotor behavior. Mood was flat and affect congruent to speech content with restricted range. Thought process, including associations, was unremarkable and thought content was devoid of suicidal and homicidal ideation and psychotic thought. No hallucinations. Insight was good. Judgment was intact and adequate for safety. Fund of knowledge was intact. Pt demonstrates no obvious problems with attention, concentration, language, recent or remote memory although these were not formally tested.      ASSESSMENT                                                                                                       HISTORICAL:  Follow-up psych consult       CURRENT: This patient provides a history which supports the diagnoses depressed mood and anxiety but no psychotic features.      TREATMENT RISK STATEMENT: The risks, benefits, alternatives and potential adverse effects have been explained and are understood by the pt. The pt agrees to the treatment plan with the ability to do so. The pt knows to call the clinic for any problems or access emergency care if needed.    DIAGNOSES                  Major Depressive Disorder, mild (F33.0)       LABS         Results for orders placed or performed in visit on 10/27/17   XR CHEST 2 VW (Clinic Performed)    Narrative    PROCEDURE:  XR CHEST 2 VW    HISTORY:  Cough.     COMPARISON:  10/13/2017    FINDINGS:   The cardiac silhouette is normal in size. The pulmonary vasculature is  normal.  The lungs are clear. No pleural effusion or pneumothorax.      Impression    IMPRESSION:  No acute cardiopulmonary disease.      CHELI LIMA MD       PLAN                                                                                                                          1) MEDICATIONS:  Tapering off Paxil. Going back to Wellbutrin  once she has tapered, beginning Wellbutrin at 75mg PO daily and will go to 150mg once we see initial reaction.      2) THERAPY: No Change. Will think about therapy. Did Alpha-Stim with Amparo QUACH Today.      3) LABS: none ordered     4) PT MONITOR [call for probs]: Worsening symptoms, side effects from medications, SI/HI     5) REFERRALS [CD tx, medical, tests other]:      6)  RTC: verbal in one week, to report on response to Wellbutrin,  then 3 weeks from now in person.

## 2017-11-27 NOTE — PATIENT INSTRUCTIONS
Start the Wellbutrin only after you have tapered off the Paxil completely, then take 75 mg (one tab) of the Paxil by mouth daily. Call us in a week, to update us on how  You feel, then if all is well we can go back to your former level.

## 2017-11-27 NOTE — MR AVS SNAPSHOT
After Visit Summary   11/27/2017    Lorena Echevarria    MRN: [id number]           Patient Information     Date Of Birth          1995        Visit Information        Provider Department      11/27/2017 2:00 PM Amparo Monzon, [facility]        Today's Diagnoses     Depression    -  1       Follow-ups after your visit        Who to contact     If you have questions or need follow up information about today's clinic visit or your schedule please contact [facility] directly at [phone].  Normal or non-critical lab and imaging results will be communicated to you by MyChart, letter or phone within 4 business days after the clinic has received the results. If you do not hear from us within 7 days, please contact the clinic through MyChart or phone. If you have a critical or abnormal lab result, we will notify you by phone as soon as possible.  Submit refill requests through [employer] or call your pharmacy and they will forward the refill request to us. Please allow 3 business days for your refill to be completed.          Additional Information About Your Visit        Care EveryWhere ID     This is your Care EveryWhere ID. This could be used by other organizations to access your [facility] medical records  [id number]         Blood Pressure from Last 3 Encounters:   11/27/17 112/70   11/06/17 116/76   11/03/17 102/65    Weight from Last 3 Encounters:   11/27/17 241 lb (109.3 kg)   11/06/17 225 lb (102.1 kg)   11/03/17 235 lb (106.6 kg)              Today, you had the following     No orders found for display         Today's Medication Changes          These changes are accurate as of: 11/27/17  2:01 PM.  If you have any questions, ask your nurse or doctor.               Start taking these medicines.        Dose/Directions    buPROPion 75 MG tablet   Commonly known as:  WELLBUTRIN   Used for:  Major depressive disorder, recurrent episode, mild (H)   Started by:   Ruthy Noble APRN CNP        Dose:  75 mg   Take 1 tablet (75 mg) by mouth daily   Quantity:  90 tablet   Refills:  0         Stop taking these medicines if you haven't already. Please contact your care team if you have questions.     PARoxetine 20 MG tablet   Commonly known as:  PAXIL   Stopped by:  Ruthy Noble APRN CNP                Where to get your medicines      These medications were sent to [id number] - [place], [place] - [address]  [address] [facility] [address]     Phone:  [phone]     buPROPion 75 MG tablet                Primary Care Provider Office Phone # Fax #    SAGAR Bolton [phone]       [facility] [address]  [place] [address]        Equal Access to Services     MAR[facility]E [facility] AH: Hadii emelia damon hadasho Soomaali, waaxda luqadaha, qaybta kaalmada adeegyada, mayito dove . So [facility] [phone].    ATENCIÓN: Si habla español, tiene a hein disposición servicios gratuitos de asistencia lingüística. Huyen al [phone].    We comply with applicable federal civil rights laws and [place] laws. We do not discriminate on the basis of race, color, national origin, age, disability, sex, sexual orientation, or gender identity.            Thank you!     Thank you for choosing [facility]  for your care. Our goal is always to provide you with excellent care. Hearing back from our patients is one way we can continue to improve our services. Please take a few minutes to complete the written survey that you may receive in the mail after your visit with us. Thank you!             Your Updated Medication List - Protect others around you: Learn how to safely use, store and throw away your medicines at www.disposemymeds.org.          This list is accurate as of: 11/27/17  2:01 PM.  Always use your most recent med list.                   Brand Name Dispense Instructions for use Diagnosis     albuterol 108 (90 BASE) MCG/ACT Inhaler    PROAIR HFA    1 Inhaler    Inhale 2 puffs into the lungs every 6 hours    Cough       blood glucose monitoring lancets     100 each    Use to test blood sugar 4 times daily or as directed.    Insulin controlled gestational diabetes mellitus (GDM) in third trimester       * blood glucose monitoring test strip    ONETOUCH ULTRA    100 each    Use to test blood sugar 4 times daily.    Gestational diabetes mellitus (GDM) in third trimester controlled on oral hypoglycemic drug       * blood glucose monitoring test strip    GABE CONTOUR NEXT    200 each    Use to test blood sugar 4 times daily or as directed.    Insulin controlled gestational diabetes mellitus (GDM) in third trimester       buPROPion 75 MG tablet    WELLBUTRIN    90 tablet    Take 1 tablet (75 mg) by mouth daily    Major depressive disorder, recurrent episode, mild (H)       cyclobenzaprine 10 MG tablet    FLEXERIL    30 tablet    Take 0.5-1 tablets (5-10 mg) by mouth 3 times daily as needed for muscle spasms    Acute right-sided low back pain without sciatica       omeprazole 40 MG capsule    priLOSEC    30 capsule    Take 1 capsule (40 mg) by mouth every morning    Encounter for triage in pregnant patient       valACYclovir 500 MG tablet    VALTREX    30 tablet    Take 1 tablet (500 mg) by mouth daily    Herpes simplex virus (HSV) infection of vagina       * Notice:  This list has 2 medication(s) that are the same as other medications prescribed for you. Read the directions carefully, and ask your doctor or other care provider to review them with you.

## 2017-11-27 NOTE — NURSING NOTE
"Chief Complaint   Patient presents with     RECHECK     3 week followup. alpha stim       Initial /70  Pulse 132  Ht 5' 5.25\" (1.657 m)  Wt 241 lb (109.3 kg)  BMI 39.8 kg/m2 Estimated body mass index is 39.8 kg/(m^2) as calculated from the following:    Height as of this encounter: 5' 5.25\" (1.657 m).    Weight as of this encounter: 241 lb (109.3 kg).  Medication Reconciliation: complete     Bceca De La Torre      "

## 2017-11-27 NOTE — PROGRESS NOTES
[facility]  November 6, 2017    Behavioral Health Clinician Progress Note    Patient Name: Lorena Echevarria         Service Type: Individual           Service Location:  Face to Face in Clinic      Session Start Time:  1:00  Session End Time: 1:30      Session Length: 16 - 37      Attendees: Patient    Visit Activities (Refresh list every visit): [facility] Cov[facility] with Steph Noble      Diagnostic Assessment Date: n/a- to be completed next visit.  Treatment Plan Review Date: n/a    See Flowsheets for PHQ-9 and MILADY-7 results  Previous PHQ-9:   PHQ-9 SCORE 10/27/2017 11/6/2017   Total Score 18 20     Previous MILADY-7:   MILADY-7 SCORE 11/3/2017 11/6/2017   Total Score 14 20     ALEJANDRA LEVEL:  No flowsheet data found.    DATA  Extended Session (60+ minutes): No  Interactive Complexity: No  Crisis: No  [facility] Patient No    Treatment Objective(s) Addressed in This Session:  Target Behavior(s):  Depressed Mood: Increase interest, engagement, and pleasure in doing things  Feel less tired and more energy during the day   Improve diet, appetite, mindful eating, and / or meal planning    Current Stressors / Issues:  Co-visit with Steph COLUNGA For med management this date.  Patient expresses she continues to feel depressed.  Discussed changing medications, will be started on Wellbutrin.  Discussed current stressors of significant other possibly getting sent back to his [place] has a court date today about this.  Also housing continues to be a stressor, but she is not sure she wants to move away from her supports.    Patient reported that she would like to use alpha-stim during therapy session today.  Patient gave verbal consent to use Alpha- Stim  as a tx modality.  Discussed any false beliefs, myths, concerns about the use.  Facilitated a brief induction with patient.  Reviewed potential for dizzy or nausea feeling.   Patient expressed understanding and in agreement with continued use.  Patient did have  this response, but was able to tolerate treatment with no complications. Patient completed a 20 minute treatment session at 250 microampere (?A) current.  Patient reports feeling calmer and enjoyed using it.    Coping skills:  Currently not using any coping skills other than distraction by watching movies.  Patient indicated she enjoys going for walks however.  Provided psychoeducation on mindful or walking meditation.  Patient explained she would try it.    Progress on Treatment Objective(s) / Homework:  New Objective established this session - PREPARATION (Decided to change - considering how); Intervened by negotiating a change plan and determining options / strategies for behavior change, identifying triggers, exploring social supports, and working towards setting a date to begin behavior change    Motivational Interviewing    MI Intervention: Expressed Empathy/Understanding, Supported Autonomy, Collaboration, Evocation and Open-ended questions     Change Talk Expressed by the Patient: NA - Precontemplative    Provider Response to Change Talk: E - Evoked more info from patient about behavior change, A - Affirmed patient's thoughts, decisions, or attempts at behavior change, R - Reflected patient's change talk and S - Summarized patient's change talk statements    CBT:  Discussed common cognitive distortions identified them in patient's life, Explored ways to challenge, replace, and act against these cognitions  SOLUTION FOCUSED: Explored patterns in patient's relationships and discussed options for new behaviors, Explored patterns in patient's actions and choices and discussed options for new behaviors  MINDFULLNESS-BASED-STRATEGIES:  Discussed skills based on development and application of mindfulness, Skills drawn from dialectical behavior therapy, mindfulness-based stress reduction, mindfulness-based cognitive therapy, etc.  .......      Care Plan review completed: No    Medication Review:  Changes to  psychiatric medications, see updated Medication List in EPIC.     Medication Compliance:  Yes    Changes in Health Issues:   None reported    Chemical Use Review:   Substance Use: Chemical use reviewed, no active concerns identified      Tobacco Use: No change in amount of tobacco use since last session.  No tobacco cesation conversation held this appointment    Assessment: Current Emotional / Mental Status (status of significant symptoms):    Risk status (Self / Other harm or suicidal ideation)  Patient denies current fears or concerns for personal safety.  Patient denies current or recent suicidal ideation or behaviors.  Patient denies current or recent homicidal ideation or behaviors.  Patient denies current or recent self injurious behavior or ideation.  Patient denies other safety concerns.  A safety and risk management plan has not been developed at this time, however patient was encouraged to call [place] [phone] should there be a change in any of these risk factors.    Appearance:   Appropriate   Eye Contact:   Fair   Psychomotor Behavior: Normal   Attitude:   Cooperative   Orientation:   All  Speech   Rate / Production: Normal    Volume:  Normal   Mood:    Depressed  Sad   Affect:    Flat   Thought Content:  Clear   Thought Form:  Coherent  Logical   Insight:    Fair     Diagnoses:  1. Depression        Collateral Reports Completed:  Not Applicable    Plan: (Homework, other):  Patient was given information about behavioral services and encouraged to schedule a follow up appointment with the clinic [facility] in 1 month.  She was also given Cognitive Behavioral Therapy skills to practice when experiencing depression and [facility] Referral again as she had missed her previously scheduled appointment.  Amparo Monzon [facility], [facility]

## 2017-12-17 ENCOUNTER — HEALTH MAINTENANCE LETTER (OUTPATIENT)
Age: 22
End: 2017-12-17

## 2018-06-01 ENCOUNTER — PRENATAL OFFICE VISIT (OUTPATIENT)
Dept: OBGYN | Facility: OTHER | Age: 23
End: 2018-06-01
Attending: OBSTETRICS & GYNECOLOGY
Payer: COMMERCIAL

## 2018-06-01 ENCOUNTER — PATIENT OUTREACH (OUTPATIENT)
Dept: CARE COORDINATION | Facility: OTHER | Age: 23
End: 2018-06-01

## 2018-06-01 VITALS
WEIGHT: 264 LBS | BODY MASS INDEX: 42.43 KG/M2 | SYSTOLIC BLOOD PRESSURE: 114 MMHG | DIASTOLIC BLOOD PRESSURE: 68 MMHG | HEIGHT: 66 IN

## 2018-06-01 DIAGNOSIS — O09.91 SUPERVISION OF HIGH RISK PREGNANCY IN FIRST TRIMESTER: ICD-10-CM

## 2018-06-01 DIAGNOSIS — Z86.32 HISTORY OF GESTATIONAL DIABETES MELLITUS (GDM): ICD-10-CM

## 2018-06-01 DIAGNOSIS — Z32.01 PREGNANCY TEST POSITIVE: Primary | ICD-10-CM

## 2018-06-01 DIAGNOSIS — Z34.91 PREGNANCY WITH UNCERTAIN DATES IN FIRST TRIMESTER: ICD-10-CM

## 2018-06-01 PROCEDURE — G0463 HOSPITAL OUTPT CLINIC VISIT: HCPCS | Mod: 25

## 2018-06-01 PROCEDURE — 76817 TRANSVAGINAL US OBSTETRIC: CPT | Mod: TC | Performed by: OBSTETRICS & GYNECOLOGY

## 2018-06-01 PROCEDURE — 99207 ZZC PRENATAL VISIT: CPT | Performed by: OBSTETRICS & GYNECOLOGY

## 2018-06-01 RX ORDER — PRENATAL VIT/IRON FUM/FOLIC AC 27MG-0.8MG
1 TABLET ORAL DAILY
COMMUNITY
End: 2022-06-15

## 2018-06-01 RX ORDER — VITAMIN A ACETATE, BETA CAROTENE, ASCORBIC ACID, CHOLECALCIFEROL, .ALPHA.-TOCOPHEROL ACETATE, DL-, THIAMINE MONONITRATE, RIBOFLAVIN, NIACINAMIDE, PYRIDOXINE HYDROCHLORIDE, FOLIC ACID, CYANOCOBALAMIN, CALCIUM CARBONATE, FERROUS FUMARATE, ZINC OXIDE, CUPRIC OXIDE 3080; 12; 120; 400; 1; 1.84; 3; 20; 22; 920; 25; 200; 27; 10; 2 [IU]/1; UG/1; MG/1; [IU]/1; MG/1; MG/1; MG/1; MG/1; MG/1; [IU]/1; MG/1; MG/1; MG/1; MG/1; MG/1
1 TABLET, FILM COATED ORAL DAILY
Qty: 100 TABLET | Refills: 5 | Status: SHIPPED | OUTPATIENT
Start: 2018-06-01 | End: 2022-06-15

## 2018-06-01 ASSESSMENT — PAIN SCALES - GENERAL: PAINLEVEL: NO PAIN (0)

## 2018-06-01 NOTE — NURSING NOTE
"Chief Complaint   Patient presents with     Prenatal Care       Initial /68 (BP Location: Right arm, Patient Position: Chair, Cuff Size: Adult Large)  Ht 5' 6\" (1.676 m)  Wt 264 lb (119.7 kg)  BMI 42.61 kg/m2 Estimated body mass index is 42.61 kg/(m^2) as calculated from the following:    Height as of this encounter: 5' 6\" (1.676 m).    Weight as of this encounter: 264 lb (119.7 kg).  Medication Reconciliation: complete    Gail Adams LPN    "

## 2018-06-01 NOTE — PROGRESS NOTES
"  HPI:  Lorena Echevarria is a 22 year old female Gravida2 Para1 No LMP recorded. Patient is pregnant. at Unknown, Estimated Date of Delivery: Data Unavailable.  She denies vaginal bleeding, nausea , vomiting and abdominal pain.   No other c/o.  H/o GDM last pregnancy.  Currently splitting time between work and FOB in the [place] and family here.  H/o depression.  No meds.  Was on Wellbutrin which helped but ran out a few months ago.  Stable currently.      Past Medical History:   Diagnosis Date     Anxiety      Autism spectrum disorder      Depressed      LGSIL on Pap smear of cervix 03/30/2017     Mild intellectual disabilities      Schizoaffective disorder (H)        History reviewed. No pertinent surgical history.    Allergies: Lamictal [lamotrigine]; Amoxicillin; and Zoloft [sertraline]     EXAM:  Blood pressure 114/68, height 5' 6\" (1.676 m), weight 264 lb (119.7 kg), currently breastfeeding.   BMI= Body mass index is 42.61 kg/(m^2).  General - pleasant female in no acute distress.  Abdomen - soft, nontender, nondistended, no hepatosplenomegaly.  Pelvic - EG: normal adult female, BUS: within normal limits,Rectovaginal - deferred.    US:    Transvaginal:Yes  Yolk sac present:Yes  CRL:  9mm  FCA present:Yes  EGA 7w 0d  TAMIKA:1/18/19      ASSESSMENT/PLAN:  (Z32.01) Pregnancy test positive  (primary encounter diagnosis)  Plan: Prenatal Vit-Fe Fumarate-FA (PRENATAL PLUS)         27-1 MG TABS           (Z86.32) History of gestational diabetes mellitus (GDM)  Plan:: baseline glu/Hg A1C.  Early GTT          (O09.91) Supervision of high risk pregnancy in first trimester  Comment: Uncertain dates.  Use US TAMIKA  Plan: ABO/Rh type and screen, Antibody screen red         cell, CBC with platelets, Drug Screen Urine         (Range), Hepatitis B surface antigen, HIV         Antigen Antibody Combo, Rubella Antibody IgG         Quantitative, Treponema Abs w Reflex to RPR and        Titer, UA with Microscopic reflex to Culture, "         Hemoglobin A1c, Glucose         to see today.     1st Trimester precautions and testing discussed.  New OB Labs ordered and exam scheduled.  Aneuploidy testing options discussed.  30 minutes were spent with the patient with greater than 50% of the visit spent in face-to-face counseling and coordination of care.  Patient has my card and phone number to call prn if problems in interim.    Fidel Severino

## 2018-06-01 NOTE — MR AVS SNAPSHOT
After Visit Summary   6/1/2018    Lorena Echevarria    MRN: [id number]           Patient Information     Date Of Birth          1995        Visit Information        Provider Department      6/1/2018 11:00 AM Fidel Severino MD [facility] [place]        Today's Diagnoses     Pregnancy test positive    -  1    History of gestational diabetes mellitus (GDM)        Supervision of high risk pregnancy in first trimester        Pregnancy with uncertain dates in first trimester          Care Instructions    F/u 3-4 wks          Follow-ups after your visit        Your next 10 appointments already scheduled     Jun 29, 2018  9:00 AM CDT   (Arrive by 8:45 AM)   New Prenatal with Mony Fernández NP   [facility] [place] ([facility] )    [address]  [place] [address]   [phone]              Future tests that were ordered for you today     Open Future Orders        Priority Expected Expires Ordered    Glucose Routine 6/25/2018 7/27/2018 6/1/2018    Antibody screen red cell STAT 6/25/2018 7/27/2018 6/1/2018    ABO/Rh type and screen Routine 6/25/2018 7/27/2018 6/1/2018    CBC with platelets Routine 6/25/2018 7/27/2018 6/1/2018    Drug Screen Urine (Range) Routine 6/25/2018 7/27/2018 6/1/2018    Hepatitis B surface antigen Routine 6/25/2018 7/27/2018 6/1/2018    HIV Antigen Antibody Combo Routine 6/25/2018 7/27/2018 6/1/2018    Rubella Antibody IgG Quantitative Routine 6/25/2018 7/27/2018 6/1/2018    Treponema Abs w Reflex to RPR and Titer Routine 6/25/2018 7/27/2018 6/1/2018    UA with Microscopic reflex to Culture Routine 6/25/2018 7/27/2018 6/1/2018    Hemoglobin A1c Routine 6/25/2018 7/27/2018 6/1/2018            Who to contact     If you have questions or need follow up information about today's clinic visit or your schedule please contact [facility] [place] directly at [phone].  Normal or non-critical lab and imaging results will be communicated to you by  "MyChart, letter or phone within 4 business days after the clinic has received the results. If you do not hear from us within 7 days, please contact the clinic through MyChart or phone. If you have a critical or abnormal lab result, we will notify you by phone as soon as possible.  Submit refill requests through [employer] or call your pharmacy and they will forward the refill request to us. Please allow 3 business days for your refill to be completed.          Additional Information About Your Visit        Care EveryWhere ID     This is your Care EveryWhere ID. This could be used by other organizations to access your [place] [facility] medical records  [id number]        Your Vitals Were     Height BMI (Body Mass Index)                5' 6\" (1.676 m) 42.61 kg/m2           Blood Pressure from Last 3 Encounters:   06/01/18 114/68   11/27/17 112/70   11/06/17 116/76    Weight from Last 3 Encounters:   06/01/18 264 lb (119.7 kg)   11/27/17 241 lb (109.3 kg)   11/06/17 225 lb (102.1 kg)              We Performed the Following     US OB TRANSVAGINAL          Today's Medication Changes          These changes are accurate as of 6/1/18 12:18 PM.  If you have any questions, ask your nurse or doctor.               These medicines have changed or have updated prescriptions.        Dose/Directions    * prenatal multivitamin plus iron 27-0.8 MG Tabs per tablet   This may have changed:  Another medication with the same name was added. Make sure you understand how and when to take each.   Changed by:  Fidel Severino MD        Dose:  1 tablet   Take 1 tablet by mouth daily   Refills:  0       * PRENATAL PLUS 27-1 MG Tabs   This may have changed:  You were already taking a medication with the same name, and this prescription was added. Make sure you understand how and when to take each.   Used for:  Pregnancy test positive   Changed by:  Fidel Severino MD        Dose:  1 tablet   Take 1 tablet by mouth daily   Quantity:  100 tablet   Refills:  5 "       * Notice:  This list has 2 medication(s) that are the same as other medications prescribed for you. Read the directions carefully, and ask your doctor or other care provider to review them with you.         Where to get your medicines      These medications were sent to "[id number] - [place], [place] - [address] AT [facility] [address], [facility] [address]     Phone:  [phone]     PRENATAL PLUS 27-1 MG Tabs                Primary Care Provider Office Phone # Fax #    SAGAR Bolton [phone]       [address] [address]        Equal Access to Services     EDMOND QUIÑONES : Lesly morao Solouie, waaxda luqadaha, qaybta kaalmada adejuanyada, mayito dove . So [facility] [phone].    ATENCIÓN: Si habla español, tiene a hein disposición servicios gratuitos de asistencia lingüística. Llame al [phone].    We comply with applicable federal civil rights laws and [place] laws. We do not discriminate on the basis of race, color, national origin, age, disability, sex, sexual orientation, or gender identity.            Thank you!     Thank you for choosing [facility]  for your care. Our goal is always to provide you with excellent care. Hearing back from our patients is one way we can continue to improve our services. Please take a few minutes to complete the written survey that you may receive in the mail after your visit with us. Thank you!             Your Updated Medication List - Protect others around you: Learn how to safely use, store and throw away your medicines at www.disposemymeds.org.          This list is accurate as of 6/1/18 12:18 PM.  Always use your most recent med list.                   Brand Name Dispense Instructions for use Diagnosis    * prenatal multivitamin plus iron 27-0.8 MG Tabs per tablet      Take 1 tablet by mouth daily        * PRENATAL PLUS 27-1 MG Tabs      100 tablet    Take 1 tablet by mouth daily    Pregnancy test positive       * Notice:  This list has 2 medication(s) that are the same as other medications prescribed for you. Read the directions carefully, and ask your doctor or other care provider to review them with you.

## 2018-06-01 NOTE — PROGRESS NOTES
Clinic Care Coordination Contact    Clinic Care Coordination Contact  OUTREACH    SW was called down to OB to check on patient's insurance.  Patient stated she had just recently lost insurance (about 1 week ago).  Patient signed a release giving SW permission to speak to [facility] on her behalf which was faxed in.  SW will check into what needs to be done to get insurance reinstated and update patient.    Annamarie Toussaint [facility]  Outpatient   [phone]      Referral Information:  Referral Source: Specialist (OB-GYN)    Primary Diagnosis: Pregnancy    Chief Complaint   Patient presents with     Clinic Care Coordination - Face To Face        Universal Utilization:   Clinic Utilization  Difficulty keeping appointments:: Yes  Utilization    Last refreshed: 6/1/2018 12:35 PM:  No Show Count (past year) 6       Last refreshed: 6/1/2018 12:35 PM:  ED visits 4       Last refreshed: 6/1/2018 12:35 PM:  Hospital admissions 2          Current as of: 6/1/2018 12:35 PM             Clinical Concerns:  Current Medical Concerns:  Pregnancy, past history of gestational diabetes    Current Behavioral Concerns: past history of depression - currently stable    Education Provided to patient: NA   Pain  Chronic pain (GOAL):: No  Health Maintenance Reviewed: Not assessed  Clinical Pathway: None    Medication Management:       Functional Status:  Mobility Status: Independent  Fallen 2 or more times in the past year?: No  Any fall with injury in the past year?: No    Living Situation:  Current living arrangement:: I live in a private home    Diet/Exercise/Sleep:  Inadequate nutrition (GOAL):: No  Food Insecurity: No  Tube Feeding: No  Exercise:: Currently not exercising  Inadequate activity/exercise (GOAL):: No  Significant changes in sleep pattern (GOAL): No    Transportation:  Transportation concerns (GOAL):: No  Transportation means:: Regular car     Psychosocial:  Evangelical or spiritual beliefs that impact treatment::  No  Mental health DX:: No  Mental health management concern (GOAL):: No  Informal Support system:: Family     Financial/Insurance:   Financial/Insurance concerns (GOAL):: Yes  SW is contacting county/ working with financial to see about having patient's MA reinstated.     Resources and Interventions:  Current Resources:    ;   Community Resources: [facility]        Goals: Getting MA reinstated.        Patient/Caregiver understanding: Patient expressed understanding of the plan.        Future Appointments              In 4 weeks Mony Fernández NP [facility] Jeffery Thomason          Plan: SW will work on getting MA reinstated.

## 2018-06-05 ENCOUNTER — PATIENT OUTREACH (OUTPATIENT)
Dept: CARE COORDINATION | Facility: OTHER | Age: 23
End: 2018-06-05

## 2018-06-05 NOTE — PROGRESS NOTES
Clinic Care Coordination Contact  [facility]/Voicemail    Referral Source: Specialist (OB-GYN)  Clinical Data: Care Coordinator Outreach     SW called county to see about status of insurance and how to reinstate.  However, they informed  that insurance was current and active.  They stated nothing needed to be done.    SW  attempted x 1 to call patient to update her. Voicemail box was full so no message was left; will try back..  Plan: Care Coordinator will mail out care coordination introduction letter with care coordinator contact information and explanation of care coordination services. Care Coordinator will try to reach patient again in 1-2 business days.    SKYE Vuong  Outpatient   [phone]

## 2018-06-13 ENCOUNTER — PATIENT OUTREACH (OUTPATIENT)
Dept: CARE COORDINATION | Facility: OTHER | Age: 23
End: 2018-06-13

## 2018-06-19 ENCOUNTER — PATIENT OUTREACH (OUTPATIENT)
Dept: CARE COORDINATION | Facility: OTHER | Age: 23
End: 2018-06-19

## 2018-06-19 NOTE — PROGRESS NOTES
Clinic Care Coordination Contact  [facility]/Voicemail    Referral Source: Specialist (OB-GYN)  Clinical Data: Care Coordinator Outreach  Outreach attempted x 1.  Left message on voicemail with call back information and requested return call.  Plan:  Care Coordinator will try to reach patient again in 1-2 business days.      After contacting [facility], SW tried to contact patient to ensure correct address was on file with [facility].  However, she was unavailable.  Will try again.    Annamarie Toussaint, [facility]  Outpatient   [phone]

## 2018-06-21 ENCOUNTER — PATIENT OUTREACH (OUTPATIENT)
Dept: CARE COORDINATION | Facility: OTHER | Age: 23
End: 2018-06-21

## 2018-06-22 NOTE — PROGRESS NOTES
Clinic Care Coordination Contact  Care Team Conversations    SW contacted patient to let her know county stated that she was fine with insurance.  Due to her move she will transfer to a Blue plus plan in July.  However, the [facility] had two addresses on file for the patient, so they wanted to confirm which was correct.  SW called to let patient know that she needed to update them on her current mailing address.  Patient thanked the SW and said she would call and get that taken care of.    Annamarie Toussaint, [facility]  Outpatient   [phone]

## 2018-08-22 ENCOUNTER — MEDICAL CORRESPONDENCE (OUTPATIENT)
Dept: HEALTH INFORMATION MANAGEMENT | Facility: CLINIC | Age: 23
End: 2018-08-22

## 2018-08-23 ENCOUNTER — TRANSFERRED RECORDS (OUTPATIENT)
Dept: HEALTH INFORMATION MANAGEMENT | Facility: CLINIC | Age: 23
End: 2018-08-23

## 2018-11-27 ENCOUNTER — HOSPITAL ENCOUNTER (OUTPATIENT)
Dept: OBGYN | Facility: HOSPITAL | Age: 23
Discharge: HOME | End: 2018-11-27
Attending: STUDENT IN AN ORGANIZED HEALTH CARE EDUCATION/TRAINING PROGRAM | Admitting: STUDENT IN AN ORGANIZED HEALTH CARE EDUCATION/TRAINING PROGRAM

## 2018-11-27 LAB
CLUE CELLS: NORMAL
RUPTURE OF FETAL MEMBRANES BY ROM PLUS: NEGATIVE
TRICHOMONAS, WET PREP: NORMAL
YEAST, WET PREP: NORMAL

## 2018-11-27 ASSESSMENT — MIFFLIN-ST. JEOR: SCORE: 2010.07

## 2018-11-30 DIAGNOSIS — F33.0 MAJOR DEPRESSIVE DISORDER, RECURRENT EPISODE, MILD (H): ICD-10-CM

## 2018-11-30 NOTE — TELEPHONE ENCOUNTER
Buproprian      Last Written Prescription Date:  11/27/17 (discont. 06/01/18 by patient)  Last Fill Quantity: 90,   # refills: 0  Last Office Visit: 11/06/17 with Gabino  Future Office visit:       Routing refill request to provider for review/approval because:    Linda Arzate

## 2018-12-05 RX ORDER — BUPROPION HYDROCHLORIDE 75 MG/1
75 TABLET ORAL DAILY
Qty: 90 TABLET | Refills: 0 | Status: SHIPPED | OUTPATIENT
Start: 2018-12-05 | End: 2019-12-30

## 2019-01-11 ENCOUNTER — ANESTHESIA - HEALTHEAST (OUTPATIENT)
Dept: OBGYN | Facility: HOSPITAL | Age: 24
End: 2019-01-11

## 2019-01-14 ENCOUNTER — HOME CARE/HOSPICE - HEALTHEAST (OUTPATIENT)
Dept: HOME HEALTH SERVICES | Facility: HOME HEALTH | Age: 24
End: 2019-01-14

## 2019-01-15 ENCOUNTER — COMMUNICATION - HEALTHEAST (OUTPATIENT)
Dept: SCHEDULING | Facility: CLINIC | Age: 24
End: 2019-01-15

## 2019-01-15 ENCOUNTER — HOME CARE/HOSPICE - HEALTHEAST (OUTPATIENT)
Dept: HOME HEALTH SERVICES | Facility: HOME HEALTH | Age: 24
End: 2019-01-15

## 2019-12-27 DIAGNOSIS — F33.0 MAJOR DEPRESSIVE DISORDER, RECURRENT EPISODE, MILD (H): ICD-10-CM

## 2019-12-27 NOTE — TELEPHONE ENCOUNTER
Bupropion       Last Written Prescription Date:  12-5-2019  Last Fill Quantity: 30,   # refills: 0  Last Office Visit: 11-6-2017  Future Office visit:

## 2019-12-30 RX ORDER — BUPROPION HYDROCHLORIDE 75 MG/1
75 TABLET ORAL DAILY
Qty: 90 TABLET | Refills: 0 | Status: SHIPPED | OUTPATIENT
Start: 2019-12-30 | End: 2020-01-15

## 2020-01-16 ENCOUNTER — TELEPHONE (OUTPATIENT)
Dept: FAMILY MEDICINE | Facility: OTHER | Age: 25
End: 2020-01-16

## 2020-01-16 NOTE — TELEPHONE ENCOUNTER
Left message for patient to return call . Patient needs an office visit scheduled with Kaya Wright

## 2020-06-23 ENCOUNTER — COMMUNICATION - HEALTHEAST (OUTPATIENT)
Dept: SCHEDULING | Facility: CLINIC | Age: 25
End: 2020-06-23

## 2020-07-10 ENCOUNTER — COMMUNICATION - HEALTHEAST (OUTPATIENT)
Dept: TELEHEALTH | Facility: CLINIC | Age: 25
End: 2020-07-10

## 2020-07-10 ENCOUNTER — OFFICE VISIT - HEALTHEAST (OUTPATIENT)
Dept: FAMILY MEDICINE | Facility: CLINIC | Age: 25
End: 2020-07-10

## 2020-07-10 ENCOUNTER — HOSPITAL ENCOUNTER (OUTPATIENT)
Dept: ULTRASOUND IMAGING | Facility: CLINIC | Age: 25
Discharge: HOME | End: 2020-07-10
Attending: FAMILY MEDICINE

## 2020-07-10 DIAGNOSIS — E66.01 MORBID OBESITY (H): ICD-10-CM

## 2020-07-10 DIAGNOSIS — N92.6 MISSED PERIOD: ICD-10-CM

## 2020-07-10 LAB — HCG UR QL: POSITIVE

## 2020-07-10 ASSESSMENT — MIFFLIN-ST. JEOR: SCORE: 2038.14

## 2020-07-15 ENCOUNTER — COMMUNICATION - HEALTHEAST (OUTPATIENT)
Dept: SCHEDULING | Facility: CLINIC | Age: 25
End: 2020-07-15

## 2020-07-23 ENCOUNTER — COMMUNICATION - HEALTHEAST (OUTPATIENT)
Dept: SCHEDULING | Facility: CLINIC | Age: 25
End: 2020-07-23

## 2020-07-30 ENCOUNTER — COMMUNICATION - HEALTHEAST (OUTPATIENT)
Dept: FAMILY MEDICINE | Facility: CLINIC | Age: 25
End: 2020-07-30

## 2020-08-11 ENCOUNTER — PRENATAL OFFICE VISIT - HEALTHEAST (OUTPATIENT)
Dept: FAMILY MEDICINE | Facility: CLINIC | Age: 25
End: 2020-08-11

## 2020-08-11 DIAGNOSIS — F32.1 CURRENT MODERATE EPISODE OF MAJOR DEPRESSIVE DISORDER, UNSPECIFIED WHETHER RECURRENT (H): ICD-10-CM

## 2020-08-11 DIAGNOSIS — O09.891 SUPERVISION OF OTHER HIGH RISK PREGNANCIES, FIRST TRIMESTER: ICD-10-CM

## 2020-08-11 DIAGNOSIS — Z11.1 SCREENING EXAMINATION FOR PULMONARY TUBERCULOSIS: ICD-10-CM

## 2020-08-11 LAB
ALBUMIN UR-MCNC: NEGATIVE MG/DL
APPEARANCE UR: CLEAR
BASOPHILS # BLD AUTO: 0 THOU/UL (ref 0–0.2)
BASOPHILS NFR BLD AUTO: 0 % (ref 0–2)
BILIRUB UR QL STRIP: NEGATIVE
COLOR UR AUTO: YELLOW
EOSINOPHIL # BLD AUTO: 0.1 THOU/UL (ref 0–0.4)
EOSINOPHIL NFR BLD AUTO: 1 % (ref 0–6)
ERYTHROCYTE [DISTWIDTH] IN BLOOD BY AUTOMATED COUNT: 11.7 % (ref 11–14.5)
GLUCOSE UR STRIP-MCNC: NEGATIVE MG/DL
HCT VFR BLD AUTO: 39.5 % (ref 35–47)
HGB BLD-MCNC: 13 G/DL (ref 12–16)
HGB UR QL STRIP: NEGATIVE
HIV 1+2 AB+HIV1 P24 AG SERPL QL IA: NEGATIVE
KETONES UR STRIP-MCNC: NEGATIVE MG/DL
LEUKOCYTE ESTERASE UR QL STRIP: ABNORMAL
LYMPHOCYTES # BLD AUTO: 1.5 THOU/UL (ref 0.8–4.4)
LYMPHOCYTES NFR BLD AUTO: 29 % (ref 20–40)
MCH RBC QN AUTO: 30.1 PG (ref 27–34)
MCHC RBC AUTO-ENTMCNC: 32.9 G/DL (ref 32–36)
MCV RBC AUTO: 91 FL (ref 80–100)
MONOCYTES # BLD AUTO: 0.3 THOU/UL (ref 0–0.9)
MONOCYTES NFR BLD AUTO: 5 % (ref 2–10)
NEUTROPHILS # BLD AUTO: 3.4 THOU/UL (ref 2–7.7)
NEUTROPHILS NFR BLD AUTO: 65 % (ref 50–70)
NITRATE UR QL: NEGATIVE
PH UR STRIP: 7 [PH] (ref 5–8)
PLATELET # BLD AUTO: 229 THOU/UL (ref 140–440)
PMV BLD AUTO: 10.7 FL (ref 8.5–12.5)
RBC # BLD AUTO: 4.32 MILL/UL (ref 3.8–5.4)
SP GR UR STRIP: 1.02 (ref 1–1.03)
TSH SERPL DL<=0.005 MIU/L-ACNC: 1.21 UIU/ML (ref 0.3–5)
UROBILINOGEN UR STRIP-ACNC: ABNORMAL
WBC: 5.2 THOU/UL (ref 4–11)

## 2020-08-12 LAB
ABO/RH(D): NORMAL
ABORH REPEAT: NORMAL
ANTIBODY SCREEN: NEGATIVE
BACTERIA SPEC CULT: NO GROWTH
HBV SURFACE AG SERPL QL IA: NEGATIVE
RUBV IGG SERPL QL IA: POSITIVE
T PALLIDUM AB SER QL: NEGATIVE

## 2020-08-13 ENCOUNTER — COMMUNICATION - HEALTHEAST (OUTPATIENT)
Dept: FAMILY MEDICINE | Facility: CLINIC | Age: 25
End: 2020-08-13

## 2020-08-13 LAB
C TRACH DNA SPEC QL PROBE+SIG AMP: NEGATIVE
N GONORRHOEA DNA SPEC QL NAA+PROBE: NEGATIVE

## 2020-08-14 ENCOUNTER — AMBULATORY - HEALTHEAST (OUTPATIENT)
Dept: NURSING | Facility: CLINIC | Age: 25
End: 2020-08-14

## 2020-09-01 ENCOUNTER — PRENATAL OFFICE VISIT - HEALTHEAST (OUTPATIENT)
Dept: FAMILY MEDICINE | Facility: CLINIC | Age: 25
End: 2020-09-01

## 2020-09-01 DIAGNOSIS — O09.891 SUPERVISION OF OTHER HIGH RISK PREGNANCIES, FIRST TRIMESTER: ICD-10-CM

## 2020-09-03 ENCOUNTER — COMMUNICATION - HEALTHEAST (OUTPATIENT)
Dept: FAMILY MEDICINE | Facility: CLINIC | Age: 25
End: 2020-09-03

## 2020-09-03 DIAGNOSIS — O09.891 SUPERVISION OF OTHER HIGH RISK PREGNANCIES, FIRST TRIMESTER: ICD-10-CM

## 2020-09-10 ENCOUNTER — HOSPITAL ENCOUNTER (OUTPATIENT)
Dept: ULTRASOUND IMAGING | Facility: CLINIC | Age: 25
Discharge: HOME | End: 2020-09-10
Attending: FAMILY MEDICINE

## 2020-09-10 DIAGNOSIS — O09.891 SUPERVISION OF OTHER HIGH RISK PREGNANCIES, FIRST TRIMESTER: ICD-10-CM

## 2020-09-11 ENCOUNTER — COMMUNICATION - HEALTHEAST (OUTPATIENT)
Dept: FAMILY MEDICINE | Facility: CLINIC | Age: 25
End: 2020-09-11

## 2020-09-11 ENCOUNTER — AMBULATORY - HEALTHEAST (OUTPATIENT)
Dept: LAB | Facility: CLINIC | Age: 25
End: 2020-09-11

## 2020-09-11 DIAGNOSIS — O09.891 SUPERVISION OF OTHER HIGH RISK PREGNANCIES, FIRST TRIMESTER: ICD-10-CM

## 2020-09-14 LAB
HSV1 IGG SERPL QL IA: NEGATIVE
HSV2 IGG SERPL QL IA: POSITIVE

## 2020-09-15 LAB
# FETUSES US: NORMAL
AFP MOM SERPL: 0.96
AFP SERPL-MCNC: 19 NG/ML
AGE - REPORTED: 23.7 YR
CURRENT SMOKER: YES
FAMILY MEMBER DISEASES HX: NO
GA METHOD: NORMAL
GA: NORMAL WK
HCG MOM SERPL: 0.82
HCG SERPL-ACNC: NORMAL IU/L
HX OF HEREDITARY DISORDERS: NO
IDDM PATIENT QL: NO
INHIBIN A MOM SERPL: 0.58
INHIBIN A SERPL-MCNC: 99 PG/ML
INTEGRATED SCN PATIENT-IMP: NORMAL
PATHOLOGY STUDY: NORMAL
SPECIMEN DRAWN SERPL: NORMAL
U ESTRIOL MOM SERPL: 1.21
U ESTRIOL SERPL-MCNC: 0.85 NG/ML

## 2020-10-07 ENCOUNTER — COMMUNICATION - HEALTHEAST (OUTPATIENT)
Dept: FAMILY MEDICINE | Facility: CLINIC | Age: 25
End: 2020-10-07

## 2020-10-08 ENCOUNTER — HOSPITAL ENCOUNTER (OUTPATIENT)
Dept: MEDSURG UNIT | Facility: CLINIC | Age: 25
Discharge: HOME | End: 2020-10-08
Attending: FAMILY MEDICINE | Admitting: FAMILY MEDICINE

## 2020-10-08 ENCOUNTER — AMBULATORY - HEALTHEAST (OUTPATIENT)
Dept: FAMILY MEDICINE | Facility: CLINIC | Age: 25
End: 2020-10-08

## 2020-10-08 ENCOUNTER — COMMUNICATION - HEALTHEAST (OUTPATIENT)
Dept: SCHEDULING | Facility: CLINIC | Age: 25
End: 2020-10-08

## 2020-10-08 DIAGNOSIS — A59.9 TRICHOMONOSIS: ICD-10-CM

## 2020-10-08 LAB
ALBUMIN UR-MCNC: NEGATIVE MG/DL
AMORPH CRY #/AREA URNS HPF: ABNORMAL /[HPF]
APPEARANCE UR: ABNORMAL
BACTERIA #/AREA URNS HPF: ABNORMAL HPF
BILIRUB UR QL STRIP: NEGATIVE
CLUE CELLS: ABNORMAL
COLOR UR AUTO: ABNORMAL
GLUCOSE UR STRIP-MCNC: NEGATIVE MG/DL
HGB UR QL STRIP: NEGATIVE
KETONES UR STRIP-MCNC: NEGATIVE MG/DL
LEUKOCYTE ESTERASE UR QL STRIP: ABNORMAL
MUCOUS THREADS #/AREA URNS LPF: ABNORMAL LPF
NITRATE UR QL: NEGATIVE
PH UR STRIP: 7 [PH] (ref 4.5–8)
RBC #/AREA URNS AUTO: ABNORMAL HPF
SP GR UR STRIP: 1.01 (ref 1–1.03)
SQUAMOUS #/AREA URNS AUTO: ABNORMAL LPF
TRICHOMONAS, WET PREP: ABNORMAL
UROBILINOGEN UR STRIP-ACNC: ABNORMAL
WBC #/AREA URNS AUTO: ABNORMAL HPF
YEAST, WET PREP: ABNORMAL

## 2020-10-08 ASSESSMENT — MIFFLIN-ST. JEOR: SCORE: 2053.17

## 2020-10-09 LAB — BACTERIA SPEC CULT: NO GROWTH

## 2020-10-27 ENCOUNTER — PRENATAL OFFICE VISIT - HEALTHEAST (OUTPATIENT)
Dept: FAMILY MEDICINE | Facility: CLINIC | Age: 25
End: 2020-10-27

## 2020-10-27 DIAGNOSIS — O09.891 SUPERVISION OF OTHER HIGH RISK PREGNANCIES, FIRST TRIMESTER: ICD-10-CM

## 2020-10-27 DIAGNOSIS — N89.8 VAGINAL DISCHARGE: ICD-10-CM

## 2020-10-27 LAB
CLUE CELLS: ABNORMAL
TRICHOMONAS, WET PREP: ABNORMAL
YEAST, WET PREP: ABNORMAL

## 2020-10-28 ENCOUNTER — HOSPITAL ENCOUNTER (OUTPATIENT)
Dept: ULTRASOUND IMAGING | Facility: CLINIC | Age: 25
Discharge: HOME | End: 2020-10-28
Attending: FAMILY MEDICINE

## 2020-11-02 ENCOUNTER — AMBULATORY - HEALTHEAST (OUTPATIENT)
Dept: FAMILY MEDICINE | Facility: CLINIC | Age: 25
End: 2020-11-02

## 2020-11-02 DIAGNOSIS — E66.01 MORBID OBESITY (H): ICD-10-CM

## 2020-11-02 DIAGNOSIS — O09.891 SUPERVISION OF OTHER HIGH RISK PREGNANCIES, FIRST TRIMESTER: ICD-10-CM

## 2020-11-03 ENCOUNTER — AMBULATORY - HEALTHEAST (OUTPATIENT)
Dept: MATERNAL FETAL MEDICINE | Facility: HOSPITAL | Age: 25
End: 2020-11-03

## 2020-11-03 DIAGNOSIS — O26.90 PREGNANCY, ANTEPARTUM, COMPLICATIONS: ICD-10-CM

## 2020-11-11 ENCOUNTER — COMMUNICATION - HEALTHEAST (OUTPATIENT)
Dept: SCHEDULING | Facility: CLINIC | Age: 25
End: 2020-11-11

## 2020-11-23 ENCOUNTER — PRENATAL OFFICE VISIT - HEALTHEAST (OUTPATIENT)
Dept: FAMILY MEDICINE | Facility: CLINIC | Age: 25
End: 2020-11-23

## 2020-11-23 DIAGNOSIS — B00.9 RECURRENT HSV (HERPES SIMPLEX VIRUS): ICD-10-CM

## 2020-11-23 DIAGNOSIS — F32.1 CURRENT MODERATE EPISODE OF MAJOR DEPRESSIVE DISORDER, UNSPECIFIED WHETHER RECURRENT (H): ICD-10-CM

## 2020-11-23 DIAGNOSIS — O09.892 SUPERVISION OF OTHER HIGH RISK PREGNANCIES, SECOND TRIMESTER: ICD-10-CM

## 2020-11-23 LAB
ALBUMIN UR-MCNC: NEGATIVE MG/DL
APPEARANCE UR: ABNORMAL
BILIRUB UR QL STRIP: NEGATIVE
COLOR UR AUTO: YELLOW
FASTING STATUS PATIENT QL REPORTED: NO
GLUCOSE 1H P 50 G GLC PO SERPL-MCNC: 226 MG/DL (ref 70–139)
GLUCOSE UR STRIP-MCNC: NEGATIVE MG/DL
HGB BLD-MCNC: 12.1 G/DL (ref 12–16)
HGB UR QL STRIP: ABNORMAL
KETONES UR STRIP-MCNC: NEGATIVE MG/DL
LEUKOCYTE ESTERASE UR QL STRIP: ABNORMAL
NITRATE UR QL: NEGATIVE
PH UR STRIP: 7 [PH] (ref 5–8)
SP GR UR STRIP: 1.02 (ref 1–1.03)
UROBILINOGEN UR STRIP-ACNC: ABNORMAL

## 2020-11-24 LAB — T PALLIDUM AB SER QL: NEGATIVE

## 2020-12-03 ENCOUNTER — COMMUNICATION - HEALTHEAST (OUTPATIENT)
Dept: MATERNAL FETAL MEDICINE | Facility: HOSPITAL | Age: 25
End: 2020-12-03

## 2020-12-03 DIAGNOSIS — O26.90 PREGNANCY, ANTEPARTUM, COMPLICATIONS: ICD-10-CM

## 2020-12-22 ENCOUNTER — OFFICE VISIT - HEALTHEAST (OUTPATIENT)
Dept: FAMILY MEDICINE | Facility: CLINIC | Age: 25
End: 2020-12-22

## 2020-12-22 DIAGNOSIS — O09.892 SUPERVISION OF OTHER HIGH RISK PREGNANCIES, SECOND TRIMESTER: ICD-10-CM

## 2020-12-23 ENCOUNTER — COMMUNICATION - HEALTHEAST (OUTPATIENT)
Dept: FAMILY MEDICINE | Facility: CLINIC | Age: 25
End: 2020-12-23

## 2020-12-23 DIAGNOSIS — F32.1 CURRENT MODERATE EPISODE OF MAJOR DEPRESSIVE DISORDER, UNSPECIFIED WHETHER RECURRENT (H): ICD-10-CM

## 2020-12-24 ENCOUNTER — COMMUNICATION - HEALTHEAST (OUTPATIENT)
Dept: FAMILY MEDICINE | Facility: CLINIC | Age: 25
End: 2020-12-24

## 2020-12-24 DIAGNOSIS — F32.1 CURRENT MODERATE EPISODE OF MAJOR DEPRESSIVE DISORDER, UNSPECIFIED WHETHER RECURRENT (H): ICD-10-CM

## 2021-01-05 ENCOUNTER — HOSPITAL ENCOUNTER (OUTPATIENT)
Dept: ULTRASOUND IMAGING | Facility: CLINIC | Age: 26
Discharge: HOME | End: 2021-01-05
Attending: FAMILY MEDICINE

## 2021-01-05 ENCOUNTER — AMBULATORY - HEALTHEAST (OUTPATIENT)
Dept: LAB | Facility: CLINIC | Age: 26
End: 2021-01-05

## 2021-01-05 ENCOUNTER — PRENATAL OFFICE VISIT - HEALTHEAST (OUTPATIENT)
Dept: FAMILY MEDICINE | Facility: CLINIC | Age: 26
End: 2021-01-05

## 2021-01-05 DIAGNOSIS — F32.1 CURRENT MODERATE EPISODE OF MAJOR DEPRESSIVE DISORDER, UNSPECIFIED WHETHER RECURRENT (H): ICD-10-CM

## 2021-01-05 DIAGNOSIS — O24.419 GESTATIONAL DIABETES MELLITUS (GDM), ANTEPARTUM, GESTATIONAL DIABETES METHOD OF CONTROL UNSPECIFIED: ICD-10-CM

## 2021-01-05 DIAGNOSIS — O09.893 SUPERVISION OF OTHER HIGH RISK PREGNANCIES, THIRD TRIMESTER: ICD-10-CM

## 2021-01-05 DIAGNOSIS — O09.892 SUPERVISION OF OTHER HIGH RISK PREGNANCIES, SECOND TRIMESTER: ICD-10-CM

## 2021-01-05 LAB
FASTING STATUS PATIENT QL REPORTED: YES
GLUCOSE 1H P 100 G GLC PO SERPL-MCNC: 237 MG/DL
GLUCOSE P FAST SERPL-MCNC: 128 MG/DL

## 2021-01-07 ENCOUNTER — TELEPHONE (OUTPATIENT)
Dept: FAMILY MEDICINE | Facility: OTHER | Age: 26
End: 2021-01-07

## 2021-01-07 ENCOUNTER — COMMUNICATION - HEALTHEAST (OUTPATIENT)
Dept: SCHEDULING | Facility: CLINIC | Age: 26
End: 2021-01-07

## 2021-01-07 ENCOUNTER — COMMUNICATION - HEALTHEAST (OUTPATIENT)
Dept: FAMILY MEDICINE | Facility: CLINIC | Age: 26
End: 2021-01-07

## 2021-01-07 NOTE — TELEPHONE ENCOUNTER
Boyfriendina called about pt   Reached writer (RN at [employer])   Dx with gestational diabetes at 1/5/2021 OB appt    Patient having issues with BS and not feeling well   BS 85 last night    today at 8am    Had a snack before bedtime   Not on any meds for this     Pt at work right now  Feels lethargic though   Sometimes seems confused   Ate breakfast this AM   Pt complaining of increase in urination   Dizzy as well     Will check BS around 1pm when gets home from work   They want more direction from OB provider though      Brigida Velásquez at [phone]    Called central scheduling who said they will get message sent through to [employer]     Yvette PETERSON, RN

## 2021-01-15 ENCOUNTER — PRENATAL OFFICE VISIT - HEALTHEAST (OUTPATIENT)
Dept: FAMILY MEDICINE | Facility: CLINIC | Age: 26
End: 2021-01-15

## 2021-01-15 DIAGNOSIS — O24.419 GESTATIONAL DIABETES MELLITUS (GDM), ANTEPARTUM, GESTATIONAL DIABETES METHOD OF CONTROL UNSPECIFIED: ICD-10-CM

## 2021-01-15 DIAGNOSIS — M25.551 HIP PAIN, RIGHT: ICD-10-CM

## 2021-01-15 DIAGNOSIS — O09.893 SUPERVISION OF OTHER HIGH RISK PREGNANCIES, THIRD TRIMESTER: ICD-10-CM

## 2021-01-15 ASSESSMENT — MIFFLIN-ST. JEOR: SCORE: 2148.42

## 2021-01-19 ENCOUNTER — PRENATAL OFFICE VISIT - HEALTHEAST (OUTPATIENT)
Dept: FAMILY MEDICINE | Facility: CLINIC | Age: 26
End: 2021-01-19

## 2021-01-19 DIAGNOSIS — O09.893 SUPERVISION OF OTHER HIGH RISK PREGNANCIES, THIRD TRIMESTER: ICD-10-CM

## 2021-01-19 DIAGNOSIS — O24.419 GESTATIONAL DIABETES MELLITUS (GDM), ANTEPARTUM, GESTATIONAL DIABETES METHOD OF CONTROL UNSPECIFIED: ICD-10-CM

## 2021-01-22 ENCOUNTER — COMMUNICATION - HEALTHEAST (OUTPATIENT)
Dept: SCHEDULING | Facility: CLINIC | Age: 26
End: 2021-01-22

## 2021-01-26 ENCOUNTER — PRENATAL OFFICE VISIT - HEALTHEAST (OUTPATIENT)
Dept: FAMILY MEDICINE | Facility: CLINIC | Age: 26
End: 2021-01-26

## 2021-01-26 DIAGNOSIS — O24.419 GESTATIONAL DIABETES MELLITUS (GDM), ANTEPARTUM, GESTATIONAL DIABETES METHOD OF CONTROL UNSPECIFIED: ICD-10-CM

## 2021-01-26 DIAGNOSIS — B00.9 RECURRENT HSV (HERPES SIMPLEX VIRUS): ICD-10-CM

## 2021-01-26 DIAGNOSIS — O09.893 SUPERVISION OF OTHER HIGH RISK PREGNANCIES, THIRD TRIMESTER: ICD-10-CM

## 2021-01-27 LAB
CLUE CELLS: NORMAL
TRICHOMONAS, WET PREP: NORMAL
YEAST, WET PREP: NORMAL

## 2021-01-28 ENCOUNTER — HOSPITAL ENCOUNTER (OUTPATIENT)
Dept: ULTRASOUND IMAGING | Facility: CLINIC | Age: 26
Discharge: HOME | End: 2021-01-28
Attending: FAMILY MEDICINE

## 2021-01-28 LAB
ALLERGIC TO PENICILLIN: NO
GP B STREP DNA SPEC QL NAA+PROBE: POSITIVE

## 2021-02-01 ENCOUNTER — HOSPITAL ENCOUNTER (OUTPATIENT)
Dept: ULTRASOUND IMAGING | Facility: CLINIC | Age: 26
Discharge: HOME | End: 2021-02-01
Attending: FAMILY MEDICINE

## 2021-02-03 ENCOUNTER — PRENATAL OFFICE VISIT - HEALTHEAST (OUTPATIENT)
Dept: FAMILY MEDICINE | Facility: CLINIC | Age: 26
End: 2021-02-03

## 2021-02-03 DIAGNOSIS — O09.893 SUPERVISION OF OTHER HIGH RISK PREGNANCIES, THIRD TRIMESTER: ICD-10-CM

## 2021-02-03 LAB
ALBUMIN UR-MCNC: ABNORMAL MG/DL
ALT SERPL W P-5'-P-CCNC: <9 U/L (ref 0–45)
APPEARANCE UR: ABNORMAL
APTT PPP: 25 SECONDS (ref 24–37)
AST SERPL W P-5'-P-CCNC: 7 U/L (ref 0–40)
BILIRUB UR QL STRIP: NEGATIVE
BUN SERPL-MCNC: 5 MG/DL (ref 8–22)
COLOR UR AUTO: YELLOW
CREAT SERPL-MCNC: 0.57 MG/DL (ref 0.6–1.1)
ERYTHROCYTE [DISTWIDTH] IN BLOOD BY AUTOMATED COUNT: 12.4 % (ref 11–14.5)
GFR SERPL CREATININE-BSD FRML MDRD: >60 ML/MIN/1.73M2
GLUCOSE UR STRIP-MCNC: NEGATIVE MG/DL
HCT VFR BLD AUTO: 37.2 % (ref 35–47)
HGB BLD-MCNC: 12 G/DL (ref 12–16)
HGB UR QL STRIP: NEGATIVE
INR PPP: 0.94 (ref 0.9–1.1)
KETONES UR STRIP-MCNC: NEGATIVE MG/DL
LEUKOCYTE ESTERASE UR QL STRIP: ABNORMAL
MCH RBC QN AUTO: 29.1 PG (ref 27–34)
MCHC RBC AUTO-ENTMCNC: 32.3 G/DL (ref 32–36)
MCV RBC AUTO: 90 FL (ref 80–100)
NITRATE UR QL: NEGATIVE
PH UR STRIP: 7 [PH] (ref 5–8)
PLATELET # BLD AUTO: 184 THOU/UL (ref 140–440)
PMV BLD AUTO: 10.8 FL (ref 7–10)
RBC # BLD AUTO: 4.12 MILL/UL (ref 3.8–5.4)
SP GR UR STRIP: 1.02 (ref 1–1.03)
URATE SERPL-MCNC: 3.8 MG/DL (ref 2–7.5)
UROBILINOGEN UR STRIP-ACNC: ABNORMAL
WBC: 7.1 THOU/UL (ref 4–11)

## 2021-02-04 ENCOUNTER — HOSPITAL ENCOUNTER (OUTPATIENT)
Dept: ULTRASOUND IMAGING | Facility: CLINIC | Age: 26
Discharge: HOME | End: 2021-02-04
Attending: FAMILY MEDICINE

## 2021-02-04 ENCOUNTER — COMMUNICATION - HEALTHEAST (OUTPATIENT)
Dept: FAMILY MEDICINE | Facility: CLINIC | Age: 26
End: 2021-02-04

## 2021-02-04 LAB
SARS-COV-2 PCR COMMENT: NORMAL
SARS-COV-2 RNA SPEC QL NAA+PROBE: NEGATIVE
SARS-COV-2 VIRUS SPECIMEN SOURCE: NORMAL

## 2021-02-05 ENCOUNTER — COMMUNICATION - HEALTHEAST (OUTPATIENT)
Dept: SCHEDULING | Facility: CLINIC | Age: 26
End: 2021-02-05

## 2021-02-08 ENCOUNTER — ANESTHESIA - HEALTHEAST (OUTPATIENT)
Dept: OBGYN | Facility: CLINIC | Age: 26
End: 2021-02-08

## 2021-02-11 ENCOUNTER — HOME CARE/HOSPICE - HEALTHEAST (OUTPATIENT)
Dept: HOME HEALTH SERVICES | Facility: HOME HEALTH | Age: 26
End: 2021-02-11

## 2021-02-13 ENCOUNTER — HOME CARE/HOSPICE - HEALTHEAST (OUTPATIENT)
Dept: HOME HEALTH SERVICES | Facility: HOME HEALTH | Age: 26
End: 2021-02-13

## 2021-02-16 ENCOUNTER — AMBULATORY - HEALTHEAST (OUTPATIENT)
Dept: FAMILY MEDICINE | Facility: CLINIC | Age: 26
End: 2021-02-16

## 2021-02-19 ENCOUNTER — COMMUNICATION - HEALTHEAST (OUTPATIENT)
Dept: FAMILY MEDICINE | Facility: CLINIC | Age: 26
End: 2021-02-19

## 2021-02-22 ENCOUNTER — OFFICE VISIT - HEALTHEAST (OUTPATIENT)
Dept: FAMILY MEDICINE | Facility: CLINIC | Age: 26
End: 2021-02-22

## 2021-02-22 DIAGNOSIS — F32.1 CURRENT MODERATE EPISODE OF MAJOR DEPRESSIVE DISORDER, UNSPECIFIED WHETHER RECURRENT (H): ICD-10-CM

## 2021-02-22 ASSESSMENT — ANXIETY QUESTIONNAIRES
GAD7 TOTAL SCORE: 12
7. FEELING AFRAID AS IF SOMETHING AWFUL MIGHT HAPPEN: NEARLY EVERY DAY
3. WORRYING TOO MUCH ABOUT DIFFERENT THINGS: MORE THAN HALF THE DAYS
5. BEING SO RESTLESS THAT IT IS HARD TO SIT STILL: NOT AT ALL
IF YOU CHECKED OFF ANY PROBLEMS ON THIS QUESTIONNAIRE, HOW DIFFICULT HAVE THESE PROBLEMS MADE IT FOR YOU TO DO YOUR WORK, TAKE CARE OF THINGS AT HOME, OR GET ALONG WITH OTHER PEOPLE: VERY DIFFICULT
2. NOT BEING ABLE TO STOP OR CONTROL WORRYING: MORE THAN HALF THE DAYS
4. TROUBLE RELAXING: SEVERAL DAYS
1. FEELING NERVOUS, ANXIOUS, OR ON EDGE: MORE THAN HALF THE DAYS
6. BECOMING EASILY ANNOYED OR IRRITABLE: MORE THAN HALF THE DAYS

## 2021-02-22 ASSESSMENT — PATIENT HEALTH QUESTIONNAIRE - PHQ9: SUM OF ALL RESPONSES TO PHQ QUESTIONS 1-9: 16

## 2021-02-24 ENCOUNTER — COMMUNICATION - HEALTHEAST (OUTPATIENT)
Dept: FAMILY MEDICINE | Facility: CLINIC | Age: 26
End: 2021-02-24

## 2021-04-20 ENCOUNTER — COMMUNICATION - HEALTHEAST (OUTPATIENT)
Dept: FAMILY MEDICINE | Facility: CLINIC | Age: 26
End: 2021-04-20

## 2021-04-20 DIAGNOSIS — O24.419 GESTATIONAL DIABETES MELLITUS (GDM), ANTEPARTUM, GESTATIONAL DIABETES METHOD OF CONTROL UNSPECIFIED: ICD-10-CM

## 2021-04-28 ENCOUNTER — OFFICE VISIT - HEALTHEAST (OUTPATIENT)
Dept: FAMILY MEDICINE | Facility: CLINIC | Age: 26
End: 2021-04-28

## 2021-04-28 DIAGNOSIS — Z11.1 SCREENING EXAMINATION FOR PULMONARY TUBERCULOSIS: ICD-10-CM

## 2021-04-28 DIAGNOSIS — M54.50 CHRONIC BILATERAL LOW BACK PAIN WITHOUT SCIATICA: ICD-10-CM

## 2021-04-28 DIAGNOSIS — G89.29 CHRONIC BILATERAL LOW BACK PAIN WITHOUT SCIATICA: ICD-10-CM

## 2021-04-28 DIAGNOSIS — F32.1 CURRENT MODERATE EPISODE OF MAJOR DEPRESSIVE DISORDER, UNSPECIFIED WHETHER RECURRENT (H): ICD-10-CM

## 2021-04-28 DIAGNOSIS — N89.8 VAGINAL DISCHARGE: ICD-10-CM

## 2021-04-28 LAB
CLUE CELLS: ABNORMAL
TRICHOMONAS, WET PREP: ABNORMAL
YEAST, WET PREP: ABNORMAL

## 2021-04-28 ASSESSMENT — EDINBURGH POSTNATAL DEPRESSION SCALE (EPDS): TOTAL SCORE: 23

## 2021-05-05 ENCOUNTER — COMMUNICATION - HEALTHEAST (OUTPATIENT)
Dept: FAMILY MEDICINE | Facility: CLINIC | Age: 26
End: 2021-05-05

## 2021-05-12 ENCOUNTER — OFFICE VISIT - HEALTHEAST (OUTPATIENT)
Dept: FAMILY MEDICINE | Facility: CLINIC | Age: 26
End: 2021-05-12

## 2021-05-12 DIAGNOSIS — N92.6 IRREGULAR PERIODS: ICD-10-CM

## 2021-05-12 DIAGNOSIS — F32.1 CURRENT MODERATE EPISODE OF MAJOR DEPRESSIVE DISORDER, UNSPECIFIED WHETHER RECURRENT (H): ICD-10-CM

## 2021-05-27 VITALS
DIASTOLIC BLOOD PRESSURE: 64 MMHG | TEMPERATURE: 97.4 F | HEART RATE: 78 BPM | RESPIRATION RATE: 16 BRPM | SYSTOLIC BLOOD PRESSURE: 116 MMHG

## 2021-05-27 ASSESSMENT — PATIENT HEALTH QUESTIONNAIRE - PHQ9: SUM OF ALL RESPONSES TO PHQ QUESTIONS 1-9: 16

## 2021-05-28 ASSESSMENT — ANXIETY QUESTIONNAIRES: GAD7 TOTAL SCORE: 12

## 2021-06-02 VITALS — WEIGHT: 273 LBS | HEIGHT: 66 IN | BODY MASS INDEX: 43.87 KG/M2

## 2021-06-04 VITALS
DIASTOLIC BLOOD PRESSURE: 70 MMHG | WEIGHT: 277.3 LBS | BODY MASS INDEX: 44.76 KG/M2 | SYSTOLIC BLOOD PRESSURE: 116 MMHG | HEART RATE: 110 BPM | OXYGEN SATURATION: 98 %

## 2021-06-04 VITALS
OXYGEN SATURATION: 98 % | BODY MASS INDEX: 44.87 KG/M2 | HEIGHT: 66 IN | RESPIRATION RATE: 18 BRPM | SYSTOLIC BLOOD PRESSURE: 116 MMHG | DIASTOLIC BLOOD PRESSURE: 78 MMHG | HEART RATE: 95 BPM | WEIGHT: 279.19 LBS

## 2021-06-04 VITALS — DIASTOLIC BLOOD PRESSURE: 72 MMHG | SYSTOLIC BLOOD PRESSURE: 112 MMHG | BODY MASS INDEX: 45.28 KG/M2 | WEIGHT: 289.1 LBS

## 2021-06-04 VITALS — WEIGHT: 279.9 LBS | SYSTOLIC BLOOD PRESSURE: 112 MMHG | BODY MASS INDEX: 45.18 KG/M2 | DIASTOLIC BLOOD PRESSURE: 60 MMHG

## 2021-06-04 VITALS — HEIGHT: 67 IN | WEIGHT: 279 LBS | BODY MASS INDEX: 43.79 KG/M2

## 2021-06-05 VITALS — WEIGHT: 293 LBS | BODY MASS INDEX: 48.1 KG/M2 | SYSTOLIC BLOOD PRESSURE: 124 MMHG | DIASTOLIC BLOOD PRESSURE: 72 MMHG

## 2021-06-05 VITALS
WEIGHT: 293 LBS | HEIGHT: 67 IN | SYSTOLIC BLOOD PRESSURE: 118 MMHG | HEART RATE: 91 BPM | BODY MASS INDEX: 45.99 KG/M2 | DIASTOLIC BLOOD PRESSURE: 70 MMHG | OXYGEN SATURATION: 94 %

## 2021-06-05 VITALS
BODY MASS INDEX: 46.36 KG/M2 | SYSTOLIC BLOOD PRESSURE: 116 MMHG | OXYGEN SATURATION: 98 % | WEIGHT: 293 LBS | HEART RATE: 95 BPM | DIASTOLIC BLOOD PRESSURE: 72 MMHG

## 2021-06-05 VITALS
SYSTOLIC BLOOD PRESSURE: 118 MMHG | WEIGHT: 293 LBS | DIASTOLIC BLOOD PRESSURE: 80 MMHG | BODY MASS INDEX: 47.14 KG/M2 | OXYGEN SATURATION: 97 % | HEART RATE: 110 BPM

## 2021-06-05 VITALS
WEIGHT: 293 LBS | OXYGEN SATURATION: 97 % | DIASTOLIC BLOOD PRESSURE: 72 MMHG | BODY MASS INDEX: 47.3 KG/M2 | HEART RATE: 112 BPM | SYSTOLIC BLOOD PRESSURE: 122 MMHG

## 2021-06-05 VITALS
RESPIRATION RATE: 16 BRPM | HEART RATE: 103 BPM | OXYGEN SATURATION: 98 % | WEIGHT: 293 LBS | DIASTOLIC BLOOD PRESSURE: 80 MMHG | SYSTOLIC BLOOD PRESSURE: 110 MMHG | BODY MASS INDEX: 45.98 KG/M2

## 2021-06-05 VITALS
HEART RATE: 66 BPM | DIASTOLIC BLOOD PRESSURE: 80 MMHG | BODY MASS INDEX: 45.37 KG/M2 | OXYGEN SATURATION: 98 % | SYSTOLIC BLOOD PRESSURE: 124 MMHG | WEIGHT: 289.7 LBS

## 2021-06-05 VITALS — BODY MASS INDEX: 47.07 KG/M2 | OXYGEN SATURATION: 98 % | WEIGHT: 293 LBS

## 2021-06-09 NOTE — TELEPHONE ENCOUNTER
RN triage   Call from pt   Pt states she is 9 weeks pregnant and sees Dr Penaloza  Pt states she has been having headaches and cramping for the past 1 1/2- 2 weeks -- feeling tired and no energy   cramping is every day -- sometimes comes and goes - sometimes all day - rates cramping 7-8/10   No pain or cramping now   Sometimes feels lightheaded - has some nausea -- no vomiting   occ has shoulder pain -- none now   No bleeding or spotting -- vaginal discharge is clear to tan  Per protocol =   Should go to ED   Check w/ PCP  Please advise = when and where do you want pt seen ?  Tana Villatoro RN [facility] RN triage        Additional Information    Negative: Passed out (i.e., fainted, collapsed and was not responding)    Negative: Shock suspected (e.g., cold/pale/clammy skin, too weak to stand, low BP, rapid pulse)    Negative: Difficult to awaken or acting confused (e.g., disoriented, slurred speech)    Negative: Sounds like a life-threatening emergency to the triager    Negative: Abdominal pain and pregnant > 20 weeks    Negative: Vaginal bleeding present 2 or more hours and soaking 2 pads or tampons per hour    Negative: Vaginal bleeding present > 6 hours and soaking 1 pad or tampon per hour    Intermittent lower abdominal pain lasting > 24 hours    Protocols used: PREGNANCY - ABDOMINAL PAIN LESS THAN 20 WEEKS EGA-A-OH

## 2021-06-09 NOTE — TELEPHONE ENCOUNTER
Who is calling:  Lorena    Reason for Call:    New pt  has a positive home pregnancy test. Want to est care for her pregnancy at [place]. Please call her back.    Date of last appointment with primary care: new pt   Okay to leave a detailed message: Yes

## 2021-06-09 NOTE — TELEPHONE ENCOUNTER
Prior Authorization Request  Who s requesting:  Pharmacy  Pharmacy Name and Location: [facility]  Medication Name:    Disp  Refills  Start  End     PNV,calcium 72-iron-folic acid (PRENATAL PLUS, CALCIUM CARB,) 27 mg iron- 1 mg Tab  90 tablet  3  7/10/2020      Sig - Route: Take 1 tablet by mouth daily. - Oral     Sent to pharmacy as: Prenatal Plus (calcium carbonate) 27 mg iron-1 mg tablet (PNV,calcium 72-iron-folic acid)     E-Prescribing Status: Receipt confirmed by pharmacy (7/10/2020  9:20 AM CDT)         Insurance Plan: [employer] [place]  Insurance Member ID Number:  [id number]  CoverMyMeds Key: [id number]  Informed patient that prior authorizations can take up to 10 business days for response:   NA  Okay to leave a detailed message: Yes

## 2021-06-09 NOTE — PROGRESS NOTES
ASSESSMENT/PLAN:   1) Amenorrhea: secondary to pregnancy - see #2 below.     2) PRENATAL:  at Unknown by unsure LMP, giving Estimated Date of Delivery: None noted..  Education: reviewed prenatal folder, info given on [facility] website. Avoid alcohol and tobacco; minimize caffeine to <2 servings per day; advised regular physical activity; avoid abdominal trauma;  drink >/= 8 cups water/fluids daily; start/continue PNV.  Discussed morning sickness and the importance of frequent small meals in order to decrease nausea.  If she gets to the point that she is unable to keep food/liquids down, she should contact me as we can provide her with medications to help.   Discussed 1st trimester genetic screening, will consider and contact with further questions. Will schedule 1st OB appointment earlier if she wishes to undergo these.    Patient given information on pregnancy today.  Next visit 4 weeks.  Will get an ultrasound to establish EDC.  Order placed today.    All questions answered.        SUBJECTIVE:   Lorena Echevarria is a 25 y.o.  presents c/o missed period, nausea, breast tenderness for past several weeks. Patient's last menstrual period was 2020 (approximate). (sure, normal, came at expected time). Her periods are not regular. Home UPT rodriguez positive, so she believes she is pregnant. She is happy about this. She is not taking PNV. No bleeding or cramping.  She does not have any exposure to cat litter.  She is aware to avoid using hot tubs or jacuzzi's for which the temperature cannot be adjusted.  She has no other concerns.    She is nauseated all the time and does want to sleep all the time. She is not taking a prenatal vitamin. She does have two kids at home. She wasn't nauseated with her other kids. Before she found out that she was pregnant she was drinking alcohol. She was not trying to get prenant, although not preventing pregnancy.     She does have issues sleeping as well.       Current  Outpatient Medications:      acetaminophen (TYLENOL) 325 MG tablet, Take 1-2 tablets (325-650 mg total) by mouth every 4 (four) hours as needed., Disp: , Rfl: 0     buPROPion (WELLBUTRIN) 100 MG tablet, Take 3 tablets (300 mg total) by mouth daily., Disp: 30 tablet, Rfl: 5     PNV,calcium 72-iron-folic acid (PRENATAL PLUS, CALCIUM CARB,) 27 mg iron- 1 mg Tab, Take 1 tablet by mouth daily., Disp: 90 tablet, Rfl: 3  Past Medical History:   Diagnosis Date     Abnormal Pap smear of cervix 03/30/2017     Autism spectrum disorder     per [place] records     Fetal tachycardia affecting management of mother 9/1/2017     Gestational diabetes mellitus (GDM) 9/15/2017    Overview:  Insulin start today Begin testing 4 times daily NST BPP weekly ordered Diabetes center     Herpes     no outbreak now     High-risk first pregnancy of young woman, third trimester 9/15/2017    Overview:  Tdap and flu done ROR signed Poor compliance with prenatal care and GDM.   consulted for assistance.   Transfer from [place] 34 weeks Girl     History of suicide attempt 2014    multiple     Mental disorder     depression     Normal labor and delivery 10/17/2017     Social History     Socioeconomic History     Marital status: Single     Spouse name: Not on file     Number of children: Not on file     Years of education: Not on file     Highest education level: Not on file   Occupational History     Not on file   Social Needs     Financial resource strain: Not on file     Food insecurity     Worry: Not on file     Inability: Not on file     Transportation needs     Medical: Not on file     Non-medical: Not on file   Tobacco Use     Smoking status: Current Every Day Smoker     Packs/day: 0.10     Types: Cigarettes     Smokeless tobacco: Never Used   Substance and Sexual Activity     Alcohol use: No     Frequency: Never     Binge frequency: Never     Drug use: No     Sexual activity: Yes     Partners: Male   Lifestyle     Physical  "activity     Days per week: Not on file     Minutes per session: Not on file     Stress: Not on file   Relationships     Social connections     Talks on phone: Not on file     Gets together: Not on file     Attends [employer] service: Not on file     Active member of club or organization: Not on file     Attends meetings of clubs or organizations: Not on file     Relationship status: Not on file     Intimate partner violence     Fear of current or ex partner: Not on file     Emotionally abused: Not on file     Physically abused: Not on file     Forced sexual activity: Not on file   Other Topics Concern     Not on file   Social History Narrative     Not on file     OB History    Para Term  AB Living   2 2 2     2   SAB TAB Ectopic Multiple Live Births           2      # Outcome Date GA Lbr López/2nd Weight Sex Delivery Anes PTL Lv   2 Term 19 39w5d  7 lb (3.175 kg) M Vag-Vacuum EPI N CARLIE      Complications: Fetal Intolerance   1 Term 10/18/17 40w4d  7 lb 1.8 oz (3.225 kg) F VACUUM EXTRA EPI N CARLIE      Complications: Failure to Progress in Second Stage, Prolonged labor, Impaired glucose tolerance during pregnancy       OBJECTIVE:   /78   Pulse 95   Resp 18   Ht 5' 6\" (1.676 m)   Wt (!) 279 lb 3 oz (126.6 kg)   LMP 2020 (Approximate)   SpO2 98%   Breastfeeding No   BMI 45.06 kg/m    NAD, A&Ox3. Normal affect. No respiratory distress. VS normal (see above).    General:  Denies fever, chills, HA, fatigue, myalgias, weight change    Eyes: Denies vision changes   Ears/Nose/Throat: Denies nasal congestion, rhinorrhea, ear pain or discharge, sore throat, swollen glands  Cardiovascular: CP, palpitations  Respiratory:  SOB, cough  Gastrointestinal:  Denies changes in bowel habits, melena, rectal bleeding,  Genitourinary: Denies changes in urine habits/frequency/dysuria, hematuria   Musculoskeletal:  Denies  joint pain or swelling or erythema, edema  Skin: Denies rashes   Neurologic: Denies " weakness, paresthesia  Psychiatric: Denies mood changes   Endocrine: Denies polyuria, polydipsia, polyphagia  Heme/Lymphatic: Denies problem with bleeding   Allergic/Immunologic: Denies problem       Recent Results (from the past 240 hour(s))   COVID-19(CORONAVIRUS)PCR [facility]    Specimen: Nasopharyngeal Swab; Respiratory   Result Value Ref Range    SARS-CoV-2 Specimen Source Nasopharyngeal     SARS-COV-2 RNA Undetected Undetected   Pregnancy (Beta-hCG, Qual), Urine   Result Value Ref Range    Pregnancy Test, Urine Positive (!) Negative          Linda Penaloza MD

## 2021-06-09 NOTE — TELEPHONE ENCOUNTER
Cramping is normal in early pregnancy and is often more with subsequent pregnancies. She could be a bit dehydrated as well. If she wants, she can push fluids to see if this helps, maybe take one OTC dramamine (dimenhydrimate type).     If the cramping is truly 7/10 she may need to go to the ER to get looked at.     If things aren't better tomorrow I can look at seeing her when it's convenient for her, she could call back in the morning.

## 2021-06-10 NOTE — TELEPHONE ENCOUNTER
7/10/20 pregnancy confirmation visit. Told to use Unisom for sleeping. Pt requesting something else. Pt is pregnant 10wks gestation per ultrasound dating.

## 2021-06-10 NOTE — PROGRESS NOTES
OV   8/11/2020  Assessment:      1. Supervision of other high risk pregnancies, first trimester  ABO/RH Typing (OP order)    Hepatitis B Surface antigen (HBsAG)    HIV Antigen/Antibody Screening Cascade    HM1(CBC and Differential)    HML Antibody Screen    RPR    Rubella Immune Status (IgG)    Urinalysis Macroscopic    Culture, Urine    Chlamydia/gonorrhoeae, URINE    HM1 (CBC with Diff)    Thyroid O'Brien   2. Current moderate episode of major depressive disorder, unspecified whether recurrent (H)  buPROPion 450 mg Tb24   3. Screening examination for pulmonary tuberculosis  TB Skin Test            Plan:        Initial labs drawn. UA/UC, GC/Chlamydia sent.  Patient thinks that her pap was updated at OB, ASHISH obtained today    Prenatal vitamins.  Problem list reviewed and updated.  First trimester screening/AFP3 discussed: requested. Will plan for quad screen at next visit.   Role of ultrasound in pregnancy discussed; fetal survey: results reviewed.  Amniocentesis discussed: not indicated.  General educational information given, including benefits of breast-feeding. See AVS for details.   Follow up in 4 weeks.  Given worsening anxiety will increase her wellbutrin to 450 mg daily, f/u in four weeks, if not improving can consider alternate options. Has not tolerated SSRIs in the past.     >50% of 40 min visit spent on counseling and coordination of care related to pregnancy        Subjective:             Lorena Echevarria is being seen today for her first obstetrical visit.  This is a planned pregnancy. She is at 11-5 gestation. Her obstetrical history is significant for obesity for two vacuum deliveries. Relationship with FOB: significant other, living together. Patient does intend to breast feed. Pregnancy history fully reviewed. Was able to breast feed her second for about two months.         Current symptoms also include: breast tenderness and fatigue.  Sexual Activity: single partner, contraception: none.          Obstetrical history is significant for: two vacuum deliveries.   Patient's last menstrual period was 05/06/2020 (approximate).. Last period was normal.    She has had increasing anxiety in the last month since she was last in. She is on wellbutrin and has been on this for a while. She was on this with her last pregnancy as well. She was on Zoloft previously, made her off.     The following portions of the patient's history were reviewed and updated as appropriate: allergies, current medications, past family history, past medical history, past social history, past surgical history and problem list.    Infection History   - Live with someone with TB or exposed to TB No  - Patient reported with history of genital herpes No  - Rash or viral illness since LMP   No, did have shingles a few weeks ago  - Prior GBS infected child    No  - Hepatitis B or C     No  - Gonorrhea      No  - Chlamydia      No  - HPV       No  - HIV       No  - Syphilis      No  - Other STI's      No  - Other (see comments)    No        Genetic Screening/Teratology Counseling  - Patient's age 35 years or older as of estimated date of delivery  No  - Thalassemia    (Italian, Greek, Mediterranean, or  background) No  - MCV less than 80       No   - Neural tube defects    (meningomyelocele, spina bifida, or anencephaly)  No  - Congenital heart defect      No  - Down syndrome       Yes: maternal uncle, maternal first cousin once removed as well  - Bridger-Sachs (Ashkenazi Islam, Cajun, Syriac [place])   No  - Canavan Disease (Ashkenazi Islam)    No  - Familial Dysautonomia (Ashkenazi Islam)    No  - Sickle cell disease or trait ()     No  - Hemophilia or other blood disorders     No  - Muscular dystrophy       No  - Cystic fibrosis       No  - Dora's chorea       No  - Mental retardation/autism       Yes: brother with autism   (If yes, was the patient tested for fragile X?)     - Other inherited genetic or chromosomal  disorders   No  - Maternal metabolic disorders (type 1 diabetes, PKU)  No  - Patient or baby's father had a child with birth defects   No  - Recurrent pregnancy loss, or stillbirth    No  - Medications   (including supplements, vitamins, herbs, OTC drugs)   /illicit/recreational drugs/alcohol since last LMP    list agents and strength, dosing    Yes: Bupropion, pnv, unisom  Any other (see comments)      No      Discussed in today's office visit  HIV and other routine prenatal tests     Yes  Risk Factors Identified by Prenatal history   Yes  Anticipated course of prenatal care    Yes  Nutrition and weight gain counseling: special diet   Yes  Toxoplasmosis precautions (Cats/Raw meat)  Yes  Sexual activity       Yes  Exercise       Yes  Influenza vaccine      Yes  Smoking counseling      Yes  Environmental/work hazards     Yes  Travel        Yes  Tobacco (asked, advise, assess, assist and arrange) Yes  Alcohol       Yes  Illicit/recreational drugs     Yes  Use of any meds   (including supplements, vitamins, herbs, OTC drugs) Yes  Indications for ultrasound     Yes  Domestic violence      Yes  Seat Belt Use        Yes  Childbirth classes/hospital facilities    Yes  Dental care       Yes  Avoidance of saunas or hot tubs    Yes  Teratogens        Yes  Breast-feeding       Yes  Screening for aneuploidy     Yes      Review of Systems  Pertinent items are noted in HPI.          Objective:      /70 (Patient Site: Right Arm, Patient Position: Sitting, Cuff Size: Adult Large)   Pulse (!) 110   Wt (!) 277 lb 4.8 oz (125.8 kg)   LMP 05/06/2020 (Approximate)   SpO2 98%   Breastfeeding No   BMI 44.76 kg/m    General: alert, pleasant, and no distress  Head: Normocephalic, without obvious abnormality, atraumatic  Eyes: conjunctivae and sclerae normal and pupils equal, round, reactive to   light and accomodation  Ears: normal TM's and external ear canals both ears  Nose: no discharge, no sinus tenderness  Throat: lips,  mucosa, and tongue normal; teeth and gums normal  Neck: no adenopathy, supple, symmetrical, trachea midline and thyroid normal  Back: symmetric, ROM normal. No CVA tenderness.  Lungs: clear to auscultation bilaterally  Breasts: normal appearance, no masses or tenderness  Heart:: regular rate and rhythm and no murmurs  Abdomen:  bowel sounds normal, no masses palpable and soft, non-tender  Pelvic: Deferred, no complaints  Extremities: extremities normal, atraumatic, no cyanosis or edema  Pulses: 2+ and symmetric  Skin: Skin color, texture, turgor normal. No rashes or lesions  Lymph nodes: Cervical, supraclavicular, and axillary nodes normal.  Neurologic: Grossly normal

## 2021-06-10 NOTE — TELEPHONE ENCOUNTER
Dr Penaloza is out of the office today - pt can either wait for Dr Penaloza to return, otherwise it would be reasonable to try hydroxyzine which I can send in for her.    Dr iPnk

## 2021-06-10 NOTE — PATIENT INSTRUCTIONS - HE
Patient Education   HEALTHY PREGNANCY CARE: 10-14 WEEKS PREGNANT     By weeks 10 to 14 of your pregnancy, the placenta has formed inside your uterus. It may be possible to hear your baby's heartbeat with a doppler ultrasound device. Your baby's eyes can and do move. The arms and legs can bend.    The second trimester genetic screening tests for Down's Syndrome, Trisomy 18, and neural tube defects (which are known collectively as a quad screen) are done at 15 to 22 weeks. It's your choice whether to have these tests. You and your partner can talk to your midwife or physician about birth defects tests.    Consider breastfeeding for the healthiest way to feed your baby. Ask your midwife or physician for more information.     As your center of gravity and weight changes, use good body mechanics when changing positions and lifting. For example, use a straight back and your legs for support when lifting instead of bending over. Maintain good posture to prevent straining your muscles. Now is a good time to continue or restart your exercise program. Walking 30-60 minutes daily is an excellent way to keep fit. Yoga and swimming also offer many benefits.    The nausea and fatigue of early pregnancy have usually started to let up, so this is a good time to focus on nutrition. Consider attending a nutrition class. A healthy diet includes about 60 grams of protein each day (3-4 servings of dairy, 2-3 servings of meat/fish/poultry/nuts), 4-6 servings of whole grain foods, and 5-6 servings of fruits and vegetables. Remember to drink 6-8 glasses of water daily.    Watch for warning signs, such as     vaginal bleeding    fluid leaking from your vagina    severe abdominal pain    nausea and vomiting more than 4-5 times a day, or if you are unable to keep anything down    fever more than 100.4 degrees F.     Contact your midwife or physician at [facility]/OB at Phone: [phone] if you have these or any  other concerns. If it's after clinic hours, physician patients should call the [facility] at [phone] ([phone]); midwife patients should call their answering service at [phone].    How can you care for yourself at home?   You can refer to the Starting Out Right book or find it online at [email or web address]/stories/[email or web address]-[email or web address]-Right.pdf or [email or web address]/images/stories/flipbooks/[email or web address]-starting-out-right/[facility]-starting-out-right.html#p=8  You can sign up for a weekly parenting e-mail that gives support, tips and advice from health care professionals that starts with pregnancy and continues through the toddler years. To register, go to [email or web address] at any time during your pregnancy.

## 2021-06-10 NOTE — TELEPHONE ENCOUNTER
Medication Request  Medication name: The patient states something for sleep  Requested [facility]acy: [facility]  Reason for request: The patient states she has not slept for 4 nights.The patient states she was prescribed Unisom per Dr. Penaloza which is not helping for sleep.  When did you use medication last?:  NA  Patient offered appointment:  N/A - electronic request  Okay to leave a detailed message: yes

## 2021-06-11 NOTE — TELEPHONE ENCOUNTER
Pt is calling in to get message left by the clinic earlier. Message was relayed to patient. Pt had no further questions.    Lencho Mcclain RN [facility] Triage/Medication Refill

## 2021-06-11 NOTE — PROGRESS NOTES
SUBJECTIVE:  Lorena Echevarria is a 25 y.o. female.  She is not sleeping well, is fairly tired with this. She is still not very hungry.   Chief Complaint   Patient presents with     Routine Prenatal Visit     14 weeks, 5 days. Patient has been feeling more fatigued and having a lack of appetite.        OBJECTIVE:   /60 (Patient Site: Right Arm, Patient Position: Sitting, Cuff Size: Adult Large)   Wt (!) 279 lb 14.4 oz (127 kg)   LMP 2020 (Approximate)   Breastfeeding No   BMI 45.18 kg/m     Please see prenatal data for fundal height and fetal HR.     ASSESSMENT/PLAN:   Lorena Echevarria is a 25 y.o. female  who presents for prenatal check. Patient is doing well.    Fetal survey scheduled for the patient. She is otherwise doing well and will follow up in 4 weeks.

## 2021-06-11 NOTE — PATIENT INSTRUCTIONS - HE
"  Patient Education   HEALTHY PREGNANCY CARE: 14 to 18 WEEKS PREGNANT    During this time, you may start to \"show,\" so that you look pregnant to people around you. You may also notice some changes in your skin, such as an increase in acne on your face. You may notice your heart pounding, a sharp stretching ache on either side of your lower abdomen (round ligament), and more vaginal discharge.     Your baby's nerves and muscles are maturing. Sex organs are recognizable. Your baby is now able to pass urine, and your baby's first stool (meconium) is starting to collect in his or her intestines. Hair is also beginning to grow on your baby's head. Your baby is moving freely inside your uterus but you may not be able to feel it until 18-20 weeks.    Continue making healthy choices for your baby during pregnancy, including good nutrition, exercise and a safe environment free from smoking, alcohol and drugs.    Your genetic screening with a quad screen blood test may have been done today.    Watch for warning signs and contact your midwife or physician at the clinic with any concerns at [facility]/OB at Phone: [phone]. For example, call about cramping, bleeding, abdominal pain, watery vaginal discharge, or if you are unable to keep fluids down for more than 24 hours due to vomiting.   If it's after clinic hours, physician patients should call the [facility] at [phone] ([phone]); midwife patients should call their answering service at [phone].    How can you care for yourself at home?   You can refer to the Starting Out Right book or find it online at http://[email or web address]/images/stories/maternity/[facility]-[email or web address]-Right.pdf or http://www.[facility].org/images/stories/flipbooks/[facility]-starting-out-right/[facility]-starting-out-right.html#p=8     You can sign up for a weekly parenting e-mail that gives support, tips and advice from health care professionals that starts " with pregnancy and continues through the toddler years. To register, go to [email or web address] at any time during your pregnancy.

## 2021-06-11 NOTE — TELEPHONE ENCOUNTER
Please call patient, I left message for her to call back at 2:15 on 9/3.     We forgot to talk about the quad screen when she was in last. This is best done in the 16-18 week marks, so starting 9/10-09/23. If she wants to do this it would be a lab only visit, and I can get this ordered for her.

## 2021-06-11 NOTE — TELEPHONE ENCOUNTER
Patient Returning Call  Reason for call:  Call back  Information relayed to patient:  Writer relayed message to Pt: Please call patient, I left message for her to call back at 2:15 on 9/3.      We forgot to talk about the quad screen when she was in last. This is best done in the 16-18 week marks, so starting 9/10-09/23. If she wants to do this it would be a lab only visit, and I can get this ordered for her.  Pt agrees, and understands.  Writer transferred Pt to scheduling.  Patient has additional questions:  No  If YES, what are your questions/concerns:  N/A  Okay to leave a detailed message?: No call back needed

## 2021-06-11 NOTE — TELEPHONE ENCOUNTER
----- Message from Linda Penaloza MD sent at 9/10/2020  5:10 PM CDT -----  Call patient to notify her that baby generally looked good on ultrasound. We'll need to repeat this in about one month to look closer at a few structures that they couldn't see. Her placenta is also close to the cervical opening so if she has any bleeding she needs to let me know.

## 2021-06-12 NOTE — TELEPHONE ENCOUNTER
----- Message from Linda Penaloza MD sent at 10/7/2020  9:12 AM CDT -----  Call patient to notify her of normal quad screening testing looking for signs of Down's syndrome. She did miss her last appointment, can we help get her set up with her next one?

## 2021-06-12 NOTE — PROGRESS NOTES
SUBJECTIVE:  Lorena Echevarria is a 25 y.o. female.  She does feel much better now after the antibiotics, the back pain and cramping is now better. She does note that she has less discharge as well. She is still sleeping on her stomach as well, wondering if baby is going to be small. She is feeling baby move on a regular basis. She does not have any headaches. Does have some ruq pain at times, can be shooting, happens more with walking and then settles down. She does have swelling at times in her feet, after work. She does have some blurry vision at times, sometimes her eyes hurt.   Chief Complaint   Patient presents with     Routine Prenatal Visit     22 weeks, 5 days. Patient would like to discuss getting another ultrasound done to determine the gender.        OBJECTIVE:   /72 (Patient Site: Right Arm, Patient Position: Sitting, Cuff Size: Adult Large)   Wt (!) 289 lb 1.6 oz (131.1 kg)   LMP 2020 (Approximate)   BMI 45.28 kg/m     Please see prenatal data for fundal height and fetal HR.     ASSESSMENT/PLAN:   Lorena Echevarria is a 25 y.o. female  who presents for prenatal check. Patient is doing well.   Defers flu shot  Will treat for yeast infection, does have yeast today on exam.

## 2021-06-12 NOTE — PROGRESS NOTES
[place] [facility] [place] and vaginal discharge/odor. . 20wks. Pt brought to room 2120. BP elevated, hx of HTN. Other VSS. FHT's per doppler, 145bpm. Uterus soft, non tender. Will collect UA and wet prep per protocol.

## 2021-06-12 NOTE — TELEPHONE ENCOUNTER
Called and left a voicemail for the patient asking her to call back. Please review lab results and help her schedule a 20 week ob apt with Dr. Penaloza - in clinic.

## 2021-06-12 NOTE — PROGRESS NOTES
Provider notified of lab results. Order for treatment received. Order to discharge pt home, to follow up as recommended by provider. Pt states understanding.

## 2021-06-13 NOTE — PROGRESS NOTES
SUBJECTIVE:  Lorena Echevarria is a 25 y.o. female.  She has been having some [place] at times. She can have them here and there. She is drinking well. She is eating well as well. She does feel baby move well.     She is having some issues with sleeping at night if she takes her bupropion. Even if she takes it in the morning she can still have issues sleeping at night.     She is having lower back pain again, not like before, just hard to get comfortable at times. It's not like the pain last time. She is having no white discharge. She has had no headaches, some changes in her vision. She has had some mild foot swelling, up and down. She has had no ruq pain. Baby is moving well.   Chief Complaint   Patient presents with     Routine Prenatal Visit       OBJECTIVE:   /80   Pulse (!) 103   Resp 16   Wt (!) 293 lb 9 oz (133.2 kg)   LMP 2020 (Approximate)   SpO2 98%   BMI 45.98 kg/m     Please see prenatal data for fundal height and fetal HR.     ASSESSMENT/PLAN:   Lorena Echevarria is a 25 y.o. female  who presents for prenatal check.  1 hour blood sugar, RPR and hemoglobin done today.  The patient has failed the 1 hour blood sugar test, we will order the 3-hour blood sugar test for her.  -Patient will need tetanus at her next visit as well.  In regards to her mental health she will start a low-dose of fluoxetine and wean down on her bupropion to 300 mg orally daily.  She will follow-up with me in again 2 weeks for recheck and we can consider further dose management at that time.

## 2021-06-13 NOTE — PROGRESS NOTES
"Lorena Echevarria is a 25 y.o. female who is being evaluated via a billable telephone visit.      The patient has been notified of following:     \"This telephone visit will be conducted via a call between you and your physician/provider. We have found that certain health care needs can be provided without the need for a physical exam.  This service lets us provide the care you need with a short phone conversation.  If a prescription is necessary we can send it directly to your pharmacy.  If lab work is needed we can place an order for that and you can then stop by our lab to have the test done at a later time.    Telephone visits are billed at different rates depending on your insurance coverage. During this emergency period, for some insurers they may be billed the same as an in-person visit.  Please reach out to your insurance provider with any questions.    If during the course of the call the physician/provider feels a telephone visit is not appropriate, you will not be charged for this service.\"    Patient has given verbal consent to a Telephone visit? Yes    What phone number would you like to be contacted at? [phone]  Patient would like to receive their AVS by AVS Preference: Mail a copy.    Additional provider notes:    SUBJECTIVE:  Lorena Echevarria is a 25 y.o. female.  She is still not sleeping well.She does think that this is due more to her brain going than discomfort. She denies any side effects from the fluoxetine, she does feel tired all the time. She does have constant anxiety as well. There are some days where she doesn't feel like getting out of bed or seeing anyone.     Baby is moving well. She does at times have some pain with cramping. She does think that she is andreia maybe two times a week. She has headaches maybe one time a week, was having some blurry vision now in the last few weeks. She has had some ruq pain as well, sharp pain, comes and goes. She does have some swelling on and " off.   Chief Complaint   Patient presents with     Routine Prenatal Visit       OBJECTIVE:   LMP 2020 (Approximate)    Please see prenatal data for fundal height and fetal HR.     ASSESSMENT/PLAN:   Lorena Echevarria is a 25 y.o. female  who presents for prenatal check. Patient is doing well.   Will update US as she did not get scheduled with [facility]. F/u in two weeks and will let me know sooner if she is having worsening pains.   Overdue for a 3 hour blood sugar, will do with her next visit.       Phone call duration:  10 minutes    Linda Penaloza MD

## 2021-06-13 NOTE — TELEPHONE ENCOUNTER
Pt called stated she started vomiting this morning at 1:30 am. Pt reported she vomited 5 times today with some diarrhea. Pt reported being depressed  than usual, and stated she is confused. Pt reported not urinating as often, denied abdominal pain, and stated she feels cold.     Per protocol pt was advised to be seen at the emergency department. Pt stated she doesn't have someone who can take her, but stated she does have taxi service on her insurance, and will call them.    Josue Farmer RN  [facility]  COVID 19 Nurse Triage Plan/Patient Instructions    Please be aware that novel coronavirus (COVID-19) may be circulating in the community. If you develop symptoms such as fever, cough, or SOB or if you have concerns about the presence of another infection including coronavirus (COVID-19), please contact your health care provider or visit [email or web address].     Disposition/Instructions    ED Visit recommended. Follow protocol based instructions.      Bring Your Own Device:  Please also bring your smart device(s) (smart phones, tablets, laptops) and their charging cables for your personal use and to communicate with your care team during your visit.      Thank you for taking steps to prevent the spread of this virus.  o Limit your contact with others.  o Wear a simple mask to cover your cough.  o Wash your hands well and often.    Resources    [facility]: About COVID-19: [email or web address]    CDC: What to Do If You're Sick: www.cdc.gov/coronavirus/2019-ncov/about/steps-when-sick.html    CDC: Ending Home Isolation: www.cdc.gov/coronavirus/2019-ncov/hcp/disposition-in-home-patients.html     CDC: Caring for Someone: www.cdc.gov/coronavirus/2019-ncov/if-you-are-sick/care-for-someone.html     [facility]: Interim Guidance for Hospital Discharge to Home: www.health.[facility].[place]/diseases/coronavirus/hcp/hospdischarge.pdf    [facility] clinical trials (COVID-19 research studies):  [email or web address]-clinical-trials     Below are the COVID-19 hotlines at the [place] Department of [facility] ([facility]). Interpreters are available.   o For health questions: Call 977-381-1492 or 1-608.956.6701 (7 a.m. to 7 p.m.)  o For questions about schools and childcare: Call 436-732-0461 or 1-829.379.8051 (7 a.m. to 7 p.m.)   Reason for Disposition    MILD to MODERATE vomiting (e.g., 1-5 times/day) and lasts > 48 hours (2 days)    [1] Depression AND [2] unable to do any of normal activities (e.g., self care, school, work; in comparison to baseline).    Additional Information    Negative: Shock suspected (e.g., cold/pale/clammy skin, too weak to stand, low BP, rapid pulse)    Negative: Sounds like a life-threatening emergency to the triager    Negative: SEVERE vomiting (e.g., 6 or more times/day)    Negative: MODERATE vomiting (e.g., 3 - 5 times/day) and age > 60    Negative: Vomiting contains bile (green color)    Negative: Vomiting red blood or black (coffee ground) material    Negative: Insulin-dependent diabetes and glucose > 240 mg/dL (13 mmol/L)    Negative: Recent head injury (within 3 days)    Negative: Recent abdominal injury (within 7 days)    Negative: Drinking very little and has signs of dehydration (e.g., no urine > 12 hours, very dry mouth, very lightheaded)    Negative: Constant abdominal pain lasting > 2 hours    Negative: High-risk adult (e.g., brain tumor, V-P shunt, hernia)    Negative: Severe pain in one eye    Negative: Patient sounds very sick or weak to the triager    Negative: Vomiting and abdomen looks much more swollen than usual    Negative: Fever > 103 F (39.4 C)    Negative: Fever > 101 F (38.3 C) and over 60 years of age    Negative: Fever > 100.0 F (37.8 C) and has a weak immune system (e.g., HIV positive, cancer chemo, organ transplant, splenectomy, chronic steroids)    Negative: Fever > 100.0 F (37.8 C) and bedridden (e.g., nursing home patient, stroke, chronic illness,  recovering from surgery)    Negative: Taking any of the following medications: digoxin (Lanoxin), lithium, theophylline, phenytoin (Dilantin)    Negative: SEVERE headache and vomiting    Negative: Patient attempted suicide    Negative: Patient is threatening suicide now    Negative: Violent behavior, or threatening to physically hurt or kill someone    Negative: [1] Patient is very confused (disoriented, slurred speech) AND [2] no other adult (e.g., friend or family member) available    Negative: [1] Difficult to awaken or acting very confused (disoriented, slurred speech) AND [2] new onset    Negative: Sounds like a life-threatening emergency to the triager    Negative: Alcohol use, abuse or dependence, question or problem related to    Negative: Drug abuse or dependence, question or problem related to    Negative: Bipolar disorder (manic depression)    Negative: Depression during the postpartum period (< 1 year since delivery)    Protocols used: VOMITING-A-OH, DEPRESSION-A-AH

## 2021-06-14 ENCOUNTER — COMMUNICATION - HEALTHEAST (OUTPATIENT)
Dept: FAMILY MEDICINE | Facility: CLINIC | Age: 26
End: 2021-06-14

## 2021-06-14 DIAGNOSIS — Z11.1 SCREENING EXAMINATION FOR PULMONARY TUBERCULOSIS: ICD-10-CM

## 2021-06-14 NOTE — PROGRESS NOTES
"SUBJECTIVE:  Lorena Echevarria is a 25 y.o. female.  She is generally doing ok.     She generally fasting in the 70-90 range, generally one hour after fasting is in the 110 range. One number was 164 after elo donuts. She does feel better. Her urination is better, still thirsty.     She is having a lot of right hip pain as well. It's will crack at times, will go out of place as well. She does have constant pain all the time. It's harder to get out of a chair by herself as well as get dressed. She did have a client almost fall on her, she was about four months pregnant at that time but no other injury.     She has had no headaches, but has had some swelling in her legs as well as some changes in her vision. She does have some right upper tummy pain with where the baby is sitting. SHe has been having some contractions at times as well.   Chief Complaint   Patient presents with     Routine Prenatal Visit       OBJECTIVE:   /70   Pulse 91   Ht 5' 7\" (1.702 m)   Wt (!) 300 lb (136.1 kg)   LMP 2020 (Approximate)   SpO2 94%   BMI 46.99 kg/m     Please see prenatal data for fundal height and fetal HR.     ASSESSMENT/PLAN:   Lorena Echevarria is a 25 y.o. female  who presents for prenatal check. Patient is doing well.   Sugars are ok, will evaluate again next week and have her call diabetes education.   Provided an OB support belt as well to see if this will help with her hip pain, may need PT postpartum    "

## 2021-06-14 NOTE — PATIENT INSTRUCTIONS - HE
Patient Education   HEALTHY PREGNANCY CARE: 34-36 WEEKS PREGNANT    Your baby is gaining about an ounce each day, so healthy nutrition and rest are still very important. Learn about late pregnancy symptoms, such as constipation and backaches. Drinking more fluids and eating more fiber can relieve constipation. The pelvic tilt and a heating pad can ease backaches.    Continue to watch for signs and symptoms of preeclampsia:     Sudden swelling of your face, hands, or feet     New vision problems such as blurring, double vision, or flashing lights    A severe headache not relieved with acetaminophen (Tylenol)    Sharp or stabbing pain in your right or middle upper abdomen    You're almost done with your pregnancy but it's still too soon to have your baby. The goal is to have your baby after 37 weeks, so watch for signs and symptoms of premature labor:     Regular contractions. This means having about 6 or more within 1 hour, even after you have had a glass of water and are resting.     A backache that starts and stops regularly.    An increase or change in vaginal discharge, such as heavy, mucus-like, watery, or bloody discharge.     Your water breaks or leaks.    You will be tested for group B streptococcus (GBS), a type of bacteria found in 10-30% of pregnant women. A woman with GBS can pass it to her baby during delivery. Most babies who get GBS from their mothers do not have any problems, but some babies get very ill and need to be hospitalized for antibiotic treatment. Treating you with antibiotics during labor and delivery helps to prevent infection in your baby.    Review information on postpartum care that you will need after the baby is born.  Discuss your choices and plans for birth control with your midwife or physician.     Postpartum Care  During the days and weeks after the delivery of your baby (postpartum period), your body will change as it returns to its nonpregnant condition. As with pregnancy  "changes, postpartum changes are different for every woman.    Physical changes after childbirth  The changes in your body may include:    Contractions called afterpains shrink the uterus for several days after childbirth. Shrinking of the uterus to its prepregnancy size may take 6 to 8 weeks.    Sore muscles (especially in the arms, neck, or jaw) are common after childbirth. This is because of the hard work of labor and pushing your baby out. The soreness should go away in a few days.    Bleeding and vaginal discharge (lochia) may last for 2 to 8 weeks, and can come and go for about 2 months.    Vaginal soreness, including pain, discomfort, and numbness, is common after vaginal birth. Soreness may be worse if you had a perineal tear or episiotomy.    If you had a  birth (), you may have pain in your lower abdomen and may need pain medicine for 1 to 2 weeks.    Breast engorgement is common around the 3rd or 4th day after delivery, when the breasts begin to fill with milk. This can cause discomfort and swelling. If you are breast feeding, the best treatment is to feed your baby often or pump the milk out. You can also use warm compresses. If you are not breast feeding, placing ice packs on your breasts, taking a hot shower, or using warm compresses may relieve the discomfort.    Be aware of postpartum depression    \"Baby blues\" are common for the first 1 to 2 weeks after birth. You may cry or feel sad or irritable for no reason.     For some women, these feelings last longer and are more intense. This is called postpartum depression.     If your symptoms last for more than a few weeks or you feel very depressed, ask your midwife or physician for help.     Postpartum depression can be treated. Support groups and counseling can help, but sometimes medication is needed.     Discuss follow-up appointments with your midwife or physician, as well. He or she will usually want to see you 6 weeks after the " birth of your baby, sooner if you are having problems.    If you have questions about any symptoms you are experiencing or any other concerns, call your provider or their clinic staff at [facility] at Phone: [phone]. If it's after clinic hours, physician patients should call the [facility] at [phone] ([phone]); midwife patients should call their answering service at [phone].    How can you care for yourself at home?   You can refer to the Starting Out Right book or find it online at http://www.[email or web address]/images/stories/http://www.[email or web address]/images/stories/maternity/[email or web address]-[email or web address]-Right.pdf or http://www.[facility].org/images/stories/flipbooks/[facility]-starting-out-right/[facility]-starting-out-right.html#p=8     You can sign up for a weekly parenting e-mail that gives support, tips and advice from health care professionals that starts with pregnancy and continues through the toddler years. To register, go to [email or web address] at any time during your pregnancy.    Making Early Breastfeeding or Chestfeeding Work: What's Important?  Breastfeeding/chestfeeding is important!     It helps keep babies healthy.    Parents who breastfeed/chestfeed have lower risks of breast and ovarian cancer.    It's convenient: the milk is always ready and warm, and there is nothing to mix or prepare for feeding.    Formula is harder for your baby to digest.    It helps you bond with your baby and protects against postpartum depression.  Lots of early skin-to-skin contact with your baby    Place your naked baby with baby's belly against your bare chest. Cover baby's back with a blanket    Start skin-to-skin right after birth, as soon as you are ready    Skin-to-skin:  ? Helps keep baby warm  ? Improves baby's oxygen and blood sugar levels  ? Helps your uterus contract and bleed less  ? Helps baby feel calm and comforted  ? Helps you feel close to baby  ? Helps get  "breastfeeding started. Being close makes latching on easier, and baby may move over to the nipple and latch without help  ? Baby breastfeeds better and longer when skin-to-skin  Placing baby well for good attachment to the breast or chest    Hold your baby close with baby's tummy touching your tummy.    Wait for baby to open mouth wide, then bring baby onto the breast/chest.    Baby should take a big mouthful of breast/chest, not just the nipple. This helps baby get more milk, and the suckling should feel comfortable.    When baby is latched well to the breast/chest, nipples aren't cracked or painful.  Keeping baby near (called \"rooming-in\" at the hospital)    Baby sleeps better and cries less when birth parent is near. Your room is quiet.    We will place your baby safely on their back in their bassinette. This lets you practice safe sleep for your baby while keeping them at your bedside.    Baby feeds more often, which means your milk supply increases faster, and your baby loses less weight.    Parents have an easier time getting to know and bonding with baby.    Parents feel much more confident about baby care and breastfeeding/chestfeeding.    Maternity staff can help at any time.  Feeding on cue    Feeding on cue simply means feeding whenever your baby shows signs of hunger    Crying is a late hunger sign. Feed baby whenever baby wants for as long as baby wants.    Feeding signs: mouth movements, sticking the tongue out, rooting (baby turns toward chest and may open mouth), hand-to-mouth movements    Advantages of feeding on cue:  ? Frequent breastfeeding/chestfeeding in the first weeks after birth gives you a good milk supply for months to come.  ? Babies settle into a relaxing feed more quickly. Babies enjoy feedings more when they don't have to cry to be fed.  ? Feeding is comfort as well as nutrition. Newborns love constant closeness and feeding and can't be held \"too much\" or \"spoiled.\"  ? Newborns need " "small frequent feedings in the first days of life. Just one to three teaspoons fill a new baby's stomach.  ? Responding to feeding cues helps babies gain weight.  Feeding only human milk in the first six months    Colostrum is the first milk that baby gets at birth. The amount of colostrum matches the baby's stomach, so it will not be overfilled.    The small volumes ready at birth are also easier for baby to handle.    All babies lose weight in the first few days. This is simply \"water weight.\"    Introducing food or fluids other than human milk too early can cause problems for breastfeeding/chestfeeding and for your baby's health.    Feeding only human milk maximizes the protection against disease and infections.    Your body knows how much milk to make by how often your baby feeds. If you give your baby formula, your body may not know how much milk to make.    For informational purposes only. Not to replace the advice of your health care provider. Adapted with permission from \"Getting Started with Infant Care and Breastfeeding,\" by RENATO Chen, MAKAYLA, Lorraine Copeland, RN, IBCLC, Shelia Dominguez MD, MAKAYLA, and Sheila Haro MD, IBCLC. [place] [employer]feeding [employer], 2017. Clinically reviewed by [facility]. [employer] [id number] - 05/19.        [facility] Breastfeeding Resources       Research shows that human milk offers the best  nutrition and protection for babies. So at [facility],  we care for families and babies in a way that  promotes, teaches and supports lactation. We  support all breastfeeding, chestfeeding and human  milk feeding families, as well as families who can t  breastfeed / chestfeed or who choose not to do so.  A mother or caregiver s own milk is best for a baby,  but if you are unable to breastfeed / chestfeed, we  may suggest pasteurized donor human milk for your  baby while in the hospital.    Lactation support    The following [facility]-affiliated locations offer  individual " outpatient lactation consults. Some  locations offer phone or group lactation consults  with certified lactation consultants. Call to confirm  services and for information and appointments. You  may wish to call your insurance company first to see  if they will cover the cost of a consult.    [facility]: [phone]    [facility]: [phone]    [facility]: [phone]  Offers [place] First Days program, which includes  group lactation visits and one free information session  about delivering your baby at a designated [facility] hospital and the importance and benefits  of breastfeeding and human milk. These group visits  also help patients with feeding concerns and offer  information about other postpartum topics.                For informational purposes only. Not to replace the advice of your health care  provider. Copyright   2005 [facility]. All rights reserved. [employer] 444006 - REV .   [facility] ([place]):  [phone]    [facility]):  [phone] or [phone]    [facility]):  [phone]    [facility]  ([place]): [phone]    [facility] ([place]):  [phone] or [phone]  Includes follow-up visits for caregivers of babies  discharged from the  intensive care unit  (NICU) at [facility].    [facility]):  [phone]    [facility] ([facility],  [facility], primary care clinics):  [phone]  Also offers weight checks, feeding discussions and support with a lactation consultant as a part of free, weekly support group.    [facility] [place]: [phone]  Also offers a free weekly support group.                                                                                                                                                                                                       (continued)   You may also call [facility] at [phone]  for information about [facility] and [facility]  locations that offer lactation support. For information  about breastfeeding / chestfeeding and childbirth  classes in the [facility], go to [facility] at [email or web address] For [facility], go to [email or web address] and click on  Classes and Events at the bottom of the page. Or, call  [phone].    Supplies    You can get breast pumps from the Birth[facility] nurses  at [facility] or [facility]  nurses at [facility]. Call your health  insurance to see if they will cover the cost of the  pump. Tell your nurse you d like a pump. They will  help you fill out the right paperwork. The pump will  be ready for you when you leave the hospital.    If you decide to get a pump after you leave the  hospital, you can get one from our partners at  [facility]. [facility] carries a range of  feeding supplies and pumps. Please call your health  insurance to see if they will cover the cost of the  pump. Then call [facility]  to find out what supplies and pumps are available.  Some stores may deliver the pump to your home.    [facility]:  [email or web address] Other lactation services    [facility]: [phone] (24 hours a day)  www.[email or web address]  Offers support for breastfeeding / chestfeeding and  human milk feeding families. Call to find a group in  your area.    [facility]:  [phone]  www.womenshealth.gov/breastfeeding  Offers a breastfeeding / chestfeeding information  line in English and Vincentian.    WIC (Women, Infants and Children) Program:  [phone]  Offers breastfeeding / chestfeeding counseling. Call  to find an office near  you.    Milk banking    [facility]  [email or web address]  [phone]  Consider freezing your extra collected milk to donate  to babies in need. Email or call for information.                           If you are deaf or hard of hearing, please let us know. We provide many free services including  sign language interpreters, oral interpreters, TTYs, telephone amplifiers, note takers and written materials.

## 2021-06-14 NOTE — TELEPHONE ENCOUNTER
RN cannot approve Refill Request    RN can NOT refill this medication No established PCP. Last office visit: 7/10/2020 Linda Penaloza MD Last Physical: Visit date not found Last MTM visit: Visit date not found Last visit same specialty: 7/10/2020 Linda Penaloza MD.  Next visit within 3 mo: Visit date not found  Next physical within 3 mo: Visit date not found      Belinda Chanel, [facility] Triage/Med Refill 12/26/2020    Requested Prescriptions   Pending Prescriptions Disp Refills     buPROPion (WELLBUTRIN XL) 150 MG 24 hr tablet [Pharmacy Med Name: BUPROPION XL 150MG TABLETS (24 H)] 14 tablet 0     Sig: TAKE 1 TABLET(150 MG) BY MOUTH DAILY FOR 14 DAYS AFTER YOU COMPLETED THE 300MG PRESCRIPTION       Tricyclics/Misc Antidepressant/Antianxiety Meds Refill Protocol Passed - 12/24/2020  3:56 AM        Passed - PCP or prescribing provider visit in last year     Last office visit with prescriber/PCP: 7/10/2020 Linda Penaloza MD OR same dept: 7/10/2020 Linda Penaloza MD OR same specialty: 7/10/2020 Linda Penaloza MD  Last physical: Visit date not found Last MTM visit: Visit date not found   Next visit within 3 mo: Visit date not found  Next physical within 3 mo: Visit date not found  Prescriber OR PCP: Linda Penaloza MD  Last diagnosis associated with med order: 1. Current moderate episode of major depressive disorder, unspecified whether recurrent (H)  - buPROPion (WELLBUTRIN XL) 150 MG 24 hr tablet [Pharmacy Med Name: BUPROPION XL 150MG TABLETS (24 H)]; TAKE 1 TABLET(150 MG) BY MOUTH DAILY FOR 14 DAYS AFTER YOU COMPLETED THE 300MG PRESCRIPTION  Dispense: 14 tablet; Refill: 0    If protocol passes may refill for 12 months if within 3 months of last provider visit (or a total of 15 months).

## 2021-06-14 NOTE — TELEPHONE ENCOUNTER
Who is calling:  Donald RN nurse from [facility]   Reason for Call:  RN stated that Boyfriend called in and said that patient is not feeling well. Blood sugar  Date of last appointment with primary care: n/a  Okay to leave a detailed message: Yes    RN gave me Boyfriends number which is [phone]

## 2021-06-14 NOTE — PATIENT INSTRUCTIONS - HE
fasting blood glucose concentration: <95 mg/dL   ?One-hour after eating blood glucose concentration: <140 mg/dL     Check 4 times daily for now, in the morning fasting, and then 1 hour after meals.

## 2021-06-14 NOTE — PATIENT INSTRUCTIONS - HE
Please plan on calling the hospital on Saturday 2/6/21 at 0600, [phone], plan on arrival at 0700.

## 2021-06-14 NOTE — TELEPHONE ENCOUNTER
Last Med Check:   12/22/2020  Next med check due on: ?    CSA on File: N/A    Future Appointment Scheduled ? 01/15/2021    Last Med Refill? 11/23/2020    Tyler Rai LPN

## 2021-06-14 NOTE — TELEPHONE ENCOUNTER
RN cannot approve Refill Request    RN can NOT refill this medication historical medication requested. Last office visit: 7/10/2020 Linda Penaloza MD Last Physical: Visit date not found Last MTM visit: Visit date not found Last visit same specialty: 7/10/2020 Linda Penaloza MD.  Next visit within 3 mo: Visit date not found  Next physical within 3 mo: Visit date not found      Meagan Israel, [facility] Triage/Med Refill 12/25/2020    Requested Prescriptions   Pending Prescriptions Disp Refills     FLUoxetine (PROZAC) 10 MG capsule [Pharmacy Med Name: FLUOXETINE 10MG CAPSULES] 14 capsule 0     Sig: TAKE 1 CAPSULE(10 MG) BY MOUTH DAILY FOR FIRST 14 DAYS       SSRI Refill Protocol  Passed - 12/23/2020  3:55 AM        Passed - PCP or prescribing provider visit in last year     Last office visit with prescriber/PCP: 7/10/2020 Linda Penaloza MD OR same dept: 7/10/2020 Linda Penaloza MD OR same specialty: 7/10/2020 Linda Penaloza MD  Last physical: Visit date not found Last MTM visit: Visit date not found   Next visit within 3 mo: Visit date not found  Next physical within 3 mo: Visit date not found  Prescriber OR PCP: Linda Penaloza MD  Last diagnosis associated with med order: 1. Current moderate episode of major depressive disorder, unspecified whether recurrent (H)  - FLUoxetine (PROZAC) 10 MG capsule [Pharmacy Med Name: FLUOXETINE 10MG CAPSULES]; TAKE 1 CAPSULE(10 MG) BY MOUTH DAILY FOR FIRST 14 DAYS  Dispense: 14 capsule; Refill: 0    If protocol passes may refill for 12 months if within 3 months of last provider visit (or a total of 15 months).

## 2021-06-14 NOTE — PROGRESS NOTES
SUBJECTIVE:  Lorena Echevarria is a 25 y.o. female.      She is having some more hip pain and back pain. She is dizzy and light headed at times and off balance.     She has had some increasing cramping/contractions in last few days. She is drinking quite a bit. She is having some increased swelling in her legs and feet. She does not sleep super well all the time.     She has had no headaches. She does get light headed at times, will have blurry vision at times as well. No ruq pain. She does have some pulling in her lower stomach and then cramping in her lower stomach and upper stomach.     Baby is moving ok as well. Mood is down, missing pills at times.     Chief Complaint   Patient presents with     Routine Prenatal Visit       OBJECTIVE:   /80   Pulse (!) 110   Wt (!) 301 lb (136.5 kg)   LMP 2020 (Approximate)   SpO2 97%   BMI 47.14 kg/m     Please see prenatal data for fundal height and fetal HR.     ASSESSMENT/PLAN:   Lorena Echevarria is a 25 y.o. female  who presents for prenatal check.  New diagnosis of gestational diabetes. Discussed checking sugars, set up with prescription for meter, strips and lancets and referred to diabetes education. She will likely need insulin, will see what her sugars look like.   In regards to her mood, will work on getting her medication in consistently and if not improving we can increase her dose some.

## 2021-06-14 NOTE — PROGRESS NOTES
SUBJECTIVE:  Lorena Echevarria is a 25 y.o. female.  She states that she is doing ok. She did fall 3 times last week. Two times her hip gave out on her and then one time it popped.     She did have a blood sugar of 160 this week as well. She does have a lot of pressure, does have increasing fatigue as well. She is not peeing as much as she did.   Chief Complaint   Patient presents with     Routine Prenatal Visit     blood sugars are off , high and lows, hip pain in both hips gives out and pt goes down.         OBJECTIVE:   /72   Pulse (!) 112   Wt (!) 302 lb (137 kg)   LMP 2020 (Approximate)   SpO2 97%   BMI 47.30 kg/m     Please see prenatal data for fundal height and fetal HR.     ASSESSMENT/PLAN:   Lorena Echevarria is a 25 y.o. female  who presents for prenatal check. Patient is doing well.   NST normal today. Will update BPP   as she is starting insulin will need weekly monitoring and consider IOL at 37-39 weeks.   Given elevated blood sugars although normal in the morning will start meal time insulin, 2 units with meals for now, f/u in 3 days and if not improving can increase further from there.   Starting Valtrex as well, patient does not think that she can reliably get in the three times daily acyclovir.

## 2021-06-14 NOTE — TELEPHONE ENCOUNTER
Spoke with Ericksonmalvin, pt's boyfriend. He reports that her blood sugar was 185 on 1/6/21 all day. Before bed 80.  182 today. Pt at work. Ericksonmalvin was told to have patient call and speak with one of our triage nurses.  He reports her feeling lightheaded and dizzy.

## 2021-06-14 NOTE — PROGRESS NOTES
SUBJECTIVE:  Lorena Echevarria is a 25 y.o. female.  She comes in today for her follow up.     She is now doing 4 units with each meal and is getting numbers in the 90s in the morning. After eating, one hour they are 150-160, can be up to 170. She is limiting eating otherwise due to her blood sugar going up. She is otherwise feeling poorly, has no energy. She doesn't know what temperature she wants to be. She is getting hot flashes as well.     She denies any headaches, acute changes in her vision, right upper tummy pain.  She has minimal lower extremity swelling    Chief Complaint   Patient presents with     Routine Prenatal Visit     36 weeks, 6 days.        OBJECTIVE:   /72 (Patient Site: Left Arm, Patient Position: Sitting, Cuff Size: Adult Large)   Wt (!) 307 lb 1.6 oz (139.3 kg)   LMP 2020 (Approximate)   Breastfeeding No   BMI 48.10 kg/m     Please see prenatal data for fundal height and fetal HR.     ASSESSMENT/PLAN:   Lorena Echevarria is a 25 y.o. female  who presents for prenatal check.   Given her poorly controlled gestational diabetes, as well as her low baby that is large for gestational age we did discuss risks and benefits of induction of labor after 37 weeks.  She is agreeable to this.  The hospital is full at this time from an induction perspective tomorrow which is when she is 37 weeks and the day after at 37 and 1.  She is set up for induction on  at this time which will be 37 weeks and 2 days.  In the meantime she will increase her NovoLog to 6 units of insulin with meals.  She will need group B strep prophylaxis with clindamycin unfortunately given her history of rash with penicillin.  She did have slight proteinuria on her urinalysis today and given this as well as her obesity and gestational diabetes I did update her preeclampsia labs as well.

## 2021-06-14 NOTE — TELEPHONE ENCOUNTER
Upcoming Appointment Question  When is the appointment: Tuesday January 26th at 3:20 pm  What is your appointment for?: OB Check  Who is your appointment scheduled with?: Dr. Penaloza  What is your question/concern?: patient would like to change this from a virtual visit to an in person visit- central scheduling unable to override the hold spot in order to do this  Okay to leave a detailed message?: Yes

## 2021-06-14 NOTE — PROGRESS NOTES
SUBJECTIVE:  Lorena Echevarria is a 25 y.o. female.  She has been doing well with her sugars. Fasting have all been under 95, after eating she is having numbers in the 120 range.     She is feeling well otherwise other than feeling tired. The compression didn't help with her symptoms. She did have similar issues with her daughter, and was having some issues falling down after 36 weeks.     Chief Complaint   Patient presents with     Routine Prenatal Visit     34 weeks 5 days- hip pain          OBJECTIVE:   Wt (!) 300 lb 9 oz (136.3 kg)   LMP 2020 (Approximate)   SpO2 98%   BMI 47.07 kg/m     Please see prenatal data for fundal height and fetal HR.     ASSESSMENT/PLAN:   Lorena Echevarria is a 25 y.o. female  who presents for prenatal check. Patient is doing well.   Normal blood sugars at this time. Growth US set up for 36 weeks. If she remains well controlled then we can continue with expectant management.

## 2021-06-15 ENCOUNTER — AMBULATORY - HEALTHEAST (OUTPATIENT)
Dept: NURSING | Facility: CLINIC | Age: 26
End: 2021-06-15

## 2021-06-15 DIAGNOSIS — Z11.1 SCREENING EXAMINATION FOR PULMONARY TUBERCULOSIS: ICD-10-CM

## 2021-06-15 NOTE — TELEPHONE ENCOUNTER
Ibuprofen is not covered by insurance.     Alternatives: Diclofenac, indomethacin, Ketorolactromethamine, Meloxicam,Nabumetone, Naproxen.

## 2021-06-15 NOTE — ANESTHESIA POSTPROCEDURE EVALUATION
Patient: Lorena Echevarria  * No procedures listed *  Anesthesia type: epidural    Patient location: PACU  Last vitals: No vitals data found for the desired time range.    Post vital signs: stable  Level of consciousness: awake and responds to simple questions  Post-anesthesia pain: pain controlled  Post-anesthesia nausea and vomiting: no  Pulmonary: unassisted, return to baseline  Cardiovascular: stable and blood pressure at baseline  Hydration: adequate  Anesthetic events: no    QCDR Measures:  ASA# 11 - Penny-op Cardiac Arrest: ASA11B - Patient did NOT experience unanticipated cardiac arrest  ASA# 12 - Penny-op Mortality Rate: ASA12B - Patient did NOT die  ASA# 13 - PACU Re-Intubation Rate: ASA13B - Patient did NOT require a new airway mgmt  ASA# 10 - Composite Anes Safety: ASA10A - No serious adverse event    Additional Notes:

## 2021-06-15 NOTE — TELEPHONE ENCOUNTER
Please call patient, the ibuprofen is not covered by her insurance, is she ok getting OTC and taking four at a time?

## 2021-06-15 NOTE — ANESTHESIA PROCEDURE NOTES
Epidural Block    Patient location during procedure: OB    Reason for Block:labor epidural  Staffing:  Performing  Anesthesiologist: Martine Kay MD  Preanesthetic Checklist  Completed: patient identified, risks, benefits, and alternatives discussed, timeout performed, consent obtained, at patient's request, airway assessed, oxygen available, suction available, emergency drugs available and hand hygiene performed  Procedure  Patient position: sitting  Prep: ChloraPrep  Patient monitoring: continuous pulse oximetry and blood pressure  Approach: midline  Location: L1-L2  Injection technique: VAHID saline  Number of Attempts:5  Needle  Needle type: Tuohy   Needle gauge: 18 G     Catheter in Space: 4  Assessment  Sensory level:  No complications      Additional Notes:  Initial epidural provided no analgesia and dressing was saturated, so catheter was removed. Multiple attempts at replacing epidural.  Unable to palpate landmarks and feedback from the patient is that needle was always felt midline.  Ultimately, longer, 6 inch Tuohy needle used.

## 2021-06-15 NOTE — TELEPHONE ENCOUNTER
RN cannot approve Refill Request    RN can NOT refill this medication med is not covered by policy/route to provider. Last office visit: Visit date not found Last Physical: Visit date not found Last MTM visit: Visit date not found Last visit same specialty: 7/10/2020 Linda Penaloza MD.  Next visit within 3 mo: Visit date not found  Next physical within 3 mo: Visit date not found      Meagan Israel, [facility] Triage/Med Refill 2/19/2021    Requested Prescriptions   Pending Prescriptions Disp Refills     ibuprofen (ADVIL,MOTRIN) 800 MG tablet [Pharmacy Med Name: IBUPROFEN 800MG TABLETS] 30 tablet 0     Sig: TAKE 1 TABLET(800 MG) BY MOUTH EVERY 6 HOURS AS NEEDED FOR PAIN       There is no refill protocol information for this order

## 2021-06-15 NOTE — TELEPHONE ENCOUNTER
----- Message from Linda Penaloza MD sent at 2/4/2021  3:18 PM CST -----  Call patient to notify her that her labs were normal in clinic yesterday and her covid swab was negative as well which is great news.

## 2021-06-15 NOTE — PROGRESS NOTES
"    Assessment & Plan     Current moderate episode of major depressive disorder, unspecified whether recurrent (H)  -Exacerbation of symptoms since delivery.  This is not unexpected.  She is taking her medication as prescribed, given this we will refer her to therapy as I do think that she has had some trauma in her past based on her anxiety surrounding vaginal exams.  In the meantime we will increase her fluoxetine to 40 mg orally daily and she will follow-up with me in approximately 1 month for her postpartum check, sooner as needed.  She will be here with her daughters 1 month check in 2 weeks as well.  - FLUoxetine (PROZAC) 40 MG capsule  Dispense: 30 capsule; Refill: 2  - AMB REFERRAL TO MENTAL HEALTH AND ADDICTION  - Adult (18+); Outpatient Treatment; Individual/Couples/Family/Group Therapy/Health Psychology; [facility]; [facility]; We will contact you to schedule the appointment or please c...     (spontaneous vaginal delivery)  -Continues to have a fair monitor cramping following delivery, renewal of ibuprofen today.  - ibuprofen (ADVIL,MOTRIN) 800 MG tablet  Dispense: 30 tablet; Refill: 0             Tobacco Cessation:   reports that she has been smoking cigarettes. She has been smoking about 0.10 packs per day. She has never used smokeless tobacco.  I have counseled the patient for tobacco cessation and the follow up will occur  at the next visit.  BMI:   Estimated body mass index is 45.37 kg/m  as calculated from the following:    Height as of 21: 5' 7\" (1.702 m).    Weight as of this encounter: 289 lb 11.2 oz (131.4 kg).   The following high BMI interventions were performed this visit: weight monitoring    Depression Screening Follow Up    PHQ 2021   PHQ-9 Total Score 16   Q9: Thoughts of better off dead/self-harm past 2 weeks 0       Follow Up Actions Taken  Mental Health Referral placed   Return in about 6 weeks (around 2021) for postpartum already " scheduled.    Linda Penaloza MD  [facility]   Lorena Echevarria is 25 y.o. and presents today for the following health issues   HPI   She comes in today for follow up. She is still taking the ibuprofen and vicodin. Is having more severe like period cramping. She is taking the vicodin 1-2 times a day.     She is here mainly to talk about mental health. Her mental health is getting worse since delivery. More nightmares now as well.  They do have her on a waiting list at [facility] and [facility] for evaluation.  She has a longstanding history of mental health issues.  She finds that she is crying every night.      Review of Systems  Per above      Objective    /80 (Patient Site: Right Arm, Patient Position: Sitting, Cuff Size: Adult Large)   Pulse 66   Wt (!) 289 lb 11.2 oz (131.4 kg)   LMP 05/06/2020 (Approximate)   SpO2 98%   Breastfeeding Yes   BMI 45.37 kg/m    Body mass index is 45.37 kg/m .  Physical Exam  GENERAL APPEARANCE: A&A, NAD, well hydrated, well nourished  SKIN:  Normal skin turgor, no lesions/rashes   ABDOMEN: Soft, mildly tender in the right lower quadrant but improved from last week, no guarding or rebound  EXTREMITY: Trace edema, significantly improved from last week  NEURO: no gross deficits   PSYCH: Affect is flat but stable, she is casually dressed and groomed, eye contact is fair, speech pattern is normal, smiles appropriately

## 2021-06-15 NOTE — ANESTHESIA PREPROCEDURE EVALUATION
Anesthesia Evaluation      Patient summary reviewed     Airway   Mallampati: II   Pulmonary                           Cardiovascular - negative ROS   Neuro/Psych - negative ROS     Endo/Other    (+) diabetes mellitus, obesity,      Comments: Gestational DM, BMI 48    GI/Hepatic/Renal - negative ROS           Dental                         Anesthesia Plan  Planned anesthetic: epidural  LABOR EPIDURAL  ASA 3     Anesthetic plan and risks discussed with: patient    Post-op plan: routine recovery    Patient requests labor analgesia.  Patient identified.  Medical history reviewed; lab results and allergies reviewed.  Patient interviewed and examined.  Risks(including headache, back pain, low blood pressure, high block and related complications, failed block and/or inadequate analgesia, infection, bleeding and nerve injury), alternatives and procedure discussed and questions answered. Patient gives consent for epidural.  Correct patient, procedure/site verified.Hands washed, sterile gloves and mask worn.

## 2021-06-15 NOTE — ANESTHESIA PROCEDURE NOTES
Epidural Block    Patient location during procedure: OB  Time Called: 2/8/2021 6:22 PM  Reason for Block:labor epidural  Staffing:  Performing  Anesthesiologist: Martine Kay MD  Preanesthetic Checklist  Completed: patient identified, risks, benefits, and alternatives discussed, timeout performed, consent obtained, at patient's request, airway assessed, oxygen available, suction available, emergency drugs available and hand hygiene performed  Procedure  Patient position: sitting  Prep: ChloraPrep  Patient monitoring: continuous pulse oximetry and blood pressure  Approach: midline  Location: L2-L3  Injection technique: VAHID saline  Number of Attempts:1  Needle  Needle type: Quintin   Needle gauge: 18 G     Catheter in Space: 4  Assessment  Sensory level:  No complications

## 2021-06-16 NOTE — TELEPHONE ENCOUNTER
insulin aspart U-100 (NOVOLOG FLEXPEN U-100 INSULIN) 100 unit/mL (3 mL) injection pen [[id number]]    Electronically signed by: Linda Penaloza MD on 01/26/21 1655 Status: Discontinued   Ordering user: Linda Penaloza MD 01/26/21 1655 Authorized by: Linda Penaloza MD   Frequency:  01/26/21 - 02/11/21  Discontinued by: Jevon Thomas [facility] 02/11/21 1216 [Stop Taking at Discharge]   Diagnoses   Gestational diabetes mellitus (GDM), antepartum, gestational diabetes method of control unspecified [O24.419]

## 2021-06-17 ENCOUNTER — AMBULATORY - HEALTHEAST (OUTPATIENT)
Dept: NURSING | Facility: CLINIC | Age: 26
End: 2021-06-17

## 2021-06-17 LAB
INDURATION - HISTORICAL: 0 MM
TB SKIN TEST - HISTORICAL: NEGATIVE

## 2021-06-17 NOTE — TELEPHONE ENCOUNTER
Spoke with patient and relayed Dr. Penaloza's message. Understands and will take otc ibuprofen.    Anh Carbajal, CMA

## 2021-06-17 NOTE — PROGRESS NOTES
Lorena Echevarria is a 25 y.o. female who is being evaluated via a billable telephone visit.      What phone number would you like to be contacted at? Per chart  How would you like to obtain your AVS? AVS Preference: Mail a copy.    Assessment & Plan     Current moderate episode of major depressive disorder, unspecified whether recurrent (H)  -Slight improvement but only slight.  We will increase bupropion to 300 mg orally daily and have her follow-up with me in 3 to 4 weeks for recheck.  She will call sooner if issues.  - buPROPion (WELLBUTRIN XL) 300 MG 24 hr tablet  Dispense: 30 tablet; Refill: 1    Irregular periods  -Discussed that this is very normal given the fact that she is still lactating.  Continue to monitor for now and certainly could entertain the idea of doing a pregnancy test when she comes in if needed.           No follow-ups on file.    Linda Penaloza MD  [facility]   Lorena Echevarria is 25 y.o. and presents today for the following health issues   HPI   She is feeling only a little better right now on the bupropion. She was on 450 mg previously. She has been on the 300 mg previously. She is maybe bonding a bit better but not really. No obvious side effects.     She has had no period since having her baby. She does have cramping now though. She is breast feeding 2-3 times a day. Before she got pregnant they were regular.       Review of Systems    Pre above      Objective       Vitals:  No vitals were obtained today due to virtual visit.    Physical Exam  None done, phone visit.          Phone call duration: 9  minutes

## 2021-06-17 NOTE — TELEPHONE ENCOUNTER
Please let the patient know that her insurance doesn't cover the ibuprofen unfortunately which is the best medication to take while breast feeding. She can take up to 4 of the over the counter at a time (800 mg total), three times a day

## 2021-06-17 NOTE — TELEPHONE ENCOUNTER
Telephone Encounter by Ivet Samuel at 2020  3:01 PM     Author: Ivet Samuel Service: -- Author Type: --    Filed: 2020  3:03 PM Encounter Date: 7/15/2020 Status: Addendum    : Ivet Samuel    Related Notes: Original Note by Ivet Samuel filed at 2020  3:02 PM       No PA is needed, supplement is covered under plan.  Pharmacy however is trying to process a NDC that does not participate in the National Drug Rebate program through [employer].     Called pharmacy and made them aware the M-Vit prenatal tabs as well as the M-Nikki Plus 27-1mg tabs are also covered.  They will need to call the help desk for specific NDC information.  No further action can be done by PA team.

## 2021-06-17 NOTE — TELEPHONE ENCOUNTER
Ibuprofen prescription is no covered under pt's insurance plan.     Alternatives:  Diclofenac sodium, indomethacin, Ketorolactromethamine, Meloxicam, nabumetone, Naproxen    Please send alternative if appropriate.       Post partum visit 4/28/21 with Dr Penaloza.

## 2021-06-17 NOTE — PROGRESS NOTES
Assessment:        Lorena Echevarria is a 25 y.o. female here for postpartum exam. Pap smear not done at today's visit.   1. Postpartum care and examination     2. Current moderate episode of major depressive disorder, unspecified whether recurrent (H)  buPROPion (WELLBUTRIN XL) 150 MG 24 hr tablet   3. Chronic bilateral low back pain without sciatica  ibuprofen (ADVIL,MOTRIN) 800 MG tablet   4. Vaginal discharge  Wet Prep, Vaginal    fluconazole (DIFLUCAN) 150 MG tablet   5. Screening examination for pulmonary tuberculosis  TB Skin Test   . .    Plan:     1. Postpartum care and examination  -Doing fairly well at this time although I suspect she has some postpartum depression right now. Will adjust medications.   -Vaginal laceration has healed although not super well approximated. Patient had refused repair of this after delivery. Anatomy is intact    2. Current moderate episode of major depressive disorder, unspecified whether recurrent (H)  -Increasing her Wellbutrin today per below, will have her follow up with me in about one month for a recheck, she will call sooner if worsening.   - buPROPion (WELLBUTRIN XL) 150 MG 24 hr tablet; Take 1 tablet (150 mg total) by mouth daily.  Dispense: 30 tablet; Refill: 1    3. Chronic bilateral low back pain without sciatica  -Renewal of her ibuprofen given her increase in back pain  - ibuprofen (ADVIL,MOTRIN) 800 MG tablet; TAKE 1 TABLET(800 MG) BY MOUTH EVERY 6 HOURS AS NEEDED FOR PAIN  Dispense: 30 tablet; Refill: 1    4. Vaginal discharge  -Obtained wet prep today showing yeast, will treat per below  - Wet Prep, Vaginal  - fluconazole (DIFLUCAN) 150 MG tablet; Take 1 tablet (150 mg total) by mouth once for 1 dose. Repeat in 3 days  Dispense: 2 tablet; Refill: 0    5. Screening examination for pulmonary tuberculosis  -needs tb testing for work, done today  - TB Skin Test       Subjective:       Lorena Echevarria is a 25 y.o. female who presents for a postpartum visit. She is 2  months postpartum following a spontaneous vaginal delivery. I have fully reviewed the prenatal and intrapartum course. The delivery was at 37-6 gestational weeks. Outcome: spontaneous vaginal delivery. Postpartum course has been complicated by mood issues. Baby's course has been uncomplicated. Baby is feeding by both breast and bottle - unsure. Bled for  4-5 weeks postpartum.  Currently bleeding  no bleeding and but has been having some cramping. Bowel function is normal. Bladder function is normal. Patient has resumed intercourse. Contraception method is condoms. Postpartum depression screening score:     EPDS Total Score: 23 (4/28/2021 10:45 AM)       She is noting that she is not sleeping well at this time. She also is feeling more irritable and is more depressed.She has done best with Wellbutrin in the past, on 450 mg.     She is having some low and mid back pain at times, it will be stabbing at times. She does note that it is hard to get comfortable as well with this. For pain she is taking tylenol.     The following portions of the patient's history were reviewed and updated as appropriate: allergies, current medications and problem list    Review of Systems  General:  Denies problem  Eyes: Denies problem  Ears/Nose/Throat: Denies problem  Cardiovascular: Denies problem  Respiratory:  Denies problem  Gastrointestinal:  Denies problem, Genitourinary: Denies problem  Musculoskeletal:  Denies problem  Skin: Denies problem  Neurologic: Denies problem  Psychiatric: Denies problem  Endocrine: Denies problem  Heme/Lymphatic: Denies problem   Allergic/Immunologic: Denies problem          Objective:         Vitals:    04/28/21 0843   BP: 116/72   Pulse: 95   SpO2: 98%   Weight: (!) 296 lb (134.3 kg)       Physical Exam:  General Appearance: Alert, cooperative, no distress, appears stated age  Head: Normocephalic, without obvious abnormality, atraumatic  Eyes: PERRL, conjunctiva/corneas clear, EOM's intact  Ears: Normal  TM's and external ear canals, both ears  Nose: Nares normal, septum midline,mucosa normal, no drainage  Throat: Lips, mucosa, and tongue normal; teeth and gums normal  Neck: Supple, symmetrical, trachea midline, no adenopathy;  thyroid: not enlarged, symmetric, no tenderness/mass/nodules; no carotid bruit or JVD  Back: Symmetric, no curvature, ROM normal, no CVA tenderness  Lungs: Clear to auscultation bilaterally, respirations unlabored  Breasts: No breast masses, tenderness, asymmetry, or nipple discharge.  Heart: Regular rate and rhythm, S1 and S2 normal, no murmur, rub, or gallop,   Abdomen: Soft, non-tender, bowel sounds active all four quadrants,  no masses, no organomegaly  Pelvic:Normally developed genitalia with no external lesions or eruptions. Vagina shows no lesions, inflammation. Some moderate white discharge present, perineal laceration is healed but with with enlargement of her introitus. No cystocele, No rectocele. Uterus deferred.   Extremities: Extremities normal, atraumatic, no cyanosis or edema  Skin: Skin color, texture, turgor normal, no rashes or lesions  Lymph nodes: Cervical, supraclavicular, and axillary nodes normal  Neurologic: Normal

## 2021-06-18 NOTE — PATIENT INSTRUCTIONS - HE
Patient Instructions by Linda Penaloza MD at 11/23/2020 10:40 AM     Author: Linda Penaloza MD Service: -- Author Type: Physician    Filed: 11/23/2020 11:12 AM Encounter Date: 11/23/2020 Status: Signed    : Linda Penaloza MD (Physician)         Patient Education   HEALTHY PREGNANCY CARE: 26-30 WEEKS PREGNANT    You are now in your last trimester of pregnancy. Your baby is growing rapidly, can open and close her eyelids, sometimes get hiccups, and you'll probably feel her moving around more often. Your baby has breathing movements and is gaining about one ounce each day. You may notice heartburn and leg cramps. Your back may ache as your body gets used to your baby's size and length.    Discuss your work situation with your midwife or physician as needed. If you stand for long periods of time, you may need to make changes and take breaks.    Pre-register online at the hospital where your baby will be born ([email or web address])     Be aware of your baby's activity level. You may be asked to do daily fetal movement counts. Contact your midwife or physician about any decreased movement.    You may have been tested for gestational diabetes today. If you are RH negative, you may have had an additional test and a Rhogam injection.    Consider receiving a Tdap vaccination to protect your baby from Pertussis/whooping cough.    If you are considering tubal ligation, discuss this with your midwife or physician. You will need to have an appointment with the obstetrician who will do the surgery to discuss the risks, benefits, and alternatives, and to sign the consent. This can be discussed at your next visit.    Continue to watch for any signs or symptoms of premature labor:     Regular contractions. This means having about 6 or more within 1 hour, even after you have had a glass of water and are resting.     A backache that starts  "and stops regularly.    An increase or change in vaginal discharge, such as heavy, mucus-like, watery, or bloody discharge.     Your water breaks or leaks.    Continue to watch for signs and symptoms of preeclampsia:     Sudden swelling of your face, hands, or feet     New vision problems such as blurring, double vision, or flashing lights    A severe headache not relieved with acetaminophen (Tylenol)    Sharp or stabbing pain in your right or middle upper abdomen    If you have any of the above symptoms or any other concerns, call your midwife or physician or their clinic staff at [facility] at Phone: [phone]. If it's after clinic hours, physician patients should call the [facility] at [phone] ([phone]); midwife patients should call their answering service at [phone].    How can you care for yourself at home?   You can refer to the Starting Out Right book or find it online at [email or web address] or [email or web address]     You can sign up for a weekly parenting e-mail that gives support, tips and advice from health care professionals that starts with pregnancy and continues through the toddler years. To register, go to [email or web address] at any time during your pregnancy.    BREASTFEEDING TIPS FOR NEW MOMS     Importance of skin-to-skin contact:  ? Gets breastfeeding off to a good start  ? Keeps baby warm and is great for bonding  ? Provides calming effects and helps to stabilize breathing and  blood sugar  ? Helps the uterus to contract and bleed less    Importance of feeding whenever baby shows signs of  being hungry, \"feeding on cue\":  ? Helps create a good milk supply appropriate to the babys needs  ? Less breastfeeding complications such as engorgement or  low supply  ? Helps baby get enough milk  ? " Milk supply is determined by how often baby nurses and empties  the breast.  ? Feeding is for comfort as well as nutrition    Importance of good latch (positioning and attaching  baby properly at breast):  ? Helps prevent sore nipples  ? Helps ensure baby gets enough milk and supports moms breast  milk supply    Risks of giving baby supplements other  than moms breastmilk  Breastfeeding alone is recommended for the first 6 months, if not it:  ? Can make baby more prone to illness  ? Can make baby less satisfied at breast (wanting larger amounts or  faster flow)  ? Reduces milk supply    Importance of rooming-in:  ? Increases parent confidence while mother is supported by the  hospital staff  ? Caregivers learn how to care for baby and recognize feeding cues  ? Enables feeding whenever baby needs to eat  ? Baby is comforted with mom and learns to recognize caregivers    Avoiding use of pacifiers:  ? Use of pacifiers in the first weeks can make it difficult to make a full  milk supply  ? May interfere with baby learning to latch well      2017 [facility] [id number].  [id number]. DOD 11.17           Breastfeeding   Why Its Important and What to Expect     Your breast milk is the best food for your baby--and  breastfeeding can help you be healthy as well.    Though breastfeeding is natural, it is a learned process for both mother and baby. To prepare, there are things you can learn--and do--before your baby is born.    ? Learn the benefits of breastfeeding.    ? Understand the basic process.    ? Know what to expect in the hospital.    ? Arrange breastfeeding support for the first few  weeks after birth.    ? Take a breastfeeding class (see back page).    ? Talk to your midwife, nurse or doctor if you have  Questions.    The benefits of breastfeeding    Human milk changes to meet the needs of a growing baby. It is all a baby needs for the first six months of life.    In fact, babies who receive only human milk for the  first six  months are less likely to develop colds, the  flu, colic, asthma, ear infections, food allergies and  diarrhea (loose, watery stools). They may be less likely      to be overweight as children, and they are less likely to develop diabetes later in life. Some studies also show that infants have a higher IQ if they are .    Breastfeeding can:    ? Help you and your baby develop a special bond--  and make you feel proud that you can feed your  Baby.    ? Reduce the total amount of blood you will lose  after delivery.    ? Help your uterus return to its non-pregnant size.    ? Reduce the risk of Sudden Infant Death Syndrome (SIDS).    ? Help you lose your pregnancy weight more quickly.    ? Help delay the return of your monthly periods.    ? Lower your risk of some breast and ovarian  cancers--as well as osteoporosis (bone loss)--later in life.    ? Save you more than $300 per month. (This  includes the cost of formula and medical bills.  Formula-fed babies get sick more often.)          If you are deaf or hard of hearing, please let us know. We provide many free services including  sign language interpreters, oral interpreters, TTYs, telephone amplifiers, note takers and written materials.        How to breastfeed    Skin-to-skin contact    Hold your baby on your chest skin-to-skin right after  birth. Skin contact calms your baby, steadies their  breathing and keeps your baby warm. Your baby will be alert and will likely want to feed within the first hour after birth.    Babies are born with reflexes that help them  breastfeed. Your body will be ready with early milk  (called colostrum), so you will have all the milk your  baby needs for that first feeding. Your nurse will help you get started.    Keep your baby with you and breastfeed whenever  your baby is hungry. Offering the breast early and  often helps your body keep making lots of milk.    How to position your baby    There are many positions for  breastfeeding.              No matter which position you choose, support your  babys back, shoulders and neck. The head and body should be in a straight line, and the entire body should face the breast. Your baby should be able to tilt the head back easily. Your baby shouldnt have to reach out to feed. Also make sure your babys nose is level with your nipple. This way, your baby will find it easier to attach to your breast.    Finally, get comfortable. Use pillows to support your  body. Dont lean over or slump to reach your baby.  Once your baby is attached to the breast, its okay to change your position slightly.    You will feel a bit of a tugging at first, but you should  never feel pain. If you do, ask your nurse or lactation expert for help. She will teach you how to latch your baby onto the breast in a way that feels more comfortable.    Breastfeeding in the hospital    For the first three days after birth, your body will  produce early milk called colostrum. This milk is  full of calories and antibodies to help keep your baby healthy. It is all your baby needs for the first few days.    Remember, babies do not eat anything while inside  you. Right after birth, your baby will only need a little bit of milk (about 1 teaspoon per feeding) to get the digestive system working well. Your baby will not need any formula--your body will make the right amount of milk.    Breastfeed whenever your baby shows signs of hunger. (See next page for a list of signs.) Crying is a late sign of hunger.    Babies often lose weight in the days after birth. This  is normal. By two weeks of age, your baby should be back to their birth weight. Your care team will watch your babys weight carefully.    If you are unable to breastfeed in the hospital, your  care team may suggest pasteurized donor human milk for your baby.             The Most Important Points to Remember    ? Your breast milk is the perfect food for your  baby.  Breastfeeding has a lot of health benefits  for you as well.    ? Hold your baby skin-to-skin as soon as possible  after birth. Do this for as long as you can. Even  if your baby doesnt go to the breast right away,  skin-to-skin contact helps your body make  more milk. This lets you get an early start on  Breastfeeding.    ? Learn how to position your baby at the breast.  This will help your baby feed well, and it will  keep you comfortable.    ? Feed your baby whenever your baby wants to  eat. You are feeding a baby, not a clock!    ? Signs that your baby is ready to eat include:  starting to wake up, chewing on fists, moving  the face from side to side, opening and closing  the mouth, sticking out the tongue and turning  toward the breast when held. Crying is a late  sign of hunger, so look for the earlier signals  that your baby makes.    ? Feed your baby only human milk for at least  six months. The World Health Organization  recommends breastfeeding for the first year,  noting it is a jakub baby who is  for  the first two years.    ? While in the hospital, plan to keep your baby  with you at all times, except for certain medical  procedures. This is an important time for you  and your baby to get to know each other and  practice breastfeeding.     Common questions    Below are questions that many women have about  breastfeeding. You will find further information in the  childbirth book your care team gave you. If you have more questions, speak with your midwife, nurse or doctor.    How do I involve my partner, family and  friends and get their help and support?    Sometimes family and friends dont understand why  you want to breastfeed. Perhaps they themselves  didnt breastfeed, or they werent  as infants. Tell them about the benefits of breastfeeding and how important it is to feed only human milk for the first six months or so. If you feed often, you will make plenty of milk to help your  baby grow, fight illness and get the best start possible.    After two to four weeks--once your baby is feeding  well and your milk supply is well established--your  partner and others can feed the baby your milk from  a cup, dropper or bottle. You can remove milk from  your breast (by hand or pump) and store it for later  use. This way, your baby will have your milk even  when youre away.    Remind everyone that there are lots of ways to help  that dont involve feeding: making meals, caring for  your other children, comforting the baby if they cries, changing diapers, running errands and more.    Is breastfeeding painful?    Not usually. There are ways to prevent pain and to  treat it if it happens.    The best ways to avoid pain are to feed your baby  often, use a good position and correctly latch your  baby onto the breast. These help prevent the two  most common sources of pain: sore nipples and  engorgement (overly full breasts). You will learn more about these topics in a breastfeeding class and in the hospital after your baby is born.         How much time does it take to breastfeed?    Some new mothers feel that all they do is breastfeed. In the early weeks, a baby eats 8 to 12 times per day. Sometimes babies will cluster feedings close together. At other times, there are longer stretches between feedings.    Remember, your newborns stomach will be about  the size of a walnut. Your baby wont eat much at each feeding. If you feed your baby often in the first days, your baby--and your milk supply--will grow. Your baby will eat more at each session, and you will need to nurse less often. Within a few weeks, most women find that breastfeeding is easier and takes less time than formula feeding.    How will I know if my baby is getting  enough milk?    Your body will start making milk as soon as your  baby is born. The more often you put your baby to  breast, the more milk you will make. You should avoid pacifiers  for the first several weeks until breastfeeding is well established--you want your baby to do all their sucking at the breast. This will help you make more milk.    There are signs that your baby is getting enough milk. You will learn these signs over time. For example:    ? You will count wet and soiled diapers, because  these show how much milk your baby is getting.    ? Your baby will seem satisfied after feedings.    ? Your baby will grow and gain weight after the first  few days.    Are there reasons why a woman shouldnt  breastfeed her baby?    There are a few medical concerns that prevent  breastfeeding. In mothers, these include being HIVpositive, having active or untreated TB (tuberculosis)  and using street drugs or some medicines. These  mothers usually choose infant formula for their  Babies.    A few women choose not to breastfeed for personal  reasons. But most mothers can breastfeed. If you are not sure if its okay to breastfeed, ask your care team.    Getting support    Before your baby is born, get as much information  as you can. Sign up for a breastfeeding class. Most  childbirth classes discuss breastfeeding, but a special class will give more in-depth information.    ? In [facility]: Go to [facility] at [employer].    ? In [facility]: Go to [email or web address].  Click on Classes-and Events at the bottom of the  page, then search for breastfeeding. Or, call [phone].    Remember, help is available from your hospital, clinic, lactation experts or online. If you need help after leaving the hospital:    ? Call your babys clinic. (If you dont have a clinic,  call [phone] and ask for a referral.)    ? Call a lactation consultant. (If you need help  finding a location that offers lactation support, call  [phone].)    ? Call [employer] (24 hours a  day) at 0-018-YQ-LECHE [[phone]].    ? Call the Women, Infants and Children  (WIC)  program at 1-210.515.2541.    ? Call the National Womens Health Information  Center (English and Norwegian) at 1-226.431.1214 or  go to www.womenshealth.gov/breastfeeding.    ? Go to [employer].     For informational purposes only. Not to replace the advice of your health care provider. Copyright   2010 [facility]. All rights reserved. Clinically reviewed by [facility]. [employer] 082491 - REV 06/18.

## 2021-06-18 NOTE — PATIENT INSTRUCTIONS - HE
Patient Instructions by Linda Penaloza MD at 10/27/2020  1:20 PM     Author: Linda Penaloza MD Service: -- Author Type: Physician    Filed: 10/27/2020  1:38 PM Encounter Date: 10/27/2020 Status: Signed    : Linda Penaloza MD (Physician)         Patient Education   HEALTHY PREGNANCY CARE: 22-26 WEEKS PREGNANT    You are finishing your second trimester. Your baby is developing rapidly. At this stage, babies have a sense of balance, can respond to touch, and are recognizing parent voices.  Your baby will be moving around more now.  You may notice [place]Jacobs contractions now, which are painless and prepare the uterus for the delivery.    Nutrition: During this time, you may find that your nausea and fatigue are gone. Overall, you may feel better and have more energy than you did in your first trimester. Be sure you are getting enough calcium and iron in your diet. Your prenatal vitamins cannot supply all of the nutrients you need, so continue to eat 3-4 servings of dairy foods and 2-3 servings of meat/fish/poultry/nuts every day. Foods high in iron include: red meats, eggs, dark green vegetables, dark yellow vegetables, nuts, kidney beans and chickpeas. Some cereals are fortified with iron, so look at the food labels for 100% of the daily requirement for iron.     Discuss your work situation with your midwife or physician as needed. If you stand for long periods of time, you may need to make changes and take breaks.    [place] for childbirth and parenting classes, including an infant CPR class. Breastfeeding classes are recommended too.    Plan for the gestational diabetes screening between weeks 24-28. You can eat normally before that visit; we would suggest making sure you have protein foods, but not a lot of carbohydrates or sugary foods.    Your blood type was determined at your first OB appointment. If you are Rh negative, you should discuss the need for a Rhogam  "shot with your midwife or physician. This would be administered around 28 weeks if necessary.    How can you care for yourself at home?   You can refer to the Starting Out Right book or find it online at [email or web address] or [email or web address][facility]-[email or web address]-[email or web address]#[email or web address]     You can sign up for a weekly parenting e-mail that gives support, tips and advice from health care professionals that starts with pregnancy and continues through the toddler years. To register, go to [email or web address] at any time during your pregnancy.    Watch for the warning signs of premature labor:   \" Dull, low backache  \" Contractions of the uterus, menstrual-like cramps  \" Abdominal cramping with or without diarrhea  \" More pelvic pressure  \" Increase or change in vaginal discharge.     Continue to watch for signs and symptoms of preeclampsia:   \" Sudden swelling of your face, hands, or feet   \" New vision problems such as blurring, double vision, or flashing lights  \" A severe headache not relieved with acetaminophen (Tylenol)  \" Sharp or stabbing pain in your right or middle upper abdomen        Remember that labor doesn't have to hurt. Never hesitate to call your midwife or physician or their staff at [facility] at Phone: [phone] for any one of these warning signs - or if something just doesn't feel right. If it's after clinic hours, physician patients should call the [facility] at [phone] ([phone]); midwife patients should call their answering service at [phone].    BREASTFEEDING TIPS FOR NEW MOMS     Importance of skin-to-skin contact:  ? Gets breastfeeding off to a good start  ? Keeps baby warm and is great for bonding  ? Provides calming effects and helps to stabilize breathing and  blood sugar  ? Helps the uterus to contract and bleed " "less    Importance of feeding whenever baby shows signs of  being hungry, \"feeding on cue\":  ? Helps create a good milk supply appropriate to the babys needs  ? Less breastfeeding complications such as engorgement or  low supply  ? Helps baby get enough milk  ? Milk supply is determined by how often baby nurses and empties  the breast.  ? Feeding is for comfort as well as nutrition    Importance of good latch (positioning and attaching  baby properly at breast):  ? Helps prevent sore nipples  ? Helps ensure baby gets enough milk and supports moms breast  milk supply    Risks of giving baby supplements other  than moms breastmilk  Breastfeeding alone is recommended for the first 6 months, if not it:  ? Can make baby more prone to illness  ? Can make baby less satisfied at breast (wanting larger amounts or  faster flow)  ? Reduces milk supply    Importance of rooming-in:  ? Increases parent confidence while mother is supported by the  hospital staff  ? Caregivers learn how to care for baby and recognize feeding cues  ? Enables feeding whenever baby needs to eat  ? Baby is comforted with mom and learns to recognize caregivers    Avoiding use of pacifiers:  ? Use of pacifiers in the first weeks can make it difficult to make a full  milk supply  ? May interfere with baby learning to latch well      [address] [facility] [place] [address]50. [place] [id number]. [facility] 11.17         Breastfeeding   Why Its Important and What to Expect     Your breast milk is the best food for your baby--and  breastfeeding can help you be healthy as well.    Though breastfeeding is natural, it is a learned process for both mother and baby. To prepare, there are things you can learn--and do--before your baby is born.    ? Learn the benefits of breastfeeding.    ? Understand the basic process.    ? Know what to expect in the hospital.    ? Arrange breastfeeding support for the first few  weeks after birth.    ? Take a breastfeeding class (see back page).    ? Talk to your " midwife, nurse or doctor if you have  Questions.    The benefits of breastfeeding    Human milk changes to meet the needs of a growing baby. It is all a baby needs for the first six months of life.    In fact, babies who receive only human milk for the  first six months are less likely to develop colds, the  flu, colic, asthma, ear infections, food allergies and  diarrhea (loose, watery stools). They may be less likely      to be overweight as children, and they are less likely to develop diabetes later in life. Some studies also show that infants have a higher IQ if they are .    Breastfeeding can:    ? Help you and your baby develop a special bond--  and make you feel proud that you can feed your  Baby.    ? Reduce the total amount of blood you will lose  after delivery.    ? Help your uterus return to its non-pregnant size.    ? Reduce the risk of Sudden Infant Death Syndrome (SIDS).    ? Help you lose your pregnancy weight more quickly.    ? Help delay the return of your monthly periods.    ? Lower your risk of some breast and ovarian  cancers--as well as osteoporosis (bone loss)--later in life.    ? Save you more than $300 per month. (This  includes the cost of formula and medical bills.  Formula-fed babies get sick more often.)          If you are deaf or hard of hearing, please let us know. We provide many free services including  sign language interpreters, oral interpreters, TTYs, telephone amplifiers, note takers and written materials.        How to breastfeed    Skin-to-skin contact    Hold your baby on your chest skin-to-skin right after  birth. Skin contact calms your baby, steadies their  breathing and keeps your baby warm. Your baby will be alert and will likely want to feed within the first hour after birth.    Babies are born with reflexes that help them  breastfeed. Your body will be ready with early milk  (called colostrum), so you will have all the milk your  baby needs for that first  feeding. Your nurse will help you get started.    Keep your baby with you and breastfeed whenever  your baby is hungry. Offering the breast early and  often helps your body keep making lots of milk.    How to position your baby    There are many positions for breastfeeding.              No matter which position you choose, support your  babys back, shoulders and neck. The head and body should be in a straight line, and the entire body should face the breast. Your baby should be able to tilt the head back easily. Your baby shouldnt have to reach out to feed. Also make sure your babys nose is level with your nipple. This way, your baby will find it easier to attach to your breast.    Finally, get comfortable. Use pillows to support your  body. Dont lean over or slump to reach your baby.  Once your baby is attached to the breast, its okay to change your position slightly.    You will feel a bit of a tugging at first, but you should  never feel pain. If you do, ask your nurse or lactation expert for help. She will teach you how to latch your baby onto the breast in a way that feels more comfortable.    Breastfeeding in the hospital    For the first three days after birth, your body will  produce early milk called colostrum. This milk is  full of calories and antibodies to help keep your baby healthy. It is all your baby needs for the first few days.    Remember, babies do not eat anything while inside  you. Right after birth, your baby will only need a little bit of milk (about 1 teaspoon per feeding) to get the digestive system working well. Your baby will not need any formula--your body will make the right amount of milk.    Breastfeed whenever your baby shows signs of hunger. (See next page for a list of signs.) Crying is a late sign of hunger.    Babies often lose weight in the days after birth. This  is normal. By two weeks of age, your baby should be back to their birth weight. Your care team will watch your babys  weight carefully.    If you are unable to breastfeed in the hospital, your  care team may suggest pasteurized donor human milk for your baby.               The Most Important Points to Remember    ? Your breast milk is the perfect food for your  baby. Breastfeeding has a lot of health benefits  for you as well.    ? Hold your baby skin-to-skin as soon as possible  after birth. Do this for as long as you can. Even  if your baby doesnt go to the breast right away,  skin-to-skin contact helps your body make  more milk. This lets you get an early start on  Breastfeeding.    ? Learn how to position your baby at the breast.  This will help your baby feed well, and it will  keep you comfortable.    ? Feed your baby whenever your baby wants to  eat. You are feeding a baby, not a clock!    ? Signs that your baby is ready to eat include:  starting to wake up, chewing on fists, moving  the face from side to side, opening and closing  the mouth, sticking out the tongue and turning  toward the breast when held. Crying is a late  sign of hunger, so look for the earlier signals  that your baby makes.    ? Feed your baby only human milk for at least  six months. The World Health Organization  recommends breastfeeding for the first year,  noting it is a jakub baby who is  for  the first two years.    ? While in the hospital, plan to keep your baby  with you at all times, except for certain medical  procedures. This is an important time for you  and your baby to get to know each other and  practice breastfeeding.     Common questions    Below are questions that many women have about  breastfeeding. You will find further information in the  childbirth book your care team gave you. If you have more questions, speak with your midwife, nurse or doctor.    How do I involve my partner, family and  friends and get their help and support?    Sometimes family and friends dont understand why  you want to breastfeed. Perhaps they  themselves  didnt breastfeed, or they werent  as infants. Tell them about the benefits of breastfeeding and how important it is to feed only human milk for the first six months or so. If you feed often, you will make plenty of milk to help your baby grow, fight illness and get the best start possible.    After two to four weeks--once your baby is feeding  well and your milk supply is well established--your  partner and others can feed the baby your milk from  a cup, dropper or bottle. You can remove milk from  your breast (by hand or pump) and store it for later  use. This way, your baby will have your milk even  when youre away.    Remind everyone that there are lots of ways to help  that dont involve feeding: making meals, caring for  your other children, comforting the baby if they cries, changing diapers, running errands and more.    Is breastfeeding painful?    Not usually. There are ways to prevent pain and to  treat it if it happens.    The best ways to avoid pain are to feed your baby  often, use a good position and correctly latch your  baby onto the breast. These help prevent the two  most common sources of pain: sore nipples and  engorgement (overly full breasts). You will learn more about these topics in a breastfeeding class and in the hospital after your baby is born.         How much time does it take to breastfeed?    Some new mothers feel that all they do is breastfeed. In the early weeks, a baby eats 8 to 12 times per day. Sometimes babies will cluster feedings close together. At other times, there are longer stretches between feedings.    Remember, your newborns stomach will be about  the size of a walnut. Your baby wont eat much at each feeding. If you feed your baby often in the first days, your baby--and your milk supply--will grow. Your baby will eat more at each session, and you will need to nurse less often. Within a few weeks, most women find that breastfeeding is easier and takes  less time than formula feeding.    How will I know if my baby is getting  enough milk?    Your body will start making milk as soon as your  baby is born. The more often you put your baby to  breast, the more milk you will make. You should avoid pacifiers for the first several weeks until breastfeeding is well established--you want your baby to do all their sucking at the breast. This will help you make more milk.    There are signs that your baby is getting enough milk. You will learn these signs over time. For example:    ? You will count wet and soiled diapers, because  these show how much milk your baby is getting.    ? Your baby will seem satisfied after feedings.    ? Your baby will grow and gain weight after the first  few days.    Are there reasons why a woman shouldnt  breastfeed her baby?    There are a few medical concerns that prevent  breastfeeding. In mothers, these include being HIVpositive, having active or untreated TB (tuberculosis)  and using street drugs or some medicines. These  mothers usually choose infant formula for their  Babies.    A few women choose not to breastfeed for personal  reasons. But most mothers can breastfeed. If you are not sure if its okay to breastfeed, ask your care team.    Getting support    Before your baby is born, get as much information  as you can. Sign up for a breastfeeding class. Most  childbirth classes discuss breastfeeding, but a special class will give more in-depth information.    ? In the [facility]: Go to [facility]  Center at [place].    ? In [facility]: Go to [email or web address].  Click on Classes-and Events at the bottom of the  page, then search for breastfeeding. Or, call [phone].    Remember, help is available from your hospital, clinic, lactation experts or online. If you need help after leaving the hospital:    ? Call your babys clinic. (If you dont have a clinic,  call [phone] and ask for a referral.)    ? Call  a lactation consultant. (If you need help  finding a location that offers lactation support, call  [phone].)    ? Call [employer] (24 hours a  day) at 5-004-IA-LECHE [1-623.258.9070].    ? Call the Women, Infants and Children (WIC)  program at 1-980.553.4421.    ? Call the National Womens Health Information  Center (English and Wolof) at 1-476.882.4273 or  go to www.womenshealth.gov/breastfeeding.    ? Go to [employer].       For informational purposes only. Not to replace the advice of your health care provider. Copyright   2010 [facility]  Services. All rights reserved. Clinically reviewed by [facility]. [employer] [id number] - REV 06/18.

## 2021-06-20 NOTE — LETTER
Letter by Linda Penaloza MD at      Author: Linda Penaloza MD Service: -- Author Type: --    Filed:  Encounter Date: 8/13/2020 Status: (Other)         Lorena Echevarria  [address]  [place] [address]             August 13, 2020         Dear Ms. Echevarria,    Below are the results from your recent visit:    Resulted Orders   ABO/RH Typing (OP order)   Result Value Ref Range    HML ABO/Rh Typing A POS     HML ABO/Rh Repeat Typing A POS    Hepatitis B Surface antigen (HBsAG)   Result Value Ref Range    Hepatitis B Surface Ag Negative Negative   HIV Antigen/Antibody Screening Cascade   Result Value Ref Range    HIV Antigen / Antibody Negative Negative    Narrative    Method is Abbott HIV Ag/Ab for the detection of HIV p24 antigen, HIV-1 antibodies and HIV-2 antibodies.   HML Antibody Screen   Result Value Ref Range    HML Antibody Screen Negative Negative   RPR   Result Value Ref Range    Treponema Antibody (Syphilis) Negative Negative   Rubella Immune Status (IgG)   Result Value Ref Range    Rubella Antibody, IgG Positive     Narrative    Negative: Absence of detectable rubella virus IgG antibodies. A negative result presumes that immunity has not been acquired.     Equivocal: Suggest recollection.    Positive: Considered positive for IgG antibodies to rubella virus.   Urinalysis Macroscopic   Result Value Ref Range    Color, UA Yellow Colorless, Yellow, Straw, Light Yellow    Clarity, UA Clear Clear    Glucose, UA Negative Negative    Bilirubin, UA Negative Negative    Ketones, UA Negative Negative    Specific Gravity, UA 1.025 1.005 - 1.030    Blood, UA Negative Negative    pH, UA 7.0 5.0 - 8.0    Protein, UA Negative Negative mg/dL    Urobilinogen, UA 0.2 E.U./dL 0.2 E.U./dL, 1.0 E.U./dL    Nitrite, UA Negative Negative    Leukocytes, UA Trace (!) Negative   Culture, Urine   Result Value Ref Range    Culture No Growth    Chlamydia/gonorrhoeae, URINE   Result Value Ref Range     Chlamydia trachomatis, Amplified Detection Negative Negative    Neisseria gonorrhoeae, Amplified Detection Negative Negative   HM1 (CBC with Diff)   Result Value Ref Range    WBC 5.2 4.0 - 11.0 thou/uL    RBC 4.32 3.80 - 5.40 mill/uL    Hemoglobin 13.0 12.0 - 16.0 g/dL    Hematocrit 39.5 35.0 - 47.0 %    MCV 91 80 - 100 fL    MCH 30.1 27.0 - 34.0 pg    MCHC 32.9 32.0 - 36.0 g/dL    RDW 11.7 11.0 - 14.5 %    Platelets 229 140 - 440 thou/uL    MPV 10.7 8.5 - 12.5 fL    Neutrophils % 65 50 - 70 %    Lymphocytes % 29 20 - 40 %    Monocytes % 5 2 - 10 %    Eosinophils % 1 0 - 6 %    Basophils % 0 0 - 2 %    Neutrophils Absolute 3.4 2.0 - 7.7 thou/uL    Lymphocytes Absolute 1.5 0.8 - 4.4 thou/uL    Monocytes Absolute 0.3 0.0 - 0.9 thou/uL    Eosinophils Absolute 0.1 0.0 - 0.4 thou/uL    Basophils Absolute 0.0 0.0 - 0.2 thou/uL   Thyroid Cascade   Result Value Ref Range    TSH 1.21 0.30 - 5.00 uIU/mL       Your labs are all normal which is great news.     Please call with questions or contact us using [employer].    Sincerely,        Electronically signed by Linda Penaloza MD

## 2021-06-20 NOTE — LETTER
Letter by Linda Penaloza MD at      Author: Linda Penaloza MD Service: -- Author Type: --    Filed:  Encounter Date: 9/1/2020 Status: (Other)         September 1, 2020     Patient: Lorena Echevarria   YOB: 1995   Date of Visit: 9/1/2020       To Whom it May Concern:    Lorena Echevarria was seen in my clinic on 9/1/2020. She is pregnant and due on 2/25/21.     If you have any questions or concerns, please don't hesitate to call.    Sincerely,         Electronically signed by Linda Penaloza MD

## 2021-06-21 NOTE — LETTER
Letter by Linda Penaloza MD at      Author: Linda Penaloza MD Service: -- Author Type: --    Filed:  Encounter Date: 1/26/2021 Status: (Other)         January 26, 2021     Patient: Lorena Echevarria   YOB: 1995   Date of Visit: 1/26/2021       To Whom It May Concern:    Lorena Echevarria is pregnant with a due date of 2/25/21. Please consider for WIC.     If you have any questions or concerns, please don't hesitate to call.    Sincerely,        Electronically signed by Linda Penaloza MD

## 2021-06-21 NOTE — LETTER
Letter by Linda Penaloza MD at      Author: Linda Penaloza MD Service: -- Author Type: --    Filed:  Encounter Date: 10/27/2020 Status: (Other)         October 27, 2020     Patient: Lorena Echevarria   YOB: 1995   Date of Visit: 10/27/2020       To Whom it May Concern:    Lorena Echevarria was seen in my clinic on 10/27/2020. Due to a medical condition she should limit lifting to 20 pounds during a shift.     If you have any questions or concerns, please don't hesitate to call.    Sincerely,         Electronically signed by Linda Penaloza MD

## 2021-06-21 NOTE — LETTER
Letter by Linda Penaloza MD at      Author: Linda Penaloza MD Service: -- Author Type: --    Filed:  Encounter Date: 1/26/2021 Status: (Other)         January 26, 2021     Patient: Lorena Echevarria   YOB: 1995   Date of Visit: 1/26/2021       To Whom it May Concern:    Lorena Echevarria was seen in my clinic on 1/26/2021. She should be excused from any further work due to her medical condition.     If you have any questions or concerns, please don't hesitate to call.    Sincerely,         Electronically signed by Linda Penaloza MD

## 2021-06-21 NOTE — PROGRESS NOTES
Patient here with c/o leaking aprox 1/2 cup fluid this am.  None since.  ROM PLUS and wet prep sent.  fht 140 with accels to 160, very activer fetus.  rom plus and wet prep negative.  Dr Anton aware, ok for discharge.  Home with instructions

## 2021-06-21 NOTE — LETTER
Letter by Linda Penaloza MD at      Author: Linda Penaloza MD Service: -- Author Type: --    Filed:  Encounter Date: 4/28/2021 Status: (Other)         April 28, 2021     Patient: Lorena Echevarria   YOB: 1995   Date of Visit: 4/28/2021       To Whom It May Concern:    It is my medical opinion that Lorena Echevarria may return to full duty immediately with no restrictions as of 4/28/21.    If you have any questions or concerns, please don't hesitate to call.    Sincerely,        Electronically signed by Linda Penaloza MD

## 2021-06-23 NOTE — ANESTHESIA POSTPROCEDURE EVALUATION
Patient: Lorena Echevarria  * No procedures listed *  Anesthesia type: epidural    Patient location: Labor and Delivery  Last vitals:   Vitals:    01/12/19 0330   BP: 133/67   Pulse: 85   Resp: 16   Temp:    SpO2:      Post vital signs: stable  Level of consciousness: awake and responds to simple questions  Post-anesthesia pain: pain controlled  Post-anesthesia nausea and vomiting: no  Pulmonary: unassisted, return to baseline  Cardiovascular: stable and blood pressure at baseline  Hydration: adequate  Anesthetic events: no    QCDR Measures:  ASA# 11 - Penny-op Cardiac Arrest: ASA11B - Patient did NOT experience unanticipated cardiac arrest  ASA# 12 - Penny-op Mortality Rate: ASA12B - Patient did NOT die  ASA# 13 - PACU Re-Intubation Rate: ASA13B - Patient did NOT require a new airway mgmt  ASA# 10 - Composite Anes Safety: ASA10A - No serious adverse event    Additional Notes: Doing well. Satisfied with anesthetic care. Denies headache. Effects of epidural wearing off, but patient still feels somewhat wobbly. Encouraged to have assistance whenever she gets out of bed until she feels more steady. No apparent complications.

## 2021-06-23 NOTE — ANESTHESIA PREPROCEDURE EVALUATION
Anesthesia Evaluation        Airway    Pulmonary - negative ROS                          Cardiovascular - negative ROS  Exercise tolerance: > or = 4 METS   Neuro/Psych - negative ROS     Comments: Mental delay, slight    Endo/Other    (+) obesity (BMI 44), pregnant     GI/Hepatic/Renal - negative ROS      Other findings:     Results for ALVARADO CORTEZ (MRN [id number]) as of 1/11/2019 1/11/2019 11:58  WBC: 8.2  RBC: 4.13  Hemoglobin: 11.8 (L)  Hematocrit: 36.1  MCV: 87  MCH: 28.6  MCHC: 32.7  RDW: 13.9  Platelets: 207  MPV: 10.9          Dental                         Anesthesia Plan  Planned anesthetic: epidural    ASA 3     Anesthetic plan and risks discussed with: patient    Post-op plan: routine recovery

## 2021-06-23 NOTE — ANESTHESIA PROCEDURE NOTES
Epidural Block    Patient location during procedure: OB    Reason for Block:labor epidural  Staffing:  Performing  Anesthesiologist: Ame Keith MD  Preanesthetic Checklist  Completed: patient identified, risks, benefits, and alternatives discussed, timeout performed, consent obtained, at patient's request, airway assessed, oxygen available, suction available, emergency drugs available and hand hygiene performed  Procedure  Patient position: sitting  Prep: ChloraPrep  Patient monitoring: continuous pulse oximetry, heart rate and blood pressure  Approach: midline  Location: L3-L4  Injection technique: VAHID air and VAHID saline  Number of Attempts:1  Needle  Needle type: Tuohy   Needle gauge: 17 G     : VAHID at 7 cm. Catheter at 16 cm at skin.  Assessment  Sensory level:  No complications      Additional Notes:    Patient requests labor epidural. Chart reviewed, including labs. Reviewed options and risks with the patient, including but not limited to: bleeding, infection, damage to tissues under the skin (nerves, muscles, blood vessels), hypotension, headache, and epidural failure. Questions answered, consent signed. Patient agrees to elective labor epidural.     Hands washed, sterile technique used, including scrub hat, mask, and sterile gloves.    Aspiration negative. Test dose negative.    Loading dose with bupivacaine 0.125% with epi 10 cc - in divided doses.      Ame Keith MD  Staff Anesthesiologist  [facility]

## 2021-06-23 NOTE — TELEPHONE ENCOUNTER
Vaginal delivery 1/12/19, discharged on 1/15/19.     Started having abdominal pain about an hour ago. she states it is on her left side. She states the pain came on suddenly while sitting on the bed.     Rates pain as 8 1/2 to 9 out of 10.     Passed a large clot this am about the size of a ping pong ball.     Nauseated for past hour. Vomited.     Groaning during call intermittently due to pain.   During call she audible heaves. When asked, she states she is going to vomit.     Call to 911 to send ambulance.  Medic spoke with patient and are sending an ambulance.  Remained on call until ambulance arrived.  Reason for Disposition    SEVERE abdominal pain and present > 1 hour    Protocols used: POSTPARTUM - VAGINAL BLEEDING AND LOCHIA-A-OH    Offered to call her brother who she states is on his way to get her, but an hour away. When attempting to call him, got a voice mail and left general message.     Yeny Hdz RN [facility] Triage

## 2021-06-25 NOTE — TELEPHONE ENCOUNTER
New Appointment Needed  What is the reason for the visit:    Mantoux Placement  Appt Request  What is the purpose of the mantoux?:  Work: Home health aid   Is there a form to be completed?:   Yes  How soon do you need the mantoux placed?:  date: Today or tomarrow     Provider Preference: Nurse   How soon do you need to be seen?: tomorrow  Waitlist offered?: No  Okay to leave a detailed message:  Yes

## 2021-06-26 NOTE — PROGRESS NOTES
PPD Reading Note  PPD read and results entered in [employer].  Result: 0 mm induration.  Interpretation: Negative  If test not read within 48-72 hours of initial placement, patient advised to repeat in other arm 1-3 weeks after this test.  Allergic reaction: no    Gail Aaron, CMA

## 2021-07-03 NOTE — ADDENDUM NOTE
Addendum Note by Linda Davsi MD at 9/8/2020  8:05 AM     Author: Linda Davis MD Service: -- Author Type: Physician    Filed: 9/8/2020  8:05 AM Encounter Date: 9/3/2020 Status: Signed    : Linda Davis MD (Physician)    Addended by: LINDA DAVIS on: 9/8/2020 08:05 AM        Modules accepted: Orders

## 2021-07-04 NOTE — LETTER
Letter by Gail Aaron CMA at      Author: Gail Aaron CMA Service: -- Author Type: --    Filed:  Encounter Date: 6/17/2021 Status: (Other)         Lorena Echevarria  [address]  [place] [address]      June 17, 2021      Dear Ms. Echevarria,    PPD Reading Note  PPD read and results entered in [employer].  Result: 0 mm induration.  Interpretation: Negative  If test not read within 48-72 hours of initial placement, patient advised to repeat in other arm 1-3 weeks after this test.  Allergic reaction: no      Sincerely,        Electronically signed by Clinical Support Staff

## 2021-07-08 ENCOUNTER — COMMUNICATION - HEALTHEAST (OUTPATIENT)
Dept: FAMILY MEDICINE | Facility: CLINIC | Age: 26
End: 2021-07-08

## 2021-07-08 NOTE — TELEPHONE ENCOUNTER
Telephone Encounter by Linda Penaloza MD at 7/8/2021  9:56 AM     Author: Linda Penaloza MD Service: -- Author Type: Physician    Filed: 7/8/2021  9:56 AM Encounter Date: 7/8/2021 Status: Signed    : Linda Penaloza MD (Physician)       Please help schedule medication follow up virtual visit in one month. Thanks!

## 2021-07-09 NOTE — TELEPHONE ENCOUNTER
Telephone Encounter by Leanna Calzada CMA at 7/9/2021 11:10 AM     Author: Leanna Calzada CMA Service: -- Author Type: Certified Medical Assistant    Filed: 7/9/2021 11:11 AM Encounter Date: 7/8/2021 Status: Signed    : Leanna Calzada CMA (Certified Medical Assistant)       Detailed message left on voicemail.

## 2021-07-14 PROBLEM — Z34.90 PREGNANT: Status: RESOLVED | Noted: 2021-02-06 | Resolved: 2021-05-12

## 2021-07-14 PROBLEM — O09.613: Status: RESOLVED | Noted: 2017-09-15 | Resolved: 2020-07-16

## 2021-07-14 PROBLEM — O24.410 DIET CONTROLLED GESTATIONAL DIABETES MELLITUS (GDM) IN THIRD TRIMESTER: Status: RESOLVED | Noted: 2017-09-15 | Resolved: 2021-05-12

## 2021-07-14 PROBLEM — O36.8390 FETAL TACHYCARDIA AFFECTING MANAGEMENT OF MOTHER: Status: RESOLVED | Noted: 2017-09-01 | Resolved: 2020-07-16

## 2021-09-01 DIAGNOSIS — F41.9 ANXIETY: Primary | ICD-10-CM

## 2021-09-01 RX ORDER — HYDROXYZINE PAMOATE 25 MG/1
25-50 CAPSULE ORAL 3 TIMES DAILY PRN
Qty: 30 CAPSULE | Refills: 1 | Status: ON HOLD | OUTPATIENT
Start: 2021-09-01 | End: 2023-03-20

## 2022-05-18 ENCOUNTER — TELEPHONE (OUTPATIENT)
Dept: SLEEP MEDICINE | Facility: CLINIC | Age: 27
End: 2022-05-18
Payer: COMMERCIAL

## 2022-06-15 ENCOUNTER — OFFICE VISIT (OUTPATIENT)
Dept: FAMILY MEDICINE | Facility: CLINIC | Age: 27
End: 2022-06-15
Payer: COMMERCIAL

## 2022-06-15 VITALS
DIASTOLIC BLOOD PRESSURE: 78 MMHG | BODY MASS INDEX: 46.05 KG/M2 | HEART RATE: 90 BPM | SYSTOLIC BLOOD PRESSURE: 120 MMHG | OXYGEN SATURATION: 97 % | WEIGHT: 293 LBS

## 2022-06-15 DIAGNOSIS — N89.8 VAGINAL DISCHARGE: ICD-10-CM

## 2022-06-15 DIAGNOSIS — N92.6 IRREGULAR PERIODS: ICD-10-CM

## 2022-06-15 DIAGNOSIS — E66.01 MORBID OBESITY (H): ICD-10-CM

## 2022-06-15 DIAGNOSIS — M54.50 CHRONIC BILATERAL LOW BACK PAIN WITHOUT SCIATICA: Primary | ICD-10-CM

## 2022-06-15 DIAGNOSIS — M54.6 CHRONIC BILATERAL THORACIC BACK PAIN: ICD-10-CM

## 2022-06-15 DIAGNOSIS — G89.29 CHRONIC BILATERAL THORACIC BACK PAIN: ICD-10-CM

## 2022-06-15 DIAGNOSIS — R51.9 NONINTRACTABLE EPISODIC HEADACHE, UNSPECIFIED HEADACHE TYPE: ICD-10-CM

## 2022-06-15 DIAGNOSIS — G89.29 CHRONIC BILATERAL LOW BACK PAIN WITHOUT SCIATICA: Primary | ICD-10-CM

## 2022-06-15 DIAGNOSIS — Z11.3 SCREEN FOR STD (SEXUALLY TRANSMITTED DISEASE): ICD-10-CM

## 2022-06-15 DIAGNOSIS — R53.83 FATIGUE, UNSPECIFIED TYPE: ICD-10-CM

## 2022-06-15 DIAGNOSIS — Z12.4 SCREENING FOR MALIGNANT NEOPLASM OF CERVIX: ICD-10-CM

## 2022-06-15 DIAGNOSIS — F32.1 CURRENT MODERATE EPISODE OF MAJOR DEPRESSIVE DISORDER, UNSPECIFIED WHETHER RECURRENT (H): ICD-10-CM

## 2022-06-15 LAB
ALBUMIN SERPL-MCNC: 3.7 G/DL (ref 3.5–5)
ALP SERPL-CCNC: 59 U/L (ref 45–120)
ALT SERPL W P-5'-P-CCNC: 14 U/L (ref 0–45)
ANION GAP SERPL CALCULATED.3IONS-SCNC: 10 MMOL/L (ref 5–18)
AST SERPL W P-5'-P-CCNC: 10 U/L (ref 0–40)
BILIRUB SERPL-MCNC: 0.3 MG/DL (ref 0–1)
BUN SERPL-MCNC: 10 MG/DL (ref 8–22)
CALCIUM SERPL-MCNC: 8.7 MG/DL (ref 8.5–10.5)
CHLORIDE BLD-SCNC: 104 MMOL/L (ref 98–107)
CLUE CELLS: ABNORMAL
CO2 SERPL-SCNC: 24 MMOL/L (ref 22–31)
CREAT SERPL-MCNC: 0.85 MG/DL (ref 0.6–1.1)
ERYTHROCYTE [DISTWIDTH] IN BLOOD BY AUTOMATED COUNT: 11.9 % (ref 10–15)
GFR SERPL CREATININE-BSD FRML MDRD: >90 ML/MIN/1.73M2
GLUCOSE BLD-MCNC: 102 MG/DL (ref 70–125)
HCT VFR BLD AUTO: 40.9 % (ref 35–47)
HGB BLD-MCNC: 13.2 G/DL (ref 11.7–15.7)
MCH RBC QN AUTO: 29.4 PG (ref 26.5–33)
MCHC RBC AUTO-ENTMCNC: 32.3 G/DL (ref 31.5–36.5)
MCV RBC AUTO: 91 FL (ref 78–100)
PLATELET # BLD AUTO: 277 10E3/UL (ref 150–450)
POTASSIUM BLD-SCNC: 3.6 MMOL/L (ref 3.5–5)
PROT SERPL-MCNC: 7.3 G/DL (ref 6–8)
RBC # BLD AUTO: 4.49 10E6/UL (ref 3.8–5.2)
SODIUM SERPL-SCNC: 138 MMOL/L (ref 136–145)
TRICHOMONAS, WET PREP: ABNORMAL
TSH SERPL DL<=0.005 MIU/L-ACNC: 2.19 UIU/ML (ref 0.3–5)
WBC # BLD AUTO: 4.8 10E3/UL (ref 4–11)
WBC'S/HIGH POWER FIELD, WET PREP: ABNORMAL
YEAST, WET PREP: ABNORMAL

## 2022-06-15 PROCEDURE — 84443 ASSAY THYROID STIM HORMONE: CPT | Performed by: FAMILY MEDICINE

## 2022-06-15 PROCEDURE — 85027 COMPLETE CBC AUTOMATED: CPT | Performed by: FAMILY MEDICINE

## 2022-06-15 PROCEDURE — 87491 CHLMYD TRACH DNA AMP PROBE: CPT | Performed by: FAMILY MEDICINE

## 2022-06-15 PROCEDURE — 87210 SMEAR WET MOUNT SALINE/INK: CPT | Performed by: FAMILY MEDICINE

## 2022-06-15 PROCEDURE — G0145 SCR C/V CYTO,THINLAYER,RESCR: HCPCS | Performed by: FAMILY MEDICINE

## 2022-06-15 PROCEDURE — 36415 COLL VENOUS BLD VENIPUNCTURE: CPT | Performed by: FAMILY MEDICINE

## 2022-06-15 PROCEDURE — 99214 OFFICE O/P EST MOD 30 MIN: CPT | Performed by: FAMILY MEDICINE

## 2022-06-15 PROCEDURE — 87591 N.GONORRHOEAE DNA AMP PROB: CPT | Performed by: FAMILY MEDICINE

## 2022-06-15 PROCEDURE — 82306 VITAMIN D 25 HYDROXY: CPT | Performed by: FAMILY MEDICINE

## 2022-06-15 PROCEDURE — 80053 COMPREHEN METABOLIC PANEL: CPT | Performed by: FAMILY MEDICINE

## 2022-06-15 RX ORDER — TRAZODONE HYDROCHLORIDE 50 MG/1
50 TABLET, FILM COATED ORAL AT BEDTIME
COMMUNITY
Start: 2022-05-25 | End: 2022-06-15

## 2022-06-15 RX ORDER — SUMATRIPTAN 50 MG/1
50 TABLET, FILM COATED ORAL
Qty: 10 TABLET | Refills: 2 | Status: ON HOLD | OUTPATIENT
Start: 2022-06-15 | End: 2023-03-20

## 2022-06-15 RX ORDER — BUPROPION HYDROCHLORIDE 300 MG/1
300 TABLET ORAL
COMMUNITY
Start: 2021-05-12 | End: 2022-09-30

## 2022-06-15 RX ORDER — TRAZODONE HYDROCHLORIDE 100 MG/1
TABLET ORAL
Status: ON HOLD | COMMUNITY
Start: 2022-03-29 | End: 2023-03-20

## 2022-06-15 RX ORDER — CELECOXIB 200 MG/1
200 CAPSULE ORAL DAILY
Qty: 30 CAPSULE | Refills: 2 | Status: SHIPPED | OUTPATIENT
Start: 2022-06-15 | End: 2022-10-27

## 2022-06-15 ASSESSMENT — PAIN SCALES - GENERAL: PAINLEVEL: SEVERE PAIN (6)

## 2022-06-15 ASSESSMENT — PATIENT HEALTH QUESTIONNAIRE - PHQ9
SUM OF ALL RESPONSES TO PHQ QUESTIONS 1-9: 16
SUM OF ALL RESPONSES TO PHQ QUESTIONS 1-9: 16
10. IF YOU CHECKED OFF ANY PROBLEMS, HOW DIFFICULT HAVE THESE PROBLEMS MADE IT FOR YOU TO DO YOUR WORK, TAKE CARE OF THINGS AT HOME, OR GET ALONG WITH OTHER PEOPLE: NOT DIFFICULT AT ALL

## 2022-06-15 NOTE — PROGRESS NOTES
Assessment & Plan     Chronic bilateral low back pain without sciatica  -Patient is moving at the end of the month. Discussed that we should trial some PT without radiating signs, new injuries. I printed [place] for her and [place]. She will trial Celebrex as well to see if this will help. She can follow up here in the next 2-3 months if needed. If these interventions aren't working will refer for MRI.    - Physical Therapy Referral  - celecoxib (CELEBREX) 200 MG capsule  Dispense: 30 capsule; Refill: 2    Nonintractable episodic headache, unspecified headache type  -Seems migrainous to me based on description. She will trial some Imitrex at the onset of her headaches and see how she does with this. She will again, f/u in 2-3 months.   - SUMAtriptan (IMITREX) 50 MG tablet  Dispense: 10 tablet; Refill: 2    Irregular periods  -Discussed OCP, she does apparently have a history of blood clot and this in combination with the headaches disqualifies her from estrogen. She could consider the minipill if they aren't improving with the celebrex as well. She could also consider the Nexplanon, I think IUD would be hard for insertion.   - TSH with free T4 reflex  - CBC with platelets  - celecoxib (CELEBREX) 200 MG capsule  Dispense: 30 capsule; Refill: 2  - TSH with free T4 reflex  - CBC with platelets    Vaginal discharge  -No obvious signs of infection on exam today. Wet prep today is normal as well, will watch for now.   - Wet prep - Clinic Collect    Morbid obesity (H)  -Discussed working on diet and exercise for now.     Fatigue, unspecified type  -Checking labs, if not improving can consider sleep medicine referral.   - Vitamin D deficiency screening  - Comprehensive metabolic panel (BMP + Alb, Alk Phos, ALT, AST, Total. Bili, TP)  - Vitamin D deficiency screening  - Comprehensive metabolic panel (BMP + Alb, Alk Phos, ALT, AST, Total. Bili, TP)    Chronic bilateral thoracic back pain  -Per above  with the lumbar pain. Can refer for MRI if these interventions aren't working.   - Physical Therapy Referral  - celecoxib (CELEBREX) 200 MG capsule  Dispense: 30 capsule; Refill: 2    Current moderate episode of major depressive disorder, unspecified whether recurrent (H)  Seeing mental health.     Screening for malignant neoplasm of cervix  - Gynecologic Cytology (PAP)  - HPV Hold (Lab Only)    Screen for STD (sexually transmitted disease)  - Neisseria gonorrhoeae PCR - Clinic Collect  - Chlamydia trachomatis PCR - Clinic Collect           Tobacco Cessation:   reports that she has been smoking cigarettes and cigarettes. She has been smoking about 0.10 packs per day. She has never used smokeless tobacco.  Tobacco Cessation Action Plan: did not discuss today due to nature of visit    Depression Screening Follow Up    PHQ 6/15/2022   PHQ-9 Total Score 16   Q9: Thoughts of better off dead/self-harm past 2 weeks Not at all       Follow Up Actions Taken  Crisis resource information provided in After Visit Summary  Referred patient back to current mental health provider.         No follow-ups on file.    Linda Penaloza MD  [facility]    Angelito Carrillo is a 26 year old, presenting for the following health issues:  Abnormal Bleeding Problem (Bleeds through pads in 30minutes, very painful cramping.) and Back Pain (Low back goes up to the base of her neck)      History of Present Illness       Back Pain:  She presents for follow up of back pain. Patient's back pain is a chronic problem.  Location of back pain:  Right lower back, left lower back, right middle of back, left middle of back, right upper back, left upper back, right hip and left hip  Description of back pain: sharp and shooting  Back pain spreads: left side of neck    Since patient first noticed back pain, pain is: gradually worsening  Does back pain interfere with her job:  Yes      Headaches:   Since the patient's last clinic  "visit, headaches are: worsened  The patient is getting headaches:  3  She is not able to do normal daily activities when she has a migraine.  The patient is taking the following rescue/relief medications:  Ibuprofen (Advil, Motrin) and Tylenol   Patient states \"I get no relief\" from the rescue/relief medications.   The patient is taking the following medications to prevent migraines:  No medications to prevent migraines  In the past 4 weeks, the patient has gone to an Urgent Care or Emergency Room 0 times times due to headaches.    She eats 0-1 servings of fruits and vegetables daily.She consumes 1 sweetened beverage(s) daily.She exercises with enough effort to increase her heart rate 10 to 19 minutes per day.  She exercises with enough effort to increase her heart rate 3 or less days per week. She is missing 4 dose(s) of medications per week.    Today's PHQ-9         PHQ-9 Total Score: 16    PHQ-9 Q9 Thoughts of better off dead/self-harm past 2 weeks :   Not at all    How difficult have these problems made it for you to do your work, take care of things at home, or get along with other people: Not difficult at all     She notes that she is quite uncomfortable with her periods, more on the right than the left. She does bleed through a pad in an hour. She does have them being irreguarl as well. She is getting clots as well. Her last period was 1.5 weeks late. This is new for her. Sometimes it's gradual, othertimes it's shooting/throbbing pain. She has tried heat for it, cold, hot showers, tylenol/ibuprofen and that is not helping. She did try hydroxyzine as well. She does have some discharge, some pain with intercourse at times. She does have itching an odor as well. She does have a burning pain with intercourse as well. She is not using comdoms, lubrication.     She does have back pain as well. She has tried all the above for her pain and not helping. She odes have pain in her mid to low back. It hurts daily, " morning's it is very bad, better after she has been moving around some. The pain is sharp in nature, stabbing at times. NO obvious things make it worse. Mainly stays in the back, can go to the sides as well. Mainly the spine. She does feel weak in her legs as well. She did have an episodes of not feeing her legs last month as well.     She does have headaches as well. This is mainly on the left, pounds. She does have the headaaches about two times a week. She does get sensitive to the light with them, not as much sound. She does get sick to her stomach at times. She can get a warning that she is getting her headache. She does note that she has been very lethargic as well. Same medications she has tried for her headaches no help. She has been going on long walks and that can make it worse.         Review of Systems   Per above      Objective    /78   Pulse 90   Wt 133.4 kg (294 lb)   LMP 06/05/2022   SpO2 97%   Breastfeeding No   BMI 46.05 kg/m    Body mass index is 46.05 kg/m .  Physical Exam   GENERAL: healthy, alert and no distress  NECK: no adenopathy, no asymmetry, masses, or scars and thyroid normal to palpation  RESP: lungs clear to auscultation - no rales, rhonchi or wheezes  CV: regular rate and rhythm, normal S1 S2, no S3 or S4, no murmur, click or rub, no peripheral edema and peripheral pulses strong   (female): normal female external genitalia, normal urethral meatus, vaginal mucosa, normal cervix, some moderate white discharge present  MS: no gross musculoskeletal defects noted, no edema  BACK: no CVA tenderness, no paralumbar tenderness, no midline tenderness to palpation, positive straight leg raise bilaterally, left worse than right.   Neuro: normal strength in bilateral lower extremities, normal gait.               .  ..

## 2022-06-16 LAB
C TRACH DNA SPEC QL NAA+PROBE: NEGATIVE
DEPRECATED CALCIDIOL+CALCIFEROL SERPL-MC: 23 UG/L (ref 20–75)
N GONORRHOEA DNA SPEC QL NAA+PROBE: NEGATIVE

## 2022-06-18 LAB
BKR LAB AP GYN ADEQUACY: NORMAL
BKR LAB AP GYN INTERPRETATION: NORMAL
BKR LAB AP HPV REFLEX: NORMAL
BKR LAB AP LMP: NORMAL
BKR LAB AP PREVIOUS ABNORMAL: NORMAL
PATH REPORT.COMMENTS IMP SPEC: NORMAL
PATH REPORT.COMMENTS IMP SPEC: NORMAL
PATH REPORT.RELEVANT HX SPEC: NORMAL

## 2022-06-23 ENCOUNTER — PATIENT OUTREACH (OUTPATIENT)
Dept: FAMILY MEDICINE | Facility: CLINIC | Age: 27
End: 2022-06-23

## 2022-09-13 NOTE — PROGRESS NOTES
Patient was referred to [facility] from OB. Patient is not on medical and is having a baby within the next few weeks. Patient came and this worker assisted her in filling out the medical insurance application.     This worker gave application to financial worker to be faxed.   Detail Level: Detailed

## 2022-09-30 ENCOUNTER — PRENATAL OFFICE VISIT (OUTPATIENT)
Dept: OBGYN | Facility: OTHER | Age: 27
End: 2022-09-30
Attending: OBSTETRICS & GYNECOLOGY
Payer: COMMERCIAL

## 2022-09-30 VITALS
HEIGHT: 67 IN | WEIGHT: 288.87 LBS | BODY MASS INDEX: 45.34 KG/M2 | SYSTOLIC BLOOD PRESSURE: 102 MMHG | HEART RATE: 105 BPM | DIASTOLIC BLOOD PRESSURE: 70 MMHG | OXYGEN SATURATION: 97 %

## 2022-09-30 DIAGNOSIS — E66.813 CLASS 3 SEVERE OBESITY DUE TO EXCESS CALORIES WITHOUT SERIOUS COMORBIDITY IN ADULT, UNSPECIFIED BMI (H): ICD-10-CM

## 2022-09-30 DIAGNOSIS — F32.0 CURRENT MILD EPISODE OF MAJOR DEPRESSIVE DISORDER, UNSPECIFIED WHETHER RECURRENT (H): Primary | ICD-10-CM

## 2022-09-30 DIAGNOSIS — O21.9 PREGNANCY RELATED NAUSEA AND VOMITING, ANTEPARTUM: ICD-10-CM

## 2022-09-30 DIAGNOSIS — E66.01 CLASS 3 SEVERE OBESITY DUE TO EXCESS CALORIES WITHOUT SERIOUS COMORBIDITY IN ADULT, UNSPECIFIED BMI (H): ICD-10-CM

## 2022-09-30 DIAGNOSIS — Z34.91 PREGNANCY WITH UNCERTAIN DATES IN FIRST TRIMESTER: ICD-10-CM

## 2022-09-30 PROCEDURE — G0463 HOSPITAL OUTPT CLINIC VISIT: HCPCS

## 2022-09-30 PROCEDURE — 99207 PR FIRST OB VISIT: CPT | Performed by: OBSTETRICS & GYNECOLOGY

## 2022-09-30 PROCEDURE — 76817 TRANSVAGINAL US OBSTETRIC: CPT | Performed by: OBSTETRICS & GYNECOLOGY

## 2022-09-30 RX ORDER — ONDANSETRON 4 MG/1
4 TABLET, FILM COATED ORAL EVERY 8 HOURS PRN
Qty: 90 TABLET | Refills: 3 | Status: ON HOLD | OUTPATIENT
Start: 2022-09-30 | End: 2023-03-20

## 2022-09-30 RX ORDER — BUPROPION HYDROCHLORIDE 300 MG/1
300 TABLET ORAL DAILY
Qty: 90 TABLET | Refills: 3 | Status: ON HOLD | OUTPATIENT
Start: 2022-09-30 | End: 2023-03-20

## 2022-09-30 ASSESSMENT — PAIN SCALES - GENERAL: PAINLEVEL: SEVERE PAIN (6)

## 2022-09-30 NOTE — NURSING NOTE
"Chief Complaint   Patient presents with     Prenatal Care       Initial /70   Pulse 105   Ht 1.702 m (5' 7\")   Wt 131 kg (288 lb 13.9 oz)   LMP 06/05/2022   SpO2 97%   BMI 45.24 kg/m   Estimated body mass index is 45.24 kg/m  as calculated from the following:    Height as of this encounter: 1.702 m (5' 7\").    Weight as of this encounter: 131 kg (288 lb 13.9 oz).  Medication Reconciliation: complete  Elsa Jason RN    "

## 2022-09-30 NOTE — PROGRESS NOTES
"  HPI:  oLrena Echevarria is a 27 year old female  Patient's last menstrual period was 2022. at Unknown, Estimated Date of Delivery: Data Unavailable.  She denies vaginal bleeding and abdominal pain.  + N/V.  + depression off meds.  + anxiety for which she takes hydroxyzine.  H/e Pre-E, obestiy, GDM.  No other c/o.  FOB ([facility])    Past Medical History:   Diagnosis Date     Abnormal Pap smear of cervix 2017     Anxiety      Autism spectrum disorder      Autism spectrum disorder     per [facility] records     Depressed      Diet controlled gestational diabetes mellitus (GDM) in third trimester 9/15/2017    Overview:  Insulin start today Begin testing 4 times daily NST BPP weekly ordered Diabetes center     Fetal tachycardia affecting management of mother 2017     Gestational diabetes mellitus (GDM) 9/15/2017    Overview:  Insulin start today Begin testing 4 times daily NST BPP weekly ordered Diabetes center     Herpes     no outbreak now     High-risk first pregnancy of young woman, third trimester 9/15/2017    Overview:  Tdap and flu done ROR signed Poor compliance with prenatal care and GDM.   consulted for assistance.   Transfer from [place] 34 weeks Girl     History of suicide attempt 2014    multiple     LGSIL on Pap smear of cervix 2017     Mental disorder     depression     Mild intellectual disabilities      Normal labor and delivery 10/17/2017     Pregnant 2021     Schizoaffective disorder (H)        Past Surgical History:   Procedure Laterality Date     COLPOSCOPY         Allergies: Lamictal [lamotrigine], Quetiapine, Amoxicillin, and Zoloft [sertraline]     ROS:   Denies fever, wt loss   Neg /GI other than per HPI      EXAM:  Blood pressure 102/70, pulse 105, height 1.702 m (5' 7\"), weight 131 kg (288 lb 13.9 oz), last menstrual period 2022, SpO2 97 %, not currently breastfeeding.   BMI= Body mass index is 45.24 kg/m .  General - pleasant female in no acute " distress.  Abdomen - soft, nontender, nondistended, no hepatosplenomegaly.  Pelvic - EG: normal adult female, BUS: within normal limits,Rectovaginal - deferred.    US:    Transvaginal:Yes  Yolk sac present:Yes  CRL:  21mm  FCA present:Yes  EGA 8w 5d  TAMIKA:5/7/23          ASSESSMENT/PLAN:  (Z34.90) Supervision of normal pregnancy  (primary encounter diagnosis)  Comment: Viable IUP with discordant dates.   Plan: TSH, T4 free, HIV Antigen Antibody Combo,         Rubella Antibody IgG, Hepatitis B surface         antigen, Treponema Abs w Reflex to RPR and         Titer, Urine Culture, UA with Microscopic, CBC         with platelets, Urine Drugs of Abuse Screen         Panel 13, Hepatitis C antibody, [employer]         Non-Invasive Prenatal Screening        (F32.0) Current mild episode of major depressive disorder, unspecified whether recurrent (H)  Comment: r/b discussed  Plan: buPROPion (WELLBUTRIN XL) 300 MG 24 hr tablet           (O21.9) Pregnancy related nausea and vomiting, antepartum  Comment: R/B discussed  Plan: ondansetron (ZOFRAN) 4 MG tablet      Obesity.  H/o GDM and preeclampsia   Early GTT.  Baby ASA starting second trimester.     1st Trimester precautions and testing discussed.  New OB Labs ordered and exam scheduled.  Aneuploidy testing options discussed. Desires NIPT(ordered next visit) Patient has my card and phone number to call prn if problems in interim.    Fidel Severino MD

## 2022-10-27 ENCOUNTER — LAB (OUTPATIENT)
Dept: LAB | Facility: OTHER | Age: 27
End: 2022-10-27
Attending: NURSE PRACTITIONER
Payer: COMMERCIAL

## 2022-10-27 ENCOUNTER — OFFICE VISIT (OUTPATIENT)
Dept: BEHAVIORAL HEALTH | Facility: OTHER | Age: 27
End: 2022-10-27
Attending: SOCIAL WORKER
Payer: COMMERCIAL

## 2022-10-27 ENCOUNTER — PRENATAL OFFICE VISIT (OUTPATIENT)
Dept: OBGYN | Facility: OTHER | Age: 27
End: 2022-10-27
Attending: NURSE PRACTITIONER
Payer: COMMERCIAL

## 2022-10-27 VITALS
RESPIRATION RATE: 16 BRPM | HEART RATE: 90 BPM | SYSTOLIC BLOOD PRESSURE: 100 MMHG | DIASTOLIC BLOOD PRESSURE: 70 MMHG | OXYGEN SATURATION: 98 % | WEIGHT: 288 LBS | BODY MASS INDEX: 45.2 KG/M2 | HEIGHT: 67 IN

## 2022-10-27 DIAGNOSIS — R45.851 SUICIDAL THOUGHTS: ICD-10-CM

## 2022-10-27 DIAGNOSIS — Z33.1 PREGNANCY, INCIDENTAL: ICD-10-CM

## 2022-10-27 DIAGNOSIS — E66.01 CLASS 3 SEVERE OBESITY DUE TO EXCESS CALORIES WITHOUT SERIOUS COMORBIDITY IN ADULT, UNSPECIFIED BMI (H): ICD-10-CM

## 2022-10-27 DIAGNOSIS — R69 DIAGNOSIS DEFERRED: Primary | ICD-10-CM

## 2022-10-27 DIAGNOSIS — Z86.32 HISTORY OF GESTATIONAL DIABETES MELLITUS (GDM): ICD-10-CM

## 2022-10-27 DIAGNOSIS — O99.011 ANEMIA AFFECTING PREGNANCY IN FIRST TRIMESTER: Primary | ICD-10-CM

## 2022-10-27 DIAGNOSIS — O09.292 HIGH RISK PREGNANCY DUE TO HISTORY OF PREVIOUS OBSTETRICAL PROBLEM IN SECOND TRIMESTER: ICD-10-CM

## 2022-10-27 DIAGNOSIS — E66.813 CLASS 3 SEVERE OBESITY DUE TO EXCESS CALORIES WITHOUT SERIOUS COMORBIDITY IN ADULT, UNSPECIFIED BMI (H): ICD-10-CM

## 2022-10-27 PROBLEM — O24.415 GESTATIONAL DIABETES MELLITUS (GDM) IN THIRD TRIMESTER CONTROLLED ON ORAL HYPOGLYCEMIC DRUG: Status: RESOLVED | Noted: 2017-09-15 | Resolved: 2022-10-27

## 2022-10-27 PROBLEM — Z36.89 ENCOUNTER FOR TRIAGE IN PREGNANT PATIENT: Status: RESOLVED | Noted: 2017-09-27 | Resolved: 2022-10-27

## 2022-10-27 PROBLEM — O09.613: Status: RESOLVED | Noted: 2017-09-15 | Resolved: 2022-10-27

## 2022-10-27 PROBLEM — O24.419 GESTATIONAL DIABETES: Status: RESOLVED | Noted: 2017-10-16 | Resolved: 2022-10-27

## 2022-10-27 PROBLEM — O24.419 GDM (GESTATIONAL DIABETES MELLITUS): Status: RESOLVED | Noted: 2017-10-13 | Resolved: 2022-10-27

## 2022-10-27 LAB
ALBUMIN UR-MCNC: 10 MG/DL
AMPHETAMINES UR QL: NOT DETECTED
APPEARANCE UR: ABNORMAL
BACTERIA #/AREA URNS HPF: ABNORMAL /HPF
BARBITURATES UR QL SCN: NOT DETECTED
BENZODIAZ UR QL SCN: NOT DETECTED
BILIRUB UR QL STRIP: NEGATIVE
BUPRENORPHINE UR QL: NOT DETECTED
CANNABINOIDS UR QL: NOT DETECTED
COCAINE UR QL SCN: NOT DETECTED
COLOR UR AUTO: YELLOW
D-METHAMPHET UR QL: NOT DETECTED
ERYTHROCYTE [DISTWIDTH] IN BLOOD BY AUTOMATED COUNT: 12.7 % (ref 10–15)
EST. AVERAGE GLUCOSE BLD GHB EST-MCNC: 114 MG/DL
GLUCOSE UR STRIP-MCNC: NEGATIVE MG/DL
HBA1C MFR BLD: 5.6 %
HCT VFR BLD AUTO: 39.8 % (ref 35–47)
HGB BLD-MCNC: 13.4 G/DL (ref 11.7–15.7)
HGB UR QL STRIP: NEGATIVE
HYALINE CASTS: 1 /LPF
KETONES UR STRIP-MCNC: NEGATIVE MG/DL
LEUKOCYTE ESTERASE UR QL STRIP: ABNORMAL
MCH RBC QN AUTO: 30.5 PG (ref 26.5–33)
MCHC RBC AUTO-ENTMCNC: 33.7 G/DL (ref 31.5–36.5)
MCV RBC AUTO: 91 FL (ref 78–100)
METHADONE UR QL SCN: NOT DETECTED
MUCOUS THREADS #/AREA URNS LPF: PRESENT /LPF
NITRATE UR QL: NEGATIVE
OPIATES UR QL SCN: NOT DETECTED
OXYCODONE UR QL SCN: NOT DETECTED
PCP UR QL SCN: NOT DETECTED
PH UR STRIP: 7 [PH] (ref 4.7–8)
PLATELET # BLD AUTO: 223 10E3/UL (ref 150–450)
PROPOXYPH UR QL: NOT DETECTED
RBC # BLD AUTO: 4.39 10E6/UL (ref 3.8–5.2)
RBC URINE: 1 /HPF
SP GR UR STRIP: 1.02 (ref 1–1.03)
SQUAMOUS EPITHELIAL: 5 /HPF
T4 FREE SERPL-MCNC: 0.8 NG/DL (ref 0.9–1.7)
TRICYCLICS UR QL SCN: NOT DETECTED
TSH SERPL DL<=0.005 MIU/L-ACNC: 1.11 UIU/ML (ref 0.3–4.2)
UROBILINOGEN UR STRIP-MCNC: NORMAL MG/DL
WBC # BLD AUTO: 7.1 10E3/UL (ref 4–11)
WBC URINE: 23 /HPF

## 2022-10-27 PROCEDURE — G0463 HOSPITAL OUTPT CLINIC VISIT: HCPCS | Mod: 25

## 2022-10-27 PROCEDURE — 86780 TREPONEMA PALLIDUM: CPT | Mod: ZL

## 2022-10-27 PROCEDURE — 99207 PR PRENATAL VISIT: CPT | Performed by: NURSE PRACTITIONER

## 2022-10-27 PROCEDURE — 83036 HEMOGLOBIN GLYCOSYLATED A1C: CPT | Mod: ZL

## 2022-10-27 PROCEDURE — 81001 URINALYSIS AUTO W/SCOPE: CPT | Mod: ZL,59

## 2022-10-27 PROCEDURE — 85014 HEMATOCRIT: CPT | Mod: ZL

## 2022-10-27 PROCEDURE — 84443 ASSAY THYROID STIM HORMONE: CPT | Mod: ZL

## 2022-10-27 PROCEDURE — 80306 DRUG TEST PRSMV INSTRMNT: CPT | Mod: ZL

## 2022-10-27 PROCEDURE — 84439 ASSAY OF FREE THYROXINE: CPT | Mod: ZL

## 2022-10-27 PROCEDURE — 87086 URINE CULTURE/COLONY COUNT: CPT | Mod: ZL

## 2022-10-27 PROCEDURE — 87389 HIV-1 AG W/HIV-1&-2 AB AG IA: CPT | Mod: ZL

## 2022-10-27 PROCEDURE — 87340 HEPATITIS B SURFACE AG IA: CPT | Mod: ZL

## 2022-10-27 PROCEDURE — G0463 HOSPITAL OUTPT CLINIC VISIT: HCPCS

## 2022-10-27 PROCEDURE — 86803 HEPATITIS C AB TEST: CPT | Mod: ZL

## 2022-10-27 PROCEDURE — 36415 COLL VENOUS BLD VENIPUNCTURE: CPT | Mod: ZL

## 2022-10-27 PROCEDURE — 86762 RUBELLA ANTIBODY: CPT | Mod: ZL

## 2022-10-27 PROCEDURE — 87491 CHLMYD TRACH DNA AMP PROBE: CPT | Mod: ZL | Performed by: NURSE PRACTITIONER

## 2022-10-27 PROCEDURE — 87591 N.GONORRHOEAE DNA AMP PROB: CPT | Mod: ZL | Performed by: NURSE PRACTITIONER

## 2022-10-27 RX ORDER — DOCUSATE SODIUM 100 MG/1
100 CAPSULE, LIQUID FILLED ORAL 2 TIMES DAILY
Qty: 120 CAPSULE | Refills: 1 | Status: ON HOLD | OUTPATIENT
Start: 2022-10-27 | End: 2023-03-20

## 2022-10-27 RX ORDER — FERROUS SULFATE 325(65) MG
325 TABLET ORAL
Qty: 60 TABLET | Refills: 1 | Status: SHIPPED | OUTPATIENT
Start: 2022-10-27 | End: 2022-12-29

## 2022-10-27 ASSESSMENT — PAIN SCALES - GENERAL: PAINLEVEL: MILD PAIN (2)

## 2022-10-27 NOTE — NURSING NOTE
"Chief Complaint   Patient presents with     Prenatal Care     12 weeks 4 days       Initial /70 (BP Location: Left arm, Patient Position: Sitting, Cuff Size: Adult Large)   Pulse 90   Resp 16   Ht 1.702 m (5' 7\")   Wt 130.6 kg (288 lb)   LMP 06/05/2022   SpO2 98%   BMI 45.11 kg/m   Estimated body mass index is 45.11 kg/m  as calculated from the following:    Height as of this encounter: 1.702 m (5' 7\").    Weight as of this encounter: 130.6 kg (288 lb).  Medication Reconciliation: complete  Itzel Chang RN    "

## 2022-10-28 LAB
C TRACH DNA SPEC QL PROBE+SIG AMP: NEGATIVE
N GONORRHOEA DNA SPEC QL NAA+PROBE: NEGATIVE

## 2022-10-28 ASSESSMENT — PATIENT HEALTH QUESTIONNAIRE - PHQ9: SUM OF ALL RESPONSES TO PHQ QUESTIONS 1-9: 22

## 2022-10-28 NOTE — PROGRESS NOTES
HPI:  Lorena Echevarria is a 27 year old female Patient's last menstrual period was 06/05/2022. at 12w5d, Estimated Date of Delivery: May 7, 2023.  She denies vaginal bleeding, vomiting, abdominal pain and headaches. PHQ9 score of 22 today with present thoughts self harm.  Denies plan and feels safe with her partner.  Hx of hospitalization for depression last December and is feeling similar now.  [facility] referral and Dr Cintron referral today.  Clinical  to see patient in office today. Crisis information reviewed.  Notes she has a very difficult time remembering to take her medications and is only taking them about 4 days a week. Partner states they have tried setting up med boxes and reminders on her phone. He will try to assist more in getting her to take meds daily.  Will also plan Public Health nursing.  No other c/o.    Past Medical History:   Diagnosis Date     Abnormal Pap smear of cervix 03/30/2017     Anxiety      Autism spectrum disorder      Autism spectrum disorder     per [facility] records     Depressed      Diet controlled gestational diabetes mellitus (GDM) in third trimester 9/15/2017    Overview:  Insulin start today Begin testing 4 times daily NST BPP weekly ordered Diabetes center     Fetal tachycardia affecting management of mother 9/1/2017     Gestational diabetes mellitus (GDM) 9/15/2017    Overview:  Insulin start today Begin testing 4 times daily NST BPP weekly ordered Diabetes center     Herpes     no outbreak now     High-risk first pregnancy of young woman, third trimester 9/15/2017    Overview:  Tdap and flu done ROR signed Poor compliance with prenatal care and GDM.   consulted for assistance.   Transfer from [facility] 34 weeks Girl     History of suicide attempt 2014    multiple     LGSIL on Pap smear of cervix 03/30/2017     Mental disorder     depression     Mild intellectual disabilities      Normal labor and delivery 10/17/2017     Pregnant 2/6/2021      "Schizoaffective disorder (H)        Past Surgical History:   Procedure Laterality Date     COLPOSCOPY         Allergies: Lamictal [lamotrigine], Quetiapine, Amoxicillin, and Zoloft [sertraline]     EXAM:  Blood pressure 100/70, pulse 90, resp. rate 16, height 1.702 m (5' 7\"), weight 130.6 kg (288 lb), last menstrual period 06/05/2022, SpO2 98 %, not currently breastfeeding.   BMI= Body mass index is 45.11 kg/m .  General - pleasant female in no acute distress.  Neck - supple without lymphadenopathy or thyromegaly.  Lungs - clear to auscultation bilaterally.  Heart - regular rate and rhythm without murmur.  Abdomen - soft, nontender, nondistended, no hepatosplenomegaly.  Pelvic - EG: normal adult female, BUS: within normal limits, Vagina: well rugated, no discharge, Cervix: no lesions or CMT, closed/long Uterus: gravid, consistant with dates, mobile, Adnexae: no masses or tenderness.  Rectovaginal - deferred.  Musculoskeletal - no gross deformities.  Neurological - normal strength, sensation, and mental status.    Doptones were 162    ASSESSMENT/PLAN:  (O99.011) Anemia affecting pregnancy in first trimester  (primary encounter diagnosis)  Comment:   Plan: ferrous sulfate (FEROSUL) 325 (65 Fe) MG         tablet, docusate sodium (COLACE) 100 MG         capsule, Chlamydia trachomatis/Neisseria         gonorrhoeae by PCR            (Z86.32) History of gestational diabetes mellitus (GDM)  Comment:   Plan: Glucose tolerance gest screen 1 hour            (Z33.1) Pregnancy, incidental  Comment:   Plan: US OB Limited >14 Weeks wo Fetal Measurement            (O09.292) High risk pregnancy due to history of previous obstetrical problem in second trimester  Comment: new ob teaching and physical completed  Plan: return in 4 weeks.     (R45.550) Suicidal thoughts  Comment:   Plan: SKYE Davalos with patient in office today.  [facility] referral and referral to psychiatry. Crisis information reviewed. Public health referral placed " for home visiting.        Weight gain and exercise during pregnancy was discussed at today's visit.  The patient will return to clinic in 4 weeks for continued prenatal care.

## 2022-10-28 NOTE — PROGRESS NOTES
Have you had or do you currently have:    - Diabetes? n  - Hypertension? n  - Heart disease, mitral valve prolapse, or rheumatic fever?  n  - An autoimmune disease such as lupus or rheumatoid arthritis?  n  - Kidney disease, urinary tract infection?  n  - Epilepsy, seizures, or spells?  n  - Migraine headaches?  y  - Any other neurological problems?  n  - Have you ever been treated for depression?  y  - Are you having problems with crying spells or loss of self-esteem?  y  - Have you ever required psychiatric care?  y  - Have you ever had hepatitis, liver disease, or jaundice?  n  - Have you ever been treated for blood clots in your veins, deep vein thrombosis, inflammation in the veins, thrombosis, phelbitis, pulmonary embolism or varicosities?  n  - Have you had excessive bleeding after surgery or dental work?  n  - Do you bleed more than other women after a cut or scratch?  n  - Do you have a history or anemia?  n  - Have you ever had thyroid problems or take thyroid medication?  n  - Do you have any endocrine problems?  n  - Have you every been in a major accident or suffered serious trauma?  n  - Within the last year, has anyone hit, slapped, kicked, or otherwise hurt you?  n  - In the last year, has anyone forced you to have sex when you didn't want to?  n  - Have you every received a blood transfusion?  n  - Would you refuse a blood transfusion if a doctor judged it to be medically necessary?  n  - Does anyone in your home smoke? y  - Do you use tobacco products?  y  - Do you drink beer, wine, or hard liquor?  n  - Do you use any of the following: marijuana, speed, cocaine, heroin, hallucinogens, or other drugs?  n  - Is your blood type RH negative?  n  - Have you ever had asthma?  n  - Have you ever had tuberculosis?  n  - Do you have any allergies to drugs or over-the-counter medications?  y  - Allergies: dust mites, aspartame, ethanol, venlafaxine hydrochloride, sertraline?  n  - Have you had any breast  problems?  n  - Have you ever breast-fed?  y  - Have you had any gynecological surgical procedures such as cervical conization, LEEP, laser treatment, cryosurgery of the cervix, or a dilatation and curettage, etc?  n  - Have you ever had any other surgical procedures?  n  - Have you ever had any anesthetic complications?  n  - Have you ever had an abnormal pap smear?  n  - Do you have a history of abnormalities of the uterus? n  - Did your mother take DOE or any other hormones when she was pregnant with you?  n  - Did it take you more than one year to become pregnant?  n  - Have you ever been evaluated or treated for infertility?  n  - Is there a history of medical problems in your family, which you feel might adversely affect your health or pregnancy?  n  - Do you have any other problems we have not asked about which you feel may be important to this pregnancy?  n    Symptoms since last menstrual period  - Do you currently have any of the following symptoms: abdominal pain, blood in the stool or urine, chest pain, shortness of breath, coughing or vomiting up blood, you heart is racing or skipping beats, nausea and vomiting, pain on urination, or vaginal discharge or bleeding?  n    Genetic screening  Has the patient, baby's father, or anyone in either family had:  - Thalassemia (Italian, Greek, Mediterranean, or  background only) and an MCV result less than 80?  n  - Neural tube defect such as meningomyelocele, spina bifida, or anencephaly?  n  - Congential heart defect?  n  - Down's syndrome?  n  - Bridger-Sachs disease ( Rastafarian, Cajun, Hebrew-[place]ner)?  n  - Sickle cell disease or trait () ?  n  - Hemophilia or other inherited problems of blood?  n  - Muscular dystrophy?  n  - Cystic fibrosis?  n  - [place]'s chorea?  n  - Mental retardation/autism?  n   If yes, was the person tested for Fragile X?  n  - Any other inherited genetic or chromosomal disorder?  n  - Maternal metabolic disorder (e.g.  insulin- dependent diabetes, PKU)?  n  - A child with birth defects not listed above?  n  - Recurrent pregnancy loss, or a stillbirth?  n  - Has the patient had any medications/street drugs/alcohol since her last menstrual period?  n  - Does the patient or baby's father have any other genetic risk?  n    Infection history  - Have you ever been treated for tuberculosis?  n  - Have you every had a positive skin test for tuberculosis?  n  - Do you live with someone who has tuberculosis?  n  - Have you ever been exposed to tuberculosis?  n  - Do you have genital herpes?  y  - Does your partner have genital herpes?  n  - Have you had a rash or viral illness since your last period?  n  - Have you ever had gonorrhea, chlamydia, syphilis, venereal warts, trichomoniasis, pelvic inflammatory disease, or any other sexually transmitted disease?  n  - Have you had chicken pox?  y  - Have you been vaccinated against chicken pox?  y  - Have you had any other infectious diseases?  n

## 2022-10-29 LAB
BACTERIA UR CULT: NORMAL
T PALLIDUM AB SER QL: NONREACTIVE

## 2022-10-31 LAB
HBV SURFACE AG SERPL QL IA: NONREACTIVE
HCV AB SERPL QL IA: NONREACTIVE
HIV 1+2 AB+HIV1 P24 AG SERPL QL IA: NONREACTIVE
RUBV IGG SERPL QL IA: 10 INDEX
RUBV IGG SERPL QL IA: POSITIVE

## 2022-10-31 NOTE — CONFIDENTIAL NOTE
ELVIA CC introduced and explained integrated health model, brief therapy interventions and referral/ support services for ongoing therapy interventions.  Discussed [facility] services, patient was interested.  Presenting issue: Pt is currently pregnant and is in need of MH services.  She advised she has had significant SI but no plan or intent.  Assisted pt in calling [facility] [employer] who advised they would send paperwork to pt.  Scheduled pt for [facility] enrollment on 11/2 to enroll and discuss further resources.    Annamarie Toussaint, [facility]  Social Work Care Coordinator  [facility] [place]  [phone] option 2 or [phone]

## 2022-11-03 ENCOUNTER — TELEPHONE (OUTPATIENT)
Dept: BEHAVIORAL HEALTH | Facility: OTHER | Age: 27
End: 2022-11-03

## 2022-11-04 LAB — SCANNED LAB RESULT: NORMAL

## 2022-11-11 NOTE — CONFIDENTIAL NOTE
Writer received text message from patient apologizing for missing her appointment.  She advised she forgot to call and cancel and that her kids were sick.  Writer offered understanding and offered a few other dates writer was available; encouraged her to reach out when ready to reschedule.  Will await response.    Annamarie Toussaint [facility]  Social Work Care Coordinator  [facility]al [facility] [place]  [phone] option 2 or [phone]

## 2022-11-29 ENCOUNTER — PRENATAL OFFICE VISIT (OUTPATIENT)
Dept: OBGYN | Facility: OTHER | Age: 27
End: 2022-11-29
Attending: NURSE PRACTITIONER
Payer: COMMERCIAL

## 2022-11-29 ENCOUNTER — LAB (OUTPATIENT)
Dept: LAB | Facility: OTHER | Age: 27
End: 2022-11-29
Attending: NURSE PRACTITIONER
Payer: COMMERCIAL

## 2022-11-29 VITALS
OXYGEN SATURATION: 98 % | BODY MASS INDEX: 44.8 KG/M2 | HEIGHT: 67 IN | HEART RATE: 97 BPM | WEIGHT: 285.4 LBS | SYSTOLIC BLOOD PRESSURE: 107 MMHG | DIASTOLIC BLOOD PRESSURE: 64 MMHG

## 2022-11-29 DIAGNOSIS — Z86.32 HISTORY OF GESTATIONAL DIABETES MELLITUS (GDM): ICD-10-CM

## 2022-11-29 DIAGNOSIS — O09.292 HIGH RISK PREGNANCY DUE TO HISTORY OF PREVIOUS OBSTETRICAL PROBLEM IN SECOND TRIMESTER: Primary | ICD-10-CM

## 2022-11-29 LAB — GLUCOSE 1H P 50 G GLC PO SERPL-MCNC: 142 MG/DL (ref 70–129)

## 2022-11-29 PROCEDURE — 36415 COLL VENOUS BLD VENIPUNCTURE: CPT | Mod: ZL

## 2022-11-29 PROCEDURE — 82950 GLUCOSE TEST: CPT | Mod: ZL

## 2022-11-29 PROCEDURE — 99207 PR PRENATAL VISIT: CPT | Performed by: NURSE PRACTITIONER

## 2022-11-29 PROCEDURE — G0463 HOSPITAL OUTPT CLINIC VISIT: HCPCS | Performed by: COUNSELOR

## 2022-11-29 ASSESSMENT — PAIN SCALES - GENERAL: PAINLEVEL: NO PAIN (0)

## 2022-11-29 NOTE — PROGRESS NOTES
Doing well.  Intermittent flutters.  Denies cramping or VB.  Discussed upcoming anatomy screen.  Gtt today.  Second trimester changes discussed. Considering sterilization.  Return in 4 weeks.

## 2022-11-30 DIAGNOSIS — Z86.32 HISTORY OF GESTATIONAL DIABETES IN PRIOR PREGNANCY, CURRENTLY PREGNANT IN SECOND TRIMESTER: ICD-10-CM

## 2022-11-30 DIAGNOSIS — R73.09 ELEVATED GLUCOSE TOLERANCE TEST: Primary | ICD-10-CM

## 2022-11-30 DIAGNOSIS — O09.292 HISTORY OF GESTATIONAL DIABETES IN PRIOR PREGNANCY, CURRENTLY PREGNANT IN SECOND TRIMESTER: ICD-10-CM

## 2022-12-12 ENCOUNTER — TELEPHONE (OUTPATIENT)
Dept: NUTRITION | Facility: HOSPITAL | Age: 27
End: 2022-12-12

## 2022-12-19 ENCOUNTER — HOSPITAL ENCOUNTER (OUTPATIENT)
Dept: ULTRASOUND IMAGING | Facility: HOSPITAL | Age: 27
Discharge: HOME | End: 2022-12-19
Attending: NURSE PRACTITIONER | Admitting: NURSE PRACTITIONER
Payer: COMMERCIAL

## 2022-12-19 DIAGNOSIS — Z33.1 PREGNANCY, INCIDENTAL: ICD-10-CM

## 2022-12-19 PROCEDURE — 76805 OB US >/= 14 WKS SNGL FETUS: CPT

## 2022-12-29 ENCOUNTER — OFFICE VISIT (OUTPATIENT)
Dept: CARE COORDINATION | Facility: OTHER | Age: 27
End: 2022-12-29
Payer: COMMERCIAL

## 2022-12-29 ENCOUNTER — PRENATAL OFFICE VISIT (OUTPATIENT)
Dept: OBGYN | Facility: OTHER | Age: 27
End: 2022-12-29
Attending: NURSE PRACTITIONER
Payer: COMMERCIAL

## 2022-12-29 VITALS
SYSTOLIC BLOOD PRESSURE: 109 MMHG | HEIGHT: 67 IN | WEIGHT: 284.2 LBS | OXYGEN SATURATION: 98 % | BODY MASS INDEX: 44.61 KG/M2 | HEART RATE: 92 BPM | DIASTOLIC BLOOD PRESSURE: 62 MMHG

## 2022-12-29 DIAGNOSIS — R73.09 ELEVATED GLUCOSE TOLERANCE TEST: ICD-10-CM

## 2022-12-29 DIAGNOSIS — O09.292 HIGH RISK PREGNANCY DUE TO HISTORY OF PREVIOUS OBSTETRICAL PROBLEM IN SECOND TRIMESTER: ICD-10-CM

## 2022-12-29 DIAGNOSIS — R69 DIAGNOSIS DEFERRED: Primary | ICD-10-CM

## 2022-12-29 DIAGNOSIS — O99.011 ANEMIA AFFECTING PREGNANCY IN FIRST TRIMESTER: ICD-10-CM

## 2022-12-29 PROCEDURE — 99207 PR PRENATAL VISIT: CPT | Performed by: NURSE PRACTITIONER

## 2022-12-29 PROCEDURE — G0463 HOSPITAL OUTPT CLINIC VISIT: HCPCS | Performed by: NURSE PRACTITIONER

## 2022-12-29 RX ORDER — FERROUS SULFATE 325(65) MG
325 TABLET ORAL
Qty: 60 TABLET | Refills: 1 | Status: ON HOLD | OUTPATIENT
Start: 2022-12-29 | End: 2023-03-20

## 2022-12-29 ASSESSMENT — PAIN SCALES - GENERAL: PAINLEVEL: NO PAIN (0)

## 2022-12-29 NOTE — PROGRESS NOTES
[facility] possibly do [facility] management with [facility] pt.  Will assist pt with finding a therapist/psychiatrist/couples therapist.  With pt's verbal permission, will place a referral to [facility] to inquire about WIC, SNAP, and cash assistance benefits.  Pt gave verbal permission to send referral to [employer] to help significant other look for employment and additional assistance.  Provided the following resources: [employer] transportation and [employer].//Provided direct # and encouraged pt to call if she thinks of additional resources/assistance she may need.

## 2023-01-03 NOTE — PROGRESS NOTES
Feeling movement.  Denies cramping or bleeding.  Having difficulty with follow through on [facility] visits.  SO is concerned about her depression and lack of motivation.  Not taking ASA; encouraged to start.   Diabetes center visit rescheduled.  [facility] contact in office today. Mid-pregnancy changes and comfort measures discussed.  Return in 4 weeks.

## 2023-01-10 ENCOUNTER — CARE COORDINATION (OUTPATIENT)
Dept: CARE COORDINATION | Facility: OTHER | Age: 28
End: 2023-01-10

## 2023-01-10 NOTE — PROGRESS NOTES
Care Coordination focused note:      Routed pt's file to  LAINEY DAVID as pt was interested in case management with behavioral health team.//Awaiting response//If  SW was not able to get in touch with patient, will reach out to her to provide resources (per writer's last notes).

## 2023-01-13 ENCOUNTER — TELEPHONE (OUTPATIENT)
Dept: BEHAVIORAL HEALTH | Facility: OTHER | Age: 28
End: 2023-01-13

## 2023-01-13 NOTE — CONFIDENTIAL NOTE
Received referral for possible [facility] services from SKYE Castaneda.  Attempted to reach patient, but was unsuccessful.  Plan to attempt again.     Annamarie Toussaint, [facility]  Social Work Care Coordinator  Behavioral [facility]  [phone] option 2 or [phone]

## 2023-01-16 NOTE — CONFIDENTIAL NOTE
Received response from pt that she will reach out to writer in the pm on 1/17.    Annamarie Toussaint, [facility]  Social Work Care Coordinator  [facility] [place]  [phone] option 2 or [phone]

## 2023-01-27 ENCOUNTER — HOSPITAL ENCOUNTER (OUTPATIENT)
Dept: EDUCATION SERVICES | Facility: HOSPITAL | Age: 28
Discharge: HOME | End: 2023-01-27
Attending: DIETITIAN, REGISTERED
Payer: COMMERCIAL

## 2023-01-27 ENCOUNTER — PRENATAL OFFICE VISIT (OUTPATIENT)
Dept: OBGYN | Facility: OTHER | Age: 28
End: 2023-01-27
Attending: OBSTETRICS & GYNECOLOGY
Payer: COMMERCIAL

## 2023-01-27 VITALS
HEIGHT: 66 IN | OXYGEN SATURATION: 98 % | BODY MASS INDEX: 45.96 KG/M2 | DIASTOLIC BLOOD PRESSURE: 70 MMHG | SYSTOLIC BLOOD PRESSURE: 106 MMHG | WEIGHT: 286 LBS | HEART RATE: 75 BPM

## 2023-01-27 VITALS
HEART RATE: 75 BPM | BODY MASS INDEX: 45.96 KG/M2 | HEIGHT: 66 IN | WEIGHT: 286 LBS | RESPIRATION RATE: 16 BRPM | OXYGEN SATURATION: 98 % | DIASTOLIC BLOOD PRESSURE: 70 MMHG | SYSTOLIC BLOOD PRESSURE: 106 MMHG

## 2023-01-27 DIAGNOSIS — O09.93 HIGH-RISK PREGNANCY IN THIRD TRIMESTER: Primary | ICD-10-CM

## 2023-01-27 DIAGNOSIS — O99.810 GLUCOSE INTOLERANCE OF PREGNANCY: ICD-10-CM

## 2023-01-27 DIAGNOSIS — B02.9 HERPES ZOSTER WITHOUT COMPLICATION: ICD-10-CM

## 2023-01-27 PROCEDURE — G0463 HOSPITAL OUTPT CLINIC VISIT: HCPCS

## 2023-01-27 PROCEDURE — 99207 PR PRENATAL VISIT: CPT | Performed by: OBSTETRICS & GYNECOLOGY

## 2023-01-27 PROCEDURE — G0463 HOSPITAL OUTPT CLINIC VISIT: HCPCS | Mod: 25

## 2023-01-27 PROCEDURE — G0108 DIAB MANAGE TRN  PER INDIV: HCPCS | Performed by: DIETITIAN, REGISTERED

## 2023-01-27 RX ORDER — LIDOCAINE 50 MG/G
OINTMENT TOPICAL PRN
Qty: 35 G | Refills: 3 | Status: SHIPPED | OUTPATIENT
Start: 2023-01-27

## 2023-01-27 RX ORDER — ASPIRIN 81 MG/1
81 TABLET, CHEWABLE ORAL DAILY
Qty: 180 TABLET | Refills: 1 | Status: ON HOLD | OUTPATIENT
Start: 2023-01-27 | End: 2023-04-30

## 2023-01-27 RX ORDER — VALACYCLOVIR HYDROCHLORIDE 1 G/1
1000 TABLET, FILM COATED ORAL 3 TIMES DAILY
Qty: 21 TABLET | Refills: 3 | Status: ON HOLD | OUTPATIENT
Start: 2023-01-27 | End: 2023-03-20

## 2023-01-27 ASSESSMENT — ANXIETY QUESTIONNAIRES
6. BECOMING EASILY ANNOYED OR IRRITABLE: NEARLY EVERY DAY
GAD7 TOTAL SCORE: 17
2. NOT BEING ABLE TO STOP OR CONTROL WORRYING: MORE THAN HALF THE DAYS
7. FEELING AFRAID AS IF SOMETHING AWFUL MIGHT HAPPEN: NEARLY EVERY DAY
4. TROUBLE RELAXING: NEARLY EVERY DAY
GAD7 TOTAL SCORE: 17
IF YOU CHECKED OFF ANY PROBLEMS ON THIS QUESTIONNAIRE, HOW DIFFICULT HAVE THESE PROBLEMS MADE IT FOR YOU TO DO YOUR WORK, TAKE CARE OF THINGS AT HOME, OR GET ALONG WITH OTHER PEOPLE: SOMEWHAT DIFFICULT
1. FEELING NERVOUS, ANXIOUS, OR ON EDGE: MORE THAN HALF THE DAYS
3. WORRYING TOO MUCH ABOUT DIFFERENT THINGS: MORE THAN HALF THE DAYS
5. BEING SO RESTLESS THAT IT IS HARD TO SIT STILL: MORE THAN HALF THE DAYS

## 2023-01-27 ASSESSMENT — PAIN SCALES - GENERAL
PAINLEVEL: SEVERE PAIN (6)
PAINLEVEL: WORST PAIN (10)
PAINLEVEL: WORST PAIN (10)

## 2023-01-27 NOTE — LETTER
"    1/27/2023        RE: Lorena Echevarria  [address]  [address]  [facility] [place] [facility] [address]        Diabetes Self-Management Education & Support      SUBJECTIVE/OBJECTIVE:  Presents for education related to glucose intolerance of pregnancy.  Pt has hx of GDM with a previous pregnancy that did not require insulin therapy.  Baby was 7# 15 oz at birth.  She is 25w 5d gestation.     Cultural Influences/Ethnic Background:  Not  or     Estimated Date of Delivery: May 7, 2023     /70   Pulse 75   Resp 16   Ht 1.664 m (5' 5.5\")   Wt 129.7 kg (286 lb)   LMP 06/05/2022   SpO2 98%   BMI 46.87 kg/m     Pt reports prepregnancy weight of 282#.     1 hour OGTT  Lab Results   Component Value Date    GLU1 142 (H) 11/29/2022     3 hour OGTT    Fasting  No results found for: GTTGF    1 hour  No results found for: GTTG1    2 hour  No results found for: GTTG2    3 hour  No results found for: GTTG3    Lifestyle and Health Behaviors:  Verbal diet recall obtained:  Breakfast - 1 package of flavored oatmeal or 1 slice avocado toast with egg   Afternoon-Rik Bar  Supper-chicken and rice or soup or if dines out Chinese food  Evening-Rik Bar    Pt drinks coffee with cream, water, Gatorade Zero, Body [place].  She does have mini can of soda 2x/week.      Takes prenatal vitamin when she remembers.      Tries to get some daily walking for 10-30 minutes.      Healthy Coping:  Pt has been referred for possible [facility] services.       ASSESSMENT:  Pt listened and participated in session.      INTERVENTION:  Patient was instructed on Accu-Chek Guide meter and was able to provide an accurate return demonstration. Patient's blood glucose reading today was 88 mg/dL after coffee 1 hour prior.      Educational topics covered today:  Glucose intolerance of pregnancy diagnosis, pathophysiology, Risks and Complications, Means of controlling glucose levels during pregnancy, Using a Blood Glucose Monitor, Blood Glucose Goals, Logging and " Interpreting Glucose Results, When to Call a Diabetes Educator or OB Provider, Healthy Eating During Pregnancy, Counting Carbohydrates, Meal Planning, and Physical Activity    Educational materials provided today:   Gestational Diabetes The First Steps  My Food Plan for Gestational Diabetes  Blood glucose log sheet  Accu-Chek Guide meter kit    Pt verbalized understanding of concepts discussed and recommendations provided today.     PLAN:  Check glucose 4 times daily, before breakfast and 1 hour after each meal.    Physical activity recommended: As able/allowed by provider.    Meal plan: 45 carbs at breakfast, 45 carbs at lunch, 45 carbs at supper, 15-30 carbs at 3 snacks a day.  Follow consistent CHO meal plan, eat CHO and protein/fat at all meals/snacks.    Call/e-mail/MyChart message diabetes educator if 3 or more blood sugars are above the goal or in 1 week, or with questions/concerns.    Time Spent: 60 minutes  Encounter Type: Individual    Any diabetes medication dose changes were made via the CDE Protocol and Collaborative Practice Agreement with the patient's OB/GYN provider. A copy of this encounter was shared with the provider.        Sincerely,        Gifty Anthony RD

## 2023-01-27 NOTE — PROGRESS NOTES
Doing well.  No concerns today except neuropathic pain from shingles right outer thigh.  Valcyclovir/topical lidiacine rx.  Start baby ASA.  Diabetes center today.  US growth ordered 3 wks.  .  Denies PTL sx, vb, lof  Reminded of upcoming labs including 1 hour GTT  Reviewed recent US-no concerns with anatomical survey.  Return to clinic in 4 weeks    Fidel Severino MD  1/27/2023

## 2023-01-27 NOTE — PATIENT INSTRUCTIONS
-Follow healthy, low carbohydrate meal plan provided - 45 grams/meal, 15-30 grams/snack.  -Exercise as able/allowed by provider.  -Test glucose 4x/day - fasting and 1 hour after each meal.  -Target levels are fasting < 95 and 1 hour after meals < 140.  -Record all glucose levels on log sheet and bring to all OB/GYN appointments.   -Call if any three levels in one column are above target.    -I will call you next week to review your levels.   -PERLA Bonds, [facility] [phone].

## 2023-01-27 NOTE — PROGRESS NOTES
"Diabetes Self-Management Education & Support      SUBJECTIVE/OBJECTIVE:  Presents for education related to glucose intolerance of pregnancy.  Pt has hx of GDM with a previous pregnancy that did not require insulin therapy.  Baby was 7# 15 oz at birth.  She is 25w 5d gestation.     Cultural Influences/Ethnic Background:  Not  or     Estimated Date of Delivery: May 7, 2023     /70   Pulse 75   Resp 16   Ht 1.664 m (5' 5.5\")   Wt 129.7 kg (286 lb)   LMP 06/05/2022   SpO2 98%   BMI 46.87 kg/m     Pt reports prepregnancy weight of 282#.     1 hour OGTT  Lab Results   Component Value Date    GLU1 142 (H) 11/29/2022     3 hour OGTT    Fasting  No results found for: GTTGF    1 hour  No results found for: GTTG1    2 hour  No results found for: GTTG2    3 hour  No results found for: GTTG3    Lifestyle and Health Behaviors:  Verbal diet recall obtained:  Breakfast - 1 package of flavored oatmeal or 1 slice avocado toast with egg   Afternoon-Rik Bar  Supper-chicken and rice or soup or if dines out Chinese food  Evening-Rik Bar    Pt drinks coffee with cream, water, Gatorade Zero, Body [place].  She does have mini can of soda 2x/week.      Takes prenatal vitamin when she remembers.      Tries to get some daily walking for 10-30 minutes.      Healthy Coping:  Pt has been referred for possible [facility] services.       ASSESSMENT:  Pt listened and participated in session.      INTERVENTION:  Patient was instructed on Accu-Chek Guide meter and was able to provide an accurate return demonstration. Patient's blood glucose reading today was 88 mg/dL after coffee 1 hour prior.      Educational topics covered today:  Glucose intolerance of pregnancy diagnosis, pathophysiology, Risks and Complications, Means of controlling glucose levels during pregnancy, Using a Blood Glucose Monitor, Blood Glucose Goals, Logging and Interpreting Glucose Results, When to Call a Diabetes Educator or OB Provider, Healthy Eating " During Pregnancy, Counting Carbohydrates, Meal Planning, and Physical Activity    Educational materials provided today:   Gestational Diabetes The First Steps  My Food Plan for Gestational Diabetes  Blood glucose log sheet  Accu-Chek Guide meter kit    Pt verbalized understanding of concepts discussed and recommendations provided today.     PLAN:  Check glucose 4 times daily, before breakfast and 1 hour after each meal.    Physical activity recommended: As able/allowed by provider.    Meal plan: 45 carbs at breakfast, 45 carbs at lunch, 45 carbs at supper, 15-30 carbs at 3 snacks a day.  Follow consistent CHO meal plan, eat CHO and protein/fat at all meals/snacks.    Call/e-mail/[employer] message diabetes educator if 3 or more blood sugars are above the goal or in 1 week, or with questions/concerns.    Time Spent: 60 minutes  Encounter Type: Individual    Any diabetes medication dose changes were made via the CDE Protocol and Collaborative Practice Agreement with the patient's OB/GYN provider. A copy of this encounter was shared with the provider.

## 2023-02-23 LAB
ABO/RH(D): NORMAL
ANTIBODY SCREEN: NEGATIVE
SPECIMEN EXPIRATION DATE: NORMAL

## 2023-02-24 ENCOUNTER — HOSPITAL ENCOUNTER (OUTPATIENT)
Dept: ULTRASOUND IMAGING | Facility: HOSPITAL | Age: 28
Discharge: HOME | End: 2023-02-24
Attending: OBSTETRICS & GYNECOLOGY
Payer: COMMERCIAL

## 2023-02-24 ENCOUNTER — PRENATAL OFFICE VISIT (OUTPATIENT)
Dept: OBGYN | Facility: OTHER | Age: 28
End: 2023-02-24
Attending: OBSTETRICS & GYNECOLOGY
Payer: COMMERCIAL

## 2023-02-24 ENCOUNTER — LAB (OUTPATIENT)
Dept: LAB | Facility: OTHER | Age: 28
End: 2023-02-24
Payer: COMMERCIAL

## 2023-02-24 VITALS — SYSTOLIC BLOOD PRESSURE: 102 MMHG | WEIGHT: 290 LBS | DIASTOLIC BLOOD PRESSURE: 68 MMHG | BODY MASS INDEX: 47.52 KG/M2

## 2023-02-24 DIAGNOSIS — E66.01 CLASS 3 SEVERE OBESITY DUE TO EXCESS CALORIES WITHOUT SERIOUS COMORBIDITY IN ADULT, UNSPECIFIED BMI (H): ICD-10-CM

## 2023-02-24 DIAGNOSIS — O24.410 GDM (GESTATIONAL DIABETES MELLITUS), CLASS A1: Primary | ICD-10-CM

## 2023-02-24 DIAGNOSIS — O09.93 HIGH-RISK PREGNANCY IN THIRD TRIMESTER: ICD-10-CM

## 2023-02-24 DIAGNOSIS — Z30.2 ENCOUNTER FOR STERILIZATION: ICD-10-CM

## 2023-02-24 DIAGNOSIS — O36.8190 DECREASED FETAL MOVEMENT AFFECTING MANAGEMENT OF PREGNANCY, ANTEPARTUM, SINGLE OR UNSPECIFIED FETUS: ICD-10-CM

## 2023-02-24 DIAGNOSIS — E66.813 CLASS 3 SEVERE OBESITY DUE TO EXCESS CALORIES WITHOUT SERIOUS COMORBIDITY IN ADULT, UNSPECIFIED BMI (H): ICD-10-CM

## 2023-02-24 LAB
ERYTHROCYTE [DISTWIDTH] IN BLOOD BY AUTOMATED COUNT: 12.8 % (ref 10–15)
HCT VFR BLD AUTO: 37.9 % (ref 35–47)
HGB BLD-MCNC: 12.6 G/DL (ref 11.7–15.7)
MCH RBC QN AUTO: 30.4 PG (ref 26.5–33)
MCHC RBC AUTO-ENTMCNC: 33.2 G/DL (ref 31.5–36.5)
MCV RBC AUTO: 92 FL (ref 78–100)
PLATELET # BLD AUTO: 234 10E3/UL (ref 150–450)
RBC # BLD AUTO: 4.14 10E6/UL (ref 3.8–5.2)
WBC # BLD AUTO: 8.6 10E3/UL (ref 4–11)

## 2023-02-24 PROCEDURE — G0463 HOSPITAL OUTPT CLINIC VISIT: HCPCS

## 2023-02-24 PROCEDURE — 86901 BLOOD TYPING SEROLOGIC RH(D): CPT

## 2023-02-24 PROCEDURE — 99207 PR COMPLICATED OB VISIT: CPT | Performed by: OBSTETRICS & GYNECOLOGY

## 2023-02-24 PROCEDURE — 86850 RBC ANTIBODY SCREEN: CPT

## 2023-02-24 PROCEDURE — G0463 HOSPITAL OUTPT CLINIC VISIT: HCPCS | Mod: 25

## 2023-02-24 PROCEDURE — 76816 OB US FOLLOW-UP PER FETUS: CPT

## 2023-02-24 PROCEDURE — 36415 COLL VENOUS BLD VENIPUNCTURE: CPT | Mod: ZL

## 2023-02-24 PROCEDURE — 86780 TREPONEMA PALLIDUM: CPT | Mod: ZL

## 2023-02-24 PROCEDURE — 76819 FETAL BIOPHYS PROFIL W/O NST: CPT

## 2023-02-24 PROCEDURE — 85014 HEMATOCRIT: CPT | Mod: ZL

## 2023-02-24 ASSESSMENT — PAIN SCALES - GENERAL: PAINLEVEL: MODERATE PAIN (5)

## 2023-02-24 NOTE — PROGRESS NOTES
Doing well.  No concerns today.  Labs today  Daily FM, but decreased today.  US growth/BPP today  Desires sterilization  We reviewed the risks and benefits of tubal ligation/salpingectomy procedures including risks of bleeding, infection, damage to other organs. Risks of tubal failure, and possibility of ectopic pregnancy were also explained. We discussed alternative forms of birth control.  I  Specifically, we discussed a laparoscopic bilateral salpingectomy    We reviewed risks of laparoscopy, specifically risk of bleeding, infection, damage to nerves, blood vessels, bowel and bladder. Discussed recovery period and expected discomfort. She desires to proceed  and will be scheduled accordingly after deliver or at CS if necessary.  Federal sterilization forms signed.   Prenatal flowsheet information is reviewed.  Discussed kick counts and fetal movement.  RTC in 2 weeks    Denies PTL sx, vb, lof  Fidel Severino MD  2/24/2023

## 2023-02-25 LAB — T PALLIDUM AB SER QL: NONREACTIVE

## 2023-03-07 ENCOUNTER — PRENATAL OFFICE VISIT (OUTPATIENT)
Dept: OBGYN | Facility: OTHER | Age: 28
End: 2023-03-07
Attending: OBSTETRICS & GYNECOLOGY
Payer: COMMERCIAL

## 2023-03-07 VITALS
OXYGEN SATURATION: 97 % | TEMPERATURE: 97.6 F | HEIGHT: 67 IN | BODY MASS INDEX: 45.91 KG/M2 | SYSTOLIC BLOOD PRESSURE: 132 MMHG | RESPIRATION RATE: 16 BRPM | DIASTOLIC BLOOD PRESSURE: 66 MMHG | HEART RATE: 105 BPM | WEIGHT: 292.5 LBS

## 2023-03-07 DIAGNOSIS — O09.93 HIGH-RISK PREGNANCY IN THIRD TRIMESTER: ICD-10-CM

## 2023-03-07 DIAGNOSIS — O24.410 GDM (GESTATIONAL DIABETES MELLITUS), CLASS A1: Primary | ICD-10-CM

## 2023-03-07 DIAGNOSIS — O99.810 GLUCOSE INTOLERANCE OF PREGNANCY: Primary | ICD-10-CM

## 2023-03-07 DIAGNOSIS — O24.410 GDM (GESTATIONAL DIABETES MELLITUS), CLASS A1: ICD-10-CM

## 2023-03-07 LAB
CLUE CELLS: ABNORMAL
TRICHOMONAS, WET PREP: ABNORMAL
WBC'S/HIGH POWER FIELD, WET PREP: ABNORMAL
YEAST, WET PREP: ABNORMAL

## 2023-03-07 PROCEDURE — G0463 HOSPITAL OUTPT CLINIC VISIT: HCPCS

## 2023-03-07 PROCEDURE — 99207 PR PRENATAL VISIT: CPT | Performed by: OBSTETRICS & GYNECOLOGY

## 2023-03-07 PROCEDURE — 87210 SMEAR WET MOUNT SALINE/INK: CPT | Mod: ZL | Performed by: OBSTETRICS & GYNECOLOGY

## 2023-03-07 RX ORDER — LANCETS
EACH MISCELLANEOUS
Qty: 100 EACH | Refills: 0 | Status: SHIPPED | OUTPATIENT
Start: 2023-03-07 | End: 2023-06-08

## 2023-03-07 RX ORDER — BLOOD SUGAR DIAGNOSTIC
STRIP MISCELLANEOUS
Qty: 200 STRIP | Refills: 1 | Status: SHIPPED | OUTPATIENT
Start: 2023-03-07 | End: 2023-06-08

## 2023-03-07 ASSESSMENT — PAIN SCALES - GENERAL: PAINLEVEL: NO PAIN (0)

## 2023-03-07 NOTE — PROGRESS NOTES
Doing well.  No concerns today except some increased greenish intermittent vaginal discharge.  No vaginitis sx.  Wet ptrep done.  US growth ordered.  Wkly BPP starting 34 wks.  Has not been checking BS due to not having testing strips.  Prescription done for strips. .    Discussed kick counts and fetal movement.  RTC in 2 weeks  Denies PTL sx, vb, tania Severino MD  3/7/2023

## 2023-03-07 NOTE — TELEPHONE ENCOUNTER
3/7/2023 1:50 PM  Writer called [facility] to discuss test strips. Accu chek guide.  Pt reports she was told she needed a PA. Per [facility] test strips are covered preparing now. Pt updated and will  Rx now.   Itzel Chang, RN

## 2023-03-17 ENCOUNTER — HOSPITAL ENCOUNTER (INPATIENT)
Facility: HOSPITAL | Age: 28
LOS: 3 days | Discharge: HOME | End: 2023-03-20
Attending: PHYSICIAN ASSISTANT | Admitting: STUDENT IN AN ORGANIZED HEALTH CARE EDUCATION/TRAINING PROGRAM
Payer: COMMERCIAL

## 2023-03-17 DIAGNOSIS — R45.851 SUICIDAL IDEATION: ICD-10-CM

## 2023-03-17 DIAGNOSIS — O23.40 UTI IN PREGNANCY: ICD-10-CM

## 2023-03-17 DIAGNOSIS — O23.43 URINARY TRACT INFECTION IN MOTHER DURING THIRD TRIMESTER OF PREGNANCY: ICD-10-CM

## 2023-03-17 DIAGNOSIS — O09.93 HIGH-RISK PREGNANCY IN THIRD TRIMESTER: ICD-10-CM

## 2023-03-17 DIAGNOSIS — F33.2 SEVERE EPISODE OF RECURRENT MAJOR DEPRESSIVE DISORDER, WITHOUT PSYCHOTIC FEATURES (H): Primary | ICD-10-CM

## 2023-03-17 LAB
ALBUMIN SERPL BCG-MCNC: 3.3 G/DL (ref 3.5–5.2)
ALBUMIN UR-MCNC: 30 MG/DL
ALP SERPL-CCNC: 84 U/L (ref 35–104)
ALT SERPL W P-5'-P-CCNC: 9 U/L (ref 10–35)
AMORPH CRY #/AREA URNS HPF: ABNORMAL /HPF
AMPHETAMINES UR QL: NOT DETECTED
ANION GAP SERPL CALCULATED.3IONS-SCNC: 12 MMOL/L (ref 7–15)
APPEARANCE UR: ABNORMAL
AST SERPL W P-5'-P-CCNC: 14 U/L (ref 10–35)
BACTERIA #/AREA URNS HPF: ABNORMAL /HPF
BARBITURATES UR QL SCN: NOT DETECTED
BASOPHILS # BLD AUTO: 0 10E3/UL (ref 0–0.2)
BASOPHILS NFR BLD AUTO: 0 %
BENZODIAZ UR QL SCN: NOT DETECTED
BILIRUB SERPL-MCNC: <0.2 MG/DL
BILIRUB UR QL STRIP: NEGATIVE
BUN SERPL-MCNC: 5.3 MG/DL (ref 6–20)
BUPRENORPHINE UR QL: NOT DETECTED
CALCIUM SERPL-MCNC: 9 MG/DL (ref 8.6–10)
CANNABINOIDS UR QL: NOT DETECTED
CHLORIDE SERPL-SCNC: 103 MMOL/L (ref 98–107)
COCAINE UR QL SCN: NOT DETECTED
COLOR UR AUTO: YELLOW
CREAT SERPL-MCNC: 0.48 MG/DL (ref 0.51–0.95)
D-METHAMPHET UR QL: NOT DETECTED
DEPRECATED HCO3 PLAS-SCNC: 21 MMOL/L (ref 22–29)
EOSINOPHIL # BLD AUTO: 0 10E3/UL (ref 0–0.7)
EOSINOPHIL NFR BLD AUTO: 1 %
ERYTHROCYTE [DISTWIDTH] IN BLOOD BY AUTOMATED COUNT: 12.9 % (ref 10–15)
GFR SERPL CREATININE-BSD FRML MDRD: >90 ML/MIN/1.73M2
GLUCOSE SERPL-MCNC: 129 MG/DL (ref 70–99)
GLUCOSE UR STRIP-MCNC: NEGATIVE MG/DL
HCT VFR BLD AUTO: 35.8 % (ref 35–47)
HGB BLD-MCNC: 11.9 G/DL (ref 11.7–15.7)
HGB UR QL STRIP: NEGATIVE
HOLD SPECIMEN: NORMAL
IMM GRANULOCYTES # BLD: 0 10E3/UL
IMM GRANULOCYTES NFR BLD: 0 %
KETONES UR STRIP-MCNC: ABNORMAL MG/DL
LEUKOCYTE ESTERASE UR QL STRIP: ABNORMAL
LYMPHOCYTES # BLD AUTO: 1.3 10E3/UL (ref 0.8–5.3)
LYMPHOCYTES NFR BLD AUTO: 15 %
MCH RBC QN AUTO: 30.2 PG (ref 26.5–33)
MCHC RBC AUTO-ENTMCNC: 33.2 G/DL (ref 31.5–36.5)
MCV RBC AUTO: 91 FL (ref 78–100)
METHADONE UR QL SCN: NOT DETECTED
MONOCYTES # BLD AUTO: 0.4 10E3/UL (ref 0–1.3)
MONOCYTES NFR BLD AUTO: 5 %
MUCOUS THREADS #/AREA URNS LPF: PRESENT /LPF
NEUTROPHILS # BLD AUTO: 6.7 10E3/UL (ref 1.6–8.3)
NEUTROPHILS NFR BLD AUTO: 79 %
NITRATE UR QL: NEGATIVE
NRBC # BLD AUTO: 0 10E3/UL
NRBC BLD AUTO-RTO: 0 /100
OPIATES UR QL SCN: NOT DETECTED
OXYCODONE UR QL SCN: NOT DETECTED
PCP UR QL SCN: NOT DETECTED
PH UR STRIP: 6 [PH] (ref 4.7–8)
PLATELET # BLD AUTO: 196 10E3/UL (ref 150–450)
POTASSIUM SERPL-SCNC: 3.7 MMOL/L (ref 3.4–5.3)
PROPOXYPH UR QL: NOT DETECTED
PROT SERPL-MCNC: 6.5 G/DL (ref 6.4–8.3)
RBC # BLD AUTO: 3.94 10E6/UL (ref 3.8–5.2)
RBC URINE: 1 /HPF
SARS-COV-2 RNA RESP QL NAA+PROBE: NEGATIVE
SODIUM SERPL-SCNC: 136 MMOL/L (ref 136–145)
SP GR UR STRIP: >1.03 (ref 1–1.03)
SQUAMOUS EPITHELIAL: 7 /HPF
TRICYCLICS UR QL SCN: NOT DETECTED
UROBILINOGEN UR STRIP-MCNC: 2 MG/DL
WBC # BLD AUTO: 8.5 10E3/UL (ref 4–11)
WBC URINE: 22 /HPF

## 2023-03-17 PROCEDURE — 85004 AUTOMATED DIFF WBC COUNT: CPT | Performed by: PHYSICIAN ASSISTANT

## 2023-03-17 PROCEDURE — 80053 COMPREHEN METABOLIC PANEL: CPT | Performed by: PHYSICIAN ASSISTANT

## 2023-03-17 PROCEDURE — 99292 CRITICAL CARE ADDL 30 MIN: CPT

## 2023-03-17 PROCEDURE — 99291 CRITICAL CARE FIRST HOUR: CPT

## 2023-03-17 PROCEDURE — 36415 COLL VENOUS BLD VENIPUNCTURE: CPT | Performed by: PHYSICIAN ASSISTANT

## 2023-03-17 PROCEDURE — 124N000001 HC R&B MH

## 2023-03-17 PROCEDURE — 250N000013 HC RX MED GY IP 250 OP 250 PS 637: Performed by: PHYSICIAN ASSISTANT

## 2023-03-17 PROCEDURE — C9803 HOPD COVID-19 SPEC COLLECT: HCPCS

## 2023-03-17 PROCEDURE — 99285 EMERGENCY DEPT VISIT HI MDM: CPT | Performed by: PHYSICIAN ASSISTANT

## 2023-03-17 PROCEDURE — 81001 URINALYSIS AUTO W/SCOPE: CPT | Performed by: PHYSICIAN ASSISTANT

## 2023-03-17 PROCEDURE — U0005 INFEC AGEN DETEC AMPLI PROBE: HCPCS | Performed by: PHYSICIAN ASSISTANT

## 2023-03-17 PROCEDURE — 80306 DRUG TEST PRSMV INSTRMNT: CPT | Performed by: PHYSICIAN ASSISTANT

## 2023-03-17 PROCEDURE — 87086 URINE CULTURE/COLONY COUNT: CPT | Performed by: PHYSICIAN ASSISTANT

## 2023-03-17 RX ORDER — CEPHALEXIN 500 MG/1
500 CAPSULE ORAL ONCE
Status: COMPLETED | OUTPATIENT
Start: 2023-03-17 | End: 2023-03-17

## 2023-03-17 RX ORDER — AMOXICILLIN 250 MG
1 CAPSULE ORAL 2 TIMES DAILY PRN
Status: DISCONTINUED | OUTPATIENT
Start: 2023-03-17 | End: 2023-03-20 | Stop reason: HOSPADM

## 2023-03-17 RX ORDER — MAGNESIUM HYDROXIDE/ALUMINUM HYDROXICE/SIMETHICONE 120; 1200; 1200 MG/30ML; MG/30ML; MG/30ML
30 SUSPENSION ORAL EVERY 4 HOURS PRN
Status: DISCONTINUED | OUTPATIENT
Start: 2023-03-17 | End: 2023-03-20 | Stop reason: HOSPADM

## 2023-03-17 RX ORDER — ACETAMINOPHEN 325 MG/1
650 TABLET ORAL EVERY 4 HOURS PRN
Status: DISCONTINUED | OUTPATIENT
Start: 2023-03-17 | End: 2023-03-20 | Stop reason: HOSPADM

## 2023-03-17 RX ADMIN — CEPHALEXIN 500 MG: 500 CAPSULE ORAL at 17:37

## 2023-03-17 ASSESSMENT — ACTIVITIES OF DAILY LIVING (ADL)
WEAR_GLASSES_OR_BLIND: NO
CONCENTRATING,_REMEMBERING_OR_MAKING_DECISIONS_DIFFICULTY: NO
ADLS_ACUITY_SCORE: 28
DIFFICULTY_EATING/SWALLOWING: NO
DRESSING/BATHING_DIFFICULTY: NO
FALL_HISTORY_WITHIN_LAST_SIX_MONTHS: NO
ADLS_ACUITY_SCORE: 28
TOILETING_ISSUES: NO
DOING_ERRANDS_INDEPENDENTLY_DIFFICULTY: NO
ADLS_ACUITY_SCORE: 45
CHANGE_IN_FUNCTIONAL_STATUS_SINCE_ONSET_OF_CURRENT_ILLNESS/INJURY: NO
WALKING_OR_CLIMBING_STAIRS_DIFFICULTY: NO
ADLS_ACUITY_SCORE: 35

## 2023-03-17 ASSESSMENT — ENCOUNTER SYMPTOMS: ABDOMINAL PAIN: 0

## 2023-03-17 NOTE — ED NOTES
Patient presents to the ED accompanied by her significant other, Didier.  Patient is very soft spoken, not open to answering questions with anything other than yes or no currently.    Her significant other noticed lacerations to her thighs last night.    Patient denies chest pain, shortness of breath, ABD pain, fever.   She has been feeling baby moving, follows Dr. Álvarez.  Denies ingesting any ETOH, OTC or street drugs.

## 2023-03-17 NOTE — ED TRIAGE NOTES
Patient presents with c/o suicidal thoughts, plan, and attempt. Patient soft spoken and not opening up. Boyfriend tells writer that patient attempted to kill herself 2 nights ago by cutting inner thighs, and he just found out about it today. Patient unsure why she didn't go through with it 2 nights ago. Patient 32 weeks pregnant, denies any abdominal or back pain.

## 2023-03-17 NOTE — ED NOTES
Patient wanded by security.  Patient agreeable to change into paper scrubs for safety and to assess wounds.

## 2023-03-17 NOTE — PROGRESS NOTES
03/17/23 1836   Patient Belongings   Did you bring any home meds/supplements to the hospital?  No   Patient Belongings locker   Patient Belongings Put in Hospital Secure Location (Security or Locker, etc.) shoes;clothing   Belongings Search Yes   Clothing Search Yes   Second Staff Dayami   Comment pink and black bra, plastic hair tie, black crocs, erik PJ pants, Pj shirt, purple sweater     List items sent to safe: None    All other belongings put in assigned cubby in belongings room.     I have reviewed my belongings list on admission and verify that it is correct.     Patient signature_______________________________    Second staff witness (if patient unable to sign) ______________________________       I have received all my belongings at discharge.    Patient signature________________________________    Kristin  3/17/2023  6:47 PM

## 2023-03-17 NOTE — ED PROVIDER NOTES
History     Chief Complaint   Patient presents with     Psychiatric Evaluation     The history is provided by the patient.     Lorena Echevarria is a 27 year old female who presented to the emergency department ambulatory along with significant other for evaluation of suicidal ideation with a plan.  Self cutting 2 days ago.  Plan to end her life.  Thoughts persist.  Patient has had psychiatric admissions in the past.  32 weeks gestation currently.    Allergies:  Allergies   Allergen Reactions     Lamictal [Lamotrigine] Other (See Comments)     Increases aggression.     Quetiapine Other (See Comments)     Mood changes  Mood changes       Amoxicillin Rash     Zoloft [Sertraline] Other (See Comments)     Depression worsens       Problem List:    Patient Active Problem List    Diagnosis Date Noted     Encounter for sterilization 02/24/2023     Priority: Medium     Desires sterilization, salpingectomy if CS.  Otherwise interval procedure.  Counseled and federal sterilization forms signed.        Glucose intolerance of pregnancy 01/27/2023     Priority: Medium     High risk pregnancy due to history of previous obstetrical problem in second trimester 10/28/2022     Priority: Medium     Major Depression - [facility] referral, Dr Cintron referral (poor med compliance)  Hx of pre eclampsia - 81 mg ASA  Anemia - ferrous sulfate daily  no covid  no covid vax  declines flu shot       Morbid obesity (H) 06/15/2022     Priority: Medium     Papanicolaou smear of cervix with low grade squamous intraepithelial lesion (LGSIL) 09/22/2017     Priority: Medium     03/30/17 LSIL Pap (abstracted records) [place] listed in medical hx but no date or results to review   06/15/22 NIL Pap Plan pap in 1 year due 06/15/23.   06/23/22 Letter sent.        Recurrent HSV (herpes simplex virus) 09/15/2017     Priority: Medium     Begin initial tx then switch to suppression.       Elevated serum cholesterol 09/03/2014     Priority: Medium     Obesity 09/03/2014      Priority: Medium     Sinus tachycardia 09/03/2014     Priority: Medium     Depression 09/01/2014     Priority: Medium     Irregular menstrual cycle (PERIODS) 05/14/2014     Priority: Medium     Suicidal thoughts 05/10/2014     Priority: Medium     Denies ideation / plan - safe with partner and has crisis plan / info  [facility] referral and Dr Cintron referral       Left Ankle Pain 05/10/2014     Priority: Medium     Mild intellectual disability 03/04/2014     Priority: Medium     Problem list name updated by automated process. Provider to review       Autism spectrum 03/04/2014     Priority: Medium     Anxiety 03/04/2014     Priority: Medium     Mood disorder (H) 03/04/2014     Priority: Medium     Suicidal ideation 03/03/2014     Priority: Medium        Past Medical History:    Past Medical History:   Diagnosis Date     Abnormal Pap smear of cervix 03/30/2017     Anxiety      Autism spectrum disorder      Autism spectrum disorder      Depressed      Diet controlled gestational diabetes mellitus (GDM) in third trimester 9/15/2017     Fetal tachycardia affecting management of mother 9/1/2017     Gestational diabetes mellitus (GDM) 9/15/2017     Herpes      High-risk first pregnancy of young woman, third trimester 9/15/2017     History of suicide attempt 2014     LGSIL on Pap smear of cervix 03/30/2017     Mental disorder      Mild intellectual disabilities      Normal labor and delivery 10/17/2017     Pregnant 2/6/2021     Schizoaffective disorder (H)        Past Surgical History:    Past Surgical History:   Procedure Laterality Date     COLPOSCOPY         Family History:    Family History   Problem Relation Age of Onset     Diabetes Paternal Grandfather      Diabetes Paternal Grandmother        Social History:  Marital Status:  Single [1]  Social History     Tobacco Use     Smoking status: Some Days     Packs/day: 0.10     Types: Cigarettes, Vaping Device     Last attempt to quit: 10/27/2016     Years since quitting:  6.3     Smokeless tobacco: Never     Tobacco comments:     smoking about 1 cig/day   Vaping Use     Vaping Use: Some days     Substances: Nicotine, Flavoring     Devices: Pre-filled or refillable cartridge, Refillable tank     Passive vaping exposure: Yes   Substance Use Topics     Alcohol use: No     Comment: Past use     Drug use: No        Medications:    aspirin (ASA) 81 MG chewable tablet  blood glucose (ACCU-CHEK GUIDE) test strip  blood glucose (NO BRAND SPECIFIED) test strip  blood glucose monitoring (SOFTCLIX) lancets  buPROPion (WELLBUTRIN XL) 300 MG 24 hr tablet  docusate sodium (COLACE) 100 MG capsule  ferrous sulfate (FEROSUL) 325 (65 Fe) MG tablet  hydrOXYzine (VISTARIL) 25 MG capsule  lidocaine (XYLOCAINE) 5 % external ointment  ondansetron (ZOFRAN) 4 MG tablet  SUMAtriptan (IMITREX) 50 MG tablet  traZODone (DESYREL) 100 MG tablet  valACYclovir (VALTREX) 1000 mg tablet          Review of Systems   Gastrointestinal: Negative for abdominal pain.   Psychiatric/Behavioral:        See HPI       Physical Exam   BP: 125/85  Pulse: 110  Temp: 97.7  F (36.5  C)  Resp: 18  SpO2: 95 %      Physical Exam  Vitals and nursing note reviewed.   Constitutional:       General: She is not in acute distress.     Appearance: Normal appearance. She is not ill-appearing, toxic-appearing or diaphoretic.   Pulmonary:      Effort: Pulmonary effort is normal.   Skin:     General: Skin is warm and dry.      Capillary Refill: Capillary refill takes less than 2 seconds.   Neurological:      General: No focal deficit present.      Mental Status: She is alert and oriented to person, place, and time.   Psychiatric:      Comments: Flat affect.  No evidence of delusions, psychosis, or flight of ideas.         ED Course     Mental Health Risk Assessment      PSS-3    Date and Time Over the past 2 weeks have you felt down, depressed, or hopeless? Over the past 2 weeks have you had thoughts of killing yourself? Have you ever attempted to  kill yourself? When did this last happen? User   03/17/23 1524 yes yes yes within the last month (but not today) MJA      C-SSRS ([place])    Date and Time Q1 Wished to be Dead (Past Month) Q2 Suicidal Thoughts (Past Month) Q3 Suicidal Thought Method Q4 Suicidal Intent without Specific Plan Q5 Suicide Intent with Specific Plan Q6 Suicide Behavior (Lifetime) Within the Past 3 Months? RETIRED: Level of Risk per Screen Screening Not Complete User   03/17/23 1524 yes yes yes no yes yes -- -- -- MJA                Suicide assessment completed by mental health (D.E.C., LCSW, etc.)       Procedures              Critical Care time:  none               Results for orders placed or performed during the hospital encounter of 03/17/23 (from the past 24 hour(s))   CBC with platelets differential    Narrative    The following orders were created for panel order CBC with platelets differential.  Procedure                               Abnormality         Status                     ---------                               -----------         ------                     CBC with platelets and d...[[id number]]                      Final result                 Please view results for these tests on the individual orders.   Comprehensive metabolic panel   Result Value Ref Range    Sodium 136 136 - 145 mmol/L    Potassium 3.7 3.4 - 5.3 mmol/L    Chloride 103 98 - 107 mmol/L    Carbon Dioxide (CO2) 21 (L) 22 - 29 mmol/L    Anion Gap 12 7 - 15 mmol/L    Urea Nitrogen 5.3 (L) 6.0 - 20.0 mg/dL    Creatinine 0.48 (L) 0.51 - 0.95 mg/dL    Calcium 9.0 8.6 - 10.0 mg/dL    Glucose 129 (H) 70 - 99 mg/dL    Alkaline Phosphatase 84 35 - 104 U/L    AST 14 10 - 35 U/L    ALT 9 (L) 10 - 35 U/L    Protein Total 6.5 6.4 - 8.3 g/dL    Albumin 3.3 (L) 3.5 - 5.2 g/dL    Bilirubin Total <0.2 <=1.2 mg/dL    GFR Estimate >90 >60 mL/min/1.73m2   CBC with platelets and differential   Result Value Ref Range    WBC Count 8.5 4.0 - 11.0 10e3/uL    RBC Count 3.94  3.80 - 5.20 10e6/uL    Hemoglobin 11.9 11.7 - 15.7 g/dL    Hematocrit 35.8 35.0 - 47.0 %    MCV 91 78 - 100 fL    MCH 30.2 26.5 - 33.0 pg    MCHC 33.2 31.5 - 36.5 g/dL    RDW 12.9 10.0 - 15.0 %    Platelet Count 196 150 - 450 10e3/uL    % Neutrophils 79 %    % Lymphocytes 15 %    % Monocytes 5 %    % Eosinophils 1 %    % Basophils 0 %    % Immature Granulocytes 0 %    NRBCs per 100 WBC 0 <1 /100    Absolute Neutrophils 6.7 1.6 - 8.3 10e3/uL    Absolute Lymphocytes 1.3 0.8 - 5.3 10e3/uL    Absolute Monocytes 0.4 0.0 - 1.3 10e3/uL    Absolute Eosinophils 0.0 0.0 - 0.7 10e3/uL    Absolute Basophils 0.0 0.0 - 0.2 10e3/uL    Absolute Immature Granulocytes 0.0 <=0.4 10e3/uL    Absolute NRBCs 0.0 10e3/uL   Extra Tube    Narrative    The following orders were created for panel order Extra Tube.  Procedure                               Abnormality         Status                     ---------                               -----------         ------                     Extra Blue Top Tube[[id number]]                              In process                 Extra Red Top Tube[[id number]]                               In process                 Extra Heparinized Syringe[[id number]]                        In process                   Please view results for these tests on the individual orders.   UA with Microscopic reflex to Culture    Specimen: Urine, Midstream   Result Value Ref Range    Color Urine Yellow Colorless, Straw, Light Yellow, Yellow    Appearance Urine Slightly Cloudy (A) Clear    Glucose Urine Negative Negative mg/dL    Bilirubin Urine Negative Negative    Ketones Urine Trace (A) Negative mg/dL    Specific Gravity Urine >1.030 1.003 - 1.035    Blood Urine Negative Negative    pH Urine 6.0 4.7 - 8.0    Protein Albumin Urine 30 (A) Negative mg/dL    Urobilinogen Urine 2.0 Normal, 2.0 mg/dL    Nitrite Urine Negative Negative    Leukocyte Esterase Urine Large (A) Negative    Bacteria Urine Few (A) None Seen /HPF    Mucus  Urine Present (A) None Seen /LPF    Amorphous Crystals Urine Few (A) None Seen /HPF    RBC Urine 1 <=2 /HPF    WBC Urine 22 (H) <=5 /HPF    Squamous Epithelials Urine 7 (H) <=1 /HPF    Narrative    Urine Culture ordered based on laboratory criteria   Urine Drugs of Abuse Screen    Narrative    The following orders were created for panel order Urine Drugs of Abuse Screen.  Procedure                               Abnormality         Status                     ---------                               -----------         ------                     Urine Drugs of Abuse Scr...[[id number]]  Normal              Final result                 Please view results for these tests on the individual orders.   Urine Drugs of Abuse Screen Panel 13   Result Value Ref Range    Cannabinoids (73-bme-2-carboxy-9-THC) Not Detected Not Detected, Indeterminate    Phencyclidine Not Detected Not Detected, Indeterminate    Cocaine (Benzoylecgonine) Not Detected Not Detected, Indeterminate    Methamphetamine (d-Methamphetamine) Not Detected Not Detected, Indeterminate    Opiates (Morphine) Not Detected Not Detected, Indeterminate    Amphetamine (d-Amphetamine) Not Detected Not Detected, Indeterminate    Benzodiazepines (Nordiazepam) Not Detected Not Detected, Indeterminate    Tricyclic Antidepressants (Desipramine) Not Detected Not Detected, Indeterminate    Methadone Not Detected Not Detected, Indeterminate    Barbiturates (Butalbital) Not Detected Not Detected, Indeterminate    Oxycodone Not Detected Not Detected, Indeterminate    Propoxyphene (Norpropoxyphene) Not Detected Not Detected, Indeterminate    Buprenorphine Not Detected Not Detected, Indeterminate       Medications   cephALEXin (KEFLEX) capsule 500 mg (has no administration in time range)       Assessments & Plan (with Medical Decision Making)   Discussed with [facility].  Graciously accepted.  Pyuria noted on urinalysis.  Given pregnancy status we will culture the  urine and start Keflex.    I have reviewed the nursing notes.    I have reviewed the findings, diagnosis, plan and need for follow up with the patient.           Medical Decision Making  The patient's presentation was of moderate complexity (a chronic illness mild to moderate exacerbation, progression, or side effect of treatment).    The patient's evaluation involved:  ordering and/or review of 3+ test(s) in this encounter (Several labs)    The patient's management necessitated high risk (a decision regarding hospitalization).        New Prescriptions    No medications on file       Final diagnoses:   Suicidal ideation   UTI in pregnancy       3/17/2023   [facility]     Vanita Arce PA-C  03/17/23 6412       Vanita Arce PA-C  03/17/23 5800

## 2023-03-18 LAB
GLUCOSE BLDC GLUCOMTR-MCNC: 122 MG/DL (ref 70–99)
GLUCOSE BLDC GLUCOMTR-MCNC: 129 MG/DL (ref 70–99)
GLUCOSE BLDC GLUCOMTR-MCNC: 175 MG/DL (ref 70–99)

## 2023-03-18 PROCEDURE — 124N000001 HC R&B MH

## 2023-03-18 PROCEDURE — 250N000013 HC RX MED GY IP 250 OP 250 PS 637: Performed by: PSYCHIATRY & NEUROLOGY

## 2023-03-18 PROCEDURE — 250N000013 HC RX MED GY IP 250 OP 250 PS 637: Performed by: NURSE PRACTITIONER

## 2023-03-18 PROCEDURE — 99223 1ST HOSP IP/OBS HIGH 75: CPT | Mod: AI | Performed by: PSYCHIATRY & NEUROLOGY

## 2023-03-18 RX ADMIN — ACETAMINOPHEN 650 MG: 325 TABLET ORAL at 08:45

## 2023-03-18 RX ADMIN — ACETAMINOPHEN 650 MG: 325 TABLET ORAL at 17:19

## 2023-03-18 RX ADMIN — NICOTINE POLACRILEX 2 MG: 2 GUM, CHEWING ORAL at 17:20

## 2023-03-18 RX ADMIN — DOXYLAMINE SUCCINATE 25 MG: 25 TABLET ORAL at 21:01

## 2023-03-18 RX ADMIN — BUPROPION HYDROCHLORIDE 450 MG: 150 TABLET, FILM COATED, EXTENDED RELEASE ORAL at 10:24

## 2023-03-18 RX ADMIN — NICOTINE POLACRILEX 2 MG: 2 GUM, CHEWING ORAL at 20:30

## 2023-03-18 ASSESSMENT — ACTIVITIES OF DAILY LIVING (ADL)
ADLS_ACUITY_SCORE: 28
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
## 2023-03-18 NOTE — PLAN OF CARE
Face to face end of shift report will be communicated to oncoming RN.     Problem: Plan of Care - These are the overarching goals to be used throughout the patient stay.    Goal: Patient-Specific Goal (Individualized)  Description: Patient will be compliant with medication administrations and treatment team recommendations during hospitalization.  Patient will remain independent with ADLs daily.   Patient will sleep 6-8 hours per night.  Patient will eat at least 50% of meals daily.  Patient will attend at least 50% of unit programming daily.   Outcome: Progressing     Problem: Suicide Risk  Goal: Absence of Self-Harm  Description: Patient will deny SI by discharge.  Patient will remain free from self harm/injury during hospitalization.  Patient will verbalize 3 coping skills by discharge.   Outcome: Progressing   Goal Outcome Evaluation:  Face to face end of shift report obtained from NETTE Carter. Pt observed resting in bed.  Pt appears to be sleeping in bed with eyes closed and having position changes. 15 minutes and PRN safety checks completed with no noted complains. No self harm noted or reported so far this shift.  0600-Pt appeared to had slept 6.5 hours this shift.

## 2023-03-18 NOTE — PLAN OF CARE
Problem: Suicide Risk  Goal: Absence of Self-Harm  Description: Patient will deny SI by discharge.  Patient will remain free from self harm/injury during hospitalization.  Patient will verbalize 3 coping skills by discharge.   Outcome: Progressing     Problem: Plan of Care - These are the overarching goals to be used throughout the patient stay.    Goal: Patient-Specific Goal (Individualized)  Description: Patient will be compliant with medication administrations and treatment team recommendations during hospitalization.  Patient will remain independent with ADLs daily.   Patient will sleep 6-8 hours per night.  Patient will eat at least 50% of meals daily.  Patient will attend at least 50% of unit programming daily.   Outcome: Progressing       Face to Face report received from NETTE More.  Rounding completed. Patient observed.  Patient is calm, cooperative & medication compliant.  Patient is 32 weeks pregnant; she reports back pain and receives 650 mg Tylenol upon request.  Patient denies SI and A/VH.  VSS.  Accu check done one hour after breakfast results 175 mg/dl.  Patient sits in the open lounge & reads books.  She is withdrawn & isn't seen socializing with her peers.  Will continue to support the needs of the patient.    Accu checks done one hour after meals:  0900 - 175 and 1300 - 122    OB Nurse here at 1305 to measure fetal heart rate - 135 bpm    Face to face end of shift report communicated to oncoming evening shift NETTE.     Thi Rudd RN  3/18/2023  9:45 AM

## 2023-03-18 NOTE — PLAN OF CARE
"  Problem: Adult Behavioral Health Plan of Care  Goal: Patient-Specific Goal (Individualization)  Description: Patient will be compliant with medication administrations and treatment team recommendations during hospitalization.  Patient will remain independent with ADLs daily.   Patient will sleep 6-8 hours per night.  Patient will eat at least 50% of meals daily.  Patient will attend at least 50% of unit programming daily.     Outcome: Progressing  Note: 15:40: Received end of shift report from NETTE Ness.     17:20: PRN acetaminophen given for 7/10 generalized \"achy back pain-- brighter affect, pt requesting breast pads et pump; states \"I've been already collecting colostrum @ home\" \"It happens with everyone one of my pregnancies\" PCS to consult with OB--     22;00: Compliant with HS unisom, sleep machine provided, postprandial Accucheck of 127. Boyfriend into visit. Brighter affect today. Denies SI/HI, hallucinations. Remains free from injury et or self harm.     Face to face end of shift report communicated to on-coming [facility] staff.     Emma Irving RN  3/18/2023  11:27 PM               Problem: Suicide Risk  Goal: Absence of Self-Harm  Description: Patient will deny SI by discharge.  Patient will remain free from self harm/injury during hospitalization.  Patient will verbalize 3 coping skills by discharge.   Outcome: Progressing   Goal Outcome Evaluation:                        "
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
## 2023-03-19 LAB
BACTERIA UR CULT: NORMAL
GLUCOSE BLDC GLUCOMTR-MCNC: 115 MG/DL (ref 70–99)
GLUCOSE BLDC GLUCOMTR-MCNC: 147 MG/DL (ref 70–99)
GLUCOSE BLDC GLUCOMTR-MCNC: 149 MG/DL (ref 70–99)
GLUCOSE BLDC GLUCOMTR-MCNC: 177 MG/DL (ref 70–99)
GLUCOSE BLDC GLUCOMTR-MCNC: 180 MG/DL (ref 70–99)

## 2023-03-19 PROCEDURE — 99232 SBSQ HOSP IP/OBS MODERATE 35: CPT | Mod: GT | Performed by: PSYCHIATRY & NEUROLOGY

## 2023-03-19 PROCEDURE — 250N000013 HC RX MED GY IP 250 OP 250 PS 637: Performed by: PSYCHIATRY & NEUROLOGY

## 2023-03-19 PROCEDURE — 250N000013 HC RX MED GY IP 250 OP 250 PS 637: Performed by: NURSE PRACTITIONER

## 2023-03-19 PROCEDURE — 124N000001 HC R&B MH

## 2023-03-19 RX ORDER — ASPIRIN 81 MG/1
81 TABLET, CHEWABLE ORAL DAILY
Status: DISCONTINUED | OUTPATIENT
Start: 2023-03-19 | End: 2023-03-20 | Stop reason: HOSPADM

## 2023-03-19 RX ORDER — CEPHALEXIN 500 MG/1
500 CAPSULE ORAL EVERY 6 HOURS SCHEDULED
Status: DISCONTINUED | OUTPATIENT
Start: 2023-03-19 | End: 2023-03-20 | Stop reason: HOSPADM

## 2023-03-19 RX ORDER — MULTIPLE VITAMINS W/ MINERALS TAB 9MG-400MCG
1 TAB ORAL DAILY
Status: DISCONTINUED | OUTPATIENT
Start: 2023-03-19 | End: 2023-03-20 | Stop reason: HOSPADM

## 2023-03-19 RX ORDER — CEPHALEXIN 500 MG/1
500 CAPSULE ORAL EVERY 12 HOURS SCHEDULED
Status: DISCONTINUED | OUTPATIENT
Start: 2023-03-19 | End: 2023-03-19 | Stop reason: DRUGHIGH

## 2023-03-19 RX ADMIN — NICOTINE POLACRILEX 2 MG: 2 GUM, CHEWING ORAL at 19:32

## 2023-03-19 RX ADMIN — ACETAMINOPHEN 650 MG: 325 TABLET ORAL at 22:00

## 2023-03-19 RX ADMIN — CEPHALEXIN 500 MG: 500 CAPSULE ORAL at 13:49

## 2023-03-19 RX ADMIN — CEPHALEXIN 500 MG: 500 CAPSULE ORAL at 19:27

## 2023-03-19 RX ADMIN — BUPROPION HYDROCHLORIDE 450 MG: 150 TABLET, FILM COATED, EXTENDED RELEASE ORAL at 08:33

## 2023-03-19 RX ADMIN — NICOTINE POLACRILEX 2 MG: 2 GUM, CHEWING ORAL at 17:36

## 2023-03-19 RX ADMIN — NICOTINE POLACRILEX 2 MG: 2 GUM, CHEWING ORAL at 12:30

## 2023-03-19 RX ADMIN — ACETAMINOPHEN 650 MG: 325 TABLET ORAL at 07:35

## 2023-03-19 RX ADMIN — MULTIPLE VITAMINS W/ MINERALS TAB 1 TABLET: TAB at 08:33

## 2023-03-19 RX ADMIN — NICOTINE POLACRILEX 2 MG: 2 GUM, CHEWING ORAL at 21:00

## 2023-03-19 RX ADMIN — ACETAMINOPHEN 650 MG: 325 TABLET ORAL at 12:30

## 2023-03-19 RX ADMIN — CEPHALEXIN 500 MG: 500 CAPSULE ORAL at 09:22

## 2023-03-19 RX ADMIN — ACETAMINOPHEN 650 MG: 325 TABLET ORAL at 17:43

## 2023-03-19 RX ADMIN — ASPIRIN 81 MG: 81 TABLET, CHEWABLE ORAL at 08:33

## 2023-03-19 RX ADMIN — DOXYLAMINE SUCCINATE 25 MG: 25 TABLET ORAL at 21:59

## 2023-03-19 RX ADMIN — NICOTINE POLACRILEX 2 MG: 2 GUM, CHEWING ORAL at 08:36

## 2023-03-19 ASSESSMENT — ACTIVITIES OF DAILY LIVING (ADL)
ADLS_ACUITY_SCORE: 28
HYGIENE/GROOMING: INDEPENDENT
ADLS_ACUITY_SCORE: 28

## 2023-03-19 NOTE — PLAN OF CARE
Face to face end of shift report will be communicated to oncoming RN.     Problem: Adult Behavioral Health Plan of Care  Goal: Absence of New-Onset Illness or Injury  Outcome: Progressing     Problem: Suicide Risk  Goal: Absence of Self-Harm  Description: Patient will deny SI by discharge.  Patient will remain free from self harm/injury during hospitalization.  Patient will verbalize 3 coping skills by discharge.   Outcome: Progressing   Goal Outcome Evaluation:  Face to face end of shift report obtained from NETTE Carter. Pt observed resting in bed.  Pt appears to be sleeping in bed with eyes closed and having position changes. 15 minutes and PRN safety checks completed with no noted complains. No self harm noted or reported so far this shift.  0600-Pt appeared to had slept all night.

## 2023-03-19 NOTE — PROGRESS NOTES
"  [facility]  -  PROGRESS NOTE     ID   Name: Lorena Echevarria  MRN#: [id number]     SUBJECTIVE   Prior to interviewing the patient, I met with nursing and reviewed patient's clinical condition. We discussed clinical care both before and after the interview. I have reviewed the patient's clinical course by review of records including previous notes, labs, and vital signs.     Per nursing, the patient had the following behavioral events over the last 24-hours: none    On psychiatric interview, Lorena is met in the [facility]. She sates she is \"better\" today. Tolerated initiation of her medications without side effects. She has no questions today regarding her medications and her pregnancy and reiterates information we spoke about yesterday. She states she does take a multi-vitamin and a baby ASA (\"when I remember\") and consents to ordering these today. She denies SI, no plan, no intent. She is hopeful to dismiss this week, as early as tomorrow. She is in agreement that she should get reconnected with a therapist post-discharge and would like to meet with someone more on a consistent basis to manager her psychotropic medications. Encouraged her to go to groups.        MEDICATIONS   Scheduled Meds:    aspirin  81 mg Oral Daily     buPROPion  450 mg Oral Daily     cephALEXin  500 mg Oral Q6H JAKY     doxylamine  25 mg Oral At Bedtime     multivitamin w/minerals  1 tablet Oral Daily     PRN Meds:.acetaminophen, alum & mag hydroxide-simethicone, nicotine, senna-docusate     ALLERGIES   Allergies   Allergen Reactions     Lamictal [Lamotrigine] Other (See Comments)     Increases aggression.     Quetiapine Other (See Comments)     Mood changes  Mood changes       Amoxicillin Rash     Zoloft [Sertraline] Other (See Comments)     Depression worsens        VITALS   Vitals: /51   Pulse 84   Temp 97.9  F (36.6  C) (Tympanic)   Resp 16   Ht 1.702 m (5' 7\")   Wt 134 kg (295 lb 8 oz)   LMP 06/05/2022   SpO2 98%  " " BMI 46.28 kg/m       MENTAL STATUS EXAM     Lorena Echevarria is an age appearing 27 year old female, currently pregnant, 33w0d.  Grooming and hygiene are appropriate. Kinetics are calm, good eye contact. Engaged in interview and appears to be a reliable historian. Concentration is intact to conversation, cognition and intellectual functioning are intact. Mood is \"better\". Affect is brighter and she smiles today. Speech is within normal limits for volume, rate and tone. Thought process is logical, linear, and goal-directed. Does not endorse auditory/visual hallucinations and/or delusions. Does not appear to respond to internal stimuli.  Judgement and insight are improving.  Does not endorse suicidal ideation.  Does not endorse homicidal ideation. Gait and station are within normal limits.       LABS   Recent Results (from the past 24 hour(s))   Glucose by meter    Collection Time: 03/18/23  1:03 PM   Result Value Ref Range    GLUCOSE BY METER POCT 122 (H) 70 - 99 mg/dL   Glucose by meter    Collection Time: 03/18/23  9:05 PM   Result Value Ref Range    GLUCOSE BY METER POCT 129 (H) 70 - 99 mg/dL   Glucose by meter    Collection Time: 03/19/23  7:02 AM   Result Value Ref Range    GLUCOSE BY METER POCT 115 (H) 70 - 99 mg/dL   Glucose by meter    Collection Time: 03/19/23  9:13 AM   Result Value Ref Range    GLUCOSE BY METER POCT 177 (H) 70 - 99 mg/dL         ASSESSMENT   Lorena Echevarria is a 27 year old female with a past psychiatric history notable for unspecified mood disorder, unspecified anxiety disorder, ASD, lower than normal intellectual functioning noted in school with a history of IEP in place.  Multiple prior inpatient psychiatric hospitalizations, starting in adolescence. Longstanding history of SIB, cutting dating back to adolescent years. Patient presents to emergency department accompanied by her significant other with concerns for safety. A few days prior to arrival, she reportedly engaged in SIB, " cutting to her bilateral thighs. She is 32w6d pregnant with her fourth child. Gestational diabetes and a hx of preeclampsia.  She was medically cleared by both our emergency room physicians and OBGYN to be transferred to behavioral health for further stabilization. Patient was subsequently transferred and accepted for inpatient psychiatric hospitalization at [facility] for further safety and further stabilization.      Etiology of patient's presentation today is consistent with major depressive disorder, recurrent severe. Current onset within third trimester. Prior history of  depression.  She warrants hospitalization for safety and warrants treatment with adequate dosing of antidepressant in order for her safety and the safety of her baby.  Currently voluntary, but should she request to leave, she would be placed on an involuntary 72 hour hold at this time.      We discussed bupropion in pregnancy and breastfeeding and I provided her with a handout from [employer]. The baseline rate for congenital malformations is 3-5%.  One study found a weak association between bupropion and heart defects, but other, more robust studies, have shown no increase in birth defects. It is felt that it is unlikely that bupropion increases the chance for birth defects above the background risk. Some infants exposed to antidepressants in pregnancy have poor  adaptation syndrome at birth, including irritability, fussiness, jitteriness, sleep problems, and eating problems. These symptoms generally last several days to a few weeks.There are not known long-term sequelae from this.  We do not have enough information regarding long-term effects on learning and behavior in infants exposed to bupropion in utero or in the breast milk. Bupropion is found in the breast milk in very small amounts and generally is felt to be tolerated by infants. There are two case reports of   infants who possibly had a seizure, but little is known about this risk.     We discussed the use of antidepressants in pregnancy and breastfeeding and specifically discussed bupropion. I also provided the patient with a  handout from [employer] Baby. The baseline rate of congenital malformations is around 3-5% and it is not thought that bupropion would increase that rate or be associated with a specific pattern of malformations. There are possible unknown risks related to fetal exposure. There is a risk of poor  adaptation syndrome which could include irritability, tremor, crying, tachypnea, and respiratory issues in the first days to weeks of life. This could occur in 0-30 % of infants exposed to antidepressants in utero, perhaps is more likely in those exposed to paroxetine, fluoxetine, and venlafaxine. There are no known long-term sequelae of this.We also discussed persistent pulmonary hypertension of the  which is likely quite low and may have a slight increase in absolute risk with use of antidepressants. Bupropion will also transfer in the breast milk. There have not been significant neurologic or developmental side effects of infants exposed to bupropion in breast milk, but she should monitor and if she has any concerns alert her pediatrician. There have been studies that have suggested that infants exposed to antidepressants in utero have slower neuromotor development, but that they seem to catch up to their unexposed peers by 18-24 months of age.      We talked about the risks of untreated depression and anxiety including  birth, still birth, and post-partum depression and difficulty attaching to and caring for the child.      We discussed alternatives to antidepressant medications including not taking the medication and proceeding with psychotherapy without pharmacotherapy.      Given her significant symptoms, she opted to proceed with the antidepressant medication.     We also  reviewed Unisom efficacy in pregnancy and agrees to utilize at bedtime.   Given her usage of vaping, we decided jointly that use of nicotine gum while hospitalized outweighed the risks.     Daily Progress: adjusted to the unit without complication. Her affect is brighter. She slept last night with unisom. She tolerated increase in bupropion XL to 450 mg q daily. OBGYN saw yesterday, fetal heart tones wnl.  She is in agreement to set up outpatient services tomorrow. Encourage her to avoid isolation and attend groups as able.  Anticipate dismissal as early as tomorrow       DIAGNOSTIC FORMULATION   #Major Depressive Disorder, Recurrent, Severe,  Onset  #Hx of postpartum depression with second child  #suicidal ideation,resolved  #nicotine dependence  #urinary tract infection (treating with Keflex)  #pregnancy, admitted on 36w6d     PLAN     Location: Unit 5  Legal Status: Orders Placed This Encounter      Voluntary    Safety Assessment:    Behavioral Orders   Procedures     Code 1 - Restrict to Unit     Routine Programming     As clinically indicated     Status 15     Every 15 minutes.          PTA medications continued/changed:   -    New medications tried and stopped:   -None    New medications initiated:   -    Today's Changes:  -     Programming: Patient will be treated in a therapeutic milieu with appropriate individual and group therapies. Education will be provided on diagnoses, medications, and treatments. Encouraged behavioral activation and participation in group programming.     Medical diagnoses:  Per medicine    Disposition: pending clinical course , anticipate dismissal early this coming week       TREATMENT TEAM CARE PLAN     Progress: Continued symptoms.    Continued Stay Criteria/Rationale: Continued symptoms without sufficient improvement/resolution.    Medical/Physical: See above.    Precautions: See above.     Plan: Continue inpatient care with unit support and medication  management.    Rationale for change in precautions or plan: NA due to no change.    Participants: Garo Oconnell MD, Nursing, SW, OT.    The patient's care was discussed with the treatment team and chart notes were reviewed.       ATTESTATION    Garo Oconnell MD  [facility]   Psychiatry    Video Visit: Patient has given verbal consent for video visit?: Yes  Type of Service: video visit for mental health treatment  Reason for Video Visit: COVID-19 [place]  Originating Site (patient location): [facility]  Distant Site (provider location): Remote Location  Mode of Communication: Video Conference via [employer]  Time of Service: Date: March 19, 2023 , Start: 08:00 end: 08:30

## 2023-03-19 NOTE — PLAN OF CARE
Problem: Adult Behavioral Health Plan of Care  Goal: Patient-Specific Goal (Individualization)  Description: Patient will be compliant with medication administrations and treatment team recommendations during hospitalization.  Patient will remain independent with ADLs daily.   Patient will sleep 6-8 hours per night.  Patient will eat at least 50% of meals daily.  Patient will attend at least 50% of unit programming daily.     Outcome: Progressing    A&O, VSS, pain rated 6/10 described as HA-tylenol 659 mg and Ibuprofen before HS rec'd.   Fetal heart Tones per OB nurse Korina-reports WDL in 140's/min.   Somewhat withdrawn though talkative 1:1 with staff. States she had felt overwhelmed at home (pregnancy, young children, house work and job). States she cut to try to feel relief. Pt states her father also helps with children and is supportive.   Pt states she is feeling better, states sleep has helped as well-- would like to discharge tomorrow. Pleasant and cooperative with nursing assessment, cares and medications.   Lets needs be known.   Denies all criteria including: SI, SIB, HI or hallucinations.       Problem: Suicide Risk  Goal: Absence of Self-Harm  Description: Patient will deny SI by discharge.  Patient will remain free from self harm/injury during hospitalization.  Patient will verbalize 3 coping skills by discharge.   Outcome: Progressing   Goal Outcome Evaluation:    Plan of Care Reviewed With: patient      Face to face end of shift report communicated to oncoming shift.     Johanna Pepper RN  3/19/2023  6:36 PM

## 2023-03-19 NOTE — PLAN OF CARE
Problem: Suicide Risk  Goal: Absence of Self-Harm  Description: Patient will deny SI by discharge.  Patient will remain free from self harm/injury during hospitalization.  Patient will verbalize 3 coping skills by discharge.   Outcome: Progressing     Problem: Adult Behavioral Health Plan of Care  Goal: Patient-Specific Goal (Individualization)  Description: Patient will be compliant with medication administrations and treatment team recommendations during hospitalization.  Patient will remain independent with ADLs daily.   Patient will sleep 6-8 hours per night.  Patient will eat at least 50% of meals daily.  Patient will attend at least 50% of unit programming daily.     Outcome: Progressing     Face to Face report received from NETTE More.  Rounding completed. Patient observed.  Patient is calm, cooperative & medication compliant. Patient denies SI and A/VH.  VSS.   Patient sits in the open lounge & reads books or does crossword puzzles.  She is withdrawn & isn't seen socializing with her peers.  Will continue to support the needs of the patient.    PRNs:     0735 Acetaminophen 650 mg for 6/10 back pain.     1230 Acetaminophen 650 mg for 5/10 headache & continued back pain.     Accu checks done one hour after meals:  0900 - 177 and 1300 -149      OB Nurse here at ____ to measure fetal heart rate -  bpm      Face to face end of shift report communicated to oncoming evening shift NETTE.     Thi Rudd RN  3/19/2023  9:37 AM

## 2023-03-20 VITALS
WEIGHT: 293 LBS | DIASTOLIC BLOOD PRESSURE: 68 MMHG | HEART RATE: 96 BPM | TEMPERATURE: 96.9 F | RESPIRATION RATE: 16 BRPM | BODY MASS INDEX: 45.99 KG/M2 | HEIGHT: 67 IN | OXYGEN SATURATION: 98 % | SYSTOLIC BLOOD PRESSURE: 109 MMHG

## 2023-03-20 LAB
GLUCOSE BLDC GLUCOMTR-MCNC: 153 MG/DL (ref 70–99)
GLUCOSE BLDC GLUCOMTR-MCNC: 167 MG/DL (ref 70–99)
GLUCOSE BLDC GLUCOMTR-MCNC: 221 MG/DL (ref 70–99)

## 2023-03-20 PROCEDURE — 250N000013 HC RX MED GY IP 250 OP 250 PS 637: Performed by: PSYCHIATRY & NEUROLOGY

## 2023-03-20 PROCEDURE — 99239 HOSP IP/OBS DSCHRG MGMT >30: CPT | Performed by: STUDENT IN AN ORGANIZED HEALTH CARE EDUCATION/TRAINING PROGRAM

## 2023-03-20 PROCEDURE — 250N000013 HC RX MED GY IP 250 OP 250 PS 637: Performed by: NURSE PRACTITIONER

## 2023-03-20 RX ORDER — BUPROPION HYDROCHLORIDE 450 MG/1
450 TABLET, FILM COATED, EXTENDED RELEASE ORAL DAILY
Qty: 30 TABLET | Refills: 1 | Status: SHIPPED | OUTPATIENT
Start: 2023-03-21

## 2023-03-20 RX ORDER — CEPHALEXIN 500 MG/1
500 CAPSULE ORAL EVERY 6 HOURS
Qty: 21 CAPSULE | Refills: 0 | Status: SHIPPED | OUTPATIENT
Start: 2023-03-20 | End: 2023-03-30

## 2023-03-20 RX ADMIN — NICOTINE POLACRILEX 2 MG: 2 GUM, CHEWING ORAL at 12:39

## 2023-03-20 RX ADMIN — NICOTINE POLACRILEX 2 MG: 2 GUM, CHEWING ORAL at 08:25

## 2023-03-20 RX ADMIN — CEPHALEXIN 500 MG: 500 CAPSULE ORAL at 06:58

## 2023-03-20 RX ADMIN — NICOTINE POLACRILEX 2 MG: 2 GUM, CHEWING ORAL at 09:37

## 2023-03-20 RX ADMIN — CEPHALEXIN 500 MG: 500 CAPSULE ORAL at 12:13

## 2023-03-20 RX ADMIN — CEPHALEXIN 500 MG: 500 CAPSULE ORAL at 01:12

## 2023-03-20 RX ADMIN — ACETAMINOPHEN 650 MG: 325 TABLET ORAL at 08:12

## 2023-03-20 RX ADMIN — MULTIPLE VITAMINS W/ MINERALS TAB 1 TABLET: TAB at 08:13

## 2023-03-20 RX ADMIN — BUPROPION HYDROCHLORIDE 450 MG: 150 TABLET, FILM COATED, EXTENDED RELEASE ORAL at 08:13

## 2023-03-20 RX ADMIN — ACETAMINOPHEN 650 MG: 325 TABLET ORAL at 12:12

## 2023-03-20 RX ADMIN — ASPIRIN 81 MG: 81 TABLET, CHEWABLE ORAL at 08:13

## 2023-03-20 ASSESSMENT — ACTIVITIES OF DAILY LIVING (ADL)
ADLS_ACUITY_SCORE: 28
DRESS: SCRUBS (BEHAVIORAL HEALTH);INDEPENDENT
ORAL_HYGIENE: INDEPENDENT
HYGIENE/GROOMING: INDEPENDENT
LAUNDRY: UNABLE TO COMPLETE
ADLS_ACUITY_SCORE: 28

## 2023-03-20 NOTE — DISCHARGE SUMMARY
[facility] PSYCHIATRY  DISCHARGE SUMMARY     DISCHARGE DATA     Lorena Echevarria MRN# [id number]   Age: 27 year old YOB: 1995     Date of Admission: 3/17/2023  Date of Discharge: 3/20/2023  Discharge Provider: Meek Hitchcock DO       REASON FOR ADMISSION     Lorena Echevarria is a 27 year old female with a past psychiatric history notable for unspecified mood disorder, unspecified anxiety disorder, ASD, lower than normal intellectual functioning noted in school with a history of IEP in place.  Multiple prior inpatient psychiatric hospitalizations, starting in adolescence. Longstanding history of SIB, cutting dating back to adolescent years. Patient presents to emergency department accompanied by her significant other with concerns for safety. A few days prior to arrival, she reportedly engaged in SIB, cutting to her bilateral thighs. She is 32w6d pregnant with her fourth child. Gestational diabetes and a hx of preeclampsia.  She was medically cleared by both our emergency room physicians and OBGYN to be transferred to behavioral health for further stabilization. Patient was subsequently transferred and accepted for inpatient psychiatric hospitalization at [facility] for further safety and further stabilization.      Etiology of patient's presentation today is consistent with major depressive disorder, recurrent severe. Current onset within third trimester. Prior history of  depression.  She warrants hospitalization for safety and warrants treatment with adequate dosing of antidepressant in order for her safety and the safety of her baby.  Currently voluntary, but should she request to leave, she would be placed on an involuntary 72 hour hold at this time.      We discussed bupropion in pregnancy and breastfeeding and I provided her with a handout from [employer]. The baseline rate for congenital malformations is 3-5%.  One study found a weak  association between bupropion and heart defects, but other, more robust studies, have shown no increase in birth defects. It is felt that it is unlikely that bupropion increases the chance for birth defects above the background risk. Some infants exposed to antidepressants in pregnancy have poor  adaptation syndrome at birth, including irritability, fussiness, jitteriness, sleep problems, and eating problems. These symptoms generally last several days to a few weeks.There are not known long-term sequelae from this.  We do not have enough information regarding long-term effects on learning and behavior in infants exposed to bupropion in utero or in the breast milk. Bupropion is found in the breast milk in very small amounts and generally is felt to be tolerated by infants. There are two case reports of  infants who possibly had a seizure, but little is known about this risk.     We discussed the use of antidepressants in pregnancy and breastfeeding and specifically discussed bupropion. I also provided the patient with a  handout from [employer]. The baseline rate of congenital malformations is around 3-5% and it is not thought that bupropion would increase that rate or be associated with a specific pattern of malformations. There are possible unknown risks related to fetal exposure. There is a risk of poor  adaptation syndrome which could include irritability, tremor, crying, tachypnea, and respiratory issues in the first days to weeks of life. This could occur in 0-30 % of infants exposed to antidepressants in utero, perhaps is more likely in those exposed to paroxetine, fluoxetine, and venlafaxine. There are no known long-term sequelae of this.We also discussed persistent pulmonary hypertension of the  which is likely quite low and may have a slight increase in absolute risk with use of antidepressants. Bupropion will also transfer in the breast milk. There have not been  significant neurologic or developmental side effects of infants exposed to bupropion in breast milk, but she should monitor and if she has any concerns alert her pediatrician. There have been studies that have suggested that infants exposed to antidepressants in utero have slower neuromotor development, but that they seem to catch up to their unexposed peers by 18-24 months of age.      We talked about the risks of untreated depression and anxiety including  birth, still birth, and post-partum depression and difficulty attaching to and caring for the child.      We discussed alternatives to antidepressant medications including not taking the medication and proceeding with psychotherapy without pharmacotherapy.      Given her significant symptoms, she opted to proceed with the antidepressant medication.     We also reviewed Unisom efficacy in pregnancy and agrees to utilize at bedtime.   Given her usage of vaping, we decided jointly that use of nicotine gum while hospitalized outweighed the risks.         DISCHARGE DIAGNOSES     #Major Depressive Disorder, Recurrent, Severe,  Onset  #Hx of postpartum depression with second child  #suicidal ideation,resolved  #nicotine dependence       CONSULTS     OB       HOSPITAL COURSE   Psychiatric Course:    Legal status: Orders Placed This Encounter      Voluntary    Patient was admitted to unit 5 due to the aforementioned presentation. The patient was placed under 15 minute checks to ensure patient safety. The patient participated in unit programming and groups as able.    Ms. Echevarria did not require seclusion/restraint during hospitalization.     We reviewed with Ms. Echevarria current and past medication trials including duration, dose, response and side effects. During this hospitalization, the following changes to the patient's psychotropic medications were made:    PTA medications stopped:   -Trazodone 100 mg at bedtime prn sleep  -Hydroxyzine 25-50 mg TID  prn anxiety       PTA medications continued/changed:   -Wellbutrin  mg --> increased to 450 mg      New medications initiated:   -Unisom 25 mg at bedtime prn sleep    The patient tolerate the above changes well without problems. The patient noted that she felt more energetic and optimistic after increasing Wellbutrin. In addition, she noted that her SI resolved. She noted feeling more hopeful. She also was sleeping better after starting Unisom. She felt safe to discharge home, requesting to leave.     With these changes and supports the patient noticed improvement in their symptoms and felt sufficiently ready for discharge. As a result, Lorena Echevarria was discharged. At the time of discharge, Lorena Echevarria was determined to not be a danger to self or others. At the current time of discharge, the patient does not meet criteria for involuntary hospitalization. On the day of discharge, the patient reports that they do not have suicidal or homicidal ideation. In addition, no thoughts of harm to her baby or children. No intrusive thoughts. No firearms at home. Steps taken to minimize risk include: assessing patient s behavior and thought process daily during hospital stay, discharging patient with adequate plan for follow up for mental and physical health and discussing safety plan of returning to the hospital should the patient ever have thoughts of harming themselves or others. Therefore, based on all available evidence including the factors cited above, the patient does not appear to be at imminent risk for self-harm, and is appropriate for outpatient level of care. However, if patient uses substances or is medication non-adherent, their risk of decompensation, SI, and danger to others will be elevated. This was discussed with the patient.    Medical Course:    The patient was medically cleared for admission to inpatient psychiatry. The following medical issues arose below. The patient was medically stable at  the time of discharge.     #. UTI  - Keflex 500 mg every 6 hours x 7 days  - Patient to finish course on outpatient basis    #. Gestational DM  - Closing monitoring of glucose with some more elevated readings  - F/U with Dr. Severino     #. Pregnancy, admitted on 36w6d  - Fetal heart tones daily  - 1 hour post-meal glucoses  - Fasting AM glucose  - No signs of labor upon discharge. Continued management by Dr. Severino upon discharge       DISCHARGE MEDICATIONS     Current Discharge Medication List      START taking these medications    Details   cephALEXin (KEFLEX) 500 MG capsule Take 1 capsule (500 mg) by mouth every 6 hours  Qty: 21 capsule, Refills: 0    Associated Diagnoses: Urinary tract infection in mother during third trimester of pregnancy      doxylamine (UNISOM) 25 MG TABS tablet Take 1 tablet (25 mg) by mouth nightly as needed for sleep  Qty: 30 tablet, Refills: 1    Associated Diagnoses: Severe episode of recurrent major depressive disorder, without psychotic features (H)         CONTINUE these medications which have CHANGED    Details   buPROPion 450 MG TB24 Take 450 mg by mouth daily  Qty: 30 tablet, Refills: 1    Associated Diagnoses: Severe episode of recurrent major depressive disorder, without psychotic features (H)         CONTINUE these medications which have NOT CHANGED    Details   aspirin (ASA) 81 MG chewable tablet Take 1 tablet (81 mg) by mouth daily  Qty: 180 tablet, Refills: 1    Associated Diagnoses: High-risk pregnancy in third trimester      blood glucose (ACCU-CHEK GUIDE) test strip Use to test blood sugar 4 times daily.  Qty: 200 strip, Refills: 1    Associated Diagnoses: Glucose intolerance of pregnancy      blood glucose monitoring (SOFTCLIX) lancets Use to test blood sugar 4 times daily.  Qty: 100 each, Refills: 0    Associated Diagnoses: Glucose intolerance of pregnancy      lidocaine (XYLOCAINE) 5 % external ointment Apply topically as needed for moderate pain (4-6)  Qty: 35 g,  "Refills: 3    Associated Diagnoses: Herpes zoster without complication         STOP taking these medications       docusate sodium (COLACE) 100 MG capsule Comments:   Reason for Stopping:         ferrous sulfate (FEROSUL) 325 (65 Fe) MG tablet Comments:   Reason for Stopping:         hydrOXYzine (VISTARIL) 25 MG capsule Comments:   Reason for Stopping:         ondansetron (ZOFRAN) 4 MG tablet Comments:   Reason for Stopping:         SUMAtriptan (IMITREX) 50 MG tablet Comments:   Reason for Stopping:         traZODone (DESYREL) 100 MG tablet Comments:   Reason for Stopping:         valACYclovir (VALTREX) 1000 mg tablet Comments:   Reason for Stopping:                MENTAL STATUS EXAM   Vitals: /70   Pulse 102   Temp 98.1  F (36.7  C) (Tympanic)   Resp 18   Ht 1.702 m (5' 7\")   Wt 134.4 kg (296 lb 4.8 oz)   LMP 06/05/2022   SpO2 99%   BMI 46.41 kg/m      Appearance: Alert, oriented, dressed in hospital scrubs, appears stated age   Attitude: Cooperative   Eye Contact: Good  Mood: \"Fine\"  Affect: Restricted  Speech: Normal rate and rhythm   Psychomotor Behavior: No tremor, rigidity, or psychomotor abnormality   Thought Process: Logical, goal directed   Associations: No loose associations   Thought Content: Denies SI or plan. No SIB. Denies A/V hallucinations. No evidence of delusional thought.  Insight: Good  Judgment: Good  Oriented to: Person, place, and time  Attention Span and Concentration: Intact  Recent and Remote Memory: Intact  Language: English with appropriate syntax and vocabulary  Fund of Knowledge: Average  Muscle Strength and Tone: Grossly normal  Gait and Station: Grossly normal       DISCHARGE PLAN     1.  Education given regarding diagnostic and treatment options with risks, benefits and alternatives with adequate verbalization of understanding.  2.  Discharge to home. Upon detailed review of risk factors, patient amenable for release.   3.  Continue aforementioned medications and " associated medication changes with follow-up by outpatient provider.  4.  Crisis management planning in place.    5.  Nursing and  to review further discharge recommendations.   6.  Patient is being discharged with the following appointments:    See AVS    7. General discharge instructions:       Reason for your hospital stay    SI and depression with recent self-harm.     Follow-up and recommended labs and tests    See SW Recommendations for outpatient follow-up appointments. Close F/U with OB with lab monitoring per their discretion.     Activity    Your activity upon discharge: activity as tolerated.     Discharge Instructions    Please continue to take your medications as prescribed. Please follow closely with your OB. Please also practice healthy lifestyle choices, including exercise, healthy diet, stress management, adequate sleep, and cultivating supportive relationships. Please also avoid use of any substances. Please return to the ED if your symptoms worsen or you do not feel safe. Please come to the ED as soon as possible if you feel like you will hurt yourself, your baby, or your children.     Diet    Follow this diet upon discharge: Orders Placed This Encounter      Regular Diet Adult           DISCHARGE SERVICES PROVIDED     50 minutes spent on discharge services, including:  Final examination of patient.  Review and discussion of hospital stay.  Instructions for continued outpatient care/goals.  Preparation of discharge records.  Preparation of medications refills and new prescriptions.  Preparation of applicable referral forms.        ATTESTATION     Dr. Meek Hitchcock  Psychiatrist        LABS THIS ADMISSION     Results for orders placed or performed during the hospital encounter of 03/17/23   Comprehensive metabolic panel     Status: Abnormal   Result Value Ref Range    Sodium 136 136 - 145 mmol/L    Potassium 3.7 3.4 - 5.3 mmol/L    Chloride 103 98 - 107 mmol/L    Carbon Dioxide (CO2)  21 (L) 22 - 29 mmol/L    Anion Gap 12 7 - 15 mmol/L    Urea Nitrogen 5.3 (L) 6.0 - 20.0 mg/dL    Creatinine 0.48 (L) 0.51 - 0.95 mg/dL    Calcium 9.0 8.6 - 10.0 mg/dL    Glucose 129 (H) 70 - 99 mg/dL    Alkaline Phosphatase 84 35 - 104 U/L    AST 14 10 - 35 U/L    ALT 9 (L) 10 - 35 U/L    Protein Total 6.5 6.4 - 8.3 g/dL    Albumin 3.3 (L) 3.5 - 5.2 g/dL    Bilirubin Total <0.2 <=1.2 mg/dL    GFR Estimate >90 >60 mL/min/1.73m2   UA with Microscopic reflex to Culture     Status: Abnormal    Specimen: Urine, Midstream   Result Value Ref Range    Color Urine Yellow Colorless, Straw, Light Yellow, Yellow    Appearance Urine Slightly Cloudy (A) Clear    Glucose Urine Negative Negative mg/dL    Bilirubin Urine Negative Negative    Ketones Urine Trace (A) Negative mg/dL    Specific Gravity Urine >1.030 1.003 - 1.035    Blood Urine Negative Negative    pH Urine 6.0 4.7 - 8.0    Protein Albumin Urine 30 (A) Negative mg/dL    Urobilinogen Urine 2.0 Normal, 2.0 mg/dL    Nitrite Urine Negative Negative    Leukocyte Esterase Urine Large (A) Negative    Bacteria Urine Few (A) None Seen /HPF    Mucus Urine Present (A) None Seen /LPF    Amorphous Crystals Urine Few (A) None Seen /HPF    RBC Urine 1 <=2 /HPF    WBC Urine 22 (H) <=5 /HPF    Squamous Epithelials Urine 7 (H) <=1 /HPF    Narrative    Urine Culture ordered based on laboratory criteria   Asymptomatic COVID-19 Virus (Coronavirus) by PCR Nasopharyngeal     Status: Normal    Specimen: Nasopharyngeal; Swab   Result Value Ref Range    SARS CoV2 PCR Negative Negative    Narrative    Testing was performed using the Xpert Xpress SARS-CoV-2 Assay on the Cepheid Gene-Xpert Instrument Systems. Additional information about this Emergency Use Authorization (EUA) assay can be found via the Lab Guide. This test should be ordered for the detection of SARS-CoV-2 in individuals who meet SARS-CoV-2 clinical and/or epidemiological criteria as well as from individuals without symptoms or  other reasons to suspect COVID-19. Test performance for asymptomatic patients has only been established in anterior nasal swab specimens. This test is for in vitro diagnostic use under the FDA EUA for laboratories certified under CLIA to perform high complexity testing. This test has not been FDA cleared or approved. A negative result does not rule out the presence of PCR inhibitors in the specimen or target RNA concentration below the limit of detection for the assay. The possibility of a false negative should be considered if the patient's recent exposure or clinical presentation suggests COVID-19. This test was validated by [facility] laboratory. This laboratory is certified under the Clinical Laboratory Improvement Amendments (CLIA) as qualified to perform high complexity testing.   CBC with platelets and differential     Status: None   Result Value Ref Range    WBC Count 8.5 4.0 - 11.0 10e3/uL    RBC Count 3.94 3.80 - 5.20 10e6/uL    Hemoglobin 11.9 11.7 - 15.7 g/dL    Hematocrit 35.8 35.0 - 47.0 %    MCV 91 78 - 100 fL    MCH 30.2 26.5 - 33.0 pg    MCHC 33.2 31.5 - 36.5 g/dL    RDW 12.9 10.0 - 15.0 %    Platelet Count 196 150 - 450 10e3/uL    % Neutrophils 79 %    % Lymphocytes 15 %    % Monocytes 5 %    % Eosinophils 1 %    % Basophils 0 %    % Immature Granulocytes 0 %    NRBCs per 100 WBC 0 <1 /100    Absolute Neutrophils 6.7 1.6 - 8.3 10e3/uL    Absolute Lymphocytes 1.3 0.8 - 5.3 10e3/uL    Absolute Monocytes 0.4 0.0 - 1.3 10e3/uL    Absolute Eosinophils 0.0 0.0 - 0.7 10e3/uL    Absolute Basophils 0.0 0.0 - 0.2 10e3/uL    Absolute Immature Granulocytes 0.0 <=0.4 10e3/uL    Absolute NRBCs 0.0 10e3/uL   Extra Tube     Status: None    Narrative    The following orders were created for panel order Extra Tube.  Procedure                               Abnormality         Status                     ---------                               -----------         ------                     Extra Blue  Top Tube[[id number]]                              Final result               Extra Red Top Tube[[id number]]                               Final result               Extra Heparinized Syringe[[id number]]                        Final result                 Please view results for these tests on the individual orders.   Extra Blue Top Tube     Status: None   Result Value Ref Range    Hold Specimen JIC    Extra Red Top Tube     Status: None   Result Value Ref Range    Hold Specimen JIC    Extra Heparinized Syringe     Status: None   Result Value Ref Range    Hold Specimen JI    Urine Drugs of Abuse Screen Panel 13     Status: Normal   Result Value Ref Range    Cannabinoids (39-jpy-5-carboxy-9-THC) Not Detected Not Detected, Indeterminate    Phencyclidine Not Detected Not Detected, Indeterminate    Cocaine (Benzoylecgonine) Not Detected Not Detected, Indeterminate    Methamphetamine (d-Methamphetamine) Not Detected Not Detected, Indeterminate    Opiates (Morphine) Not Detected Not Detected, Indeterminate    Amphetamine (d-Amphetamine) Not Detected Not Detected, Indeterminate    Benzodiazepines (Nordiazepam) Not Detected Not Detected, Indeterminate    Tricyclic Antidepressants (Desipramine) Not Detected Not Detected, Indeterminate    Methadone Not Detected Not Detected, Indeterminate    Barbiturates (Butalbital) Not Detected Not Detected, Indeterminate    Oxycodone Not Detected Not Detected, Indeterminate    Propoxyphene (Norpropoxyphene) Not Detected Not Detected, Indeterminate    Buprenorphine Not Detected Not Detected, Indeterminate   Glucose by meter     Status: Abnormal   Result Value Ref Range    GLUCOSE BY METER POCT 175 (H) 70 - 99 mg/dL   Glucose by meter     Status: Abnormal   Result Value Ref Range    GLUCOSE BY METER POCT 122 (H) 70 - 99 mg/dL   Glucose by meter     Status: Abnormal   Result Value Ref Range    GLUCOSE BY METER POCT 129 (H) 70 - 99 mg/dL   Glucose by meter     Status: Abnormal   Result Value Ref  Range    GLUCOSE BY METER POCT 115 (H) 70 - 99 mg/dL   Glucose by meter     Status: Abnormal   Result Value Ref Range    GLUCOSE BY METER POCT 177 (H) 70 - 99 mg/dL   Glucose by meter     Status: Abnormal   Result Value Ref Range    GLUCOSE BY METER POCT 149 (H) 70 - 99 mg/dL   Glucose by meter     Status: Abnormal   Result Value Ref Range    GLUCOSE BY METER POCT 180 (H) 70 - 99 mg/dL   Glucose by meter     Status: Abnormal   Result Value Ref Range    GLUCOSE BY METER POCT 147 (H) 70 - 99 mg/dL   Glucose by meter     Status: Abnormal   Result Value Ref Range    GLUCOSE BY METER POCT 221 (H) 70 - 99 mg/dL   Glucose by meter     Status: Abnormal   Result Value Ref Range    GLUCOSE BY METER POCT 167 (H) 70 - 99 mg/dL   Urine Culture     Status: None    Specimen: Urine, Midstream   Result Value Ref Range    Culture >100,000 CFU/mL Mixture of urogenital prakash     Narrative    Multiple morphotypes present with no predominant organism.  Growth consistent with probable contamination during collection.  Suggest repeat specimen if clinically indicated.    CBC with platelets differential     Status: None    Narrative    The following orders were created for panel order CBC with platelets differential.  Procedure                               Abnormality         Status                     ---------                               -----------         ------                     CBC with platelets and d...[[id number]]                      Final result                 Please view results for these tests on the individual orders.   Urine Drugs of Abuse Screen     Status: Normal    Narrative    The following orders were created for panel order Urine Drugs of Abuse Screen.  Procedure                               Abnormality         Status                     ---------                               -----------         ------                     Urine Drugs of Abuse Scr...[[id number]]  Normal              Final result                  Please view results for these tests on the individual orders.

## 2023-03-20 NOTE — DISCHARGE INSTRUCTIONS
Behavioral Discharge Planning and Instructions    Summary: Lorena Echevarria is a 27 year old female with a past psychiatric history notable for unspecified mood disorder, unspecified anxiety disorder, ASD, lower than normal intellectual functioning noted in school with a history of IEP in place.     Main Diagnosis: #Major Depressive Disorder, Recurrent, Severe,  Onset  #Hx of postpartum depression with second child  #suicidal ideation  #nicotine dependence    Health Care Follow-up:     [facility]  Intake:  @ 11:30 AM.   [address] [place] [address]  Phone: [phone]   Fax: [phone]      Attend all scheduled appointments with your outpatient providers. Call at least 24 hours in advance if you need to reschedule an appointment to ensure continued access to your outpatient providers.     Major Treatments, Procedures and Findings:  You were provided with: a psychiatric assessment, assessed for medical stability, medication evaluation and/or management, group therapy, family therapy, individual therapy, CD evaluation/assessment, milieu management, and medical interventions    Symptoms to Report: feeling more aggressive, increased confusion, losing more sleep, mood getting worse, or thoughts of suicide    Early warning signs can include: increased depression or anxiety sleep disturbances increased thoughts or behaviors of suicide or self-harm  increased unusual thinking, such as paranoia or hearing voices    Safety and Wellness:  Take all medicines as directed.  Make no changes unless your doctor suggests them.      Follow treatment recommendations.  Refrain from alcohol and non-prescribed drugs.  Ask your support system to help you reduce your access to items that could harm yourself or others. Items could include:  Firearms  Medicines (both prescribed and over-the-counter)  Knives and other sharp objects  Ropes and like materials  Car keys  If there is a concern  "for safety, call 911. If there is a concern for safety, call 911.    Resources:   Crisis Intervention: 549.689.3386 or 543-298-4529 (TTY: 608.933.8861).  Call anytime for help.  [place] on Mental Illness (www.[place].nicole.org): 988.912.1296 or 285-085-5328.  Alcoholics Anonymous (www.alcoholics-anonymous.org): Check your phone book for your local chapter.  Suicide Awareness Voices of Education (SAVE) (www.save.org): 081-544-AZKX (1250)  National Suicide Prevention Line (www.mentalhealthmn.org): 648-156-WJAK (4449)  Mental Health Consumer/Survivor Network of [place] (www.mhcsn.net): 931.716.3522 or 525-079-3434  Mental Health Association of [place] (www.mentalhealth.org): 485.399.7691 or 926-622-7844  Self- Management and Recovery Training., [employer]-- Toll free: 724.614.2606  www.[employer]  Text 4 Life: txt \"LIFE\" to 68789 for immediate support and crisis intervention  Crisis text line: Text \"[place]\" to 688321. Free, confidential, 24/7.    General Medication Instructions:   See your medication sheet(s) for instructions.   Take all medicines as directed.  Make no changes unless your doctor suggests them.   Go to all your doctor visits.  Be sure to have all your required lab tests. This way, your medicines can be refilled on time.  Do not use any drugs not prescribed by your doctor.  Avoid alcohol.    Advance Directives:   Scanned document on file with [facility]? No scanned doc  Is document scanned? Pt states no documents  Honoring Choices Your Rights Handout: Informed and given  Was more information offered? Pt declined    The Treatment team has appreciated the opportunity to work with you. If you have any questions or concerns about your recent admission, you can contact the unit which can receive your call 24 hours a day, 7 days a week. They will be able to get in touch with a Provider if needed. The unit number is [phone].  "

## 2023-03-20 NOTE — PLAN OF CARE
Report received from outgoing staff.    Problem: Adult Behavioral Health Plan of Care  Goal: Patient-Specific Goal (Individualization)  Description: Patient will be compliant with medication administrations and treatment team recommendations during hospitalization.  Patient will remain independent with ADLs daily.   Patient will sleep 6-8 hours per night.  Patient will eat at least 50% of meals daily.  Patient will attend at least 50% of unit programming daily.   Outcome: Not Progressing     Problem: Suicide Risk  Goal: Absence of Self-Harm  Description: Patient will deny SI by discharge.  Patient will remain free from self harm/injury during hospitalization.  Patient will verbalize 3 coping skills by discharge.   Outcome: Not Progressing    Rounds completed. Safety checks completed every 15 minutes throughout the night. Pt noted to be resting with eyes closed and easy resp. for 5.5 hours. No suicidal gestures or comments noted.    Face to face end of shift report to be communicated to oncoming RN.

## 2023-03-20 NOTE — PLAN OF CARE
Pt is discharging at the recommendation of the treatment team. Pt is discharging to home transported by boyfriend. Pt denies having any thoughts of hurting themself or anyone else. Pt denies anxiety or depression. Pt has follow up with Psychiatry. Discharge instructions, including; demographic sheet, psychiatric evaluation, discharge summary, and AVS were faxed to these next level of care providers.

## 2023-03-20 NOTE — PLAN OF CARE
Problem: Adult Behavioral Health Plan of Care  Goal: Patient-Specific Goal (Individualization)  Description: Patient will be compliant with medication administrations and treatment team recommendations during hospitalization.  Patient will remain independent with ADLs daily.   Patient will sleep 6-8 hours per night.  Patient will eat at least 50% of meals daily.  Patient will attend at least 50% of unit programming daily.     Outcome: Progressing     Pt up in [facility] at the start of the shift. Pt wants to discharge to home today, pt states she can't sleep, her room mate snores and sleeps without clothes. Pt states she has a back ache at 6/10 pain.   Pt given tylenol 650mg at 0812. Pt denied SI, HI, hallucinations and anxiety., pt reports depression at 4/10. Pt blood sugar at 0900 was 221, rataken at 1100 and was 167.       Problem: Suicide Risk  Goal: Absence of Self-Harm  Description: Patient will deny SI by discharge.  Patient will remain free from self harm/injury during hospitalization.  Patient will verbalize 3 coping skills by discharge.   Outcome: Progressing   Goal Outcome Evaluation:         Face to face end of shift report communicated to evening shift RN. Reported that pt is a risk for suicide.     Sheila Sellers RN  3/20/2023  7:39 AM          Discharge Note    Patient Discharged to home on 3/20/2023 2:02 PM via Private Car accompanied by  staff.     Patient informed of discharge instructions in AVS. patient verbalizes understanding and denies having any questions pertaining to AVS. Patient stable at time of discharge. Patient denies SI, HI, and thoughts of self harm at time of discharge. All personal belongings returned to patient. Discharge prescriptions sent to [facility] via electronic communication.     Sheila Sellers RN  3/20/2023  2:02 PM

## 2023-03-22 ENCOUNTER — TELEPHONE (OUTPATIENT)
Dept: BEHAVIORAL HEALTH | Facility: HOSPITAL | Age: 28
End: 2023-03-22

## 2023-03-22 NOTE — TELEPHONE ENCOUNTER
She was discharged to home on  3/20/23 from [facility]. I spoke today with her regarding the patient's discharge.    She indicates she has received sufficient information upon discharge. Medications were reviewed in full on discharge, including: Medications to be started; medications to be stopped; medications to be continued from preadmission and any side effects.   Prescriptions were e-scribed at discharge and were able to be filled. Yes  If unable to obtain prescriptions: explain:N/A    Medications are being taken as prescribed.    She did not have any questions regarding discharge instructions or condition.

## 2023-03-23 ENCOUNTER — HOSPITAL ENCOUNTER (OUTPATIENT)
Dept: ULTRASOUND IMAGING | Facility: HOSPITAL | Age: 28
Discharge: HOME | End: 2023-03-23
Attending: OBSTETRICS & GYNECOLOGY | Admitting: OBSTETRICS & GYNECOLOGY
Payer: COMMERCIAL

## 2023-03-23 DIAGNOSIS — O24.410 GDM (GESTATIONAL DIABETES MELLITUS), CLASS A1: ICD-10-CM

## 2023-03-23 PROCEDURE — 76816 OB US FOLLOW-UP PER FETUS: CPT

## 2023-03-24 ENCOUNTER — PATIENT OUTREACH (OUTPATIENT)
Dept: CARE COORDINATION | Facility: OTHER | Age: 28
End: 2023-03-24

## 2023-03-27 ENCOUNTER — PATIENT OUTREACH (OUTPATIENT)
Dept: CARE COORDINATION | Facility: OTHER | Age: 28
End: 2023-03-27

## 2023-03-29 ENCOUNTER — PATIENT OUTREACH (OUTPATIENT)
Dept: CARE COORDINATION | Facility: OTHER | Age: 28
End: 2023-03-29

## 2023-03-29 NOTE — LETTER
[facility] CARE COORDINATION  March 29, 2023              Lorena Echevarria  [address]  [place] [address]          Dear Lorena,    I have been unsuccessful in reaching you to complete transitional care management following hospitalization. At this time the Care Coordination team will make no further attempts to reach you, however this does not change your ability to continue receiving care from your providers at your primary care clinic. If you need additional support in the future please contact the clinic scheduling line at [phone] to make an appointment with your primary care provider .    All of us at [facility] are invested in your health and are here to assist you in meeting your goals.     Sincerely,    Care Coordination team

## 2023-03-30 ENCOUNTER — PRENATAL OFFICE VISIT (OUTPATIENT)
Dept: OBGYN | Facility: OTHER | Age: 28
End: 2023-03-30
Attending: OBSTETRICS & GYNECOLOGY
Payer: COMMERCIAL

## 2023-03-30 VITALS
RESPIRATION RATE: 16 BRPM | HEIGHT: 67 IN | BODY MASS INDEX: 45.64 KG/M2 | WEIGHT: 290.8 LBS | HEART RATE: 90 BPM | OXYGEN SATURATION: 95 % | DIASTOLIC BLOOD PRESSURE: 70 MMHG | SYSTOLIC BLOOD PRESSURE: 110 MMHG

## 2023-03-30 DIAGNOSIS — F32.1 CURRENT MODERATE EPISODE OF MAJOR DEPRESSIVE DISORDER, UNSPECIFIED WHETHER RECURRENT (H): ICD-10-CM

## 2023-03-30 DIAGNOSIS — O09.93 HIGH-RISK PREGNANCY IN THIRD TRIMESTER: ICD-10-CM

## 2023-03-30 DIAGNOSIS — O24.410 GDM (GESTATIONAL DIABETES MELLITUS), CLASS A1: Primary | ICD-10-CM

## 2023-03-30 DIAGNOSIS — E66.01 CLASS 3 SEVERE OBESITY DUE TO EXCESS CALORIES WITHOUT SERIOUS COMORBIDITY IN ADULT, UNSPECIFIED BMI (H): ICD-10-CM

## 2023-03-30 DIAGNOSIS — E66.813 CLASS 3 SEVERE OBESITY DUE TO EXCESS CALORIES WITHOUT SERIOUS COMORBIDITY IN ADULT, UNSPECIFIED BMI (H): ICD-10-CM

## 2023-03-30 LAB
EST. AVERAGE GLUCOSE BLD GHB EST-MCNC: 120 MG/DL
HBA1C MFR BLD: 5.8 %

## 2023-03-30 PROCEDURE — 83036 HEMOGLOBIN GLYCOSYLATED A1C: CPT | Mod: ZL | Performed by: OBSTETRICS & GYNECOLOGY

## 2023-03-30 PROCEDURE — 36415 COLL VENOUS BLD VENIPUNCTURE: CPT | Mod: ZL | Performed by: OBSTETRICS & GYNECOLOGY

## 2023-03-30 PROCEDURE — 99207 PR COMPLICATED OB VISIT: CPT | Performed by: OBSTETRICS & GYNECOLOGY

## 2023-03-30 PROCEDURE — G0463 HOSPITAL OUTPT CLINIC VISIT: HCPCS

## 2023-03-30 ASSESSMENT — PAIN SCALES - GENERAL: PAINLEVEL: NO PAIN (0)

## 2023-03-30 NOTE — PROGRESS NOTES
Pt scheduled tomorrow to see Diabetic Education. Writer discussed the importance of her keeping her appointment tomorrow. Pt verbalizes understanding and agrees to plan.  Itzel Chang RN

## 2023-03-30 NOTE — PROGRESS NOTES
Doing better since recent Psych hospitalization.  Denies thoughts of self harm. On Wellbutrin.  Has Psych f/u April 14th at [place]    No other  concerns today.  Pt states she has been checking BS with highs readings around 200 but did not bring in blood glucose log.  Hg A1c ordered. Diabetes center appt tomorrow.  Will need insulin initiation and APFT.  US scheduled next wk.   Anesthesia consult foir increased BMI.   Discussed kick counts and fetal movement.  RTC in 2 weeks  Denies PTL sperfecto, jael, tigref  Fidel Severino MD  3/30/2023

## 2023-03-30 NOTE — Clinical Note
Per Dr. Severino please reach out to patient. She hasn't been calling with her BG. Please schedule a follow-up with diabetic ed

## 2023-03-31 ENCOUNTER — HOSPITAL ENCOUNTER (OUTPATIENT)
Dept: EDUCATION SERVICES | Facility: HOSPITAL | Age: 28
Discharge: HOME | End: 2023-03-31
Attending: NURSE PRACTITIONER | Admitting: NURSE PRACTITIONER
Payer: COMMERCIAL

## 2023-03-31 VITALS
WEIGHT: 293 LBS | HEIGHT: 66 IN | RESPIRATION RATE: 16 BRPM | DIASTOLIC BLOOD PRESSURE: 81 MMHG | HEART RATE: 107 BPM | BODY MASS INDEX: 47.09 KG/M2 | OXYGEN SATURATION: 97 % | SYSTOLIC BLOOD PRESSURE: 117 MMHG

## 2023-03-31 DIAGNOSIS — O24.419 GESTATIONAL DIABETES MELLITUS (GDM) IN THIRD TRIMESTER, GESTATIONAL DIABETES METHOD OF CONTROL UNSPECIFIED: Primary | ICD-10-CM

## 2023-03-31 PROCEDURE — G0108 DIAB MANAGE TRN  PER INDIV: HCPCS | Performed by: DIETITIAN, REGISTERED

## 2023-03-31 RX ORDER — INSULIN HUMAN 100 [IU]/ML
10 INJECTION, SUSPENSION SUBCUTANEOUS AT BEDTIME
Qty: 15 ML | Refills: 1 | Status: SHIPPED | OUTPATIENT
Start: 2023-03-31 | End: 2023-04-05

## 2023-03-31 ASSESSMENT — PAIN SCALES - GENERAL
PAINLEVEL: MODERATE PAIN (5)
PAINLEVEL: MODERATE PAIN (5)

## 2023-03-31 NOTE — PATIENT INSTRUCTIONS
-Continue to work on healthy food choices.   -Keep written food logs.   -Use Dexcom for monitoring.    -Target glucose levels are fasting < 95 and 1 hour after meals < 140.  -Watch for email to share data.   -Begin taking 10 units NPH insulin at bedtime.   -Follow up Thursday April 6th at 2:30 pm.  -Call with concerns - PERLA Bonds, [facility] [phone].

## 2023-03-31 NOTE — PROGRESS NOTES
"Pt is here today for follow up regarding dx of GDM.  Pt is 34w 5d gestation.      /81   Pulse 107   Resp 16   Ht 1.664 m (5' 5.5\")   Wt 133.1 kg (293 lb 6.4 oz)   LMP 06/05/2022   SpO2 97%   BMI 48.08 kg/m     Weight is up 10-11# with pregnancy.      Pt did not bring glucose meter or glucose log sheet.  Verbal report is most fasting glucose levels  and she is also testing 30-60 minutes after breakfast and lunch with reports of most levels <140.     Lab Results   Component Value Date    A1C 5.8 03/30/2023    A1C 5.6 10/27/2022    A1C 5.2 09/27/2017     Verbal diet recall obtained:    Breakfast-2 eggs with 1 toast with butter or avocado or 1 package of flavored oatmeal - drinks 1-2 cups coffee with flavored cream  Snack-small bag of chips or string cheese or fruit or uncrustable sandwich  Lunch-chopped salad or veggie wrap or crispy chicken wrap - water or ice tea  Supper-meat, veggies - sometimes starch - water or diet soda  HS-small bag of chips or popcorn    Pt has been doing some walking for exercise and reports job is active.  Activity 4-5x/week.      PLAN:   Pt is agreeable to wearing sample Dexcom G7 sensor for monitoring for 10 days.  Sensor placed on right arm.  Encouraged pt to use Dexcom to see how different foods and activity effect glucose levels.    Email sent to patient to allow data sharing.   Food logs provided.  Message sent to provider to begin 10 units NPH insulin at bedtime r/t reported above target fasting levels.  Awaiting reply.  Reviewed insulin injection procedure using demo pen but pt familiar from previous pregnancy.  She feels confident she can give injection.  Written instructions provided.    Follow up April 6th at 2:30 pm for glucose review/insulin adjust.    "

## 2023-03-31 NOTE — TELEPHONE ENCOUNTER
Pt came to diabetes appointment today.  She did not bring glucose meter or log sheet.   Verbal report is fasting levels  and levels 30-60 minutes after breakfast/lunch usually <140.  Note A1c of 5.8% up from 5.6% at last check.  Pt agreed to wear sample Dexcom G7 sensor today for monitoring.  Okay to begin NPH 10 units at bedtime based on reported fasting glucose levels?  If so, please sign pended order.  Follow up scheduled April 6 at 2:30 pm for download of Dexcom.

## 2023-03-31 NOTE — LETTER
"    3/31/2023        RE: Lorena SANTOS Wale  [address]  [address]  [place] [address]        Pt is here today for follow up regarding dx of GDM.  Pt is 34w 5d gestation.      /81   Pulse 107   Resp 16   Ht 1.664 m (5' 5.5\")   Wt 133.1 kg (293 lb 6.4 oz)   LMP 06/05/2022   SpO2 97%   BMI 48.08 kg/m     Weight is up 10-11# with pregnancy.      Pt did not bring glucose meter or glucose log sheet.  Verbal report is most fasting glucose levels  and she is also testing 30-60 minutes after breakfast and lunch with reports of most levels <140.     Lab Results   Component Value Date    A1C 5.8 03/30/2023    A1C 5.6 10/27/2022    A1C 5.2 09/27/2017     Verbal diet recall obtained:    Breakfast-2 eggs with 1 toast with butter or avocado or 1 package of flavored oatmeal - drinks 1-2 cups coffee with flavored cream  Snack-small bag of chips or string cheese or fruit or uncrustable sandwich  Lunch-chopped salad or veggie wrap or crispy chicken wrap - water or ice tea  Supper-meat, veggies - sometimes starch - water or diet soda  HS-small bag of chips or popcorn    Pt has been doing some walking for exercise and reports job is active.  Activity 4-5x/week.      PLAN:   Pt is agreeable to wearing sample Dexcom G7 sensor for monitoring for 10 days.  Sensor placed on right arm.  Encouraged pt to use Dexcom to see how different foods and activity effect glucose levels.    Email sent to patient to allow data sharing.   Food logs provided.  Message sent to provider to begin 10 units NPH insulin at bedtime r/t reported above target fasting levels.  Awaiting reply.  Reviewed insulin injection procedure using demo pen but pt familiar from previous pregnancy.  She feels confident she can give injection.  Written instructions provided.    Follow up April 6th at 2:30 pm for glucose review/insulin adjust.          Sincerely,        Gifty Anthony RD    "

## 2023-04-03 ENCOUNTER — TELEPHONE (OUTPATIENT)
Dept: OBGYN | Facility: OTHER | Age: 28
End: 2023-04-03

## 2023-04-03 NOTE — TELEPHONE ENCOUNTER
Received PA from [facility] for HumuLIN N KwikPen 100UNIT/ML pen-injectors. Submitted on CMM. Waiting for a response.

## 2023-04-04 NOTE — TELEPHONE ENCOUNTER
Received DENIAL from [facility] for HumuLIN N KwikPen 100UNIT/ML pen-injectors. 04/03/2023.    Forms scanned to [facility].

## 2023-04-05 DIAGNOSIS — O24.419 GESTATIONAL DIABETES MELLITUS (GDM) IN THIRD TRIMESTER, GESTATIONAL DIABETES METHOD OF CONTROL UNSPECIFIED: Primary | ICD-10-CM

## 2023-04-05 NOTE — TELEPHONE ENCOUNTER
Pt's insurance would not cover NPH pens.  Please sign pended order for NPH vials and syringes.   Thanks!

## 2023-04-06 ENCOUNTER — HOSPITAL ENCOUNTER (OUTPATIENT)
Dept: ULTRASOUND IMAGING | Facility: HOSPITAL | Age: 28
Discharge: HOME | End: 2023-04-06
Attending: OBSTETRICS & GYNECOLOGY
Payer: COMMERCIAL

## 2023-04-06 ENCOUNTER — HOSPITAL ENCOUNTER (OUTPATIENT)
Dept: EDUCATION SERVICES | Facility: HOSPITAL | Age: 28
Discharge: HOME | End: 2023-04-06
Attending: NURSE PRACTITIONER
Payer: COMMERCIAL

## 2023-04-06 VITALS
WEIGHT: 287.6 LBS | SYSTOLIC BLOOD PRESSURE: 111 MMHG | HEART RATE: 85 BPM | OXYGEN SATURATION: 97 % | DIASTOLIC BLOOD PRESSURE: 78 MMHG | HEIGHT: 66 IN | RESPIRATION RATE: 16 BRPM | BODY MASS INDEX: 46.22 KG/M2

## 2023-04-06 DIAGNOSIS — O24.410 GDM (GESTATIONAL DIABETES MELLITUS), CLASS A1: ICD-10-CM

## 2023-04-06 PROCEDURE — 76819 FETAL BIOPHYS PROFIL W/O NST: CPT

## 2023-04-06 PROCEDURE — 999N000218 HC NO CHARGE DRC TIME CRITERIA NOT MET

## 2023-04-06 ASSESSMENT — PAIN SCALES - GENERAL: PAINLEVEL: NO PAIN (0)

## 2023-04-06 NOTE — PROGRESS NOTES
"Diabetes Self-Management Education & Support    Pt is here today for insulin start/teaching.  Pt is 35w 4d gestation.  NPH 10 units at bedtime, start this evening. Need to  prescriptions at pharmacy.     Pt here to start NPH insulin 10 units. Pt instructed on insulin action, dosage, gently roll/mix insulin vial, vial should be cloudy, syringe and vial technique: drawing correct insulin dose with air, injection technique, site rotation, sharps disposal, insulin storage, hypoglycemia symptoms and treatment and when to call.  Pt verbalizes understanding instructions.      Dexcom G7 AGP Report- review of daily graphs, slightly above target ranges.     3/24/2023 to 2023    % Time CGM is Active              43%   days collected. Sensor applied 3/31/2023.    Average Glucose  123    Glucose Management Indicator  (GMI)    n/a    Glucose Variabilty  21    Time in Ranges:  >250 mg/dL   0  181-250 mg/dL  1   mg/dL   98  54-69 mg/dL   <1  <54 mg/dL   0         /78   Pulse 85   Resp 16   Ht 1.664 m (5' 5.5\")   Wt 130.5 kg (287 lb 9.6 oz)   LMP 2022   SpO2 97%   BMI 47.13 kg/m      Wt Readings from Last 10 Encounters:   23 130.5 kg (287 lb 9.6 oz)   23 133.1 kg (293 lb 6.4 oz)   23 131.9 kg (290 lb 12.8 oz)   23 132.7 kg (292 lb 8 oz)   23 131.5 kg (290 lb)   23 129.7 kg (286 lb)   23 129.7 kg (286 lb)   22 128.9 kg (284 lb 3.2 oz)   22 129.5 kg (285 lb 6.4 oz)   10/27/22 130.6 kg (288 lb)           Lab Results   Component Value Date     A1C 5.8 2023     A1C 5.6 10/27/2022     A1C 5.2 2017       PLAN:   Current Dexcom G7 due to  2023-pt aware Dexcom will alert her the sensor is expiring when due to change.   Pt provided with second sample Dexcom G7 sensor for monitoring for 10 days.   Explained step by step instructions are included in the box with overlay to place on the sensor after applied to arm. Pt asks " if she has difficulty if she could call/come in on Monday for assistance. Pt provided with contact information.   Pt to  new prescriptions for insulin vial and needles. Provided alcohol wipes today. Start 10 units at bedtime for BG target FB-<95.  1 hour PP <140. Written instructions provided.     Lorena agrees to return next week. Will call sooner if questions or concerns.    Follow up  at 1:00 pm for glucose review/insulin adjust.    Kristen Lugo RN Diabetes Educator,  [phone]  2023 at 9:37 PM

## 2023-04-13 ENCOUNTER — HOSPITAL ENCOUNTER (OUTPATIENT)
Dept: EDUCATION SERVICES | Facility: HOSPITAL | Age: 28
Discharge: HOME | End: 2023-04-13
Attending: NURSE PRACTITIONER
Payer: COMMERCIAL

## 2023-04-13 ENCOUNTER — HOSPITAL ENCOUNTER (OUTPATIENT)
Dept: ULTRASOUND IMAGING | Facility: HOSPITAL | Age: 28
Discharge: HOME | End: 2023-04-13
Attending: OBSTETRICS & GYNECOLOGY
Payer: COMMERCIAL

## 2023-04-13 ENCOUNTER — HOSPITAL ENCOUNTER (OUTPATIENT)
Facility: HOSPITAL | Age: 28
Discharge: HOME | End: 2023-04-13
Attending: NURSE ANESTHETIST, CERTIFIED REGISTERED | Admitting: NURSE ANESTHETIST, CERTIFIED REGISTERED
Payer: COMMERCIAL

## 2023-04-13 ENCOUNTER — PRENATAL OFFICE VISIT (OUTPATIENT)
Dept: OBGYN | Facility: OTHER | Age: 28
End: 2023-04-13
Attending: OBSTETRICS & GYNECOLOGY
Payer: COMMERCIAL

## 2023-04-13 VITALS
BODY MASS INDEX: 45.99 KG/M2 | SYSTOLIC BLOOD PRESSURE: 124 MMHG | WEIGHT: 293 LBS | OXYGEN SATURATION: 96 % | HEIGHT: 67 IN | HEART RATE: 100 BPM | DIASTOLIC BLOOD PRESSURE: 76 MMHG | RESPIRATION RATE: 12 BRPM

## 2023-04-13 VITALS
SYSTOLIC BLOOD PRESSURE: 107 MMHG | WEIGHT: 293 LBS | RESPIRATION RATE: 16 BRPM | BODY MASS INDEX: 47.09 KG/M2 | OXYGEN SATURATION: 97 % | HEIGHT: 66 IN | HEART RATE: 106 BPM | DIASTOLIC BLOOD PRESSURE: 68 MMHG

## 2023-04-13 DIAGNOSIS — O24.410 GDM (GESTATIONAL DIABETES MELLITUS), CLASS A1: Primary | ICD-10-CM

## 2023-04-13 DIAGNOSIS — E66.813 CLASS 3 SEVERE OBESITY DUE TO EXCESS CALORIES WITHOUT SERIOUS COMORBIDITY IN ADULT, UNSPECIFIED BMI (H): ICD-10-CM

## 2023-04-13 DIAGNOSIS — E66.01 CLASS 3 SEVERE OBESITY DUE TO EXCESS CALORIES WITHOUT SERIOUS COMORBIDITY IN ADULT, UNSPECIFIED BMI (H): ICD-10-CM

## 2023-04-13 DIAGNOSIS — O09.93 HIGH-RISK PREGNANCY IN THIRD TRIMESTER: ICD-10-CM

## 2023-04-13 DIAGNOSIS — O24.410 GDM (GESTATIONAL DIABETES MELLITUS), CLASS A1: ICD-10-CM

## 2023-04-13 PROCEDURE — 87653 STREP B DNA AMP PROBE: CPT | Mod: ZL | Performed by: OBSTETRICS & GYNECOLOGY

## 2023-04-13 PROCEDURE — 76819 FETAL BIOPHYS PROFIL W/O NST: CPT

## 2023-04-13 PROCEDURE — 999N000218 HC NO CHARGE DRC TIME CRITERIA NOT MET

## 2023-04-13 PROCEDURE — G0463 HOSPITAL OUTPT CLINIC VISIT: HCPCS | Mod: 25

## 2023-04-13 PROCEDURE — 99207 PR PRENATAL VISIT: CPT | Performed by: OBSTETRICS & GYNECOLOGY

## 2023-04-13 ASSESSMENT — ACTIVITIES OF DAILY LIVING (ADL)
ADLS_ACUITY_SCORE: 35

## 2023-04-13 ASSESSMENT — PAIN SCALES - GENERAL
PAINLEVEL: NO PAIN (0)
PAINLEVEL: MODERATE PAIN (5)

## 2023-04-13 NOTE — PROGRESS NOTES
Doing well.  No concerns today.  BS good on 10 nph at night, occasional  elevated PP.  Anesthesia consult still pending for BMI>45  BPP today  Discussed signs of labor and when to call or come in.  Discussed kick counts and fetal movement.  RTC in 1 week  Denies regular contractions, vaginal bleeding, STEFANIA Severino MD  4/13/2023

## 2023-04-13 NOTE — PROGRESS NOTES
"Diabetes Self-Management Education & Support  Type of Service: In Person Visit    SUBJECTIVE/OBJECTIVE:  Presents for education related to gestational diabetes. BG review, NPH 10 units at HS.   36 w 4 days.     Cultural Influences/Ethnic Background:  Not  or     /68   Pulse 106   Resp 16   Ht 1.664 m (5' 5.5\")   Wt 135.4 kg (298 lb 6.4 oz)   LMP 2022   SpO2 97%   BMI 48.90 kg/m        Estimated Date of Delivery: May 7, 2023    G7 AGP Report  3/31/2023 to 2023    % Time CGM is Active  93%  days.     Average Glucose  119    Glucose Management Indicator  (GMI)    6.2    Glucose Variabilty  20    Time in Ranges:  >250 mg/dL   0  181-250 mg/dL  <1   mg/dL   99  54-69 mg/dL   <1  <54 mg/dL   0    ASSESSMENT:  NPH insulin 10 units at night, no concerns with administration.   Review of G7 report, over night and FBG mostly in target ranges.   Occasional PP >140. Continue to monitor for intervention as per BG patterns.   Feels low sx with glucose in 70-80s. -none overnight, am.     Hoping last day of work will be Friday.     PLAN:  Check glucose using G7 until expires , then 4 times daily with BG meter.  Continue with recommended physical activity.  BG target FB-<95.  1 hour PP <140.  Call diabetes educator if 3 or more blood sugars are above the goal in 1 week.   Lorena agrees to return 1 week to review BG.   Continue NPH 10 units at HS and reassess 1 week.     Time Spent: 30 minutes  Encounter Type: Individual    Any diabetes medication dose changes were made via the CDE Protocol and Collaborative Practice Agreement with the patient's referring provider. A copy of this encounter was shared with the provider.    Kristen Lugo RN Diabetes Educator,  [phone]                "

## 2023-04-13 NOTE — PROGRESS NOTES
Writer reviewed the following education points/handouts with third trimester patient.   [facility] Home Visiting Program  Infant Security   Jenkins Hour  Birth Plan  Any Day Now  Labor and Birth Book: Understanding Pain, Breathing Pattern, Comfort Measures During Labor, Labor Language, Water Breaks, Pushing Positions, When to go to L&D floor to be evaluated, Pain Medication Options,  Education if applicable,  Characteristics, [place] Medications, 24 Hour Testing, Postpartum Depression  Writer answered all questions. Encouraged patient to call with any questions/concerns.

## 2023-04-13 NOTE — PROGRESS NOTES
Consulted patient for high BMI and upcoming pregnancy. Patient has had 3 epidurals including first one being done here. The epidural done here was successful with depth found at 8 cm, second one in [facility] successful, and 3rd one unsuccessful in the [facility] with multiple attempts according to patient. Did mention to her there is possibility we may not be able to get epidural. She plans on natural birth and using epidural as last resort. Was able to feel slight spinous process on palpation but did have to press very firmly into her back. No history of scoliosis or back surgery. Told patient we can take care of her here. Mallampati II airway with good neck extension.   Time spent with patient:  15 minutes

## 2023-04-13 NOTE — Clinical Note
Anesthesia Virtual Consultation Requested from:  Itzel Chang, RN, Dr. Maycol SUERO,   Procedure: Patient is scheduled/planning to have C/S.  Purpose of Request:  BMI 45.10  Clinical Consultation Request Type: Report Only: Routed through [employer] [place]    PAC Point of Contact Numbers: range - [phone]

## 2023-04-14 LAB — GP B STREP DNA SPEC QL NAA+PROBE: NEGATIVE

## 2023-04-14 ASSESSMENT — ACTIVITIES OF DAILY LIVING (ADL)
ADLS_ACUITY_SCORE: 35

## 2023-04-17 ENCOUNTER — PATIENT OUTREACH (OUTPATIENT)
Dept: EDUCATION SERVICES | Facility: HOSPITAL | Age: 28
End: 2023-04-17

## 2023-04-17 NOTE — PROGRESS NOTES
Diabetes Self-Management Education & Support    Follow up with Lorena RE: G7 sensors.   No sensors available.   Will need to test with BG meter.     Confirm supplies are available with patient.       No answer, left message for return call.   Kristen Lugo RN Diabetes Educator,  [phone]  4/17/2023 at 3:56 PM

## 2023-04-18 NOTE — PROGRESS NOTES
Message received, pt called back.     Follow up call:   Lorena confirms she has testing supplies to check glucose with BG meter. Hopeful for a G7 sensor when arrives. [facility] is out of samples.    Reported BG report: most readings are 90-95 range. PP1 hour, mostly under 120.     Pt agrees to bring log on Thursday to review. Denies further questions at this time.     Kristen Lugo RN Diabetes Educator,  [phone]  4/18/2023 at 3:11 PM

## 2023-04-20 ENCOUNTER — HOSPITAL ENCOUNTER (OUTPATIENT)
Dept: EDUCATION SERVICES | Facility: HOSPITAL | Age: 28
Discharge: HOME | End: 2023-04-20
Attending: NURSE PRACTITIONER
Payer: COMMERCIAL

## 2023-04-20 ENCOUNTER — HOSPITAL ENCOUNTER (OUTPATIENT)
Dept: ULTRASOUND IMAGING | Facility: HOSPITAL | Age: 28
Discharge: HOME | End: 2023-04-20
Attending: OBSTETRICS & GYNECOLOGY
Payer: COMMERCIAL

## 2023-04-20 ENCOUNTER — PRENATAL OFFICE VISIT (OUTPATIENT)
Dept: OBGYN | Facility: OTHER | Age: 28
End: 2023-04-20
Attending: OBSTETRICS & GYNECOLOGY
Payer: COMMERCIAL

## 2023-04-20 VITALS
SYSTOLIC BLOOD PRESSURE: 113 MMHG | WEIGHT: 293 LBS | BODY MASS INDEX: 45.99 KG/M2 | HEIGHT: 67 IN | DIASTOLIC BLOOD PRESSURE: 78 MMHG

## 2023-04-20 VITALS
WEIGHT: 293 LBS | OXYGEN SATURATION: 98 % | HEIGHT: 66 IN | SYSTOLIC BLOOD PRESSURE: 113 MMHG | BODY MASS INDEX: 47.09 KG/M2 | RESPIRATION RATE: 16 BRPM | DIASTOLIC BLOOD PRESSURE: 78 MMHG | HEART RATE: 106 BPM

## 2023-04-20 DIAGNOSIS — O24.410 GDM (GESTATIONAL DIABETES MELLITUS), CLASS A1: Primary | ICD-10-CM

## 2023-04-20 DIAGNOSIS — E66.01 CLASS 3 SEVERE OBESITY DUE TO EXCESS CALORIES WITHOUT SERIOUS COMORBIDITY IN ADULT, UNSPECIFIED BMI (H): ICD-10-CM

## 2023-04-20 DIAGNOSIS — O24.410 GDM (GESTATIONAL DIABETES MELLITUS), CLASS A1: ICD-10-CM

## 2023-04-20 DIAGNOSIS — O09.93 HIGH-RISK PREGNANCY IN THIRD TRIMESTER: ICD-10-CM

## 2023-04-20 DIAGNOSIS — E66.813 CLASS 3 SEVERE OBESITY DUE TO EXCESS CALORIES WITHOUT SERIOUS COMORBIDITY IN ADULT, UNSPECIFIED BMI (H): ICD-10-CM

## 2023-04-20 PROCEDURE — G0463 HOSPITAL OUTPT CLINIC VISIT: HCPCS | Mod: 25

## 2023-04-20 PROCEDURE — 76819 FETAL BIOPHYS PROFIL W/O NST: CPT

## 2023-04-20 PROCEDURE — 99207 PR PRENATAL VISIT: CPT | Performed by: OBSTETRICS & GYNECOLOGY

## 2023-04-20 ASSESSMENT — PAIN SCALES - GENERAL
PAINLEVEL: NO PAIN (0)
PAINLEVEL: NO PAIN (0)

## 2023-04-20 NOTE — PROGRESS NOTES
Clinic Care Coordination Contact  Care Team Conversations    SW called patient to update her regarding status of MA.  She was happy to hear she was still covered.  However, end date for coverage is listed as 6/30.  SW asked patient if she has received any paperwork regarding insurance renewal which patient denied.      SW will call county to see why end date is listed as 6/30.  If paperwork is needed will call patient to update; otherwise, let her know that she is good to go with insurance.    Annamarie Toussaint, [facility]  Outpatient   [phone]      
No

## 2023-04-20 NOTE — PROGRESS NOTES
"Diabetes Self-Management Education & Support  Type of Service: In Person Visit     SUBJECTIVE/OBJECTIVE:  Presents for education related to gestational diabetes with partner.   37 w 4d     Cultural Influences/Ethnic Background:  Not  or      Ht 1.664 m (5' 5.5\")   Wt 135.6 kg (298 lb 14.4 oz)   LMP 06/05/2022   BMI 48.98 kg/m        Estimated Date of Delivery: May 7, 2023    Current Management:   NPH insulin at HS 10 units. Discovered today during visit, pt is taking 5 units of insulin.   Pt will start the full 10 units this HS.      ASSESSMENT:   BG mostly in target since last visit- despite incorrect dose of NPH insulin administered.   Pt will start 10 units of NPH insulin at HS.     Blood Glucose: Reviewed available G7 data.   No meter today to review report. Pt reports her BG 90-95 with maybe (2 readings) at or above 95 in this last week.   1 hour post meal range 120-140. One time was 170.  PP readings 3 above 140 including the 170.     INTERVENTION:  Educational topics covered today:  Re-education for drawing insulin in syringe.     PLAN:  Check glucose 4 times daily. FBG, 1 hour post meal x3/day.   Continue with recommended physical activity.  Follow consistent CHO meal plan, eat CHO and protein/fat at all meals/snacks.    Call diabetes educator if 4 or more blood sugars are above the goal in 1 week.  Return 1 week to review BG patterns. Encouraged pt to bring meter with to review patterns.     Time Spent: 20 minutes  Encounter Type: Individual    Any diabetes medication dose changes were made via the CDE Protocol and Collaborative Practice Agreement with the patient's primary care provider. A copy of this encounter was shared with the provider.    Kristen Lugo RN Diabetes Educator,  [phone]  4/20/2023 at 2:06 PM      "

## 2023-04-21 NOTE — PROGRESS NOTES
Doing well.  No concerns today.  Diabetic center appt today.  BS mostly good, insulin adjusted  Anesthesia consult done.   Discussed signs of labor and when to call or come in.  Discussed kick counts and fetal movement.  Biophysical profile ordered. This study returned 8/8.  RTC in 1 week  US growth and BPP next week  Denies regular contractions, vaginal bleeding, STEFANIA Severino MD  4/21/2023

## 2023-04-27 ENCOUNTER — APPOINTMENT (OUTPATIENT)
Dept: ULTRASOUND IMAGING | Facility: HOSPITAL | Age: 28
End: 2023-04-27
Attending: OBSTETRICS & GYNECOLOGY
Payer: COMMERCIAL

## 2023-04-27 ENCOUNTER — HOSPITAL ENCOUNTER (OUTPATIENT)
Dept: EDUCATION SERVICES | Facility: HOSPITAL | Age: 28
Discharge: HOME | End: 2023-04-27
Attending: NURSE PRACTITIONER
Payer: COMMERCIAL

## 2023-04-27 ENCOUNTER — PRENATAL OFFICE VISIT (OUTPATIENT)
Dept: OBGYN | Facility: OTHER | Age: 28
End: 2023-04-27
Attending: OBSTETRICS & GYNECOLOGY
Payer: COMMERCIAL

## 2023-04-27 ENCOUNTER — HOSPITAL ENCOUNTER (INPATIENT)
Facility: HOSPITAL | Age: 28
LOS: 3 days | Discharge: HOME | End: 2023-04-30
Attending: OBSTETRICS & GYNECOLOGY | Admitting: OBSTETRICS & GYNECOLOGY
Payer: COMMERCIAL

## 2023-04-27 VITALS
RESPIRATION RATE: 22 BRPM | HEART RATE: 103 BPM | BODY MASS INDEX: 49.82 KG/M2 | OXYGEN SATURATION: 98 % | WEIGHT: 293 LBS | DIASTOLIC BLOOD PRESSURE: 84 MMHG | SYSTOLIC BLOOD PRESSURE: 138 MMHG

## 2023-04-27 VITALS
SYSTOLIC BLOOD PRESSURE: 113 MMHG | BODY MASS INDEX: 47.09 KG/M2 | DIASTOLIC BLOOD PRESSURE: 75 MMHG | RESPIRATION RATE: 16 BRPM | HEIGHT: 66 IN | OXYGEN SATURATION: 97 % | HEART RATE: 97 BPM | WEIGHT: 293 LBS

## 2023-04-27 DIAGNOSIS — O09.93 HIGH-RISK PREGNANCY IN THIRD TRIMESTER: ICD-10-CM

## 2023-04-27 DIAGNOSIS — O09.93 HIGH-RISK PREGNANCY IN THIRD TRIMESTER: Primary | ICD-10-CM

## 2023-04-27 DIAGNOSIS — O24.410 GDM (GESTATIONAL DIABETES MELLITUS), CLASS A1: ICD-10-CM

## 2023-04-27 PROBLEM — Z36.89 ENCOUNTER FOR TRIAGE IN PREGNANT PATIENT: Status: ACTIVE | Noted: 2023-04-27

## 2023-04-27 PROBLEM — O24.414 INSULIN CONTROLLED GESTATIONAL DIABETES MELLITUS (GDM) IN THIRD TRIMESTER: Status: ACTIVE | Noted: 2023-04-27

## 2023-04-27 LAB
ABO/RH(D): NORMAL
ALBUMIN MFR UR ELPH: 46.1 MG/DL (ref 1–14)
ALBUMIN SERPL BCG-MCNC: 3 G/DL (ref 3.5–5.2)
ALP SERPL-CCNC: 112 U/L (ref 35–104)
ALT SERPL W P-5'-P-CCNC: 10 U/L (ref 10–35)
ANION GAP SERPL CALCULATED.3IONS-SCNC: 6 MMOL/L (ref 7–15)
ANTIBODY SCREEN: NEGATIVE
AST SERPL W P-5'-P-CCNC: 11 U/L (ref 10–35)
BILIRUB SERPL-MCNC: <0.2 MG/DL
BUN SERPL-MCNC: 7 MG/DL (ref 6–20)
CALCIUM SERPL-MCNC: 8.4 MG/DL (ref 8.6–10)
CHLORIDE SERPL-SCNC: 104 MMOL/L (ref 98–107)
CREAT SERPL-MCNC: 0.62 MG/DL (ref 0.51–0.95)
CREAT UR-MCNC: 320.2 MG/DL
DEPRECATED HCO3 PLAS-SCNC: 25 MMOL/L (ref 22–29)
ERYTHROCYTE [DISTWIDTH] IN BLOOD BY AUTOMATED COUNT: 13.9 % (ref 10–15)
EST. AVERAGE GLUCOSE BLD GHB EST-MCNC: 117 MG/DL
GFR SERPL CREATININE-BSD FRML MDRD: >90 ML/MIN/1.73M2
GLUCOSE BLDC GLUCOMTR-MCNC: 91 MG/DL (ref 70–99)
GLUCOSE SERPL-MCNC: 94 MG/DL (ref 70–99)
HBA1C MFR BLD: 5.7 %
HCT VFR BLD AUTO: 34.5 % (ref 35–47)
HGB BLD-MCNC: 11.5 G/DL (ref 11.7–15.7)
HOLD SPECIMEN: NORMAL
HOLD SPECIMEN: NORMAL
MCH RBC QN AUTO: 30 PG (ref 26.5–33)
MCHC RBC AUTO-ENTMCNC: 33.3 G/DL (ref 31.5–36.5)
MCV RBC AUTO: 90 FL (ref 78–100)
PLATELET # BLD AUTO: 179 10E3/UL (ref 150–450)
POTASSIUM SERPL-SCNC: 3.9 MMOL/L (ref 3.4–5.3)
PROT SERPL-MCNC: 5.7 G/DL (ref 6.4–8.3)
PROT/CREAT 24H UR: 0.14 MG/MG CR (ref 0–0.2)
RBC # BLD AUTO: 3.83 10E6/UL (ref 3.8–5.2)
SODIUM SERPL-SCNC: 135 MMOL/L (ref 136–145)
SPECIMEN EXPIRATION DATE: NORMAL
WBC # BLD AUTO: 6.3 10E3/UL (ref 4–11)

## 2023-04-27 PROCEDURE — 84156 ASSAY OF PROTEIN URINE: CPT | Performed by: OBSTETRICS & GYNECOLOGY

## 2023-04-27 PROCEDURE — 76819 FETAL BIOPHYS PROFIL W/O NST: CPT

## 2023-04-27 PROCEDURE — 250N000012 HC RX MED GY IP 250 OP 636 PS 637: Performed by: OBSTETRICS & GYNECOLOGY

## 2023-04-27 PROCEDURE — 97802 MEDICAL NUTRITION INDIV IN: CPT

## 2023-04-27 PROCEDURE — 36415 COLL VENOUS BLD VENIPUNCTURE: CPT | Performed by: OBSTETRICS & GYNECOLOGY

## 2023-04-27 PROCEDURE — 83036 HEMOGLOBIN GLYCOSYLATED A1C: CPT | Performed by: OBSTETRICS & GYNECOLOGY

## 2023-04-27 PROCEDURE — 80053 COMPREHEN METABOLIC PANEL: CPT | Performed by: OBSTETRICS & GYNECOLOGY

## 2023-04-27 PROCEDURE — 85027 COMPLETE CBC AUTOMATED: CPT | Performed by: OBSTETRICS & GYNECOLOGY

## 2023-04-27 PROCEDURE — G0463 HOSPITAL OUTPT CLINIC VISIT: HCPCS | Mod: 25

## 2023-04-27 PROCEDURE — 250N000013 HC RX MED GY IP 250 OP 250 PS 637: Performed by: OBSTETRICS & GYNECOLOGY

## 2023-04-27 PROCEDURE — 86850 RBC ANTIBODY SCREEN: CPT | Performed by: OBSTETRICS & GYNECOLOGY

## 2023-04-27 PROCEDURE — 86901 BLOOD TYPING SEROLOGIC RH(D): CPT | Performed by: OBSTETRICS & GYNECOLOGY

## 2023-04-27 PROCEDURE — 99207 PR COMPLICATED OB VISIT: CPT | Performed by: OBSTETRICS & GYNECOLOGY

## 2023-04-27 PROCEDURE — 120N000001 HC R&B MED SURG/OB

## 2023-04-27 RX ORDER — NICOTINE POLACRILEX 4 MG
15-30 LOZENGE BUCCAL
Status: DISCONTINUED | OUTPATIENT
Start: 2023-04-27 | End: 2023-04-27

## 2023-04-27 RX ORDER — MORPHINE SULFATE 10 MG/ML
10 INJECTION, SOLUTION INTRAMUSCULAR; INTRAVENOUS
Status: COMPLETED | OUTPATIENT
Start: 2023-04-27 | End: 2023-04-28

## 2023-04-27 RX ORDER — LABETALOL 20 MG/4 ML (5 MG/ML) INTRAVENOUS SYRINGE
20-80 EVERY 10 MIN PRN
Status: DISCONTINUED | OUTPATIENT
Start: 2023-04-27 | End: 2023-04-27

## 2023-04-27 RX ORDER — DEXTROSE MONOHYDRATE 25 G/50ML
25-50 INJECTION, SOLUTION INTRAVENOUS
Status: DISCONTINUED | OUTPATIENT
Start: 2023-04-27 | End: 2023-04-29

## 2023-04-27 RX ORDER — NICOTINE POLACRILEX 4 MG
15-30 LOZENGE BUCCAL
Status: DISCONTINUED | OUTPATIENT
Start: 2023-04-27 | End: 2023-04-29

## 2023-04-27 RX ORDER — ACETAMINOPHEN 325 MG/1
975 TABLET ORAL EVERY 6 HOURS PRN
Status: DISCONTINUED | OUTPATIENT
Start: 2023-04-27 | End: 2023-04-28

## 2023-04-27 RX ORDER — LIDOCAINE 40 MG/G
CREAM TOPICAL
Status: DISCONTINUED | OUTPATIENT
Start: 2023-04-27 | End: 2023-04-27

## 2023-04-27 RX ORDER — ACETAMINOPHEN 325 MG/1
650 TABLET ORAL EVERY 4 HOURS PRN
Status: CANCELLED | OUTPATIENT
Start: 2023-04-27

## 2023-04-27 RX ORDER — ACETAMINOPHEN 325 MG/1
650 TABLET ORAL EVERY 4 HOURS PRN
Status: DISCONTINUED | OUTPATIENT
Start: 2023-04-27 | End: 2023-04-27

## 2023-04-27 RX ORDER — DEXTROSE MONOHYDRATE 25 G/50ML
25-50 INJECTION, SOLUTION INTRAVENOUS
Status: DISCONTINUED | OUTPATIENT
Start: 2023-04-27 | End: 2023-04-27

## 2023-04-27 RX ORDER — HYDRALAZINE HYDROCHLORIDE 20 MG/ML
10 INJECTION INTRAMUSCULAR; INTRAVENOUS
Status: CANCELLED | OUTPATIENT
Start: 2023-04-27

## 2023-04-27 RX ORDER — HYDRALAZINE HYDROCHLORIDE 20 MG/ML
10 INJECTION INTRAMUSCULAR; INTRAVENOUS
Status: DISCONTINUED | OUTPATIENT
Start: 2023-04-27 | End: 2023-04-27

## 2023-04-27 RX ORDER — HYDROXYZINE HYDROCHLORIDE 25 MG/1
50 TABLET, FILM COATED ORAL
Status: DISCONTINUED | OUTPATIENT
Start: 2023-04-27 | End: 2023-04-30 | Stop reason: HOSPADM

## 2023-04-27 RX ORDER — LABETALOL HYDROCHLORIDE 5 MG/ML
20-80 INJECTION, SOLUTION INTRAVENOUS EVERY 10 MIN PRN
Status: CANCELLED | OUTPATIENT
Start: 2023-04-27

## 2023-04-27 RX ADMIN — ACETAMINOPHEN 325MG 975 MG: 325 TABLET ORAL at 19:40

## 2023-04-27 RX ADMIN — DINOPROSTONE 10 MG: 10 INSERT VAGINAL at 19:43

## 2023-04-27 RX ADMIN — HYDROXYZINE HYDROCHLORIDE 50 MG: 25 TABLET ORAL at 21:09

## 2023-04-27 RX ADMIN — DOXYLAMINE SUCCINATE 25 MG: 25 TABLET ORAL at 21:08

## 2023-04-27 RX ADMIN — INSULIN HUMAN 10 UNITS: 100 INJECTION, SUSPENSION SUBCUTANEOUS at 21:06

## 2023-04-27 ASSESSMENT — ACTIVITIES OF DAILY LIVING (ADL)
ADLS_ACUITY_SCORE: 31

## 2023-04-27 ASSESSMENT — PAIN SCALES - GENERAL
PAINLEVEL: SEVERE PAIN (6)
PAINLEVEL: SEVERE PAIN (6)

## 2023-04-27 NOTE — H&P
[facility] Labor and Delivery History and Physical    Lorena Echevarria MRN# [id number]   Age: 27 year old YOB: 1995     Date of Admission:  2023    Primary care provider: Kaya Servin           Chief Complaint:   Lorena Echevarria is a 27 year old female who is 38w4d pregnant and being admitted for induction of labor, indication:  Sub optimal diabetes control/non compliance, maternal obesity.  She has a h/o PIH with some new onset PIH  sx(HA, swelling/vision changes) but no laboratory/BP evidence of preeclampsia.   Prenatal record/H and P reviewed with patient and unchanged.  Pregnancy complications:  GDM on insulin (10uNPH bedtime), depression, obesity.           Pregnancy history:     OBSTETRIC HISTORY:  OB History    Para Term  AB Living   4 3 3 3    4 Current   3    EPI,.   GDM, poor control, obesity 7lb 150z  2 Term 19 39w5d 7 lb (3.175 kg) M Vag-Vacuum EPI N CARLIE   Complications: Fetal Intolerance   1 Term 10/18/17 40w4d 7 lb 1.8 oz (3.225 kg) F VACUUM EXTRA EPI N CARLIE   Birth Comments: prolonged second stage, >4 hours   Complications: Failure to Progress in Second Stage, Prolonged labor, Impaired glucose tolerance during pregnancy       Prenatal Complications Diabetes and Obesity    EDC: Estimated Date of Delivery: May 7, 2023    Prenatal Labs:   Lab Results   Component Value Date    ABO A 10/14/2017    RH Pos 10/14/2017    AS Negative 2023    HEPBANG Nonreactive 10/27/2022    CHPCRT Negative 06/15/2022    GCPCRT Negative 06/15/2022    TREPAB non reactive 2017    RUBELLAABIGG non immune 2017    HGB 11.5 (L) 2023       GBS Status:   Lab Results   Component Value Date    GBS Negative 09/15/2017       Active Problem List  Patient Active Problem List   Diagnosis     Suicidal ideation     Mild intellectual disability     Autism spectrum     Anxiety     Mood disorder (H)     Suicidal thoughts     Left Ankle Pain     Irregular menstrual cycle  "(PERIODS)     Depression     Elevated serum cholesterol     Obesity     Sinus tachycardia     Recurrent HSV (herpes simplex virus)     Papanicolaou smear of cervix with low grade squamous intraepithelial lesion (LGSIL)     Morbid obesity (H)     High risk pregnancy due to history of previous obstetrical problem in second trimester     Glucose intolerance of pregnancy     Encounter for sterilization     Encounter for triage in pregnant patient     Insulin controlled gestational diabetes mellitus (GDM) in third trimester       Medication Prior to Admission  Medications Prior to Admission   Medication Sig Dispense Refill Last Dose     aspirin (ASA) 81 MG chewable tablet Take 1 tablet (81 mg) by mouth daily 180 tablet 1 4/26/2023 at 0900     blood glucose (ACCU-CHEK GUIDE) test strip Use to test blood sugar 4 times daily. 200 strip 1 4/27/2023 at 1200     blood glucose monitoring (SOFTCLIX) lancets Use to test blood sugar 4 times daily. 100 each 0 4/27/2023 at 1200     buPROPion 450 MG TB24 Take 450 mg by mouth daily 30 tablet 1 4/27/2023 at 0900     doxylamine (UNISOM) 25 MG TABS tablet Take 1 tablet (25 mg) by mouth nightly as needed for sleep 30 tablet 1 Past Week     insulin  UNIT/ML vial Inject 10 Units Subcutaneous At Bedtime 10 mL 0 4/26/2023 at 2000     insulin syringe-needle U-100 31G X 15/64\" 0.5 ML Use 1 syringe daily. 100 each 0 4/26/2023 at 2000     lidocaine (XYLOCAINE) 5 % external ointment Apply topically as needed for moderate pain (4-6) 35 g 3 Past Week   .        Maternal Past Medical History:     Past Medical History:   Diagnosis Date     Abnormal Pap smear of cervix 03/30/2017     Anxiety      Autism spectrum disorder      Autism spectrum disorder     per [place] records     Depressed      Diet controlled gestational diabetes mellitus (GDM) in third trimester 9/15/2017    Overview:  Insulin start today Begin testing 4 times daily NST BPP weekly ordered Diabetes center     Fetal tachycardia " affecting management of mother 9/1/2017     Gestational diabetes mellitus (GDM) 9/15/2017    Overview:  Insulin start today Begin testing 4 times daily NST BPP weekly ordered Diabetes center     Herpes     no outbreak now     High-risk first pregnancy of young woman, third trimester 9/15/2017    Overview:  Tdap and flu done ROR signed Poor compliance with prenatal care and GDM.   consulted for assistance.   Transfer from [place] 34 weeks Girl     History of suicide attempt 2014    multiple     LGSIL on Pap smear of cervix 03/30/2017     Mental disorder     depression     Mild intellectual disabilities      Normal labor and delivery 10/17/2017     Pregnant 2/6/2021     Schizoaffective disorder (H)                        Family History:   Unchanged from prenatal record            Social History:   Unchanged from prenatal record         Review of Systems:   Per HPI.  Other systems reviewed and negative         Physical Exam:     Vitals:    04/27/23 1639 04/27/23 1659 04/27/23 1714 04/27/23 1759   BP: 115/65 113/68 121/68 127/76   BP Location:       Patient Position:       Cuff Size:       Pulse:       Resp:       Temp:       TempSrc:       SpO2:       Weight:       Height:         Chest: CTA  CV:  RRR without murmers  General:  Alert and oriented  Abdomen soft, non-tender, gravid  Ext: neg  Cervix:   Membranes: intact   Dilation: 1   Effacement: 40%   Station:-3   Consistency: average   Position: Mid  Presentation:Cephalic  Fetal Heart Rate Tracing: reactive and reassuring, Tier 1 (normal)  Tocometer: external monitor and inadequate  BPP 8/8  EFW 3800g                    Assessment:   Lorena Echevarria is a 38w4d pregnant female admitted with induction of labor, indication GDM (poor control/non compliance).  No evidence preeclampsia.  Unfavorable cervix.  Reassuring fetal assessment.           Plan:   Admit, Routine intrapartum care and monitoring per protocol.  Plan Cervidil cervical ripening overnight.   Cytotec or pitocin IOL in AM, intrapartum diabetes management/BS monitoring per protocol.   R/B/A discussed with pt who desires to proceed.     Fidel Severino MD

## 2023-04-27 NOTE — PROGRESS NOTES
C/o intermittent HA, vision changes.  Increased swelling hands/feet.  Did not keep US appt today.  Had diabetes center appt today but did not bring blood sugars.  BS range 60 fasting to 230 PP per pt.  On insulin 10u hs  A/P)38 4/7 wks   R/o preeclampsia  Obesity  Unfavorable cervix  GDM on inulin with poor compliance and uncertain control    Recommend further evaluation and fetal assessment.  To [facility]  PIH eval(labs/urine protein/serial BP  US:  BPP/EFW

## 2023-04-27 NOTE — CARE PLAN
Patient admitted into room 4208 as an OP from the clinic for preeclampsia symptoms.  Patient denies epigastric pain or upper abdominal pain.  Patient c/o pressure/pounding headache at forehead and wraps around to back of head.  Pt c/o vision disturbances the past 3 days and getting worse.  Blurry vision and double vision at times.  Swelling noted to come and go in feet more then usual.  Hands and wrist swelling worse today. Patient in Insulin Dependent Gestational Diabetic.  Patient denies bleeding and leakin of fluid.  Patient denies contractions except for cramping type pain in vagina area rarely occurring the past week.  See MD gonzalez.  CORTEZ CUELLAR RN

## 2023-04-27 NOTE — PROGRESS NOTES
"Diabetes Self-Management Education & Support  Type of Service: In Person Visit    SUBJECTIVE/OBJECTIVE:  Presents for education related to gestational diabetes.  BG review.     Cultural Influences/Ethnic Background:  Not  or     /75   Pulse 97   Resp 16   Ht 1.664 m (5' 5.5\")   Wt 139.3 kg (307 lb 1.6 oz)   LMP 06/05/2022   SpO2 97%   BMI 50.33 kg/m        Wt Readings from Last 10 Encounters:   04/27/23 139.3 kg (307 lb 1.6 oz)   04/20/23 133.8 kg (295 lb)   04/20/23 135.6 kg (298 lb 14.4 oz)   04/13/23 134.5 kg (296 lb 9.6 oz)   04/13/23 135.4 kg (298 lb 6.4 oz)   04/06/23 130.5 kg (287 lb 9.6 oz)   03/31/23 133.1 kg (293 lb 6.4 oz)   03/30/23 131.9 kg (290 lb 12.8 oz)   03/07/23 132.7 kg (292 lb 8 oz)   02/24/23 131.5 kg (290 lb)       Estimated Date of Delivery: May 7, 2023    Blood Glucose Verbal Report as no meter or written log to review.   Lorena tells me she has not had FBG above 95. Had 2 readings at 65 in the morning in the last week. Treated with a \"1/2 can of regular coke\" and came up to 110.   PP 1 hour: 4 readings above 140  With two readings above 200: 220, 230. Pt states higher glucose reading varies from breakfast, lunch or dinner. No above target consistency with a specific meal. Lorena shares she is eating smaller snack size now. Not feeling hungry.     Current Management:   NPH 10 units at HS.     ASSESSMENT:  Fasting blood glucoses: 100% in target per verbal report.  1H PP unable to determine without log or meter to review. Pt states she had 4 readings above 140, 1HPP.     Pt reports swelling and tingling in feet x3 days. Better when elevated. Pt will let OB MD know.     INTERVENTION:  Educational topics covered today:  Has OB appt after DRC visit today.     PLAN:  Check glucose 4 times daily. Will call with AlloCure sample device when they arrive.    Follow consistent CHO meal plan, eat CHO and protein/fat at all meals/snacks.  Continue NPH insulin 10 units at HS.    DRC RN " left message for OB/Severino nurse: update of BG findings and swelling/tingling in feet.     Return 1 week to review BG, okay after Radiology appointment.     Time Spent: 15 minutes  Encounter Type: Individual    Any diabetes medication dose changes were made via the CDE Protocol and Collaborative Practice Agreement with the patient's OB/GYN provider. A copy of this encounter was shared with the provider.    Kristen Lugo RN Diabetes Educator,  [phone]  4/27/2023 at 3:30 PM

## 2023-04-28 ENCOUNTER — ANESTHESIA (OUTPATIENT)
Dept: OBGYN | Facility: HOSPITAL | Age: 28
End: 2023-04-28
Payer: COMMERCIAL

## 2023-04-28 ENCOUNTER — ANESTHESIA EVENT (OUTPATIENT)
Dept: OBGYN | Facility: HOSPITAL | Age: 28
End: 2023-04-28
Payer: COMMERCIAL

## 2023-04-28 PROBLEM — O09.292 HIGH RISK PREGNANCY DUE TO HISTORY OF PREVIOUS OBSTETRICAL PROBLEM IN SECOND TRIMESTER: Status: ACTIVE | Noted: 2022-10-28

## 2023-04-28 PROBLEM — E66.01 MORBID OBESITY (H): Status: ACTIVE | Noted: 2020-07-16

## 2023-04-28 PROBLEM — O99.810 GLUCOSE INTOLERANCE OF PREGNANCY: Status: RESOLVED | Noted: 2023-01-27 | Resolved: 2023-04-28

## 2023-04-28 LAB
GLUCOSE BLDC GLUCOMTR-MCNC: 124 MG/DL (ref 70–99)
GLUCOSE BLDC GLUCOMTR-MCNC: 125 MG/DL (ref 70–99)
GLUCOSE BLDC GLUCOMTR-MCNC: 126 MG/DL (ref 70–99)
GLUCOSE BLDC GLUCOMTR-MCNC: 147 MG/DL (ref 70–99)
GLUCOSE BLDC GLUCOMTR-MCNC: 93 MG/DL (ref 70–99)
HOLD SPECIMEN: NORMAL

## 2023-04-28 PROCEDURE — 250N000009 HC RX 250: Performed by: OBSTETRICS & GYNECOLOGY

## 2023-04-28 PROCEDURE — 86780 TREPONEMA PALLIDUM: CPT | Performed by: OBSTETRICS & GYNECOLOGY

## 2023-04-28 PROCEDURE — 59400 OBSTETRICAL CARE: CPT | Performed by: NURSE ANESTHETIST, CERTIFIED REGISTERED

## 2023-04-28 PROCEDURE — 250N000013 HC RX MED GY IP 250 OP 250 PS 637

## 2023-04-28 PROCEDURE — 250N000013 HC RX MED GY IP 250 OP 250 PS 637: Performed by: OBSTETRICS & GYNECOLOGY

## 2023-04-28 PROCEDURE — 36415 COLL VENOUS BLD VENIPUNCTURE: CPT | Performed by: OBSTETRICS & GYNECOLOGY

## 2023-04-28 PROCEDURE — 120N000001 HC R&B MED SURG/OB

## 2023-04-28 PROCEDURE — 250N000011 HC RX IP 250 OP 636: Performed by: NURSE ANESTHETIST, CERTIFIED REGISTERED

## 2023-04-28 PROCEDURE — 10907ZC DRAINAGE OF AMNIOTIC FLUID, THERAPEUTIC FROM PRODUCTS OF CONCEPTION, VIA NATURAL OR ARTIFICIAL OPENING: ICD-10-PCS | Performed by: OBSTETRICS & GYNECOLOGY

## 2023-04-28 PROCEDURE — 258N000003 HC RX IP 258 OP 636: Performed by: OBSTETRICS & GYNECOLOGY

## 2023-04-28 PROCEDURE — 59400 OBSTETRICAL CARE: CPT | Performed by: OBSTETRICS & GYNECOLOGY

## 2023-04-28 PROCEDURE — 250N000011 HC RX IP 250 OP 636: Performed by: OBSTETRICS & GYNECOLOGY

## 2023-04-28 PROCEDURE — 0HQ9XZZ REPAIR PERINEUM SKIN, EXTERNAL APPROACH: ICD-10-PCS | Performed by: OBSTETRICS & GYNECOLOGY

## 2023-04-28 PROCEDURE — 250N000009 HC RX 250

## 2023-04-28 RX ORDER — MISOPROSTOL 200 UG/1
400 TABLET ORAL
Status: DISCONTINUED | OUTPATIENT
Start: 2023-04-28 | End: 2023-04-28

## 2023-04-28 RX ORDER — MISOPROSTOL 200 UG/1
400 TABLET ORAL
Status: DISCONTINUED | OUTPATIENT
Start: 2023-04-28 | End: 2023-04-30 | Stop reason: HOSPADM

## 2023-04-28 RX ORDER — HYDROCORTISONE 25 MG/G
CREAM TOPICAL 3 TIMES DAILY PRN
Status: DISCONTINUED | OUTPATIENT
Start: 2023-04-28 | End: 2023-04-30 | Stop reason: HOSPADM

## 2023-04-28 RX ORDER — KETOROLAC TROMETHAMINE 30 MG/ML
30 INJECTION, SOLUTION INTRAMUSCULAR; INTRAVENOUS
Status: DISCONTINUED | OUTPATIENT
Start: 2023-04-28 | End: 2023-04-28

## 2023-04-28 RX ORDER — LIDOCAINE HYDROCHLORIDE 10 MG/ML
INJECTION, SOLUTION INFILTRATION; PERINEURAL
Status: COMPLETED
Start: 2023-04-28 | End: 2023-04-28

## 2023-04-28 RX ORDER — IBUPROFEN 800 MG/1
800 TABLET, FILM COATED ORAL EVERY 6 HOURS PRN
Status: DISCONTINUED | OUTPATIENT
Start: 2023-04-28 | End: 2023-04-30 | Stop reason: HOSPADM

## 2023-04-28 RX ORDER — NICOTINE POLACRILEX 4 MG
15-30 LOZENGE BUCCAL
Status: DISCONTINUED | OUTPATIENT
Start: 2023-04-28 | End: 2023-04-28

## 2023-04-28 RX ORDER — NALOXONE HYDROCHLORIDE 0.4 MG/ML
0.4 INJECTION, SOLUTION INTRAMUSCULAR; INTRAVENOUS; SUBCUTANEOUS
Status: DISCONTINUED | OUTPATIENT
Start: 2023-04-28 | End: 2023-04-30 | Stop reason: HOSPADM

## 2023-04-28 RX ORDER — OXYCODONE HYDROCHLORIDE 5 MG/1
5 TABLET ORAL
Status: DISCONTINUED | OUTPATIENT
Start: 2023-04-28 | End: 2023-04-28

## 2023-04-28 RX ORDER — ONDANSETRON 4 MG/1
4 TABLET, ORALLY DISINTEGRATING ORAL EVERY 6 HOURS PRN
Status: DISCONTINUED | OUTPATIENT
Start: 2023-04-28 | End: 2023-04-30 | Stop reason: HOSPADM

## 2023-04-28 RX ORDER — OXYTOCIN/0.9 % SODIUM CHLORIDE 30/500 ML
340 PLASTIC BAG, INJECTION (ML) INTRAVENOUS CONTINUOUS PRN
Status: DISCONTINUED | OUTPATIENT
Start: 2023-04-28 | End: 2023-04-28

## 2023-04-28 RX ORDER — DEXTROSE MONOHYDRATE 25 G/50ML
25-50 INJECTION, SOLUTION INTRAVENOUS
Status: DISCONTINUED | OUTPATIENT
Start: 2023-04-28 | End: 2023-04-28

## 2023-04-28 RX ORDER — BISACODYL 10 MG
10 SUPPOSITORY, RECTAL RECTAL DAILY PRN
Status: DISCONTINUED | OUTPATIENT
Start: 2023-04-28 | End: 2023-04-30 | Stop reason: HOSPADM

## 2023-04-28 RX ORDER — CARBOPROST TROMETHAMINE 250 UG/ML
250 INJECTION, SOLUTION INTRAMUSCULAR
Status: DISCONTINUED | OUTPATIENT
Start: 2023-04-28 | End: 2023-04-30 | Stop reason: HOSPADM

## 2023-04-28 RX ORDER — METHYLERGONOVINE MALEATE 0.2 MG/ML
200 INJECTION INTRAVENOUS
Status: DISCONTINUED | OUTPATIENT
Start: 2023-04-28 | End: 2023-04-30 | Stop reason: HOSPADM

## 2023-04-28 RX ORDER — OXYTOCIN 10 [USP'U]/ML
10 INJECTION, SOLUTION INTRAMUSCULAR; INTRAVENOUS
Status: DISCONTINUED | OUTPATIENT
Start: 2023-04-28 | End: 2023-04-28

## 2023-04-28 RX ORDER — IBUPROFEN 800 MG/1
TABLET, FILM COATED ORAL
Status: COMPLETED
Start: 2023-04-28 | End: 2023-04-28

## 2023-04-28 RX ORDER — OXYTOCIN/0.9 % SODIUM CHLORIDE 30/500 ML
PLASTIC BAG, INJECTION (ML) INTRAVENOUS
Status: DISPENSED
Start: 2023-04-28 | End: 2023-04-28

## 2023-04-28 RX ORDER — PROCHLORPERAZINE 25 MG
25 SUPPOSITORY, RECTAL RECTAL EVERY 12 HOURS PRN
Status: DISCONTINUED | OUTPATIENT
Start: 2023-04-28 | End: 2023-04-30 | Stop reason: HOSPADM

## 2023-04-28 RX ORDER — NALOXONE HYDROCHLORIDE 0.4 MG/ML
0.2 INJECTION, SOLUTION INTRAMUSCULAR; INTRAVENOUS; SUBCUTANEOUS
Status: DISCONTINUED | OUTPATIENT
Start: 2023-04-28 | End: 2023-04-30 | Stop reason: HOSPADM

## 2023-04-28 RX ORDER — CARBOPROST TROMETHAMINE 250 UG/ML
250 INJECTION, SOLUTION INTRAMUSCULAR
Status: DISCONTINUED | OUTPATIENT
Start: 2023-04-28 | End: 2023-04-28

## 2023-04-28 RX ORDER — MODIFIED LANOLIN
OINTMENT (GRAM) TOPICAL
Status: DISCONTINUED | OUTPATIENT
Start: 2023-04-28 | End: 2023-04-30 | Stop reason: HOSPADM

## 2023-04-28 RX ORDER — NALBUPHINE HYDROCHLORIDE 10 MG/ML
2.5-5 INJECTION, SOLUTION INTRAMUSCULAR; INTRAVENOUS; SUBCUTANEOUS EVERY 6 HOURS PRN
Status: DISCONTINUED | OUTPATIENT
Start: 2023-04-28 | End: 2023-04-30 | Stop reason: HOSPADM

## 2023-04-28 RX ORDER — OXYCODONE HYDROCHLORIDE 5 MG/1
5 TABLET ORAL EVERY 4 HOURS PRN
Status: DISCONTINUED | OUTPATIENT
Start: 2023-04-28 | End: 2023-04-30 | Stop reason: HOSPADM

## 2023-04-28 RX ORDER — OXYTOCIN 10 [USP'U]/ML
10 INJECTION, SOLUTION INTRAMUSCULAR; INTRAVENOUS
Status: DISCONTINUED | OUTPATIENT
Start: 2023-04-28 | End: 2023-04-30 | Stop reason: HOSPADM

## 2023-04-28 RX ORDER — BUPIVACAINE HYDROCHLORIDE 2.5 MG/ML
INJECTION, SOLUTION EPIDURAL; INFILTRATION; INTRACAUDAL
Status: COMPLETED | OUTPATIENT
Start: 2023-04-28 | End: 2023-04-28

## 2023-04-28 RX ORDER — METOCLOPRAMIDE 10 MG/1
10 TABLET ORAL EVERY 6 HOURS PRN
Status: DISCONTINUED | OUTPATIENT
Start: 2023-04-28 | End: 2023-04-30 | Stop reason: HOSPADM

## 2023-04-28 RX ORDER — PROCHLORPERAZINE MALEATE 10 MG
10 TABLET ORAL EVERY 6 HOURS PRN
Status: DISCONTINUED | OUTPATIENT
Start: 2023-04-28 | End: 2023-04-30 | Stop reason: HOSPADM

## 2023-04-28 RX ORDER — METOCLOPRAMIDE HYDROCHLORIDE 5 MG/ML
10 INJECTION INTRAMUSCULAR; INTRAVENOUS EVERY 6 HOURS PRN
Status: DISCONTINUED | OUTPATIENT
Start: 2023-04-28 | End: 2023-04-30 | Stop reason: HOSPADM

## 2023-04-28 RX ORDER — OXYTOCIN/0.9 % SODIUM CHLORIDE 30/500 ML
100-340 PLASTIC BAG, INJECTION (ML) INTRAVENOUS CONTINUOUS PRN
Status: DISCONTINUED | OUTPATIENT
Start: 2023-04-28 | End: 2023-04-30 | Stop reason: HOSPADM

## 2023-04-28 RX ORDER — DOCUSATE SODIUM 100 MG/1
100 CAPSULE, LIQUID FILLED ORAL DAILY
Status: DISCONTINUED | OUTPATIENT
Start: 2023-04-28 | End: 2023-04-30 | Stop reason: HOSPADM

## 2023-04-28 RX ORDER — SODIUM CHLORIDE, SODIUM LACTATE, POTASSIUM CHLORIDE, CALCIUM CHLORIDE 600; 310; 30; 20 MG/100ML; MG/100ML; MG/100ML; MG/100ML
INJECTION, SOLUTION INTRAVENOUS CONTINUOUS
Status: DISCONTINUED | OUTPATIENT
Start: 2023-04-28 | End: 2023-04-30 | Stop reason: HOSPADM

## 2023-04-28 RX ORDER — FENTANYL CITRATE 50 UG/ML
50 INJECTION, SOLUTION INTRAMUSCULAR; INTRAVENOUS EVERY 30 MIN PRN
Status: DISCONTINUED | OUTPATIENT
Start: 2023-04-28 | End: 2023-04-28

## 2023-04-28 RX ORDER — IBUPROFEN 800 MG/1
800 TABLET, FILM COATED ORAL
Status: DISCONTINUED | OUTPATIENT
Start: 2023-04-28 | End: 2023-04-28

## 2023-04-28 RX ORDER — OXYTOCIN 10 [USP'U]/ML
INJECTION, SOLUTION INTRAMUSCULAR; INTRAVENOUS
Status: DISPENSED
Start: 2023-04-28 | End: 2023-04-28

## 2023-04-28 RX ORDER — ONDANSETRON 2 MG/ML
4 INJECTION INTRAMUSCULAR; INTRAVENOUS EVERY 6 HOURS PRN
Status: DISCONTINUED | OUTPATIENT
Start: 2023-04-28 | End: 2023-04-30 | Stop reason: HOSPADM

## 2023-04-28 RX ORDER — FENTANYL CITRATE 50 UG/ML
INJECTION, SOLUTION INTRAMUSCULAR; INTRAVENOUS
Status: COMPLETED | OUTPATIENT
Start: 2023-04-28 | End: 2023-04-28

## 2023-04-28 RX ORDER — TRANEXAMIC ACID 10 MG/ML
1 INJECTION, SOLUTION INTRAVENOUS EVERY 30 MIN PRN
Status: DISCONTINUED | OUTPATIENT
Start: 2023-04-28 | End: 2023-04-30 | Stop reason: HOSPADM

## 2023-04-28 RX ORDER — FENTANYL CITRATE-0.9 % NACL/PF 10 MCG/ML
100 PLASTIC BAG, INJECTION (ML) INTRAVENOUS EVERY 5 MIN PRN
Status: DISCONTINUED | OUTPATIENT
Start: 2023-04-28 | End: 2023-04-30 | Stop reason: HOSPADM

## 2023-04-28 RX ORDER — LIDOCAINE 40 MG/G
CREAM TOPICAL
Status: DISCONTINUED | OUTPATIENT
Start: 2023-04-28 | End: 2023-04-30 | Stop reason: HOSPADM

## 2023-04-28 RX ORDER — CITRIC ACID/SODIUM CITRATE 334-500MG
30 SOLUTION, ORAL ORAL
Status: DISCONTINUED | OUTPATIENT
Start: 2023-04-28 | End: 2023-04-30 | Stop reason: HOSPADM

## 2023-04-28 RX ORDER — BUPIVACAINE HYDROCHLORIDE 2.5 MG/ML
INJECTION, SOLUTION EPIDURAL; INFILTRATION; INTRACAUDAL
Status: COMPLETED
Start: 2023-04-28 | End: 2023-04-28

## 2023-04-28 RX ORDER — FENTANYL/BUPIVACAINE/NS/PF 2-1250MCG
PLASTIC BAG, INJECTION (ML) INJECTION
Status: DISCONTINUED
Start: 2023-04-28 | End: 2023-04-28 | Stop reason: WASHOUT

## 2023-04-28 RX ORDER — TRANEXAMIC ACID 10 MG/ML
1 INJECTION, SOLUTION INTRAVENOUS EVERY 30 MIN PRN
Status: DISCONTINUED | OUTPATIENT
Start: 2023-04-28 | End: 2023-04-28

## 2023-04-28 RX ORDER — OXYTOCIN/0.9 % SODIUM CHLORIDE 30/500 ML
340 PLASTIC BAG, INJECTION (ML) INTRAVENOUS CONTINUOUS PRN
Status: DISCONTINUED | OUTPATIENT
Start: 2023-04-28 | End: 2023-04-30 | Stop reason: HOSPADM

## 2023-04-28 RX ORDER — ACETAMINOPHEN 325 MG/1
650 TABLET ORAL EVERY 4 HOURS PRN
Status: DISCONTINUED | OUTPATIENT
Start: 2023-04-28 | End: 2023-04-30 | Stop reason: HOSPADM

## 2023-04-28 RX ORDER — METHYLERGONOVINE MALEATE 0.2 MG/ML
200 INJECTION INTRAVENOUS
Status: DISCONTINUED | OUTPATIENT
Start: 2023-04-28 | End: 2023-04-28

## 2023-04-28 RX ORDER — ACETAMINOPHEN 325 MG/1
650 TABLET ORAL EVERY 4 HOURS PRN
Status: DISCONTINUED | OUTPATIENT
Start: 2023-04-28 | End: 2023-04-28

## 2023-04-28 RX ADMIN — Medication 100 ML/HR: at 11:26

## 2023-04-28 RX ADMIN — INSULIN HUMAN 10 UNITS: 100 INJECTION, SUSPENSION SUBCUTANEOUS at 21:26

## 2023-04-28 RX ADMIN — OXYCODONE HYDROCHLORIDE 5 MG: 5 TABLET ORAL at 22:06

## 2023-04-28 RX ADMIN — ACETAMINOPHEN 325MG 975 MG: 325 TABLET ORAL at 02:02

## 2023-04-28 RX ADMIN — Medication 340 ML/HR: at 10:20

## 2023-04-28 RX ADMIN — LIDOCAINE HYDROCHLORIDE: 10 INJECTION, SOLUTION INFILTRATION; PERINEURAL at 10:24

## 2023-04-28 RX ADMIN — FENTANYL CITRATE 25 MCG: 50 INJECTION, SOLUTION INTRAMUSCULAR; INTRAVENOUS at 08:36

## 2023-04-28 RX ADMIN — HYDROXYZINE HYDROCHLORIDE 50 MG: 25 TABLET ORAL at 00:25

## 2023-04-28 RX ADMIN — MISOPROSTOL 800 MCG: 200 TABLET ORAL at 10:22

## 2023-04-28 RX ADMIN — IBUPROFEN 800 MG: 800 TABLET, FILM COATED ORAL at 10:40

## 2023-04-28 RX ADMIN — BUPROPION HYDROCHLORIDE 450 MG: 150 TABLET, FILM COATED, EXTENDED RELEASE ORAL at 11:35

## 2023-04-28 RX ADMIN — SODIUM CHLORIDE, POTASSIUM CHLORIDE, SODIUM LACTATE AND CALCIUM CHLORIDE 1000 ML: 600; 310; 30; 20 INJECTION, SOLUTION INTRAVENOUS at 08:07

## 2023-04-28 RX ADMIN — OXYCODONE HYDROCHLORIDE 5 MG: 5 TABLET ORAL at 16:49

## 2023-04-28 RX ADMIN — BUPIVACAINE HYDROCHLORIDE 1.5 ML: 2.5 INJECTION, SOLUTION EPIDURAL; INFILTRATION; INTRACAUDAL at 08:36

## 2023-04-28 RX ADMIN — DOCUSATE SODIUM 100 MG: 100 CAPSULE, LIQUID FILLED ORAL at 11:39

## 2023-04-28 RX ADMIN — MORPHINE SULFATE 10 MG: 10 INJECTION INTRAVENOUS at 00:26

## 2023-04-28 RX ADMIN — IBUPROFEN 800 MG: 800 TABLET, FILM COATED ORAL at 20:15

## 2023-04-28 RX ADMIN — BENZOCAINE AND LEVOMENTHOL: 200; 5 SPRAY TOPICAL at 21:13

## 2023-04-28 RX ADMIN — OXYCODONE HYDROCHLORIDE 5 MG: 5 TABLET ORAL at 12:26

## 2023-04-28 ASSESSMENT — ACTIVITIES OF DAILY LIVING (ADL)
ADLS_ACUITY_SCORE: 31

## 2023-04-28 NOTE — PROGRESS NOTES
"Labor Progress Note    Subjective:   Patient complains of pelvic pressure an strong contractions. Desires epidural. Cervidil removed at 0600. No bleeding or leaking of fluid. Denie current headache or vision changes.    Objective:   /98 (BP Location: Left arm, Patient Position: Right side, Cuff Size: Adult Regular)   Pulse 74   Temp 98.9  F (37.2  C)   Resp 18   Ht 1.702 m (5' 7\")   Wt 136.1 kg (300 lb)   LMP 06/05/2022   SpO2 97%   BMI 46.99 kg/m    Fetal Heart Rate Tracing: reactive and reassuring  Category:1         tracing  Tocometer: frequency q 2-3 minutes  Cervix:   Membranes: intact, bulging bag   Dilation: 4   Effacement: 80%   Station:-1   Cephalic presentation    Pre-eclampsia labs  Recent Labs   Lab Test 04/27/23  1640 03/17/23  1614 02/24/23  1303 10/27/22  1442 06/15/22  1421 02/09/21  0945 02/03/21  1418 01/11/19  0817 09/12/18  2122   HGB 11.5* 11.9 12.6 13.4 13.2   < > 12.0   < > 11.7*   HCT 34.5* 35.8 37.9 39.8 40.9   < > 37.2   < > 34.9*    196 234 223 277   < > 184   < > 191   AST 11 14  --   --  10  --  7  --   --    ALT 10 9*  --   --  14  --  <9  --   --    CR 0.62 0.48*  --   --  0.85  --  0.57*   < > 0.60   URIC  --   --   --   --   --   --  3.8  --   --    MAG  --   --   --   --   --   --   --   --  1.7*    < > = values in this interval not displayed.     04/27/2023 Urine protein/ creatine ratio: 0.14  04/27/2023 HgbA1c 5.7    04/27/2023 Limited OB ultrasound: cephalic presentation, fundal placenta, heart rate 136 bpm, normal PEGGY, Biophysical profile (BPP) score 8/8.    Assessment:   Intrauterine pregnancy 38 5/7 wk  A2GDM on insulin poor control/ non compliance  Obsitey BMI 46  Fetal well being: Category:1 tracing.    Plan:   Continue plan of care.  Notify Anesthesia for epidural.  Plan AROM when patient is more comfortable.  Plan Pitocin augmentation if needed.  Intrapartum diabetes management/BS monitoring per protocol.  Anticipate vaginal delivery      Junior Osorio" MD  Obstetrics and Gynecology

## 2023-04-28 NOTE — ANESTHESIA PREPROCEDURE EVALUATION
Anesthesia Pre-Procedure Evaluation    Patient: Lorena Echevarria   MRN: [id number] : 1995        Procedure :           Past Medical History:   Diagnosis Date     Abnormal Pap smear of cervix 2017     Anxiety      Autism spectrum disorder      Autism spectrum disorder     per [place] records     Depressed      Diet controlled gestational diabetes mellitus (GDM) in third trimester 9/15/2017    Overview:  Insulin start today Begin testing 4 times daily NST BPP weekly ordered Diabetes center     Fetal tachycardia affecting management of mother 2017     Gestational diabetes mellitus (GDM) 9/15/2017    Overview:  Insulin start today Begin testing 4 times daily NST BPP weekly ordered Diabetes center     Herpes     no outbreak now     High-risk first pregnancy of young woman, third trimester 9/15/2017    Overview:  Tdap and flu done ROR signed Poor compliance with prenatal care and GDM.   consulted for assistance.   Transfer from [place] 34 weeks Girl     History of suicide attempt 2014    multiple     LGSIL on Pap smear of cervix 2017     Mental disorder     depression     Mild intellectual disabilities      Normal labor and delivery 10/17/2017     Pregnant 2021     Schizoaffective disorder (H)       Past Surgical History:   Procedure Laterality Date     COLPOSCOPY        Allergies   Allergen Reactions     Lamictal [Lamotrigine] Other (See Comments)     Increases aggression.     Quetiapine Other (See Comments)     Mood changes  Mood changes       Amoxicillin Rash     Zoloft [Sertraline] Other (See Comments)     Depression worsens      Social History     Tobacco Use     Smoking status: Some Days     Packs/day: 0.10     Types: Cigarettes, Vaping Device     Last attempt to quit: 10/27/2016     Years since quittin.5     Smokeless tobacco: Never     Tobacco comments:     smoking about 1 cig/day   Vaping Use     Vaping status: Some Days     Substances: Nicotine, Flavoring     Devices:  Pre-filled or refillable cartridge, Refillable tank     Passive vaping exposure: Yes   Substance Use Topics     Alcohol use: No     Comment: Past use      Wt Readings from Last 1 Encounters:   04/27/23 136.1 kg (300 lb)        Anesthesia Evaluation   Pt has had prior anesthetic. Type: OB Labor Epidural.    No history of anesthetic complications       ROS/MED HX  ENT/Pulmonary:  - neg pulmonary ROS     Neurologic:     (+) Developmental delay, level of function: autism spectrum disorder,     Cardiovascular:  - neg cardiovascular ROS     METS/Exercise Tolerance:     Hematologic:       Musculoskeletal:       GI/Hepatic:       Renal/Genitourinary:       Endo:     (+) type II DM (GDM), Obesity,     Psychiatric/Substance Use:     (+) psychiatric history depression, anxiety, other (comment) and schizophrenia (History of suicide attempt)     Infectious Disease:       Malignancy:       Other:               OUTSIDE LABS:  CBC:   Lab Results   Component Value Date    WBC 6.3 04/27/2023    WBC 8.5 03/17/2023    HGB 11.5 (L) 04/27/2023    HGB 11.9 03/17/2023    HCT 34.5 (L) 04/27/2023    HCT 35.8 03/17/2023     04/27/2023     03/17/2023     BMP:   Lab Results   Component Value Date     (L) 04/27/2023     03/17/2023    POTASSIUM 3.9 04/27/2023    POTASSIUM 3.7 03/17/2023    CHLORIDE 104 04/27/2023    CHLORIDE 103 03/17/2023    CO2 25 04/27/2023    CO2 21 (L) 03/17/2023    BUN 7.0 04/27/2023    BUN 5.3 (L) 03/17/2023    CR 0.62 04/27/2023    CR 0.48 (L) 03/17/2023    GLC 91 04/27/2023    GLC 94 04/27/2023     COAGS:   Lab Results   Component Value Date    PTT 25 02/03/2021    INR 0.94 02/03/2021     POC:   Lab Results   Component Value Date    BGM 88 10/17/2017    HCG Positive (A) 07/10/2020     HEPATIC:   Lab Results   Component Value Date    ALBUMIN 3.0 (L) 04/27/2023    PROTTOTAL 5.7 (L) 04/27/2023    ALT 10 04/27/2023    AST 11 04/27/2023    ALKPHOS 112 (H) 04/27/2023    BILITOTAL <0.2 04/27/2023      OTHER:   Lab Results   Component Value Date    A1C 5.7 (H) 04/27/2023    YANELI 8.4 (L) 04/27/2023    MAG 1.7 (L) 09/12/2018    TSH 1.11 10/27/2022    T4 0.80 (L) 10/27/2022    CRP 28.9 (H) 10/27/2017       Anesthesia Plan    ASA Status:  3      Anesthesia Type: Epidural.              Consents            Postoperative Care            Comments:    Other Comments: Discussed risks and benefits with patient including itching, sore back, infection, hematoma, spinal headache, CV complications, inability to place, nerve damage. Pt wishes to proceed.             RUDY Malcolm CRNA

## 2023-04-28 NOTE — ANESTHESIA PROCEDURE NOTES
"Intrathecal injection Procedure Note    Pre-Procedure   Staff -        CRNA: Socrates Guallpa APRN CRNA       Other Anesthesia Staff: Ratna Street       Performed By: CRNA and SCOOBY       Location: OR       Procedure Start/Stop Times: 4/28/2023 8:25 AM and 4/28/2023 8:37 AM       Pre-Anesthestic Checklist: patient identified, IV checked, risks and benefits discussed, informed consent, monitors and equipment checked, pre-op evaluation and at physician/surgeon's request  Timeout:       Correct Patient: Yes        Correct Procedure: Yes        Correct Site: Yes        Correct Position: Yes   Procedure Documentation  Procedure: intrathecal injection       Diagnosis: labor       Patient Position: sitting       Skin prep: Chloraprep       Insertion Site: L3-4. (midline approach).       Needle Gauge: 25.        Needle Length (Inches): 5        Spinal Needle Type: Mary tip       Introducer used       Introducer: 20 G       # of attempts: 2 and  # of redirects:  0    Assessment/Narrative         Paresthesias: No.       CSF fluid: clear.    Medication(s) Administered   0.25% PF Bupivacaine (Intrathecal) - Intrathecal, Back   1.5 mL - 4/28/2023 8:36:00 AM  Fentanyl PF (Intrathecal) - Intrathecal, Back   25 mcg - 4/28/2023 8:36:00 AM  Medication Administration Time: 4/28/2023 8:25 AM     Comments:  First attempt at prior epidural site, unable to reach CSF. Patient repositioned, and attempted midline insertion. Successful as documented. Pt reporting reduced pain after intervention.      FOR [facility] ([facility]) ONLY:   Pain Team Contact information: please page the Pain Team Via [employer]. Search \"Pain\". During daytime hours, please page the attending first. At night please page the resident first.      "

## 2023-04-28 NOTE — CARE PLAN
Patient awoke to void.  Small amount of vaginal bleeding noted. Patient continues to C/O vaginal burning from the cervidil. External monitors on, Dr. Severino notified of vaginal bleeding. Order received to pull the cervidil. Medication pulled from vaginal area, small amount of bleeding noted.

## 2023-04-28 NOTE — CARE PLAN
External monitoring off per MD Severino orders. Cat1 FHT tracing for 1 hour after cervidil placement. Patient denies contractions. Patient had some relief of head ache symptoms from tylenol. Using ice bag to head also eases the discomfort. Instructions to call for contractions or as needed during night.

## 2023-04-28 NOTE — ANESTHESIA PROCEDURE NOTES
"Epidural catheter Procedure Note    Pre-Procedure   Staff -        CRNA: Socrates Guallpa APRN CRNA       Other Anesthesia Staff: Ratna Street       Performed By: CRNA and SCOOBY       Location: OB       Procedure Start/Stop Times: 4/28/2023 8:20 AM and 4/28/2023 8:24 AM       Pre-Anesthestic Checklist: patient identified, IV checked, risks and benefits discussed, informed consent, monitors and equipment checked, pre-op evaluation and at physician/surgeon's request  Timeout:       Correct Patient: Yes        Correct Procedure: Yes        Correct Site: Yes        Correct Position: Yes   Procedure Documentation  Procedure: epidural catheter       Diagnosis: labor       Patient Position: sitting       Skin prep: Chloraprep       Insertion Site: L3-4. (midline approach).       Technique: LORT air        Needle Type: Touhy needle       Needle Gauge: 18.        Needle Length (Inches): 3.5        # of attempts: 1 and  # of redirects:     Assessment/Narrative         Paresthesias: No.       Insertion/Infusion Method: LORT air    Medication(s) Administered   Medication Administration Time: 4/28/2023 8:20 AM     Comments:  First attempt by Jad, unable to reach epidural. Same approach by Ramu, resistance still appreciated on VAHID syringe at 10cm. No longer Tuohy needles available. Epidural attempt abandoned for intrathecal instead.      FOR [facility] ([facility]) ONLY:   Pain Team Contact information: please page the Pain Team Via [employer]. Search \"Pain\". During daytime hours, please page the attending first. At night please page the resident first.      "

## 2023-04-28 NOTE — CARE PLAN
Patient awakened by vaginal burning pain from the cervidil. Atarax repeated and IM morphine given for rest. FHT doptone 148. Denies contractions, LOF, or vaginal bleeding.

## 2023-04-28 NOTE — CARE PLAN
Face to face report given with opportunity to observe patient.    Report given to NETTE Kirkpatrick RN   4/27/2023  7:28 PM

## 2023-04-28 NOTE — PROGRESS NOTES
"Labor Progress Note    Subjective:   Patient feels better with intrathecal.    Objective:   /98 (BP Location: Left arm, Patient Position: Right side, Cuff Size: Adult Regular)   Pulse 74   Temp 98.9  F (37.2  C)   Resp 18   Ht 1.702 m (5' 7\")   Wt 136.1 kg (300 lb)   LMP 06/05/2022   SpO2 97%   BMI 46.99 kg/m    Fetal Heart Rate Tracing: reactive and reassuring  Category:1 tracing  Tocometer: frequency q 2-3 minutes  Cervix:   Membranes: intact with bulging bag   Dilation: 8   Effacement: 100%   Station:-1   Cephalic presentation    Assessment:   Intrauterine pregnancy 38 5/7 wk  A2GDM on insulin poor control/ non compliance  Obsitey BMI 46  Fetal well being: Category:1 tracing.    Plan:   Continue plan of care.  Plan AROM when patient is more comfortable.  Plan Pitocin augmentation if needed.  Intrapartum diabetes management/BS monitoring per protocol.  Anticipate vaginal delivery.    Junior Osorio MD  Obstetrics and Gynecology    "

## 2023-04-28 NOTE — ANESTHESIA POSTPROCEDURE EVALUATION
Patient: Lorena Echevarria    Procedure: * No procedures listed *       Anesthesia Type:  Epidural    Note:  Disposition: Outpatient   Postop Pain Control: Uneventful            Sign Out: Well controlled pain   PONV: No   Neuro/Psych: Uneventful            Sign Out: Acceptable/Baseline neuro status   Airway/Respiratory: Uneventful            Sign Out: Acceptable/Baseline resp. status   CV/Hemodynamics: Uneventful            Sign Out: Acceptable CV status; No obvious hypovolemia; No obvious fluid overload   Other NRE: NONE   DID A NON-ROUTINE EVENT OCCUR? No           Last vitals:  Vitals:    04/28/23 1146 04/28/23 1229 04/28/23 1344   BP: 142/84 137/77 132/68   Pulse:      Resp:  16    Temp:  99.6  F (37.6  C)    SpO2:   98%       Electronically Signed By: RUDY Mlacolm CRNA  April 28, 2023  3:23 PM

## 2023-04-28 NOTE — PLAN OF CARE
Assessments completed as charted. B/P: 132/67, T: 99.6, P: 84, R: 16. Rates pain: 7/10. Pain managed through medication; see MAR.. Voiding without difficulty. Fundus: Midline +2/U. Lochia: Light. Activity: unrestricted with out pain  and moving with difficulty due to perineal pain. Infant feeding: Both breast and formula x1; supplementation due to unstable temperature, borderline blood sugars, lethargy- per provider.           Postpartum breastfeeding assessment completed and education provided, see Patient Education Activity.  Items included in the education are:   proper positioning and latch  effectiveness of feeding  manual expression  handling and storing breastmilk  maintenance of breastfeeding for the first 6 months  sign/symptoms of infant feeding issues requiring referral to qualified health care provider  Postpartum care education provided, see Patient Education activity. Patient denies needs. Will monitor.  Laurie Han RN

## 2023-04-28 NOTE — PLAN OF CARE
of viable Male.  Nursery RN Korina with Pediatrician and RT present.  Meconium fluid, GDM with rescusitatiuons needed (See Delivery summary). Mom had first degree tear with repair done. Cytotec and 2 bags of Pit administered.  Penny cares provided.  Mother and baby in stable condition. Baby skin-to-skin with mom. ID bands placed on infant, mom and dad.

## 2023-04-28 NOTE — L&D DELIVERY NOTE
Delivery Summary    Lorena Echevarria MRN# [id number]   Age: 27 year old YOB: 1995     ASSESSMENT & PLAN: Please see separate Vaginal Delivery note for details of delivery.    Junior Osorio MD  Obstetrics and Gynecology         Cy Echevarria-Lorena [[id number]]    Labor Event Times    Dilation complete date: 23 Complete time:  8:45 AM   Start pushing date/time: 2023 0850      Labor Length    2nd Stage (hrs): 1 (min): 31   3rd Stage (hrs): 0 (min): 2      Labor Events     labor?: No   steroids: None  Labor Type: Induction/Cervical ripening, AROM  Predominate monitoring during 1st stage: continuous electronic fetal monitoring     Antibiotics received during labor?: No     Rupture date/time: 23 0840   Rupture type: Artificial Rupture of Membranes  Fluid color: Meconium  Fluid odor: Normal     Induction date/time:      Cervical ripening date/time:      Indications for induction: Diabetes (Comment to specify)     Augmentation: None     Delivery/Placenta Date and Time    Delivery Date: 23 Delivery Time: 10:16 AM   Placenta Date/Time: 2023 10:18 AM  Oxytocin given at the time of delivery: after delivery of baby  Delivering clinician: Junior Osorio MD   Other personnel present at delivery:  Provider Role   Laurie Han RN Registered Nurse   Korina Mccloud RN Registered Nurse   Musa Han MD Pediatrician         Vaginal Counts     Initial count performed by 2 team members:  Two Team Members   NETTE Parnell RN       Needles Suture Needles Sponges (RETIRED) Instruments   Initial counts 1  15    Added to count  1     Relief counts       Final counts 1 1 15          Placed during labor Accounted for at the end of labor   FSE Yes Yes   IUPC No NA   Cervidil No NA              Final count performed by 2 team members:  Two Team Members   NETTE Parnell Dr      Final count correct?: Yes     Apgars    Living status: Living   1 Minute 5 Minute 10 Minute 15  "Minute 20 Minute   Skin color: 1  2  2      Heart rate: 2  2  2      Reflex irritability: 2  2  2      Muscle tone: 0  0  1      Respiratory effort: 1  1  2      Total: 6  7  9      Apgars assigned by: DR. JONES     Cord    Vessels: 3 Vessels    Cord Complications: Nuchal   Nuchal Intervention: reduced   Nuchal cord description: tight nuchal cord   Cord Blood Disposition: Discard      Gases Sent?: No      Delayed cord clamping?: Yes      Cord Clamping Delay (seconds):  seconds      Stem cell collection?: No                     [place] Resuscitation    Methods: Oxygen, NCPAP, Oximetry  [place] Care at Delivery: Baby boy born at 1016  and brought to the warmer per Dr. Osorio. Dr. Jones, RT, and RN present at warmer. Baby pale, poor tone, no respiratory effort. PPV for 2 minutes. CPAP at 3 minutes, oxygen turned to 50 and titered back down to room air. At 5 minutes baby pink, SpO2 at 96% with poor tone with CPAP continued. Blood glucose 106 at 12 minutes. Baby with good respiratory effort and brought skin to skin with mom at 15 minutes.       Measurements    Weight: 7 lb 9.5 oz Length: 1' 6.5\"   Head circumference: 34 cm    Abdominal girth: 32 cm     Skin to Skin and Feeding Plan    Skin to skin initiation date/time: 1841    Skin to skin with: Mother  Skin to skin end date/time:     Breastfeeding initiated date/time: 2023 1040     Labor Events and Shoulder Dystocia    Fetal Tracing Prior to Delivery: Category 1  Shoulder dystocia present?: Neg     Delivery (Maternal) (Provider to Complete) ([id number])    Episiotomy: None  Perineal lacerations: 1st    Repair suture: 2-0 Vicryl  Number of repair packets: 1  Genital tract inspection done: Pos     Blood Loss  Mother: Lorena Echevarria #[id number]   Start of Mother's Information    Delivery Blood Loss  23 2216 - 23 1252    Delivery QBL (mL) Hospital Encounter 155 mL    Postpartum QBL (mL) Hospital Encounter 141 mL    Total  296 mL       "   End of Mother's Information  Mother: Lorena Echevarria #[id number]          Delivery - Provider to Complete ([id number])    Delivering clinician: Junior Osorio MD  Delivery Type (Choose the 1 that will go to the Birth History): Vaginal, Spontaneous    Other personnel:  Provider Role   Laurie Han RN Registered Nurse   Koirna Mccloud RN Registered Nurse   Musa Han MD Pediatrician                               Placenta    Date/Time: 4/28/2023 10:18 AM  Removal: Spontaneous  Disposition: Hospital disposal           Anesthesia    Method: Intrathecal                Presentation and Position    Presentation: Vertex     Occiput Anterior                 Junior Osorio MD

## 2023-04-28 NOTE — L&D DELIVERY NOTE
Name: Lorena Echevarria  Age: 27 year old  YOB: 1995   Medical Record #: [id number]     [facility] Vaginal Delivery Note    Date: 2023   Pre-delivery Diagnosis: 1. Intrauterine pregnancy at 38w5d.    2. A2GDM on insulin poor control/ non compliance  3. Meconium  4. [place] BMI 46  5. Depression  6. Anemia in pregnancy   Post-delivery Diagnosis: 1. Same.    2. Delivered a viable male infant.   Induction of Labor: Yes.   Delivery Method/ Type: Spontaneous Vaginal Delivery   Provider: Junior Osorio MD   Anesthesia: Intrathecal anesthesia   Qualitative Blood Loss: 296 ml QBL.   Laceration/ Episiotomy: First degree perineal laceration(s).   Complications: None.   Specimens: None.   Findings: Viable  male infant in occiput anterior position. Apgars were 6 / 7 / 9. Weight was 3445 grams. Amniotic fluid was meconium. Nuchal cord x 2 reduced on perineum. There was good cry at cord clamp. Placenta delivered intact with a three-vessel cord.   Disposition: Patient in stable condition, stayed in delivery room. Infant in stable condition to the nursery.   Times: Delivery of Infant: 1016    Delivery of Placenta: 1018     Description of Procedure:  Lorena Echevarria is a 27 year old  with Estimated Date of Delivery: Data Unavailable who was admitted at 38w5d. The labor was induced with Cervidil for cervical ripening and she entered into active labor with Cervidil.  She was dilated to 4 cm upon cervidil removal.  She progressed normally.  Rupture of membranes was artificial with meconium fluid at 0840. The fetal heart rate tracing was category number 1, and was reassuring during the intrapartum episode. In active labor, her contractions were regular and firm to palpation. Relaxation of the uterus was noted between contractions. She achieved complete cervical dilatation.  She entered a normal second stage labor pattern. She pushed effectively. She delivered a vigorous live born infant by  normal spontaneous vaginal delivery without excessive traction nor intrapartum complications. The delivery was complicated by none. The infant delivered in occiput anterior position. The nose and mouth were suctioned. Nuchal cord x 2 was reduced on the perineum. The infant's anterior shoulder delivered, followed by delivery of posterior shoulder and then delivery of the rest of the infant in the usual fashion. No resistance was noted at delivery of the shoulders. The baby was active and initiated a good cry. The umbilical cord was clamped and cut after 60 seconds. The baby was handed to nurse and Dr. Han. Cord blood was obtained for evaluation. The placenta delivered spontaneously and it was inspected and found to be intact. It contained a three-vessel cord. Clots were evacuated from the uterus and vagina. The uterus was firm immediately upon delivery of the placenta. Bimanual massage was performed and Pitocin was given with delivery of the placenta. Cytotec was given rectally x 1. Bleeding post-delivery was appropriate. The fundus was firm to palpation. The cervix, vagina and perineum were examined with finding of first degree perineal laceration(s). A first degree midline perineal laceration was repaired with 2-0 and 3-0 Vicryl suture in the usual fashion with good hemostasis noted at the conclusion of the procedure. There was good hemostasis noted at the conclusion of the procedure. Rectal exam confirmed that the rectal sphincter and mucosa were intact. The patient was cleaned. Sponge and needle counts were correct. At the conclusion of delivery and repair, Mother and baby were doing well and stable in the postpartum unit. The baby stayed with mother.  The mother stayed in delivery room.    Junior Osorio MD  Obstetrics and Gynecology

## 2023-04-28 NOTE — CARE PLAN
Lorena Echevarria        NST:  reactive  Start: 1620  Stop: 1645    Physician:   Reason For Test: Gestational Diabetes& preeclampsia symptoms  Estimated Date of Delivery: May 7, 2023   Gestational Age: 38w4d     Verified with second RN: NETTE Huynh    Comments:  BPP 8/8      CORTEZ CUELLAR RN

## 2023-04-28 NOTE — PLAN OF CARE
Labor Admission  Lorena Echevarria  MRN: [id number]  Gestational Age: 38w5d      Lorena Echevarria is admitted for active labor.  States andreia since 0600.  Rates pain at 10/10.  light  began at 6:00.  Denies LOF.   Dr. Osorio notified of arrival and condition and Intrapartum orders initiated.      FHT: Reassuring 120s Broken tracing due to Pt writhing in bed. Unable to monitor Pt.      Patient is alert and oriented X 3,Patient oriented to room, unit, hourly rounding, and plan of care.  Call light within reach. Explained admission packet with patient bill of rights brochure. Will continue to monitor and document as needed.     Inpatient nursing criteria listed below was met:    Health care directives status obtained and documented: N/A     Core Measure diagnosis present:: N/A  Vaccine assessment done and vaccines ordered if appropriate. N/A  Clergy visit ordered if patient requests: N/A  Skin issues/needs documented:N/A  Isolation needs addressed, if appropriate: N/A  Fall Prevention (Med and High risk): Care plan updated, Education given and documented and signage used: No  Care Plan initiated: Yes  Education Documented (Reminder to educate patient if MRSA is present on admission): Yes  Education Assessment documented:Yes  Patient has discharge needs (If yes, please explain): No

## 2023-04-28 NOTE — CARE PLAN
Patient C/O head ache coming back. Stated she has been asleep, woke up to void, Tylenol given and ice pack refilled. Patient denies contractions, LOF, or vaginal bleeding. Patient stated morphine and atarax helped the vaginal pain she was experiencing.

## 2023-04-29 LAB
GLUCOSE BLDC GLUCOMTR-MCNC: 105 MG/DL (ref 70–99)
GLUCOSE BLDC GLUCOMTR-MCNC: 94 MG/DL (ref 70–99)
HGB BLD-MCNC: 10 G/DL (ref 11.7–15.7)
T PALLIDUM AB SER QL: NONREACTIVE

## 2023-04-29 PROCEDURE — 85018 HEMOGLOBIN: CPT | Performed by: OBSTETRICS & GYNECOLOGY

## 2023-04-29 PROCEDURE — 250N000013 HC RX MED GY IP 250 OP 250 PS 637: Performed by: OBSTETRICS & GYNECOLOGY

## 2023-04-29 PROCEDURE — 120N000001 HC R&B MED SURG/OB

## 2023-04-29 PROCEDURE — 36416 COLLJ CAPILLARY BLOOD SPEC: CPT | Performed by: OBSTETRICS & GYNECOLOGY

## 2023-04-29 RX ORDER — FUROSEMIDE 20 MG
20 TABLET ORAL ONCE
Status: COMPLETED | OUTPATIENT
Start: 2023-04-29 | End: 2023-04-29

## 2023-04-29 RX ADMIN — OXYCODONE HYDROCHLORIDE 5 MG: 5 TABLET ORAL at 17:57

## 2023-04-29 RX ADMIN — OXYCODONE HYDROCHLORIDE 5 MG: 5 TABLET ORAL at 09:53

## 2023-04-29 RX ADMIN — IBUPROFEN 800 MG: 800 TABLET, FILM COATED ORAL at 14:18

## 2023-04-29 RX ADMIN — ACETAMINOPHEN 325MG 650 MG: 325 TABLET ORAL at 16:43

## 2023-04-29 RX ADMIN — FUROSEMIDE 20 MG: 20 TABLET ORAL at 17:57

## 2023-04-29 RX ADMIN — BENZOCAINE AND LEVOMENTHOL: 200; 5 SPRAY TOPICAL at 16:45

## 2023-04-29 RX ADMIN — ACETAMINOPHEN 325MG 650 MG: 325 TABLET ORAL at 12:15

## 2023-04-29 RX ADMIN — IBUPROFEN 800 MG: 800 TABLET, FILM COATED ORAL at 05:11

## 2023-04-29 RX ADMIN — OXYCODONE HYDROCHLORIDE 5 MG: 5 TABLET ORAL at 14:18

## 2023-04-29 RX ADMIN — IBUPROFEN 800 MG: 800 TABLET, FILM COATED ORAL at 20:41

## 2023-04-29 RX ADMIN — DOCUSATE SODIUM 100 MG: 100 CAPSULE, LIQUID FILLED ORAL at 09:53

## 2023-04-29 RX ADMIN — ACETAMINOPHEN 325MG 650 MG: 325 TABLET ORAL at 23:39

## 2023-04-29 RX ADMIN — BUPROPION HYDROCHLORIDE 450 MG: 150 TABLET, FILM COATED, EXTENDED RELEASE ORAL at 10:00

## 2023-04-29 ASSESSMENT — ACTIVITIES OF DAILY LIVING (ADL)
ADLS_ACUITY_SCORE: 32
ADLS_ACUITY_SCORE: 36
ADLS_ACUITY_SCORE: 32
ADLS_ACUITY_SCORE: 32
ADLS_ACUITY_SCORE: 36
ADLS_ACUITY_SCORE: 32
ADLS_ACUITY_SCORE: 32
ADLS_ACUITY_SCORE: 36
ADLS_ACUITY_SCORE: 31

## 2023-04-29 NOTE — PROVIDER NOTIFICATION
2 elevated BPs with use of regular BP cuff on forearm.  Switched to Lg long BP cuff with /71.  Pt reported doubled/slowed vision at times.  RN assessed 2+ edema BLE, this afternoon increased edema to 3+.  Provider notified and gave lasix order 20mg PO now and continue to monitor.

## 2023-04-29 NOTE — PLAN OF CARE
Face to face report given with opportunity to observe patient.    Report given to Soheila Coyle RN   4/29/2023  7:04 AM

## 2023-04-29 NOTE — PLAN OF CARE
Assessments completed as charted. B/P: 137/85, T: 98.5, P: 84, R: 18. Rates pain: 8/10 soreness to vaginal area, managed well with PRN juli, ibuprofen, and tylenol. Voiding without difficulty. Fundus: Midline 3cm above umbilicus. Lochia: Light. Activity: moving with difficulty due to perineal pain. Infant feeding: Both breast and formula.     Postpartum breastfeeding assessment completed and education provided, see Patient Education Activity.  Items included in the education are:   proper positioning and latch  effectiveness of feeding  manual expression  handling and storing breastmilk  maintenance of breastfeeding for the first 6 months  sign/symptoms of infant feeding issues requiring referral to qualified health care provider  Postpartum care education provided, see Patient Education activity. Patient denies needs. Will monitor.  Najma Arita RN

## 2023-04-29 NOTE — PLAN OF CARE
Assessments completed as charted. B/P: 117/81, T: 98.3, P: 84, R: 18. Rates pain: 4/10. Swelling to labia. Ice packs, dermoplast spray and soaked in tub. Voiding without difficulty. Fundus: Midline, firm, 1/U. Lochia: Light. Activity: Pt has difficulty getting in and out of bed, but independent and steady once on feet.  Infant feeding: Both breast and formula. Breastfeeding going well. Some formula supplementation for infant d/t low blood glucose.     Postpartum breastfeeding assessment completed and education provided, see Patient Education Activity.  Items included in the education are:   proper positioning and latch  effectiveness of feeding  manual expression  handling and storing breastmilk  maintenance of breastfeeding for the first 6 months  sign/symptoms of infant feeding issues requiring referral to qualified health care provider  Postpartum care education provided, see Patient Education activity. Patient denies needs. Will monitor.  Gail Coyle RN

## 2023-04-29 NOTE — PLAN OF CARE
Assessments completed as charted. B/P: 109/71, T: 98.6, P: 84, R: 18. Rates pain: 8/10 vaginal soreness, gave prn tylenol. Voiding without difficulty. Fundus: Midline 1cm above umbilicus, firm without massage. Lochia: Light. Activity: moving with difficulty due to perineal pain. Infant feeding: Both breast and formula.     Patient reported intermittent double/slowed vision with increased edema BLE to +3 (see previous note).  Giving lasix 20mg PO at this time.    Postpartum breastfeeding assessment completed and education provided, see Patient Education Activity.  Items included in the education are:   proper positioning and latch  effectiveness of feeding  manual expression  handling and storing breastmilk  maintenance of breastfeeding for the first 6 months  sign/symptoms of infant feeding issues requiring referral to qualified health care provider  Postpartum care education provided, see Patient Education activity. Patient denies needs. Will monitor.  Najma Arita RN

## 2023-04-29 NOTE — PROGRESS NOTES
"Name: Lorena Echevarria  Age: 27 year old  YOB: 1995   Medical Record #: [id number]     Postpartum Day 1: Vaginal delivery    DATE: 4/29/2023  SUBJECTIVE:  Patient is doing well. Her pain is well controlled with current medications. She reports normal lochia. She is tolerating a regular diet without nausea. She is ambulating and voiding. She is passing flatus. The patient denies depression symptoms. She denies headache or vision changes, shortness of breath, or chest pain. She denies fever or chills. The baby is well. She is nursing. Patient undecided about sterilization. The patient declines low carbohydrate and she is eating fast food [employer] including shakes over night.    OBJECTIVE:  /81 (BP Location: Right arm, Patient Position: Sitting, Cuff Size: Adult Regular)   Pulse 84   Temp 98.3  F (36.8  C) (Oral)   Resp 18   Ht 1.702 m (5' 7\")   Wt 136.1 kg (300 lb)   LMP 06/05/2022   SpO2 97%   Breastfeeding Unknown   BMI 46.99 kg/m      Well developed and well nourished female in no apparent distress. Alert and oriented. Lungs are clear to auscultation bilaterally. Heart is regular rate and rhythm. Abdomen is nondistended with positive bowel sounds. Uterine fundus is firm and palpated below the umbilicus. Perineum is clean, dry and intact. Extremities no edema, non-tender.    LABS :  Lab Results   Component Value Date    ABO A 10/14/2017    RH Pos 10/14/2017    AS Negative 04/27/2023    HEPBANG Nonreactive 10/27/2022    TREPT Nonreactive 02/24/2023    RUQIGG Positive 10/27/2022    TSH 1.11 10/27/2022    HGB 10.0 (L) 04/29/2023    HCT 34.5 (L) 04/27/2023     04/27/2023    GBS Negative 09/15/2017    GBPCRT Negative 04/13/2023    PAP LSIL 03/30/2017    CHPCRT Negative 06/15/2022    GCPCRT Negative 06/15/2022     CBC Results (Last 2)  Recent Labs   Lab Test 04/29/23  0609 04/27/23  1640 03/17/23  1614   WBC  --  6.3 8.5   RBC  --  3.83 3.94   HGB 10.0* 11.5* 11.9   HCT  --  34.5* 35.8 "   MCV  --  90 91   PLT  --  179 196     IMPRESSION :  1. 27 year old  female at 38w5d, now delivered.  2. PPD# 1 s/p Spontaneous vaginal delivery, doing well.  3. A2GDM on insulin poor control/ non compliance  4. [place] BMI 46  5. Depression  6. Nursing    PLAN:  1. Continue routine post-partum care.  2. Encourage nursing.  3. Restart antidepressant medication.  4. The patient declines low carbohydrate or ADA diet. She is taking regular diet and fast food. Her blood sugars are stable postpartum. We will stop checking blood sugars. She has A2GDM on insulin with poor control/ non compliance. I recommend postpartum glucose screen.  5. Reviewed some of the home instructions in anticipation of discharge; these include being aware of warning signs of postpartum depression, symptoms of infection, avoiding constipation, iron replacement, and the need to seek medical evaluation for any questions or concerns.  6. Patient undecided about sterilization. Options for birth control have been reviewed and a final decision and prescription for that will occur in the postpartum clinic visit.  7. Repeat Serology / Treponema Pallidum Antibody Test obtained per [facility] protocol.  8. Anticipate discharge on PPD# 2 in stable condition.     Junior Osorio MD  Obstetrics and Gynecology

## 2023-04-30 VITALS
HEART RATE: 107 BPM | TEMPERATURE: 98.6 F | DIASTOLIC BLOOD PRESSURE: 74 MMHG | HEIGHT: 67 IN | BODY MASS INDEX: 45.99 KG/M2 | OXYGEN SATURATION: 97 % | RESPIRATION RATE: 18 BRPM | SYSTOLIC BLOOD PRESSURE: 116 MMHG | WEIGHT: 293 LBS

## 2023-04-30 LAB — GLUCOSE BLDC GLUCOMTR-MCNC: 91 MG/DL (ref 70–99)

## 2023-04-30 PROCEDURE — 722N000001 HC LABOR CARE VAGINAL DELIVERY SINGLE

## 2023-04-30 PROCEDURE — 250N000013 HC RX MED GY IP 250 OP 250 PS 637: Performed by: OBSTETRICS & GYNECOLOGY

## 2023-04-30 RX ORDER — FUROSEMIDE 40 MG
40 TABLET ORAL ONCE
Status: COMPLETED | OUTPATIENT
Start: 2023-04-30 | End: 2023-04-30

## 2023-04-30 RX ORDER — ACETAMINOPHEN 325 MG/1
650 TABLET ORAL EVERY 4 HOURS PRN
Qty: 100 TABLET | Refills: 0 | Status: SHIPPED | OUTPATIENT
Start: 2023-04-30

## 2023-04-30 RX ORDER — DOCUSATE SODIUM 100 MG/1
100 CAPSULE, LIQUID FILLED ORAL DAILY
Qty: 30 CAPSULE | Refills: 0 | Status: SHIPPED | OUTPATIENT
Start: 2023-04-30 | End: 2023-06-08

## 2023-04-30 RX ADMIN — FUROSEMIDE 40 MG: 40 TABLET ORAL at 09:35

## 2023-04-30 RX ADMIN — BUPROPION HYDROCHLORIDE 450 MG: 150 TABLET, FILM COATED, EXTENDED RELEASE ORAL at 09:36

## 2023-04-30 RX ADMIN — OXYCODONE HYDROCHLORIDE 5 MG: 5 TABLET ORAL at 02:42

## 2023-04-30 RX ADMIN — DOCUSATE SODIUM 100 MG: 100 CAPSULE, LIQUID FILLED ORAL at 09:35

## 2023-04-30 ASSESSMENT — ACTIVITIES OF DAILY LIVING (ADL)
ADLS_ACUITY_SCORE: 32
ADLS_ACUITY_SCORE: 19
ADLS_ACUITY_SCORE: 32

## 2023-04-30 NOTE — DISCHARGE SUMMARY
"OB VAGINAL BIRTH DISCHARGE SUMMARY    Name: Lorena Echevarria  Age: 27 year old  YOB: 1995   Medical Record #: [id number]     Date of Admission:  2023  Date of Discharge:  2023  Admitting Physician:  Fidel Severino MD  Discharge Physician:  Junior Osorio MD  Discharging Service:  Obstetrics and Gynecology     Primary Provider:   Kaya Servin          Admission Diagnoses:     Encounter for triage in pregnant patient [Z36.89]  Insulin controlled gestational diabetes mellitus (GDM) in third trimester [O24.414]  High-risk pregnancy in third trimester [O09.93]          Discharge Diagnosis:     1. 27 year old  female at 38w5d, now delivered.  2. PPD# 2 s/p Spontaneous vaginal delivery, doing well.  3. A2GDM on insulin poor control/ non compliance  4. [place]y BMI 46  5. Depression  6. Nursing             Discharge Disposition:     Discharged to home.           Condition on Discharge:     Discharge condition: Stable   Discharge vitals: /69 (BP Location: Left arm, Patient Position: Sitting, Cuff Size: Adult Large)   Pulse 84   Temp 98.5  F (36.9  C) (Oral)   Resp 18   Ht 1.702 m (5' 7\")   Wt 136.1 kg (300 lb)   LMP 2022   SpO2 97%   Breastfeeding Unknown   BMI 46.99 kg/m     Code status on discharge: Full Code          Brief History of Illness:     Lorena Echevarria is a 27 year old female who was admitted for induction of labor for poorly controlled A2GDM on insulin. Please see her admit H&P for full details of her PMH, PSH, Meds, Allergies and exam on admission.          Procedure Performed:     Normal spontaneous vaginal delivery  Intrathecal analgesia         Infant Delivery Information:     38w5d at time of delivery.  Delivery Date: 2023  Sex: Male  Weight: 3445 gm  APGAR 1 min: 6  APGAR 5 min: 7         Hospital Course:     Lorena Echevarria is a 27 year old female who was admitted to the hospital for the above listed indications. Her labor progressed. She had " "AROM. She had a spontaneous vaginal delivery. Please see her Delivery Summary for full details regarding her delivery. She has A2GDM on insulin with poor control/ non compliance. The patient declines low carbohydrate or ADA diet. I recommend postpartum glucose screen. She had an uncomplicated postpartum course. Breastfeeding well. Infant is stable. She will be discharged home in good condition on PPD# 2. Her hemoglobin is 10.0. Her Blood type and Rh status is A Positive, and Rhogam was not indicated.          Post-Partum Complications:     None.         Contraception:     The patient is undecided about sterilization. Options for birth control have been reviewed and a final decision and prescription for that will occur in the postpartum clinic visit.         Medications Prior to Admission:     Medications Prior to Admission   Medication Sig Dispense Refill Last Dose     blood glucose (ACCU-CHEK GUIDE) test strip Use to test blood sugar 4 times daily. 200 strip 1 4/27/2023 at 1200     blood glucose monitoring (SOFTCLIX) lancets Use to test blood sugar 4 times daily. 100 each 0 4/27/2023 at 1200     buPROPion 450 MG TB24 Take 450 mg by mouth daily 30 tablet 1 4/27/2023 at 0900     doxylamine (UNISOM) 25 MG TABS tablet Take 1 tablet (25 mg) by mouth nightly as needed for sleep 30 tablet 1 Past Week     insulin syringe-needle U-100 31G X 15/64\" 0.5 ML Use 1 syringe daily. 100 each 0 Unknown at 2000     lidocaine (XYLOCAINE) 5 % external ointment Apply topically as needed for moderate pain (4-6) 35 g 3 Past Week     [DISCONTINUED] aspirin (ASA) 81 MG chewable tablet Take 1 tablet (81 mg) by mouth daily 180 tablet 1 More than a month at 0900     [DISCONTINUED] insulin  UNIT/ML vial Inject 10 Units Subcutaneous At Bedtime 10 mL 0 4/26/2023 at 2000             Discharge Medications:     Current Discharge Medication List      START taking these medications    Details   acetaminophen (TYLENOL) 325 MG tablet Take 2 " "tablets (650 mg) by mouth every 4 hours as needed for mild pain, fever or headaches  Qty: 100 tablet, Refills: 0    Associated Diagnoses: High-risk pregnancy in third trimester      docusate sodium (COLACE) 100 MG capsule Take 1 capsule (100 mg) by mouth daily  Qty: 30 capsule, Refills: 0    Associated Diagnoses: High-risk pregnancy in third trimester         CONTINUE these medications which have NOT CHANGED    Details   blood glucose (ACCU-CHEK GUIDE) test strip Use to test blood sugar 4 times daily.  Qty: 200 strip, Refills: 1    Associated Diagnoses: Glucose intolerance of pregnancy      blood glucose monitoring (SOFTCLIX) lancets Use to test blood sugar 4 times daily.  Qty: 100 each, Refills: 0    Associated Diagnoses: Glucose intolerance of pregnancy      buPROPion 450 MG TB24 Take 450 mg by mouth daily  Qty: 30 tablet, Refills: 1    Associated Diagnoses: Severe episode of recurrent major depressive disorder, without psychotic features (H)      doxylamine (UNISOM) 25 MG TABS tablet Take 1 tablet (25 mg) by mouth nightly as needed for sleep  Qty: 30 tablet, Refills: 1    Associated Diagnoses: Severe episode of recurrent major depressive disorder, without psychotic features (H)      insulin syringe-needle U-100 31G X 15/64\" 0.5 ML Use 1 syringe daily.  Qty: 100 each, Refills: 0    Associated Diagnoses: Gestational diabetes mellitus (GDM) in third trimester, gestational diabetes method of control unspecified      lidocaine (XYLOCAINE) 5 % external ointment Apply topically as needed for moderate pain (4-6)  Qty: 35 g, Refills: 3    Associated Diagnoses: Herpes zoster without complication         STOP taking these medications       aspirin (ASA) 81 MG chewable tablet Comments:   Reason for Stopping:         insulin  UNIT/ML vial Comments:   Reason for Stopping:                     Consultations:     No consultations were requested during this admission          Significant Results:     None.             " Pending Results:     None.           Discharge Instructions and Follow-Up:     Discharge Diet: Regular   Discharge Activity: Increase activity as tolerated. No vigorous activity or exercise for 2 weeks.    Pelvic rest for 6 weeks which includes intercourse, douching and tampons.   Discharge Nursing: Nursing is encouraged.    Schedule outpatient lactation follow up in 2-4 days.   Discharge Instructions: The warning signs of postpartum depression have been reviewed, and the patient is aware that this can be a common occurrence. She is encouraged to seek immediate medical evaluation and assistance for any questions or concerns.    Instructions on avoiding constipation and straining are discussed. The patient is advised in the appropriate use of stool softeners to avoid constipation.    The patient will call the OB/GYN Clinic Nurse at [phone] for problems such as fever (temperature >100.4), chills, pain not controlled by usual oral pain medications, persistent nausea and vomiting, constipation, difficulty nursing, heavy odorous vaginal discharge, burning or pain associated with urination.  Also call for excessive vaginal bleeding, saturating more than one pad an hour for more than three consecutive hours.   Discharge Follow-up: Follow up for outpatient lactation appointment in 2-4 days if desired.    Gestational diabetic patients to follow up for fasting blood sugar and 2 hour 75gm glucose load at 6 weeks postpartum visit.    Follow up for postpartum exam in 2 weeks with Dr. Severino or Mony Fernández NP.    Follow up for postpartum exam in 6 weeks with Dr. Severino.    Follow up with Primary Care Provider as scheduled for management of active medical problems and for adult preventive services.    Call Dr. Osorio at the OB/GYN Clinic at [phone] as necessary if you have problems in the interim.     Total time spent for discharge on date of discharge: 45 minutes  I saw the patient on the date of discharge.    Junior JOSEPH  Devon CARREON  Obstetrics and Gynecology

## 2023-04-30 NOTE — PLAN OF CARE
Patient refused postpartum mood assessment. Education provided on the reasoning behind the assessment and the risk factors, patient still denied. Education provided to patient on postpartum depression, postpartum psychosis, and resources.

## 2023-04-30 NOTE — PLAN OF CARE
Patient discharged at 10:51 AM via ambulation accompanied by significant other and staff. Prescriptions sent to patients preferred pharmacy. All belongings sent with patient.     Discharge instructions reviewed with Veroyn and significant other. Patient reports understanding of instructions. Belongings remained with patient.     Patient discharged to home.     Patient refused tdap, education provided.     MISC  Follow up appointment made:  Yes  Home and hospital aquired medications returned to patient: N/A  Patient reports pain was well managed at discharge: Yes. Pain medication sent to pharmacy and education provided.

## 2023-04-30 NOTE — PROGRESS NOTES
"Name: Lorena Echevarria  Age: 27 year old  YOB: 1995   Medical Record #: [id number]     Postpartum Day 2: Vaginal delivery    DATE: 2023  SUBJECTIVE:  Patient is doing well. Her pain is well controlled with current medications. She reports normal lochia. She is tolerating a regular diet without nausea. She declines a low carbohydrate or ADA diet. She is ambulating and voiding. The patient denies depression symptoms. She denies headache, vision changes, shortness of breath, or chest pain. She denies fever or chills. The patient is undecided about sterilization. The baby is well. She is nursing. She desires discharge.    OBJECTIVE:  /69 (BP Location: Left arm, Patient Position: Sitting, Cuff Size: Adult Large)   Pulse 84   Temp 98.5  F (36.9  C) (Oral)   Resp 18   Ht 1.702 m (5' 7\")   Wt 136.1 kg (300 lb)   LMP 2022   SpO2 97%   Breastfeeding Unknown   BMI 46.99 kg/m      Well developed and well nourished obese female in no apparent distress. Alert and oriented. Lungs are clear to auscultation bilaterally. Heart is regular rate and rhythm. Abdomen is obese nondistended with positive bowel sounds. Uterine fundus is firm and palpated below the umbilicus. Perineum is clean, dry and intact. Extremities 1+ edema, non-tender.    IMPRESSION :  1. 27 year old  female at 38w5d, now delivered.  2. PPD# 2 s/p Spontaneous vaginal delivery, doing well.  3. A2GDM on insulin poor control/ non compliance  4. [place]y BMI 46  5. Depression  6. Nursing    PLAN:  1. Continue routine post-partum care.  2. Encourage nursing.  3. She has A2GDM on insulin with poor control/ non compliance. The patient declines low carbohydrate or ADA diet. I recommend postpartum glucose screen.  4. Reviewed home instructions in anticipation of discharge; these include being aware of warning signs of postpartum depression, symptoms of infection, avoiding constipation, iron replacement, and the need to seek medical " evaluation for any questions or concerns.  5. Patient is undecided about sterilization. Options for birth control have been reviewed and a final decision and prescription for that will occur in the postpartum clinic visit.  6. Repeat Serology / Treponema Pallidum Antibody Test obtained per [facility] protocol.  7. Anticipate discharge today in stable condition.     Junior Osorio MD  Obstetrics and Gynecology

## 2023-04-30 NOTE — PLAN OF CARE
Face to face report given with opportunity to observe patient.    Report given to Soheila Coyle RN   4/30/2023  6:57 AM

## 2023-04-30 NOTE — PLAN OF CARE
Assessments completed as charted. B/P: 121/69, T: 98.5, P: 84, R: 18. Rates pain: 4/10. Voiding without difficulty. Fundus: Midline 1/U. Lochia: Light. Activity: up ad roxann. Infant feeding: Breast feeding going well.     Postpartum breastfeeding assessment completed and education provided, see Patient Education Activity.  Items included in the education are:   proper positioning and latch  effectiveness of feeding  manual expression  handling and storing breastmilk  maintenance of breastfeeding for the first 6 months  sign/symptoms of infant feeding issues requiring referral to qualified health care provider  Postpartum care education provided, see Patient Education activity. Patient denies needs. Will monitor.  Gail Coyle RN

## 2023-04-30 NOTE — PLAN OF CARE
Patient happy to go home today. See flowsheet for full assessment. Respirations unlabored. Up ad  roxann. Voiding spontaneously without difficulty. Patient reports light bleeding with no clots. Patient expresses confidence in self and  cares. Continued plan for discharge today.

## 2023-04-30 NOTE — DISCHARGE INSTRUCTIONS
Post-Partum Discharge Instructions Vaginal delivery    Discharge Diet: Regular diet   Discharge Activity: Increase activity as tolerated. No vigorous activity or exercise for 2 weeks.    Pelvic rest for 6 weeks which includes intercourse, douching and tampons.   Discharge Nursing: Nursing is encouraged.    Schedule outpatient lactation follow up in 2-4 days.   Discharge Instructions: Seek immediate medical evaluation and assistance if you develop any warning signs of postpartum depression.    Avoid constipation and straining. Use stool softeners and fiber to avoid constipation.    Call the OB/GYN Clinic Nurse at [phone] for problems such as fever (temperature >100.4), chills, pain not controlled by usual oral pain medications, persistent nausea and vomiting, constipation, difficulty nursing, heavy odorous vaginal discharge, burning or pain associated with urination.  Call for excessive vaginal bleeding, saturating more than one pad an hour for more than three consecutive hours.  Call for any problems with the incision, drainage or redness from incision site or increasing pain.   Discharge Follow-up: Follow up for outpatient lactation appointment in 2-4 days if desired.    Gestational diabetic patients to follow up for fasting blood sugar and 2 hour 75gm glucose load at 6 weeks postpartum visit.    Follow up for postpartum exam in 2 weeks with Dr. Severnio or Mony Fernández NP.    Follow up for postpartum exam in 6 weeks with Dr. Severino.    Follow up with Primary Care Provider as scheduled for management of active medical problems and for adult preventive services.    Call Dr. Osorio at the OB/GYN Clinic at [phone] as necessary if you have problems in the interim.        Warning Signs after Having a Baby    Keep this paper on your fridge or somewhere else where you can see it.    Call your provider if you have any of these symptoms up to 12 weeks after having your baby.    Thoughts of hurting yourself or your  baby  Pain in your chest or trouble breathing  Severe headache not helped by pain medicine  Eyesight concerns (blurry vision, seeing spots or flashes of light, other changes to eyesight)  Fainting, shaking or other signs of a seizure    Call 9-1-1 if you feel that it is an emergency.     The symptoms below can happen to anyone after giving birth. They can be very serious. Call your provider if you have any of these warning signs.      Losing too much blood (hemorrhage)    Call your provider if you soak through a pad in less than an hour or pass blood clots bigger than a golf ball. These may be signs that you are bleeding too much.    Blood clots in the legs or lungs    After you give birth, your body naturally clots its blood to help prevent blood loss. Sometimes this increased clotting can happen in other areas of the body, like the legs or lungs. This can block your blood flow and be very dangerous.     Call your provider if you:  Have a red, swollen spot on the back of your leg that is warm or painful when you touch it.   Are coughing up blood.     Infection    Call your provider if you have any of these symptoms:  Fever of 100.4 F (38 C) or higher.  Pain or redness around your stitches if you had an incision.   Any yellow, white, or green fluid coming from places where you had stitches or surgery.    Mood Problems (postpartum depression)    Many people feel sad or have mood changes after having a baby. But for some people, these mood swings are worse.     Call your provider right away if you feel so anxious or nervous that you can't care for yourself or your baby.    Preeclampsia (high blood pressure)    Even if you didn't have high blood pressure when you were pregnant, you are at risk for the high blood pressure disease called preeclampsia. This risk can last up to 12 weeks after giving birth.     Call your provider if you have:   Pain on your right side under your rib cage  Sudden swelling in the hands and  face    Remember: You know your body. If something doesn't feel right, get medical help.     For informational purposes only. Not to replace the advice of your health care provider. Copyright 2020 [employer]. All rights reserved. Clinically reviewed by Nikia Anthony RNC-OB, MSN. wmbly 198474 - Rev 02/23.

## 2023-05-02 ENCOUNTER — TELEPHONE (OUTPATIENT)
Dept: OBGYN | Facility: OTHER | Age: 28
End: 2023-05-02

## 2023-05-02 RX ORDER — IBUPROFEN 800 MG/1
800 TABLET, FILM COATED ORAL EVERY 8 HOURS PRN
Qty: 30 TABLET | Refills: 1 | Status: SHIPPED | OUTPATIENT
Start: 2023-05-02 | End: 2023-08-22

## 2023-05-02 NOTE — TELEPHONE ENCOUNTER
Pt had vaginal delivery on 4/28/23. States she is having extreme vaginal pain up to a 9 on pain scale. States she is unable to sleep. Is using sitz baths. Denies fever, no constipation. Was given tylenol for pain, which pt states it does not work. She uses [facility] for her pharmacy. Please advise

## 2023-05-02 NOTE — TELEPHONE ENCOUNTER
OK erx done to [facility] for prescription strength ibuprofen.  If continues/worsens needs to be seen.

## 2023-05-04 ENCOUNTER — PATIENT OUTREACH (OUTPATIENT)
Dept: EDUCATION SERVICES | Facility: HOSPITAL | Age: 28
End: 2023-05-04

## 2023-05-04 NOTE — PROGRESS NOTES
Diabetes Self-Management Education & Support    Follow up call with Lorena, she delivered her baby, no longer needs to keep this visit today with writer for GDM.   Encouraged her to check FBG periodically to monitor for diabetes. Target <100. If above, suggested follow up with PCP.     Lorena shares she and baby are doing good and adjusting.   She has no further questions/concerns. Aware to call if needed.   Kristen Lugo RN Diabetes Educator,  [phone]  5/4/2023 at 9:00 AM

## 2023-05-16 ENCOUNTER — PREP FOR PROCEDURE (OUTPATIENT)
Dept: OBGYN | Facility: OTHER | Age: 28
End: 2023-05-16

## 2023-05-16 ENCOUNTER — PRENATAL OFFICE VISIT (OUTPATIENT)
Dept: OBGYN | Facility: OTHER | Age: 28
End: 2023-05-16
Attending: OBSTETRICS & GYNECOLOGY
Payer: COMMERCIAL

## 2023-05-16 VITALS
OXYGEN SATURATION: 96 % | HEIGHT: 67 IN | WEIGHT: 278.2 LBS | SYSTOLIC BLOOD PRESSURE: 104 MMHG | HEART RATE: 100 BPM | DIASTOLIC BLOOD PRESSURE: 62 MMHG | BODY MASS INDEX: 43.66 KG/M2 | RESPIRATION RATE: 14 BRPM

## 2023-05-16 DIAGNOSIS — Z30.2 ENCOUNTER FOR STERILIZATION: Primary | ICD-10-CM

## 2023-05-16 PROCEDURE — G0463 HOSPITAL OUTPT CLINIC VISIT: HCPCS

## 2023-05-16 PROCEDURE — 99207 PR POST PARTUM EXAM: CPT | Performed by: OBSTETRICS & GYNECOLOGY

## 2023-05-16 ASSESSMENT — PAIN SCALES - GENERAL: PAINLEVEL: NO PAIN (0)

## 2023-05-16 NOTE — PROGRESS NOTES
"SUBJECTIVE:  Lorena Echevarria is a 27 year old female P4 here for a postpartum visit.  She had a   delivering a healthy baby boy  Currently no complaints and doing well.    Today's Depression Rating was 17.  On Wellbutrin for depression.  Has f/up appt scheduled with [facility] counseling within two weeks.  Denies thoughts of harm.   delivery complications:  No  breast feeding:  Yes  bladder problems:  No  bowel problems/hemorrhoids:  No  episiotomy/laceration/incision healed? Yes  vaginal flow:  light  [place]:  No  contraception:  abstinence  emotional adjustment:  tired and having some difficulties adjusting      OBJECTIVE:  Blood pressure 104/62, pulse 100, resp. rate 14, height 1.702 m (5' 7\"), weight 126.2 kg (278 lb 3.2 oz), last menstrual period 2022, SpO2 96 %, unknown if currently breastfeeding.   General - pleasant female in no acute distress.  Rectovaginal - deferred.    ASSESSMENT:  Post partum exam.    Depression  Desires sterilization    PLAN:    The patient will use Saplpingectomy for birth control. Full counseling was provided, and all questions answered. Scheduled 23.  Plan preop/postpartum exam 24.  Previous anesthesia consult during pregnancy.   Pt has glucose monitor, recommend checking FBS.  F/u with psychiatrist as planned for depression, continue Wellbutrin.     Fidel Severino MD  "

## 2023-05-25 PROBLEM — R87.612 PAPANICOLAOU SMEAR OF CERVIX WITH LOW GRADE SQUAMOUS INTRAEPITHELIAL LESION (LGSIL): Status: ACTIVE | Noted: 2017-09-22

## 2023-06-01 PROBLEM — O24.414 INSULIN CONTROLLED GESTATIONAL DIABETES MELLITUS (GDM) IN THIRD TRIMESTER: Status: RESOLVED | Noted: 2023-04-27 | Resolved: 2023-06-01

## 2023-06-01 PROBLEM — O09.93 HIGH-RISK PREGNANCY IN THIRD TRIMESTER: Status: RESOLVED | Noted: 2023-04-27 | Resolved: 2023-06-01

## 2023-06-01 PROBLEM — O09.292 HIGH RISK PREGNANCY DUE TO HISTORY OF PREVIOUS OBSTETRICAL PROBLEM IN SECOND TRIMESTER: Status: RESOLVED | Noted: 2022-10-28 | Resolved: 2023-06-01

## 2023-06-06 NOTE — OR NURSING
Call for Pre Op interview - Patient states she needs to reschedule as she can not afford to take time off work.. Notified Dr Álvarez Office.

## 2023-06-08 ENCOUNTER — PRENATAL OFFICE VISIT (OUTPATIENT)
Dept: OBGYN | Facility: OTHER | Age: 28
End: 2023-06-08
Attending: OBSTETRICS & GYNECOLOGY
Payer: COMMERCIAL

## 2023-06-08 VITALS
OXYGEN SATURATION: 97 % | SYSTOLIC BLOOD PRESSURE: 122 MMHG | DIASTOLIC BLOOD PRESSURE: 70 MMHG | HEIGHT: 67 IN | WEIGHT: 287 LBS | HEART RATE: 95 BPM | BODY MASS INDEX: 45.04 KG/M2

## 2023-06-08 DIAGNOSIS — N89.8 VAGINAL IRRITATION: Primary | ICD-10-CM

## 2023-06-08 DIAGNOSIS — N90.7 VULVAR CYST: ICD-10-CM

## 2023-06-08 DIAGNOSIS — Z30.011 ENCOUNTER FOR INITIAL PRESCRIPTION OF CONTRACEPTIVE PILLS: ICD-10-CM

## 2023-06-08 PROBLEM — Z30.2 ENCOUNTER FOR STERILIZATION: Status: RESOLVED | Noted: 2023-02-24 | Resolved: 2023-06-08

## 2023-06-08 PROCEDURE — 99207 PR POST PARTUM EXAM: CPT | Performed by: OBSTETRICS & GYNECOLOGY

## 2023-06-08 PROCEDURE — G0463 HOSPITAL OUTPT CLINIC VISIT: HCPCS | Mod: 25

## 2023-06-08 PROCEDURE — 87210 SMEAR WET MOUNT SALINE/INK: CPT | Mod: ZL | Performed by: OBSTETRICS & GYNECOLOGY

## 2023-06-08 RX ORDER — ACETAMINOPHEN AND CODEINE PHOSPHATE 120; 12 MG/5ML; MG/5ML
0.35 SOLUTION ORAL DAILY
Qty: 90 TABLET | Refills: 1 | Status: SHIPPED | OUTPATIENT
Start: 2023-06-08

## 2023-06-08 ASSESSMENT — PAIN SCALES - GENERAL: PAINLEVEL: NO PAIN (0)

## 2023-06-08 NOTE — PROGRESS NOTES
"SUBJECTIVE:  Lorena Echevarria is a 27 year old female P4 here for a postpartum visit.  She had a   delivering a healthy baby boy  Currently no complaints except some vaginal pain/pulling on right.    Today's Depression Rating was +.  On Wellbutrin 450mg daily. Following with psych at [facility] counseling.   delivery complications:  No  breast feeding:  Yes  bladder problems:  No  bowel problems/hemorrhoids:  No  episiotomy/laceration/incision healed? Yes  vaginal flow:  None  [place]:  No  contraception:  abstinence  emotional adjustment:  doing well and happy      OBJECTIVE:  Blood pressure 122/70, pulse 95, height 1.702 m (5' 7\"), weight 130.2 kg (287 lb), last menstrual period 2022, SpO2 97 %, unknown if currently breastfeeding.   General - pleasant female in no acute distress.  Breast - no nodularity, asymmetry or nipple discharge bilaterally.  Abdomen - soft, nontender, nondistended, no hepatosplenomegaly.  Pelvic - EG: normal adult female, BUS: within normal limits,  Vulva:  There is a right vulvar cyst present at introitus at 9:00.  Slightly tender, no erythema or drainage.  Vagina: well rugated, no discharge, Cervix: no lesions or CMT, Uterus: firm, normal sized and nontender, Adnexae: no masses or tenderness.  Rectovaginal - deferred.    ASSESSMENT:  Postpartum exam.   Vulvar cyst  Desires sterilization.    PLAN:  May resume normal activities without restrictions  Pap smear Not Done today  F/u with [facility] counseling as planned (scheduled)  for depression.  Laparoscopic bilateral salpingectomy rescheduled to 23 due to work schedule. Would also recommend vulvar cyst excision at that time if persists.  F/u appt/preop scheduled in August prior to procedure.     The patient will use oral contraceptive for birth control in the interim. Full counseling was provided, and all questions answered. Compliance is strongly emphasized.  Return to clinic in one year for an annual, when due for a pap " smear or PRN.    Fidel Severino MD

## 2023-06-29 ENCOUNTER — PATIENT OUTREACH (OUTPATIENT)
Dept: FAMILY MEDICINE | Facility: CLINIC | Age: 28
End: 2023-06-29
Payer: COMMERCIAL

## 2023-06-29 NOTE — LETTER
June 29, 2023      Lorena Echevarria  [address]  [address]  [place] [address]        Dear ,    This letter is to remind you that you are due for your follow-up Pap smear.    Please call [phone] to schedule your appointment at your earliest convenience.    If you have completed the appointment outside of the [facility] system, please have the records forwarded to our office. We will update your chart for your provider to review before your next annual wellness visit.     Thank you for choosing [facility]!      Sincerely,    Your [facility] Care Team

## 2023-08-22 ENCOUNTER — APPOINTMENT (OUTPATIENT)
Dept: OCCUPATIONAL MEDICINE | Facility: OTHER | Age: 28
End: 2023-08-22

## 2023-08-22 ENCOUNTER — OFFICE VISIT (OUTPATIENT)
Dept: OBGYN | Facility: OTHER | Age: 28
End: 2023-08-22
Attending: OBSTETRICS & GYNECOLOGY
Payer: COMMERCIAL

## 2023-08-22 VITALS
WEIGHT: 284 LBS | BODY MASS INDEX: 44.57 KG/M2 | OXYGEN SATURATION: 97 % | SYSTOLIC BLOOD PRESSURE: 116 MMHG | HEART RATE: 82 BPM | HEIGHT: 67 IN | DIASTOLIC BLOOD PRESSURE: 66 MMHG

## 2023-08-22 DIAGNOSIS — Z01.810 PRE-OPERATIVE CARDIOVASCULAR EXAMINATION: ICD-10-CM

## 2023-08-22 DIAGNOSIS — Z30.2 ENCOUNTER FOR STERILIZATION: Primary | ICD-10-CM

## 2023-08-22 PROCEDURE — 99499 UNLISTED E&M SERVICE: CPT

## 2023-08-22 PROCEDURE — 99000 SPECIMEN HANDLING OFFICE-LAB: CPT

## 2023-08-22 PROCEDURE — 92499 UNLISTED OPH SVC/PROCEDURE: CPT

## 2023-08-22 PROCEDURE — G0463 HOSPITAL OUTPT CLINIC VISIT: HCPCS

## 2023-08-22 PROCEDURE — 99080 SPECIAL REPORTS OR FORMS: CPT

## 2023-08-22 PROCEDURE — 99213 OFFICE O/P EST LOW 20 MIN: CPT | Performed by: OBSTETRICS & GYNECOLOGY

## 2023-08-22 PROCEDURE — 92552 PURE TONE AUDIOMETRY AIR: CPT

## 2023-08-22 PROCEDURE — 99199 UNLISTED SPECIAL SVC PX/RPRT: CPT

## 2023-08-22 ASSESSMENT — PAIN SCALES - GENERAL: PAINLEVEL: SEVERE PAIN (6)

## 2023-08-23 ENCOUNTER — TELEPHONE (OUTPATIENT)
Dept: OBGYN | Facility: OTHER | Age: 28
End: 2023-08-23

## 2023-08-23 NOTE — H&P (VIEW-ONLY)
CC:  preop Laparoscopic bilateral salpingectomy.   HPI: Lorena Echevarria is a 28 year old female  P4 currently 4 months postpartum.  She desires sterilization. !00 % sure.  Previously counseled and Federal sterilization forms previously signed.  No new complaints.  No recent colds/infections.  No h/o anesthesia problems.  She did have a vulvar cyst postpartum but that resolved.      Past Medical History:   Diagnosis Date    Abnormal Pap smear of cervix 03/30/2017    Anxiety     Autism spectrum 03/04/2014    Depression 09/01/2014    Diet controlled gestational diabetes mellitus (GDM) in third trimester 09/15/2017    Overview:  Insulin start today Begin testing 4 times daily NST BPP weekly ordered Diabetes center    Encounter for sterilization 02/24/2023    Desires sterilization, salpingectomy if CS.  Otherwise interval procedure.  Counseled and federal sterilization forms signed.     History of suicide attempt 2014    multiple    Insulin controlled gestational diabetes mellitus (GDM) in third trimester 04/27/2023    Irregular menstrual cycle (PERIODS) 05/14/2014    LGSIL on Pap smear of cervix 03/30/2017    Mild intellectual disability 03/04/2014    Problem list name updated by automated process. Provider to review    Morbid obesity (H) 07/16/2020    Papanicolaou smear of cervix with low grade squamous intraepithelial lesion (LGSIL) 09/22/2017 03/30/17 [facility] Pap (abstracted records) [place] listed in medical hx but no date or results to review  06/15/22 NIL Pap Plan pap in 1 year due 06/15/23.  06/23/22 Letter sent.     Recurrent HSV (herpes simplex virus) 09/15/2017    Begin initial tx then switch to suppression.    Schizoaffective disorder (H)     Suicidal ideation 03/03/2014    Suicidal thoughts 05/10/2014    Denies ideation / plan - safe with partner and has crisis plan / info [facility] referral and Dr Cintron referral       Past Surgical History:   Procedure Laterality Date    COLPOSCOPY         Family History   Problem  Relation Age of Onset    Diabetes Paternal Grandfather     Diabetes Paternal Grandmother      Current Outpatient Medications   Medication Sig Dispense Refill    norethindrone (MICRONOR) 0.35 MG tablet Take 1 tablet (0.35 mg) by mouth daily 90 tablet 1    acetaminophen (TYLENOL) 325 MG tablet Take 2 tablets (650 mg) by mouth every 4 hours as needed for mild pain, fever or headaches (Patient not taking: Reported on 2023) 100 tablet 0    buPROPion 450 MG TB24 Take 450 mg by mouth daily (Patient not taking: Reported on 2023) 30 tablet 1    lidocaine (XYLOCAINE) 5 % external ointment Apply topically as needed for moderate pain (4-6) (Patient not taking: Reported on 2023) 35 g 3     Social History     Socioeconomic History    Marital status: Single     Spouse name: None    Number of children: None    Years of education: None    Highest education level: None   Tobacco Use    Smoking status: Some Days     Packs/day: 0.10     Types: Cigarettes, Vaping Device     Last attempt to quit: 10/27/2016     Years since quittin.8    Smokeless tobacco: Never    Tobacco comments:     smoking about 1 cig/day   Vaping Use    Vaping Use: Some days    Substances: Nicotine, Flavoring    Devices: Pre-filled or refillable cartridge, Refillable tank    Passive vaping exposure: Yes   Substance and Sexual Activity    Alcohol use: No     Comment: Past use    Drug use: No    Sexual activity: Yes     Partners: Male       Allergies: Lamictal [lamotrigine], Quetiapine, Amoxicillin, and Zoloft [sertraline]    ROS:  CONSTITUTIONAL: NEGATIVE for fever, chills, change in weight  ENT/MOUTH: NEGATIVE for ear, mouth and throat problems  RESP: NEGATIVE for significant cough or SOB  CV: NEGATIVE for chest pain, palpitations or peripheral edema  GI: NEGATIVE for nausea, abdominal pain, heartburn, or change in bowel habits  : NEGATIVE for frequency, dysuria, hematuria, vaginal discharge, or irregular bleeding  MUSCULOSKELETAL: NEGATIVE for  "significant arthralgias or myalgia  NEURO: NEGATIVE for weakness, dizziness or paresthesias  PSYCHIATRIC: NEGATIVE for changes in mood or affect    EXAM:  Blood pressure 116/66, pulse 82, height 1.702 m (5' 7\"), weight 128.8 kg (284 lb), SpO2 97 %, unknown if currently breastfeeding.   BMI= Body mass index is 44.48 kg/m .  General - pleasant female in no acute distress.  Neck - supple without lymphadenopathy or thyromegaly.  Lungs - clear to auscultation bilaterally.  Heart - regular rate and rhythm without murmur.  Abdomen - soft, nontender, nondistended, no hepatosplenomegaly.  Rectovaginal - deferred.  Musculoskeletal - no gross deformities or edmema  Neurological - normal strength, sensation, and mental status.            ASSESSMENT/PLAN:  Desires sterilization.  Satisfactory pre-op exam.    We reviewed the risks and benefits of tubal ligation/salpingectomy procedures including risks of bleeding, infection, damage to other organs. Risks of tubal failure, and possibility of ectopic pregnancy were also explained. We discussed alternative forms of birth control.  I  Specifically, we discussed a laparoscopic bilateral salpingectomy    We reviewed risks of laparoscopy, specifically risk of bleeding, infection, damage to nerves, blood vessels, bowel and bladder. Discussed recovery period and expected discomfort.    Surgery is scheduled for 9/6/23 and will proceed as planned. No additional risk factors or testing needed.  .All questions were answered. .  Preoperative instructions discussed. Pt desires to proceed.          Fidel Severino MD  "

## 2023-08-23 NOTE — PROGRESS NOTES
CC:  preop Laparoscopic bilateral salpingectomy.   HPI: Lorena Echevarria is a 28 year old female  P4 currently 4 months postpartum.  She desires sterilization. !00 % sure.  Previously counseled and Federal sterilization forms previously signed.  No new complaints.  No recent colds/infections.  No h/o anesthesia problems.  She did have a vulvar cyst postpartum but that resolved.      Past Medical History:   Diagnosis Date    Abnormal Pap smear of cervix 03/30/2017    Anxiety     Autism spectrum 03/04/2014    Depression 09/01/2014    Diet controlled gestational diabetes mellitus (GDM) in third trimester 09/15/2017    Overview:  Insulin start today Begin testing 4 times daily NST BPP weekly ordered Diabetes center    Encounter for sterilization 02/24/2023    Desires sterilization, salpingectomy if CS.  Otherwise interval procedure.  Counseled and federal sterilization forms signed.     History of suicide attempt 2014    multiple    Insulin controlled gestational diabetes mellitus (GDM) in third trimester 04/27/2023    Irregular menstrual cycle (PERIODS) 05/14/2014    LGSIL on Pap smear of cervix 03/30/2017    Mild intellectual disability 03/04/2014    Problem list name updated by automated process. Provider to review    Morbid obesity (H) 07/16/2020    Papanicolaou smear of cervix with low grade squamous intraepithelial lesion (LGSIL) 09/22/2017 03/30/17 [facility]IL Pap (abstracted records) [place] listed in medical hx but no date or results to review  06/15/22 NIL Pap Plan pap in 1 year due 06/15/23.  06/23/22 Letter sent.     Recurrent HSV (herpes simplex virus) 09/15/2017    Begin initial tx then switch to suppression.    Schizoaffective disorder (H)     Suicidal ideation 03/03/2014    Suicidal thoughts 05/10/2014    Denies ideation / plan - safe with partner and has crisis plan / info [facility] referral and Dr Cintron referral       Past Surgical History:   Procedure Laterality Date    COLPOSCOPY         Family History   Problem  Relation Age of Onset    Diabetes Paternal Grandfather     Diabetes Paternal Grandmother      Current Outpatient Medications   Medication Sig Dispense Refill    norethindrone (MICRONOR) 0.35 MG tablet Take 1 tablet (0.35 mg) by mouth daily 90 tablet 1    acetaminophen (TYLENOL) 325 MG tablet Take 2 tablets (650 mg) by mouth every 4 hours as needed for mild pain, fever or headaches (Patient not taking: Reported on 2023) 100 tablet 0    buPROPion 450 MG TB24 Take 450 mg by mouth daily (Patient not taking: Reported on 2023) 30 tablet 1    lidocaine (XYLOCAINE) 5 % external ointment Apply topically as needed for moderate pain (4-6) (Patient not taking: Reported on 2023) 35 g 3     Social History     Socioeconomic History    Marital status: Single     Spouse name: None    Number of children: None    Years of education: None    Highest education level: None   Tobacco Use    Smoking status: Some Days     Packs/day: 0.10     Types: Cigarettes, Vaping Device     Last attempt to quit: 10/27/2016     Years since quittin.8    Smokeless tobacco: Never    Tobacco comments:     smoking about 1 cig/day   Vaping Use    Vaping Use: Some days    Substances: Nicotine, Flavoring    Devices: Pre-filled or refillable cartridge, Refillable tank    Passive vaping exposure: Yes   Substance and Sexual Activity    Alcohol use: No     Comment: Past use    Drug use: No    Sexual activity: Yes     Partners: Male       Allergies: Lamictal [lamotrigine], Quetiapine, Amoxicillin, and Zoloft [sertraline]    ROS:  CONSTITUTIONAL: NEGATIVE for fever, chills, change in weight  ENT/MOUTH: NEGATIVE for ear, mouth and throat problems  RESP: NEGATIVE for significant cough or SOB  CV: NEGATIVE for chest pain, palpitations or peripheral edema  GI: NEGATIVE for nausea, abdominal pain, heartburn, or change in bowel habits  : NEGATIVE for frequency, dysuria, hematuria, vaginal discharge, or irregular bleeding  MUSCULOSKELETAL: NEGATIVE for  "significant arthralgias or myalgia  NEURO: NEGATIVE for weakness, dizziness or paresthesias  PSYCHIATRIC: NEGATIVE for changes in mood or affect    EXAM:  Blood pressure 116/66, pulse 82, height 1.702 m (5' 7\"), weight 128.8 kg (284 lb), SpO2 97 %, unknown if currently breastfeeding.   BMI= Body mass index is 44.48 kg/m .  General - pleasant female in no acute distress.  Neck - supple without lymphadenopathy or thyromegaly.  Lungs - clear to auscultation bilaterally.  Heart - regular rate and rhythm without murmur.  Abdomen - soft, nontender, nondistended, no hepatosplenomegaly.  Rectovaginal - deferred.  Musculoskeletal - no gross deformities or edmema  Neurological - normal strength, sensation, and mental status.            ASSESSMENT/PLAN:  Desires sterilization.  Satisfactory pre-op exam.    We reviewed the risks and benefits of tubal ligation/salpingectomy procedures including risks of bleeding, infection, damage to other organs. Risks of tubal failure, and possibility of ectopic pregnancy were also explained. We discussed alternative forms of birth control.  I  Specifically, we discussed a laparoscopic bilateral salpingectomy    We reviewed risks of laparoscopy, specifically risk of bleeding, infection, damage to nerves, blood vessels, bowel and bladder. Discussed recovery period and expected discomfort.    Surgery is scheduled for 9/6/23 and will proceed as planned. No additional risk factors or testing needed.  .All questions were answered. .  Preoperative instructions discussed. Pt desires to proceed.          Fidel Severino MD  "

## 2023-08-23 NOTE — TELEPHONE ENCOUNTER
Writer received message from Milady PETERSON in surgery regarding pt's pre-op needing to be scheduled. Pt reports her pre-op was completed yesterday by Dr. Severino, visit note pending. Milady notified.   Itzel Chang RN

## 2023-08-24 NOTE — TELEPHONE ENCOUNTER
Hi Dr. Penaloza,   Patient is lost to pap tracking follow-up. Attempts to contact pt have been made per reminder process and there has been no reply and/or no appt scheduled.  If you are wanting any additional contact attempts please send to your care team staff.    Pap Hx:  03/30/17 LSIL Pap (abstracted records) [place] listed in medical hx but no date or results to review   06/15/22 NIL Pap Plan pap in 1 year due 06/15/23.   06/23/22 Letter sent.   06/08/23 pp appt, notes added--not done   06/29/23 Reminder Letter   08/1/23 Reminder call-lm  08/24/23 Lost to follow-up for pap tracking, lion routed to provider

## 2023-08-28 NOTE — OR NURSING
Instructed to hold NSAIDs, ASA, vitamins & Supplements starting 8/28, continue other prescribed medications as usual.

## 2023-08-31 ENCOUNTER — ANESTHESIA EVENT (OUTPATIENT)
Dept: SURGERY | Facility: HOSPITAL | Age: 28
End: 2023-08-31
Payer: COMMERCIAL

## 2023-08-31 ASSESSMENT — LIFESTYLE VARIABLES: TOBACCO_USE: 1

## 2023-08-31 NOTE — ANESTHESIA PREPROCEDURE EVALUATION
Anesthesia Pre-Procedure Evaluation    Patient: Lorena Echevarria   MRN: [id number] : 1995        Procedure : Procedure(s):  Bilateral Laparoscopic Salpingectomy          Past Medical History:   Diagnosis Date     Abnormal Pap smear of cervix 2017     Anxiety      Autism spectrum 2014     Depression 2014     Diet controlled gestational diabetes mellitus (GDM) in third trimester 09/15/2017    Overview:  Insulin start today Begin testing 4 times daily NST BPP weekly ordered Diabetes center     Encounter for sterilization 2023    Desires sterilization, salpingectomy if CS.  Otherwise interval procedure.  Counseled and federal sterilization forms signed.      History of suicide attempt     multiple     Insulin controlled gestational diabetes mellitus (GDM) in third trimester 2023     Irregular menstrual cycle (PERIODS) 2014     LGSIL on Pap smear of cervix 2017     Mild intellectual disability 2014    Problem list name updated by automated process. Provider to review     Morbid obesity (H) 2020     Papanicolaou smear of cervix with low grade squamous intraepithelial lesion (LGSIL) 2017 LSIL Pap (abstracted records) [place] listed in medical hx but no date or results to review  06/15/22 NIL Pap Plan pap in 1 year due 06/15/23.  22 Letter sent.      Recurrent HSV (herpes simplex virus) 09/15/2017    Begin initial tx then switch to suppression.     Schizoaffective disorder (H)      Suicidal ideation 2014     Suicidal thoughts 05/10/2014    Denies ideation / plan - safe with partner and has crisis plan / info [facility] referral and Dr Cintron referral      Past Surgical History:   Procedure Laterality Date     COLPOSCOPY        Allergies   Allergen Reactions     Lamictal [Lamotrigine] Other (See Comments)     Increases aggression.     Quetiapine Other (See Comments)     Mood changes  Mood changes       Amoxicillin Rash     Zoloft  [Sertraline] Other (See Comments)     Depression worsens      Social History     Tobacco Use     Smoking status: Some Days     Packs/day: 0.10     Types: Cigarettes, Vaping Device     Last attempt to quit: 10/27/2016     Years since quittin.8     Smokeless tobacco: Never     Tobacco comments:     smoking about 1 cig/day   Substance Use Topics     Alcohol use: No     Comment: Past use      Wt Readings from Last 1 Encounters:   23 128.8 kg (284 lb)        Anesthesia Evaluation   Pt has had prior anesthetic. Type: OB Labor Epidural.    No history of anesthetic complications       ROS/MED HX  ENT/Pulmonary: Comment: And vapes - neg pulmonary ROS   (+)     SARANYA risk factors,   obese,        tobacco use, Current use,                      Neurologic:  - neg neurologic ROS   (+)                         Developmental delay, level of function: autism spectrum disorder,      Cardiovascular:  - neg cardiovascular ROS     METS/Exercise Tolerance: >4 METS    Hematologic:  - neg hematologic  ROS     Musculoskeletal:  - neg musculoskeletal ROS     GI/Hepatic:  - neg GI/hepatic ROS     Renal/Genitourinary:  - neg Renal ROS     Endo:     (+)               Obesity,    (-) Type I DM and Type II DM (GDM)   Psychiatric/Substance Use:     (+) psychiatric history depression, anxiety, other (comment) and schizophrenia (History of suicide attempt)       Infectious Disease:  - neg infectious disease ROS     Malignancy:  - neg malignancy ROS     Other:  - neg other ROS          Physical Exam    Airway        Mallampati: II   TM distance: > 3 FB   Neck ROM: full   Mouth opening: > 3 cm    Respiratory Devices and Support         Dental       (+) Minor Abnormalities - some fillings, tiny chips      Cardiovascular   cardiovascular exam normal       Rhythm and rate: regular and normal     Pulmonary   pulmonary exam normal        breath sounds clear to auscultation       OUTSIDE LABS:  CBC:   Lab Results   Component Value Date    WBC 6.3  04/27/2023    WBC 8.5 03/17/2023    HGB 10.0 (L) 04/29/2023    HGB 11.5 (L) 04/27/2023    HCT 34.5 (L) 04/27/2023    HCT 35.8 03/17/2023     04/27/2023     03/17/2023     BMP:   Lab Results   Component Value Date     (L) 04/27/2023     03/17/2023    POTASSIUM 3.9 04/27/2023    POTASSIUM 3.7 03/17/2023    CHLORIDE 104 04/27/2023    CHLORIDE 103 03/17/2023    CO2 25 04/27/2023    CO2 21 (L) 03/17/2023    BUN 7.0 04/27/2023    BUN 5.3 (L) 03/17/2023    CR 0.62 04/27/2023    CR 0.48 (L) 03/17/2023    GLC 91 04/30/2023     (H) 04/29/2023     COAGS:   Lab Results   Component Value Date    PTT 25 02/03/2021    INR 0.94 02/03/2021     POC:   Lab Results   Component Value Date    BGM 88 10/17/2017    HCG Positive (A) 07/10/2020     HEPATIC:   Lab Results   Component Value Date    ALBUMIN 3.0 (L) 04/27/2023    PROTTOTAL 5.7 (L) 04/27/2023    ALT 10 04/27/2023    AST 11 04/27/2023    ALKPHOS 112 (H) 04/27/2023    BILITOTAL <0.2 04/27/2023     OTHER:   Lab Results   Component Value Date    A1C 5.7 (H) 04/27/2023    YANELI 8.4 (L) 04/27/2023    MAG 1.7 (L) 09/12/2018    TSH 1.11 10/27/2022    T4 0.80 (L) 10/27/2022    CRP 28.9 (H) 10/27/2017       Anesthesia Plan    ASA Status:  3    NPO Status:  NPO Appropriate    Anesthesia Type: General.     - Airway: ETT   Induction: Intravenous, Propofol.   Maintenance: Balanced.        Consents    Anesthesia Plan(s) and associated risks, benefits, and realistic alternatives discussed. Questions answered and patient/representative(s) expressed understanding.     - Discussed: Risks, Benefits and Alternatives for BOTH SEDATION and the PROCEDURE were discussed     - Discussed with:  Patient      - Extended Intubation/Ventilatory Support Discussed: No.      - Patient is DNR/DNI Status: No     Use of blood products discussed: No .     Postoperative Care    Pain management: IV analgesics, Multi-modal analgesia.   PONV prophylaxis: Ondansetron (or other 5HT-3),  Dexamethasone or Solumedrol     Comments:    Other Comments: [place] 8-23-23            RUDY Rapp CRNA

## 2023-09-06 ENCOUNTER — ANESTHESIA (OUTPATIENT)
Dept: SURGERY | Facility: HOSPITAL | Age: 28
End: 2023-09-06
Payer: COMMERCIAL

## 2023-09-06 ENCOUNTER — HOSPITAL ENCOUNTER (OUTPATIENT)
Facility: HOSPITAL | Age: 28
Discharge: HOME | End: 2023-09-06
Attending: OBSTETRICS & GYNECOLOGY | Admitting: OBSTETRICS & GYNECOLOGY
Payer: COMMERCIAL

## 2023-09-06 ENCOUNTER — APPOINTMENT (OUTPATIENT)
Dept: LAB | Facility: HOSPITAL | Age: 28
End: 2023-09-06
Attending: OBSTETRICS & GYNECOLOGY
Payer: COMMERCIAL

## 2023-09-06 ENCOUNTER — PREP FOR PROCEDURE (OUTPATIENT)
Dept: OBGYN | Facility: OTHER | Age: 28
End: 2023-09-06

## 2023-09-06 ENCOUNTER — HOSPITAL ENCOUNTER (OUTPATIENT)
Facility: HOSPITAL | Age: 28
End: 2023-09-06
Attending: OBSTETRICS & GYNECOLOGY | Admitting: OBSTETRICS & GYNECOLOGY
Payer: COMMERCIAL

## 2023-09-06 ENCOUNTER — TELEPHONE (OUTPATIENT)
Dept: OBGYN | Facility: OTHER | Age: 28
End: 2023-09-06

## 2023-09-06 VITALS
DIASTOLIC BLOOD PRESSURE: 79 MMHG | WEIGHT: 285 LBS | HEIGHT: 67 IN | RESPIRATION RATE: 16 BRPM | BODY MASS INDEX: 44.73 KG/M2 | OXYGEN SATURATION: 97 % | HEART RATE: 84 BPM | SYSTOLIC BLOOD PRESSURE: 116 MMHG | TEMPERATURE: 97.3 F

## 2023-09-06 DIAGNOSIS — Z30.2 ENCOUNTER FOR STERILIZATION: ICD-10-CM

## 2023-09-06 DIAGNOSIS — Z30.2 ENCOUNTER FOR STERILIZATION: Primary | ICD-10-CM

## 2023-09-06 LAB — HCG UR QL: NEGATIVE

## 2023-09-06 PROCEDURE — 250N000011 HC RX IP 250 OP 636: Performed by: NURSE ANESTHETIST, CERTIFIED REGISTERED

## 2023-09-06 PROCEDURE — 258N000003 HC RX IP 258 OP 636: Performed by: NURSE ANESTHETIST, CERTIFIED REGISTERED

## 2023-09-06 PROCEDURE — 250N000011 HC RX IP 250 OP 636: Mod: JZ | Performed by: OBSTETRICS & GYNECOLOGY

## 2023-09-06 PROCEDURE — 250N000013 HC RX MED GY IP 250 OP 250 PS 637: Performed by: OBSTETRICS & GYNECOLOGY

## 2023-09-06 PROCEDURE — 272N000001 HC OR GENERAL SUPPLY STERILE: Performed by: OBSTETRICS & GYNECOLOGY

## 2023-09-06 PROCEDURE — 360N000076 HC SURGERY LEVEL 3, PER MIN: Performed by: OBSTETRICS & GYNECOLOGY

## 2023-09-06 PROCEDURE — 58661 LAPAROSCOPY REMOVE ADNEXA: CPT | Performed by: NURSE ANESTHETIST, CERTIFIED REGISTERED

## 2023-09-06 PROCEDURE — 370N000017 HC ANESTHESIA TECHNICAL FEE, PER MIN: Performed by: OBSTETRICS & GYNECOLOGY

## 2023-09-06 PROCEDURE — 81025 URINE PREGNANCY TEST: CPT | Performed by: OBSTETRICS & GYNECOLOGY

## 2023-09-06 PROCEDURE — 999N000141 HC STATISTIC PRE-PROCEDURE NURSING ASSESSMENT: Performed by: OBSTETRICS & GYNECOLOGY

## 2023-09-06 PROCEDURE — 710N000012 HC RECOVERY PHASE 2, PER MINUTE: Performed by: OBSTETRICS & GYNECOLOGY

## 2023-09-06 RX ORDER — HYDROMORPHONE HCL IN WATER/PF 6 MG/30 ML
0.4 PATIENT CONTROLLED ANALGESIA SYRINGE INTRAVENOUS EVERY 5 MIN PRN
Status: DISCONTINUED | OUTPATIENT
Start: 2023-09-06 | End: 2023-09-06 | Stop reason: HOSPADM

## 2023-09-06 RX ORDER — NALOXONE HYDROCHLORIDE 0.4 MG/ML
0.2 INJECTION, SOLUTION INTRAMUSCULAR; INTRAVENOUS; SUBCUTANEOUS
Status: DISCONTINUED | OUTPATIENT
Start: 2023-09-06 | End: 2023-09-06 | Stop reason: HOSPADM

## 2023-09-06 RX ORDER — ONDANSETRON 2 MG/ML
INJECTION INTRAMUSCULAR; INTRAVENOUS PRN
Status: DISCONTINUED | OUTPATIENT
Start: 2023-09-06 | End: 2023-09-06

## 2023-09-06 RX ORDER — SODIUM CHLORIDE, SODIUM LACTATE, POTASSIUM CHLORIDE, CALCIUM CHLORIDE 600; 310; 30; 20 MG/100ML; MG/100ML; MG/100ML; MG/100ML
INJECTION, SOLUTION INTRAVENOUS CONTINUOUS
Status: DISCONTINUED | OUTPATIENT
Start: 2023-09-06 | End: 2023-09-06 | Stop reason: HOSPADM

## 2023-09-06 RX ORDER — HYDROMORPHONE HCL IN WATER/PF 6 MG/30 ML
0.2 PATIENT CONTROLLED ANALGESIA SYRINGE INTRAVENOUS EVERY 5 MIN PRN
Status: DISCONTINUED | OUTPATIENT
Start: 2023-09-06 | End: 2023-09-06 | Stop reason: HOSPADM

## 2023-09-06 RX ORDER — KETOROLAC TROMETHAMINE 30 MG/ML
30 INJECTION, SOLUTION INTRAMUSCULAR; INTRAVENOUS ONCE
Status: COMPLETED | OUTPATIENT
Start: 2023-09-06 | End: 2023-09-06

## 2023-09-06 RX ORDER — NALOXONE HYDROCHLORIDE 0.4 MG/ML
0.4 INJECTION, SOLUTION INTRAMUSCULAR; INTRAVENOUS; SUBCUTANEOUS
Status: DISCONTINUED | OUTPATIENT
Start: 2023-09-06 | End: 2023-09-06 | Stop reason: HOSPADM

## 2023-09-06 RX ORDER — FENTANYL CITRATE 50 UG/ML
25 INJECTION, SOLUTION INTRAMUSCULAR; INTRAVENOUS EVERY 5 MIN PRN
Status: DISCONTINUED | OUTPATIENT
Start: 2023-09-06 | End: 2023-09-06 | Stop reason: HOSPADM

## 2023-09-06 RX ORDER — CEFAZOLIN SODIUM/WATER 3 G/30 ML
3 SYRINGE (ML) INTRAVENOUS SEE ADMIN INSTRUCTIONS
Status: DISCONTINUED | OUTPATIENT
Start: 2023-09-06 | End: 2023-09-06 | Stop reason: HOSPADM

## 2023-09-06 RX ORDER — ONDANSETRON 2 MG/ML
4 INJECTION INTRAMUSCULAR; INTRAVENOUS EVERY 30 MIN PRN
Status: DISCONTINUED | OUTPATIENT
Start: 2023-09-06 | End: 2023-09-06 | Stop reason: HOSPADM

## 2023-09-06 RX ORDER — FENTANYL CITRATE 50 UG/ML
50 INJECTION, SOLUTION INTRAMUSCULAR; INTRAVENOUS EVERY 5 MIN PRN
Status: DISCONTINUED | OUTPATIENT
Start: 2023-09-06 | End: 2023-09-06 | Stop reason: HOSPADM

## 2023-09-06 RX ORDER — HYDROXYZINE HYDROCHLORIDE 50 MG/1
50 TABLET, FILM COATED ORAL 3 TIMES DAILY PRN
COMMUNITY

## 2023-09-06 RX ORDER — ACETAMINOPHEN 325 MG/1
975 TABLET ORAL ONCE
Status: COMPLETED | OUTPATIENT
Start: 2023-09-06 | End: 2023-09-06

## 2023-09-06 RX ORDER — LIDOCAINE 40 MG/G
CREAM TOPICAL
Status: DISCONTINUED | OUTPATIENT
Start: 2023-09-06 | End: 2023-09-06 | Stop reason: HOSPADM

## 2023-09-06 RX ORDER — CEFAZOLIN SODIUM/WATER 3 G/30 ML
3 SYRINGE (ML) INTRAVENOUS
Status: COMPLETED | OUTPATIENT
Start: 2023-09-06 | End: 2023-09-06

## 2023-09-06 RX ORDER — ONDANSETRON 4 MG/1
4 TABLET, ORALLY DISINTEGRATING ORAL EVERY 30 MIN PRN
Status: DISCONTINUED | OUTPATIENT
Start: 2023-09-06 | End: 2023-09-06 | Stop reason: HOSPADM

## 2023-09-06 RX ADMIN — ONDANSETRON 4 MG: 2 INJECTION INTRAMUSCULAR; INTRAVENOUS at 08:48

## 2023-09-06 RX ADMIN — Medication 3 G: at 08:37

## 2023-09-06 RX ADMIN — MIDAZOLAM 2 MG: 1 INJECTION INTRAMUSCULAR; INTRAVENOUS at 08:44

## 2023-09-06 RX ADMIN — SODIUM CHLORIDE, POTASSIUM CHLORIDE, SODIUM LACTATE AND CALCIUM CHLORIDE: 600; 310; 30; 20 INJECTION, SOLUTION INTRAVENOUS at 08:52

## 2023-09-06 RX ADMIN — KETOROLAC TROMETHAMINE 30 MG: 30 INJECTION, SOLUTION INTRAMUSCULAR; INTRAVENOUS at 08:41

## 2023-09-06 RX ADMIN — ACETAMINOPHEN 975 MG: 325 TABLET, FILM COATED ORAL at 08:38

## 2023-09-06 ASSESSMENT — ACTIVITIES OF DAILY LIVING (ADL): ADLS_ACUITY_SCORE: 35

## 2023-09-06 NOTE — DISCHARGE INSTRUCTIONS
Dr. Severino's office will call to reschedule surgery        Post-Anesthesia Patient Instructions    IMMEDIATELY FOLLOWING SURGERY:  Do not drive or operate machinery for the first twenty four hours after surgery.  Do not make any important decisions for twenty four hours after surgery or while taking narcotic pain medications or sedatives.  If you develop intractable nausea and vomiting or a severe headache please notify your doctor immediately.    FOLLOW-UP:  Please make an appointment with your surgeon as instructed. You do not need to follow up with anesthesia unless specifically instructed to do so.    WOUND CARE INSTRUCTIONS (if applicable):  Keep a dry clean dressing on the anesthesia/puncture wound site if there is drainage.  Once the wound has quit draining you may leave it open to air.  Generally you should leave the bandage intact for twenty four hours unless there is drainage.  If the epidural site drains for more than 36-48 hours please call the anesthesia department.    QUESTIONS?:  Please feel free to call your physician or the hospital  if you have any questions, and they will be happy to assist you.

## 2023-09-06 NOTE — INTERVAL H&P NOTE
"I have reviewed the surgical (or preoperative) H&P that is linked to this encounter, and examined the patient. There are no significant changes    Clinical Conditions Present on Arrival:  Clinically Significant Risk Factors Present on Admission                  # Severe Obesity: Estimated body mass index is 44.48 kg/m  as calculated from the following:    Height as of 8/22/23: 1.702 m (5' 7\").    Weight as of 8/22/23: 128.8 kg (284 lb).       "

## 2023-09-06 NOTE — OR NURSING
Patient and responsible adult given discharge instructions with no questions regarding instructions. Trace score 20/20. Pain level 0/10.  Discharged from unit via walking. Patient discharged to home with SO. Tolerating PO intake.

## 2023-09-06 NOTE — ANESTHESIA CARE TRANSFER NOTE
Patient: Lorena Echevarria    Procedure: Procedure(s):  Aborted Bilateral Laparoscopic Salpingectomy       Diagnosis: Encounter for sterilization [Z30.2]  Diagnosis Additional Information: No value filed.    Anesthesia Type:   General     Note:    Oropharynx: oropharynx clear of all foreign objects  Level of Consciousness: drowsy  Oxygen Supplementation: room air    Independent Airway: airway patency satisfactory and stable  Dentition: dentition unchanged  Vital Signs Stable: post-procedure vital signs reviewed and stable  Report to RN Given: handoff report given  Patient transferred to: Phase II    Handoff Report: Identifed the Patient, Identified the Reponsible Provider, Reviewed the pertinent medical history, Discussed the surgical course, Reviewed Intra-OP anesthesia mangement and issues during anesthesia, Set expectations for post-procedure period and Allowed opportunity for questions and acknowledgement of understanding    Vitals:  Vitals Value Taken Time   BP     Temp     Pulse     Resp     SpO2         Electronically Signed By: RUDY Loyd [facility]  September 6, 2023  8:58 AM

## 2023-09-06 NOTE — ANESTHESIA POSTPROCEDURE EVALUATION
Patient: Lorena Echevarria    Procedure: Procedure(s):  Aborted Bilateral Laparoscopic Salpingectomy       Anesthesia Type:  General    Note:  Disposition: Outpatient   Postop Pain Control: Uneventful            Sign Out: Well controlled pain   PONV: Yes            Symptoms: Nausea + Vomiting            Sign Out: PONV/POV resolved with treatment (case cancelled due to vomiting in the OR prior to induction)   Neuro/Psych: Uneventful            Sign Out: Acceptable/Baseline neuro status   Airway/Respiratory: Uneventful            Sign Out: Acceptable/Baseline resp. status   CV/Hemodynamics: Uneventful            Sign Out: Acceptable CV status; No obvious hypovolemia; No obvious fluid overload   Other NRE: NONE   DID A NON-ROUTINE EVENT OCCUR? No           Last vitals:  Vitals Value Taken Time   BP     Temp     Pulse     Resp     SpO2 95 % 09/06/23 0859   Vitals shown include unvalidated device data.    Electronically Signed By: RUDY Loyd CRNA  September 6, 2023  9:00 AM

## 2023-09-06 NOTE — TELEPHONE ENCOUNTER
Per Dr. Severino: Pt had surgery canceled today due to N/V per anesthesia.   Please call later to reschedule surgery. Writer called pt, pt rescheduled for 9/13/23. Surgery Department notified.

## 2023-09-11 RX ORDER — LIDOCAINE 40 MG/G
CREAM TOPICAL
Status: CANCELLED | OUTPATIENT
Start: 2023-09-11

## 2023-09-11 RX ORDER — FENTANYL CITRATE 50 UG/ML
25 INJECTION, SOLUTION INTRAMUSCULAR; INTRAVENOUS EVERY 5 MIN PRN
Status: CANCELLED | OUTPATIENT
Start: 2023-09-11

## 2023-09-11 RX ORDER — OXYCODONE HYDROCHLORIDE 5 MG/1
5 TABLET ORAL
Status: CANCELLED | OUTPATIENT
Start: 2023-09-11

## 2023-09-11 RX ORDER — ONDANSETRON 4 MG/1
4 TABLET, ORALLY DISINTEGRATING ORAL EVERY 30 MIN PRN
Status: CANCELLED | OUTPATIENT
Start: 2023-09-11

## 2023-09-11 RX ORDER — HYDROMORPHONE HYDROCHLORIDE 1 MG/ML
0.4 INJECTION, SOLUTION INTRAMUSCULAR; INTRAVENOUS; SUBCUTANEOUS EVERY 5 MIN PRN
Status: CANCELLED | OUTPATIENT
Start: 2023-09-11

## 2023-09-11 RX ORDER — FENTANYL CITRATE 50 UG/ML
50 INJECTION, SOLUTION INTRAMUSCULAR; INTRAVENOUS EVERY 5 MIN PRN
Status: CANCELLED | OUTPATIENT
Start: 2023-09-11

## 2023-09-11 RX ORDER — SODIUM CHLORIDE, SODIUM LACTATE, POTASSIUM CHLORIDE, CALCIUM CHLORIDE 600; 310; 30; 20 MG/100ML; MG/100ML; MG/100ML; MG/100ML
INJECTION, SOLUTION INTRAVENOUS CONTINUOUS
Status: CANCELLED | OUTPATIENT
Start: 2023-09-11

## 2023-09-11 RX ORDER — ONDANSETRON 2 MG/ML
4 INJECTION INTRAMUSCULAR; INTRAVENOUS EVERY 30 MIN PRN
Status: CANCELLED | OUTPATIENT
Start: 2023-09-11

## 2023-09-11 RX ORDER — OXYCODONE HYDROCHLORIDE 5 MG/1
10 TABLET ORAL
Status: CANCELLED | OUTPATIENT
Start: 2023-09-11

## 2023-09-11 RX ORDER — HYDROMORPHONE HYDROCHLORIDE 1 MG/ML
0.2 INJECTION, SOLUTION INTRAMUSCULAR; INTRAVENOUS; SUBCUTANEOUS EVERY 5 MIN PRN
Status: CANCELLED | OUTPATIENT
Start: 2023-09-11

## 2023-09-11 RX ORDER — LABETALOL 20 MG/4 ML (5 MG/ML) INTRAVENOUS SYRINGE
10
Status: CANCELLED | OUTPATIENT
Start: 2023-09-11

## 2023-09-11 RX ORDER — ALBUTEROL SULFATE 0.83 MG/ML
2.5 SOLUTION RESPIRATORY (INHALATION) EVERY 4 HOURS PRN
Status: CANCELLED | OUTPATIENT
Start: 2023-09-11

## 2023-09-12 RX ORDER — METOCLOPRAMIDE HYDROCHLORIDE 5 MG/ML
10 INJECTION INTRAMUSCULAR; INTRAVENOUS ONCE
Status: CANCELLED | OUTPATIENT
Start: 2023-09-12

## 2023-09-12 RX ORDER — CLINDAMYCIN PHOSPHATE 900 MG/50ML
900 INJECTION, SOLUTION INTRAVENOUS
Status: CANCELLED | OUTPATIENT
Start: 2023-09-13

## 2023-09-13 NOTE — PROVIDER NOTIFICATION
Patient late for surgery; attempted to call patient x 2 with no answer; message left to call back.

## 2023-11-19 NOTE — CARE PLAN
84 Prior to Admission Medication Reconciliation:     Medications added:   [x] None  [] As listed below:    Medications deleted:   [x] None  [] As listed below:    Medications marked for review/removal by attending:  [x] None  [] As listed below:    Changes made to existing medications:   [x] None  [] Updated time of day, strengths and frequencies to most current.     Pertinent notes/medications patient takes different than prescribed:     Pt being discharged. Med rec being completed by provider- no changes made.    Notes from review:     Pt reports no longer on imitrex or zofran- therapy completed    Pt reports taking a prenatal    Uses scheduled colace PRN    Hydroxyzine and trazodone strengths outdated.     Valtrex- keeps on hand    Last times/dates taken verified with patient:  [] Yes- completed myself   [x] Nurse completed, no changes made (double checked entries)  [] Unable to review with patient at this time:    Allergy review:    []Did not review: reviewed by nursing  [x]Allergies reviewed and updated if needed.     Medication reconciliation sources:   [x]Patient  []Patient family member/emergency contact: **  [x][facility] Report Review  [x]Epic Chart Review  [x]Care Everywhere review  []Pharmacy med list/phone call: **  []Fill dates reflect compliancy. No concerns.  []Fill dates do not reflect compliancy on the following medications:   [x]Outside meds dispense report:   [x]Fill dates reflect compliancy. No concerns.  []Fill dates do not reflect compliancy on the following medications:   []HomeCare medlist, Nursing home or Assisted Living MAR:  []Behavioral Health Provider:      []Other: **    [facility] at discharge: [facility]    Is patient on coumadin?   [x]No  []Yes      Fill dates and reported compliancy:  [x] Compliant: fill dates reflect reported compliancy.   [] Not applicable. Patient is not taking any prescribed maintenance medications at this time.   [] Fill dates do not coincide with  compliancy, but reports compliancy.    [] Fill dates reflect compliancy, but reports non-compliancy.   [] Cannot assess at this time.     Historian accuracy:  [] Excellent- alert and oriented, understands why meds were prescribed and how to take, able to answer specifics  [x] Good- alert and oriented, understands why meds were prescribed and how to take, some confusion   [] Fair- alert and oriented, doesn't know medications without list, cannot answer specifics about medications, but has a decent process for which to take at home  [] Poor- does not know medications, may not have a process to take at home, may be cognitively unable to review at this time  []Medication management done by family member or facility, no concerns about historian accuracy.   [] Cannot assess at this time.     Medication Management:  [x] Manages meds independently  [] Family member/ other party manages meds/assists:  [] Meds managed by staff at facility  [] Meds set up by home care, family/other party helps administer  [] Meds set up by home care, self administers  [] Cannot assess at this time.     Other medications aside from PTA:  [x] Denies taking any other medications aside from those listed in PTA meds, this includes over-the-counter vitamins, supplements and analgesics.   [] Unable to confirm at this time.     Sera Sims on 3/20/2023 at 11:54 AM     Notifying appropriate party of changes/additions/discrepancies:  [x]No pertinent changes made, notification not necessary.   [] Notified attending provider via text page/phone call/sticky note or other:  [] Notified other:  [] Medications have not been reconciled by a provider yet, notification not necessary  [] Pt is not admitted to floor yet or patient is boarding, PTA meds completed before admission.       Medications Prior to Admission   Medication Sig Dispense Refill Last Dose     aspirin (ASA) 81 MG chewable tablet Take 1 tablet (81 mg) by mouth daily 180 tablet 1      blood  glucose (ACCU-CHEK GUIDE) test strip Use to test blood sugar 4 times daily. 200 strip 1      blood glucose monitoring (SOFTCLIX) lancets Use to test blood sugar 4 times daily. 100 each 0      lidocaine (XYLOCAINE) 5 % external ointment Apply topically as needed for moderate pain (4-6) 35 g 3      [DISCONTINUED] buPROPion (WELLBUTRIN XL) 300 MG 24 hr tablet Take 1 tablet (300 mg) by mouth daily 90 tablet 3      [DISCONTINUED] docusate sodium (COLACE) 100 MG capsule Take 1 capsule (100 mg) by mouth 2 times daily 120 capsule 1      [DISCONTINUED] ferrous sulfate (FEROSUL) 325 (65 Fe) MG tablet Take 1 tablet (325 mg) by mouth daily (with breakfast) 60 tablet 1      [DISCONTINUED] hydrOXYzine (VISTARIL) 25 MG capsule Take 1-2 capsules (25-50 mg) by mouth 3 times daily as needed for anxiety 30 capsule 1      [DISCONTINUED] ondansetron (ZOFRAN) 4 MG tablet Take 1 tablet (4 mg) by mouth every 8 hours as needed for nausea 90 tablet 3      [DISCONTINUED] SUMAtriptan (IMITREX) 50 MG tablet Take 1 tablet (50 mg) by mouth at onset of headache for migraine May repeat in 2 hours. Max 4 tablets/24 hours. 10 tablet 2      [DISCONTINUED] traZODone (DESYREL) 100 MG tablet TAKE 1 TABLET BY MOUTH DAILY AT BEDTIME AS NEEDED FOR INSOMNIA        [DISCONTINUED] valACYclovir (VALTREX) 1000 mg tablet Take 1 tablet (1,000 mg) by mouth 3 times daily for 7 days 21 tablet 3

## 2024-04-26 NOTE — PATIENT INSTRUCTIONS
Thank you for choosing [facility].   I have office hours 8:00 am to 4:30 pm on Monday's, Wednesday's, Thursday's and Friday's. My nurse and I are out of the office every Tuesday.    Following your visit, when your labs and diagnostic testing have returned, I will review then and you will be contacted by my nurse.  If you are on My Chart, you can also view results there.    For refills, notify your pharmacy regarding what you need and the pharmacy will generate a refill request. Do not call my nurse as she is unable to process refill request. Please plan ahead and allow 3-5 days for refill requests.    You will generally receive a reminder call the day prior to your appointment.  If you cannot attend your appointment, please cancel your appointment with as much notice as possible.  If there is a pattern of failure to present for your appointments, I cannot provide consistent, meaningful, ongoing care for you. It is very important to me that you come in for your care, so we can best assist you with your health care needs.    IMPORTANT:  Please note that it is my standard of practice to NOT participate in prescribing ongoing requested Narcotic Analgesic therapy, and/or participate in the prescribing of other controlled substances.  My nurse and I am happy to assist you with the process of referral for alternative pain management as needed, and other treatment modalities including but not limited to:  Physical Therapy, Physical Medicine and Rehab, Counseling, Chiropractic Care, Orthopedic Care, and non-narcotic medication management.     In the event that you need to be seen for emergent concerns and I am out of office,  please see one of my colleagues for acute concerns.  You may also present to  or ER.  I appreciate the opportunity to serve you and look forward to supporting your healthcare needs in the future. Please contact me with any questions or concerns that you may  have.    Sincerely,      Kaya Servin RN, PA-C        [Follow-Up Visit] : a follow-up [Family Member] : family member [FreeTextEntry2] : lung cancer

## 2024-08-13 PROBLEM — F60.3 BORDERLINE PERSONALITY DISORDER (H): Status: ACTIVE | Noted: 2021-12-03

## 2025-04-25 NOTE — ED NOTES
Provider at bedside.   Reports a long history of gastroparesis that often causes him to experience nausea and at times vomiting  Has been following with gastroenterology however he prefers to defer this at this time due to his busy work schedule considering that he wants to focus on attending physical therapy for his neck and lower back pain  Will follow-up in future visits

## (undated) DEVICE — CANISTER SUCTION MEDI-VAC GUARDIAN 2000ML 90D 65651-220

## (undated) DEVICE — SOL NACL 0.9% INJ 1000ML BAG 2B1324X

## (undated) DEVICE — EVAC SYSTEM CLEAR FLOW SC082500

## (undated) DEVICE — KIT PATIENT POSITIONING PIGAZZI LATEX FREE 40580

## (undated) DEVICE — PACK BASIN SET UP SUTCNBSBBA

## (undated) DEVICE — GLV 8.5 BIOGEL LATEX 30485-01

## (undated) DEVICE — ESU LIGASURE MARYLAND LAPAROSCOPIC SLR/DVDR 5MMX37CM LF1937

## (undated) DEVICE — BIN-UROLOGY / CYSTO BN47

## (undated) DEVICE — BLADE CLIPPER SGL USE 9680

## (undated) DEVICE — ENDO TROCAR SHIELDED BLADED KII ADV FIX 05X100MM CFB03

## (undated) DEVICE — LABEL STERILE PREPRINTED FOR OR FRRH01-2M

## (undated) DEVICE — PREP CHLORAPREP 26ML TINTED HI-LITE ORANGE 930815

## (undated) DEVICE — CATH SELF CATH MALE 14FR 414

## (undated) DEVICE — SOL NACL 0.9% IRRIG 1000ML BOTTLE 2F7124

## (undated) DEVICE — COVER LT HANDLE 2/PK 5160-2FG

## (undated) DEVICE — TUBING INSUFFLATION INSUFFLATOR FILTER HIGH FLOW 031322-01

## (undated) DEVICE — ESU CORD MONOPOLAR 10'  E0510

## (undated) DEVICE — SUCTION STRYKERFLOW II 250-070-500

## (undated) DEVICE — PACK LAP LAVH CUSTOM SMA32LPMBG

## (undated) DEVICE — DRSG NON ADHERING 3 X 8 TELFA 1238

## (undated) DEVICE — TRAY PREP DRY SKIN SCRUB 067

## (undated) DEVICE — SOL WATER IRRIG 1000ML BOTTLE 2F7114

## (undated) DEVICE — ESU GROUND PAD ADULT W/CORD E7507

## (undated) DEVICE — SYSTEM CLEARIFY VISUALIZATION 21-345

## (undated) DEVICE — SU VICRYL 0 UR-6 27" J603H

## (undated) DEVICE — ENDO TROCAR OPTICAL ACCESS KII Z-THRD 08X100MM C0Q19

## (undated) DEVICE — SLEEVE SCD EXPRESS KNEE LENGTH MED 9529

## (undated) DEVICE — NDL INSUFFLATION 13GA 120MM C2201

## (undated) DEVICE — ENDO TROCAR SLEEVE KII ADV FIXATION 05X100MM CFS02

## (undated) DEVICE — SU DERMABOND MINI DHVM12

## (undated) DEVICE — UTERINE MANIPULATOR HUMI KRONER 6003

## (undated) RX ORDER — FENTANYL CITRATE 50 UG/ML
INJECTION, SOLUTION INTRAMUSCULAR; INTRAVENOUS
Status: DISPENSED
Start: 2023-09-06

## (undated) RX ORDER — PROPOFOL 10 MG/ML
INJECTION, EMULSION INTRAVENOUS
Status: DISPENSED
Start: 2023-09-06